# Patient Record
Sex: MALE | Race: WHITE | NOT HISPANIC OR LATINO | Employment: OTHER | URBAN - METROPOLITAN AREA
[De-identification: names, ages, dates, MRNs, and addresses within clinical notes are randomized per-mention and may not be internally consistent; named-entity substitution may affect disease eponyms.]

---

## 2017-07-01 ENCOUNTER — HOSPITAL ENCOUNTER (EMERGENCY)
Facility: MEDICAL CENTER | Age: 82
End: 2017-07-01
Attending: EMERGENCY MEDICINE
Payer: MEDICARE

## 2017-07-01 ENCOUNTER — APPOINTMENT (OUTPATIENT)
Dept: RADIOLOGY | Facility: MEDICAL CENTER | Age: 82
End: 2017-07-01
Attending: EMERGENCY MEDICINE
Payer: MEDICARE

## 2017-07-01 VITALS
BODY MASS INDEX: 25.62 KG/M2 | HEIGHT: 69 IN | RESPIRATION RATE: 18 BRPM | WEIGHT: 173 LBS | DIASTOLIC BLOOD PRESSURE: 65 MMHG | SYSTOLIC BLOOD PRESSURE: 123 MMHG | OXYGEN SATURATION: 95 % | TEMPERATURE: 97.6 F | HEART RATE: 73 BPM

## 2017-07-01 DIAGNOSIS — E86.0 DEHYDRATION: ICD-10-CM

## 2017-07-01 DIAGNOSIS — R55 NEAR SYNCOPE: ICD-10-CM

## 2017-07-01 LAB
ALBUMIN SERPL BCP-MCNC: 4 G/DL (ref 3.2–4.9)
ALBUMIN/GLOB SERPL: 1.1 G/DL
ALP SERPL-CCNC: 75 U/L (ref 30–99)
ALT SERPL-CCNC: 14 U/L (ref 2–50)
ANION GAP SERPL CALC-SCNC: 11 MMOL/L (ref 0–11.9)
AST SERPL-CCNC: 16 U/L (ref 12–45)
BASOPHILS # BLD AUTO: 0.4 % (ref 0–1.8)
BASOPHILS # BLD: 0.03 K/UL (ref 0–0.12)
BILIRUB SERPL-MCNC: 1 MG/DL (ref 0.1–1.5)
BNP SERPL-MCNC: 19 PG/ML (ref 0–100)
BUN SERPL-MCNC: 14 MG/DL (ref 8–22)
CALCIUM SERPL-MCNC: 9.4 MG/DL (ref 8.5–10.5)
CHLORIDE SERPL-SCNC: 105 MMOL/L (ref 96–112)
CO2 SERPL-SCNC: 21 MMOL/L (ref 20–33)
CREAT SERPL-MCNC: 1.12 MG/DL (ref 0.5–1.4)
EOSINOPHIL # BLD AUTO: 0.14 K/UL (ref 0–0.51)
EOSINOPHIL NFR BLD: 1.9 % (ref 0–6.9)
ERYTHROCYTE [DISTWIDTH] IN BLOOD BY AUTOMATED COUNT: 41 FL (ref 35.9–50)
GFR SERPL CREATININE-BSD FRML MDRD: >60 ML/MIN/1.73 M 2
GLOBULIN SER CALC-MCNC: 3.6 G/DL (ref 1.9–3.5)
GLUCOSE SERPL-MCNC: 116 MG/DL (ref 65–99)
HCT VFR BLD AUTO: 47.5 % (ref 42–52)
HGB BLD-MCNC: 16.1 G/DL (ref 14–18)
IMM GRANULOCYTES # BLD AUTO: 0.06 K/UL (ref 0–0.11)
IMM GRANULOCYTES NFR BLD AUTO: 0.8 % (ref 0–0.9)
LIPASE SERPL-CCNC: 18 U/L (ref 11–82)
LYMPHOCYTES # BLD AUTO: 1.59 K/UL (ref 1–4.8)
LYMPHOCYTES NFR BLD: 21.1 % (ref 22–41)
MCH RBC QN AUTO: 29.3 PG (ref 27–33)
MCHC RBC AUTO-ENTMCNC: 33.9 G/DL (ref 33.7–35.3)
MCV RBC AUTO: 86.4 FL (ref 81.4–97.8)
MONOCYTES # BLD AUTO: 0.51 K/UL (ref 0–0.85)
MONOCYTES NFR BLD AUTO: 6.8 % (ref 0–13.4)
NEUTROPHILS # BLD AUTO: 5.2 K/UL (ref 1.82–7.42)
NEUTROPHILS NFR BLD: 69 % (ref 44–72)
NRBC # BLD AUTO: 0 K/UL
NRBC BLD AUTO-RTO: 0 /100 WBC
PLATELET # BLD AUTO: 237 K/UL (ref 164–446)
PMV BLD AUTO: 10 FL (ref 9–12.9)
POTASSIUM SERPL-SCNC: 4.7 MMOL/L (ref 3.6–5.5)
PROT SERPL-MCNC: 7.6 G/DL (ref 6–8.2)
RBC # BLD AUTO: 5.5 M/UL (ref 4.7–6.1)
SODIUM SERPL-SCNC: 137 MMOL/L (ref 135–145)
TROPONIN I SERPL-MCNC: <0.01 NG/ML (ref 0–0.04)
WBC # BLD AUTO: 7.5 K/UL (ref 4.8–10.8)

## 2017-07-01 PROCEDURE — 83880 ASSAY OF NATRIURETIC PEPTIDE: CPT

## 2017-07-01 PROCEDURE — 700105 HCHG RX REV CODE 258: Performed by: EMERGENCY MEDICINE

## 2017-07-01 PROCEDURE — 96374 THER/PROPH/DIAG INJ IV PUSH: CPT

## 2017-07-01 PROCEDURE — 99284 EMERGENCY DEPT VISIT MOD MDM: CPT

## 2017-07-01 PROCEDURE — 71010 DX-CHEST-PORTABLE (1 VIEW): CPT

## 2017-07-01 PROCEDURE — 85025 COMPLETE CBC W/AUTO DIFF WBC: CPT

## 2017-07-01 PROCEDURE — 80053 COMPREHEN METABOLIC PANEL: CPT

## 2017-07-01 PROCEDURE — 84484 ASSAY OF TROPONIN QUANT: CPT

## 2017-07-01 PROCEDURE — 83690 ASSAY OF LIPASE: CPT

## 2017-07-01 PROCEDURE — 93005 ELECTROCARDIOGRAM TRACING: CPT | Performed by: EMERGENCY MEDICINE

## 2017-07-01 PROCEDURE — 700111 HCHG RX REV CODE 636 W/ 250 OVERRIDE (IP): Performed by: EMERGENCY MEDICINE

## 2017-07-01 PROCEDURE — 96361 HYDRATE IV INFUSION ADD-ON: CPT

## 2017-07-01 RX ORDER — ONDANSETRON 2 MG/ML
4 INJECTION INTRAMUSCULAR; INTRAVENOUS ONCE
Status: COMPLETED | OUTPATIENT
Start: 2017-07-01 | End: 2017-07-01

## 2017-07-01 RX ORDER — SODIUM CHLORIDE 9 MG/ML
1000 INJECTION, SOLUTION INTRAVENOUS ONCE
Status: COMPLETED | OUTPATIENT
Start: 2017-07-01 | End: 2017-07-01

## 2017-07-01 RX ADMIN — ONDANSETRON 4 MG: 2 INJECTION INTRAMUSCULAR; INTRAVENOUS at 13:02

## 2017-07-01 RX ADMIN — SODIUM CHLORIDE 1000 ML: 9 INJECTION, SOLUTION INTRAVENOUS at 13:02

## 2017-07-01 ASSESSMENT — PAIN SCALES - GENERAL: PAINLEVEL_OUTOF10: 0

## 2017-07-01 NOTE — ED NOTES
All discharge instructions given to pt . Pt verbalized understanding of all discharge instructions. All lines removed prior to discharge. All questions answered.

## 2017-07-01 NOTE — DISCHARGE INSTRUCTIONS
Dehydration, Adult  Dehydration means your body does not have as much fluid as it needs. Your kidneys, brain, and heart will not work properly without the right amount of fluids and salt.   HOME CARE  · Ask your doctor how to replace body fluid losses (rehydrate).  · Drink enough fluids to keep your pee (urine) clear or pale yellow.  · Drink small amounts of fluids often if you feel sick to your stomach (nauseous) or throw up (vomit).  · Eat like you normally do.  · Avoid:  · Foods or drinks high in sugar.  · Bubbly (carbonated) drinks.  · Juice.  · Very hot or cold fluids.  · Drinks with caffeine.  · Fatty, greasy foods.  · Alcohol.  · Tobacco.  · Eating too much.  · Gelatin desserts.  · Wash your hands to avoid spreading germs (bacteria, viruses).  · Only take medicine as told by your doctor.  · Keep all doctor visits as told.  GET HELP RIGHT AWAY IF:   · You cannot drink something without throwing up.  · You get worse even with treatment.  · Your vomit has blood in it or looks greenish.  · Your poop (stool) has blood in it or looks black and tarry.  · You have not peed in 6 to 8 hours.  · You pee a small amount of very dark pee.  · You have a fever.  · You pass out (faint).  · You have belly (abdominal) pain that gets worse or stays in one spot (localizes).  · You have a rash, stiff neck, or bad headache.  · You get easily annoyed, sleepy, or are hard to wake up.  · You feel weak, dizzy, or very thirsty.  MAKE SURE YOU:   · Understand these instructions.  · Will watch your condition.  · Will get help right away if you are not doing well or get worse.     This information is not intended to replace advice given to you by your health care provider. Make sure you discuss any questions you have with your health care provider.     Document Released: 10/14/2010 Document Revised: 03/11/2013 Document Reviewed: 08/06/2012  EventBug Interactive Patient Education ©2016 [employer]    Near-Syncope  Near-syncope (commonly  known as near fainting) is sudden weakness, dizziness, or feeling like you might pass out. During an episode of near-syncope, you may also develop pale skin, have tunnel vision, or feel sick to your stomach (nauseous). Near-syncope may occur when getting up after sitting or while standing for a long time. It is caused by a sudden decrease in blood flow to the brain. This decrease can result from various causes or triggers, most of which are not serious. However, because near-syncope can sometimes be a sign of something serious, a medical evaluation is required. The specific cause is often not determined.  HOME CARE INSTRUCTIONS   Monitor your condition for any changes. The following actions may help to alleviate any discomfort you are experiencing:  · Have someone stay with you until you feel stable.  · Lie down right away and prop your feet up if you start feeling like you might faint. Breathe deeply and steadily. Wait until all the symptoms have passed. Most of these episodes last only a few minutes. You may feel tired for several hours.    · Drink enough fluids to keep your urine clear or pale yellow.    · If you are taking blood pressure or heart medicine, get up slowly when seated or lying down. Take several minutes to sit and then stand. This can reduce dizziness.  · Follow up with your health care provider as directed.   SEEK IMMEDIATE MEDICAL CARE IF:   · You have a severe headache.    · You have unusual pain in the chest, abdomen, or back.    · You are bleeding from the mouth or rectum, or you have black or tarry stool.    · You have an irregular or very fast heartbeat.    · You have repeated fainting or have seizure-like jerking during an episode.    · You faint when sitting or lying down.    · You have confusion.    · You have difficulty walking.    · You have severe weakness.    · You have vision problems.    MAKE SURE YOU:   · Understand these instructions.  · Will watch your condition.  · Will get help  right away if you are not doing well or get worse.     This information is not intended to replace advice given to you by your health care provider. Make sure you discuss any questions you have with your health care provider.     Document Released: 12/18/2006 Document Revised: 12/23/2014 Document Reviewed: 05/23/2014  Elsevier Interactive Patient Education ©2016 Elsevier Inc.

## 2017-07-01 NOTE — ED AVS SNAPSHOT
7/1/2017    Alireza Hernandez  [address]  [place] [address]    Dear Alireza:    [facility] wants to ensure your discharge home is safe and you or your loved ones have had all of your questions answered regarding your care after you leave the hospital.    Below is a list of resources and contact information should you have any questions regarding your hospital stay, follow-up instructions, or active medical symptoms.    Questions or Concerns Regarding… Contact   Medical Questions Related to Your Discharge  (7 days a week, 8am-5pm) Contact a Nurse Care Coordinator   [phone]   Medical Questions Not Related to Your Discharge  (24 hours a day / 7 days a week)  Contact the Nurse Health Line   [phone]    Medications or Discharge Instructions Refer to your discharge packet   or contact your [facility] Primary Care Provider   [phone]   Follow-up Appointment(s) Schedule your appointment via I-Tech   or contact Scheduling [phone]   Billing Review your statement via I-Tech  or contact Billing [phone]   Medical Records Review your records via I-Tech   or contact Medical Records [phone]     You may receive a telephone call within two days of discharge. This call is to make certain you understand your discharge instructions and have the opportunity to have any questions answered. You can also easily access your medical information, test results and upcoming appointments via the I-Tech free online health management tool. You can learn more and sign up at Mydish/I-Tech. For assistance setting up your I-Tech account, please call [phone].    Once again, we want to ensure your discharge home is safe and that you have a clear understanding of any next steps in your care. If you have any questions or concerns, please do not hesitate to contact us, we are here for you. Thank you for choosing [facility] for your healthcare needs.    Sincerely,    Your [facility] Healthcare Team

## 2017-07-01 NOTE — ED AVS SNAPSHOT
EventVue Access Code: [email or web address]  Expires: 7/31/2017  2:32 PM    Your email address is not on file at [employer].  Email Addresses are required for you to sign up for EventVue, please contact [phone] to verify your personal information and to provide your email address prior to attempting to register for EventVue.    Alireza Hernandez  [address] [facility] [place] [address]    EventVue  A secure, online tool to manage your health information     [employer]’s EventVue® is a secure, online tool that connects you to your personalized health information from the privacy of your home -- day or night - making it very easy for you to manage your healthcare. Once the activation process is completed, you can even access your medical information using the EventVue lewis, which is available for free in the Apple Lewis store or Google Play store.     To learn more about EventVue, visit [email or web address]    There are two levels of access available (as shown below):   My Chart Features  [facility] Primary Care Doctor [facility]  Specialists [facility]  Urgent  Care Non-[facility] Primary Care Doctor   Email your healthcare team securely and privately 24/7 X X X    Manage appointments: schedule your next appointment; view details of past/upcoming appointments X      Request prescription refills. X      View recent personal medical records, including lab and immunizations X X X X   View health record, including health history, allergies, medications X X X X   Read reports about your outpatient visits, procedures, consult and ER notes X X X X   See your discharge summary, which is a recap of your hospital and/or ER visit that includes your diagnosis, lab results, and care plan X X  X     How to register for EventVue:  Once your e-mail address has been verified, follow the following steps to sign up for EventVue.     1. Go to  [email or web address]  2. Click on the Sign Up Now box, which takes you to the New Member Sign Up page. You  will need to provide the following information:  a. Enter your Healthcare IT Access Code exactly as it appears at the top of this page. (You will not need to use this code after you’ve completed the sign-up process. If you do not sign up before the expiration date, you must request a new code.)   b. Enter your date of birth.   c. Enter your home email address.   d. Click Submit, and follow the next screen’s instructions.  3. Create a [employer] ID. This will be your Healthcare IT login ID and cannot be changed, so think of one that is secure and easy to remember.  4. Create a Healthcare IT password. You can change your password at any time.  5. Enter your Password Reset Question and Answer. This can be used at a later time if you forget your password.   6. Enter your e-mail address. This allows you to receive e-mail notifications when new information is available in Healthcare IT.  7. Click Sign Up. You can now view your health information.    For assistance activating your Healthcare IT account, call [phone]

## 2017-07-01 NOTE — ED PROVIDER NOTES
ED Provider Note    Scribed for Shree Lowry M.D. by Annie Perdomo. 7/1/2017  12:45 PM    Primary care provider: No primary care provider   Means of arrival: EMS  History obtained from: patient   History limited by: none    CHIEF COMPLAINT  Chief Complaint   Patient presents with   • Weakness     pt reports that he was outside this morning. drank a small amount of H2O, was driving when he became, weak, nauseated, and diaphoretic. pulled over and called EMS.    • N/V     HPI  Alireza Hernandez is a 81 y.o. male with a history of atrial fibrillation who presents to the Emergency Department by EMS for weakness and nausea onset this morning prior to arrival. He was driving when he felt near-syncopal, diaphoretic, weak, and nauseated.  The patient notes some palpitations at that time as well. The patient pulled over and called 911.  The patient notes that he was outside this morning and did not drink enough water. He denies any chest pain, shortness of breath, abdominal pain, fever, chills, vomiting or diarrhea. The patient has felt normal and well today and recently, he denies any cough or cold symptoms.     REVIEW OF SYSTEMS  Pertinent positives include weakness, diaphoresis, nausea, palpitations. Pertinent negatives include no abdominal pain, chest pain, shortness of breath, fever, chills, vomiting, diarrhea, cough, cold.  All other systems reviewed and negative.C    PAST MEDICAL HISTORY    No chronic medical problems    SURGICAL HISTORY  patient denies any surgical history    SOCIAL HISTORY  Social History   Substance Use Topics   • Smoking status: No   • Smokeless tobacco: No   • Alcohol Use: No      History   Drug Use No     FAMILY HISTORY  No family history noted    CURRENT MEDICATIONS  No current facility-administered medications on file prior to encounter.     No current outpatient prescriptions on file prior to encounter.     ALLERGIES  No Known Allergies    PHYSICAL EXAM  VITAL SIGNS: /65  "mmHg  Pulse 60  Temp(Src) 36.4 °C (97.6 °F)  Resp 18  Ht 1.753 m (5' 9\")  Wt 78.472 kg (173 lb)  BMI 25.54 kg/m2  SpO2 94%    Vital signs reviewed.  Constitutional:  Alert and in no acute distress  Head: Normocephalic and atraumatic   Mouth/Throat: Oropharynx dry   Neck: Supple  Cardiovascular: Regular rate and rhythm   Pulmonary/Chest: Clear breath sounds bilaterally   Abdominal: Soft, non tender, normal bowel sounds  Musculoskeletal: No deformities or tenderness   Lymphadenopathy: No lymphadenopathy   Neurological: Patient is alert and oriented to person, place, and time. CNs II - XII intact. DTRs intact. Normal sensation and strength.  Skin: Warm, dry, slightly pale  Psychiatric: Normal affect    LABS  Results for orders placed or performed during the hospital encounter of 07/01/17   CBC with Differential   Result Value Ref Range    WBC 7.5 4.8 - 10.8 K/uL    RBC 5.50 4.70 - 6.10 M/uL    Hemoglobin 16.1 14.0 - 18.0 g/dL    Hematocrit 47.5 42.0 - 52.0 %    MCV 86.4 81.4 - 97.8 fL    MCH 29.3 27.0 - 33.0 pg    MCHC 33.9 33.7 - 35.3 g/dL    RDW 41.0 35.9 - 50.0 fL    Platelet Count 237 164 - 446 K/uL    MPV 10.0 9.0 - 12.9 fL    Neutrophils-Polys 69.00 44.00 - 72.00 %    Lymphocytes 21.10 (L) 22.00 - 41.00 %    Monocytes 6.80 0.00 - 13.40 %    Eosinophils 1.90 0.00 - 6.90 %    Basophils 0.40 0.00 - 1.80 %    Immature Granulocytes 0.80 0.00 - 0.90 %    Nucleated RBC 0.00 /100 WBC    Neutrophils (Absolute) 5.20 1.82 - 7.42 K/uL    Lymphs (Absolute) 1.59 1.00 - 4.80 K/uL    Monos (Absolute) 0.51 0.00 - 0.85 K/uL    Eos (Absolute) 0.14 0.00 - 0.51 K/uL    Baso (Absolute) 0.03 0.00 - 0.12 K/uL    Immature Granulocytes (abs) 0.06 0.00 - 0.11 K/uL    NRBC (Absolute) 0.00 K/uL   Complete Metabolic Panel (CMP)   Result Value Ref Range    Sodium 137 135 - 145 mmol/L    Potassium 4.7 3.6 - 5.5 mmol/L    Chloride 105 96 - 112 mmol/L    Co2 21 20 - 33 mmol/L    Anion Gap 11.0 0.0 - 11.9    Glucose 116 (H) 65 - 99 mg/dL    " Bun 14 8 - 22 mg/dL    Creatinine 1.12 0.50 - 1.40 mg/dL    Calcium 9.4 8.5 - 10.5 mg/dL    AST(SGOT) 16 12 - 45 U/L    ALT(SGPT) 14 2 - 50 U/L    Alkaline Phosphatase 75 30 - 99 U/L    Total Bilirubin 1.0 0.1 - 1.5 mg/dL    Albumin 4.0 3.2 - 4.9 g/dL    Total Protein 7.6 6.0 - 8.2 g/dL    Globulin 3.6 (H) 1.9 - 3.5 g/dL    A-G Ratio 1.1 g/dL   Btype Natriuretic Peptide (BNP)   Result Value Ref Range    B Natriuretic Peptide 19 0 - 100 pg/mL   Lipase   Result Value Ref Range    Lipase 18 11 - 82 U/L   Troponin STAT   Result Value Ref Range    Troponin I <0.01 0.00 - 0.04 ng/mL   ESTIMATED GFR   Result Value Ref Range    GFR If African American >60 >60 mL/min/1.73 m 2    GFR If Non African American >60 >60 mL/min/1.73 m 2   EKG (ER)   Result Value Ref Range    Report       [facility] Emergency Dept.    Test Date:  2017  Pt Name:    RODERICK MILLS                 Department: ER  MRN:        [id number]                      Room:       [facility]  Gender:     M                            Technician: [id number]  :        [id number]                   Requested By:SHONNA COLES  Order #:    [id number]                    Reading MD:    Measurements  Intervals                                Axis  Rate:       61                           P:          22  ID:         [id number]                          QRS:        4  QRSD:       100                          T:          47  QT:         420  QTc:        423    Interpretive Statements  SINUS RHYTHM  FIRST DEGREE AV BLOCK  No previous ECG available for comparison     All labs reviewed by me.    EKG  12 Lead EKG interpreted by me to show sinus rhythm at 60. Normal P waves. Normal QRS. First degree AV block. Yumiko R wave progression Normal ST segments. Normal T waves. Abnormal EKG    RADIOLOGY  No orders to display     The radiologist's interpretation of all radiological studies have been reviewed by me.    COURSE & MEDICAL DECISION MAKING  Pertinent Labs & Imaging studies  "reviewed. (See chart for details) The patient's [facility] Nursing and past medical  records were reviewed    Last visit 2004 for Cardiology     12:45 PM - Patient seen and examined at bedside. Patient will be treated with NS infusion 1000mL for ibuprofen, Zofran 4mg IV. Ordered chest xray, CBC with differential, CMP, BNP, lipase, troponin, estimated GFR, EKG to evaluate his symptoms. The differential diagnoses include but are not limited to: vasovagal episode, dehydration, electrolyte abnormality, heat exhaustion his blood work is really quite unremarkable. I suspected a near syncopal episode secondary to dehydration. Patient was given IV fluids and is feeling much improved. He was discharged home in stable condition.    2:31 PM - Re-examined; The patient is resting in bed comfortably. I discussed his above findings and plans for discharge. He was given a referral to follow up with his primary care provider and instructed to return to the ED if his symptoms worsen. Patient understands and agrees. His vitals prior to discharge are: /65 mmHg  Pulse 66  Temp(Src) 36.4 °C (97.6 °F)  Resp 18  Ht 1.753 m (5' 9\")  Wt 78.472 kg (173 lb)  BMI 25.54 kg/m2  SpO2 97%     The patient will return for new or worsening symptoms and is stable at the time of discharge.    The patient is referred to a primary physician for blood pressure management, diabetic screening, and for all other preventative health concerns.    DISPOSITION:  Patient will be discharged home in stable condition.    FOLLOW UP:  [facility], Emergency Dept  [address] [facility] [address] [phone]  In 1 day  As needed, If symptoms worsen    OUTPATIENT MEDICATIONS:  There are no discharge medications for this patient.    FINAL IMPRESSION  1. Dehydration    2. Near syncope          I, Annie Perdomo (Scriberrol), am scribing for, and in the presence of, Shree Lowry M.D..    Electronically signed by: Annie " DAMON Perdomo (Scribe), 7/1/2017    IShree M.D. personally performed the services described in this documentation, as scribed by Annie Perdomo in my presence, and it is both accurate and complete.    The note accurately reflects work and decisions made by me.  Shree Lowry  7/1/2017  2:44 PM

## 2017-07-01 NOTE — ED AVS SNAPSHOT
Home Care Instructions                                                                                                                Alireza Hernandez   MRN: [id number]    Department:  [facility], Emergency Dept   Date of Visit:  7/1/2017            [facility], Emergency Dept    [address] [place] [id number]    Phone:  [phone]      You were seen by     Shree Lowry M.D.      Your Diagnosis Was     Dehydration     E86.0       These are the medications you received during your hospitalization from 07/01/2017 1229 to 07/01/2017 1432     Date/Time Order Dose Route Action    07/01/2017 1302 NS infusion 1,000 mL 1,000 mL Intravenous New Bag    07/01/2017 1302 ondansetron (ZOFRAN) syringe/vial injection 4 mg 4 mg Intravenous Given      Follow-up Information     1. Follow up with [facility], Emergency Dept In 1 day.    Specialty:  Emergency Medicine    Why:  As needed, If symptoms worsen    Contact information    [address] [place] [id number] [phone]      Medication Information     Review all of your home medications and newly ordered medications with your primary doctor and/or pharmacist as soon as possible. Follow medication instructions as directed by your doctor and/or pharmacist.     Please keep your complete medication list with you and share with your physician. Update the information when medications are discontinued, doses are changed, or new medications (including over-the-counter products) are added; and carry medication information at all times in the event of emergency situations.               Medication List      Notice     You have not been prescribed any medications.            Procedures and tests performed during your visit     Procedure/Test Number of Times Performed    Btype Natriuretic Peptide (BNP) 1    CBC with Differential 1    Complete Metabolic Panel (CMP) 1    DX-CHEST-PORTABLE (1 VIEW) 1    EKG (ER) 2    ESTIMATED GFR 1    IV Saline Lock 1    Lipase 1    Troponin STAT 1        Discharge Instructions       Dehydration, Adult  Dehydration means your body does not have as much fluid as it needs. Your kidneys, brain, and heart will not work properly without the right amount of fluids and salt.   HOME CARE  · Ask your doctor how to replace body fluid losses (rehydrate).  · Drink enough fluids to keep your pee (urine) clear or pale yellow.  · Drink small amounts of fluids often if you feel sick to your stomach (nauseous) or throw up (vomit).  · Eat like you normally do.  · Avoid:  · Foods or drinks high in sugar.  · Bubbly (carbonated) drinks.  · Juice.  · Very hot or cold fluids.  · Drinks with caffeine.  · Fatty, greasy foods.  · Alcohol.  · Tobacco.  · Eating too much.  · Gelatin desserts.  · Wash your hands to avoid spreading germs (bacteria, viruses).  · Only take medicine as told by your doctor.  · Keep all doctor visits as told.  GET HELP RIGHT AWAY IF:   · You cannot drink something without throwing up.  · You get worse even with treatment.  · Your vomit has blood in it or looks greenish.  · Your poop (stool) has blood in it or looks black and tarry.  · You have not peed in 6 to 8 hours.  · You pee a small amount of very dark pee.  · You have a fever.  · You pass out (faint).  · You have belly (abdominal) pain that gets worse or stays in one spot (localizes).  · You have a rash, stiff neck, or bad headache.  · You get easily annoyed, sleepy, or are hard to wake up.  · You feel weak, dizzy, or very thirsty.  MAKE SURE YOU:   · Understand these instructions.  · Will watch your condition.  · Will get help right away if you are not doing well or get worse.     This information is not intended to replace advice given to you by your health care provider. Make sure you discuss any questions you have with your health care provider.     Document Released: 10/14/2010 Document Revised: 03/11/2013 Document Reviewed:  08/06/2012  [employer] Interactive Patient Education ©2016 [employer]    Near-Syncope  Near-syncope (commonly known as near fainting) is sudden weakness, dizziness, or feeling like you might pass out. During an episode of near-syncope, you may also develop pale skin, have tunnel vision, or feel sick to your stomach (nauseous). Near-syncope may occur when getting up after sitting or while standing for a long time. It is caused by a sudden decrease in blood flow to the brain. This decrease can result from various causes or triggers, most of which are not serious. However, because near-syncope can sometimes be a sign of something serious, a medical evaluation is required. The specific cause is often not determined.  HOME CARE INSTRUCTIONS   Monitor your condition for any changes. The following actions may help to alleviate any discomfort you are experiencing:  · Have someone stay with you until you feel stable.  · Lie down right away and prop your feet up if you start feeling like you might faint. Breathe deeply and steadily. Wait until all the symptoms have passed. Most of these episodes last only a few minutes. You may feel tired for several hours.    · Drink enough fluids to keep your urine clear or pale yellow.    · If you are taking blood pressure or heart medicine, get up slowly when seated or lying down. Take several minutes to sit and then stand. This can reduce dizziness.  · Follow up with your health care provider as directed.   SEEK IMMEDIATE MEDICAL CARE IF:   · You have a severe headache.    · You have unusual pain in the chest, abdomen, or back.    · You are bleeding from the mouth or rectum, or you have black or tarry stool.    · You have an irregular or very fast heartbeat.    · You have repeated fainting or have seizure-like jerking during an episode.    · You faint when sitting or lying down.    · You have confusion.    · You have difficulty walking.    · You have severe weakness.    · You have  vision problems.    MAKE SURE YOU:   · Understand these instructions.  · Will watch your condition.  · Will get help right away if you are not doing well or get worse.     This information is not intended to replace advice given to you by your health care provider. Make sure you discuss any questions you have with your health care provider.     Document Released: 12/18/2006 Document Revised: 12/23/2014 Document Reviewed: 05/23/2014  [employer] Interactive Patient Education ©2016 [employer]            Patient Information     Patient Information    Following emergency treatment: all patient requiring follow-up care must return either to a private physician or a clinic if your condition worsens before you are able to obtain further medical attention, please return to the emergency room.     Billing Information    At [facility], we work to make the billing process streamlined for our patients.  Our Representatives are here to answer any questions you may have regarding your hospital bill.  If you have insurance coverage and have supplied your insurance information to us, we will submit a claim to your insurer on your behalf.  Should you have any questions regarding your bill, we can be reached online or by phone as follows:  Online: You are able pay your bills online or live chat with our representatives about any billing questions you may have. We are here to help Monday - Friday from 8:00am to 7:30pm and 9:00am - 12:00pm on Saturdays.  Please visit [email or web address]  for more information.   Phone:  [phone] or [phone]    Please note that your emergency physician, surgeon, pathologist, radiologist, anesthesiologist, and other specialists are not employed by [facility] and will therefore bill separately for their services.  Please contact them directly for any questions concerning their bills at the numbers below:     Emergency Physician Services:  [phone]  [facility]:  [phone]  [facility]:  [phone]  [facility]:  [phone]    1. Your final bill may vary from the amount quoted upon discharge if all procedures are not complete at that time, or if your doctor has additional procedures of which we are not aware. You will receive an additional bill if you return to the Emergency Department at [facility] for suture removal regardless of the facility of which the sutures were placed.     2. Please arrange for settlement of this account at the emergency registration.    3. All self-pay accounts are due in full at the time of treatment.  If you are unable to meet this obligation then payment is expected within 4-5 days.     4. If you have had radiology studies (CT, X-ray, Ultrasound, MRI), you have received a preliminary result during your emergency department visit. Please contact the radiology department [phone] to receive a copy of your final result. Please discuss the Final result with your primary physician or with the follow up physician provided.     Crisis Hotline:  [place] Crisis Hotline:  8-625-MMZOHLV or 1-152.102.7675  [place] Crisis Hotline:    1-279.556.9534 or 232-890-2569         ED Discharge Follow Up Questions    1. In order to provide you with very good care, we would like to follow up with a phone call in the next few days.  May we have your permission to contact you?     YES /  NO    2. What is the best phone number to call you? (       )_____-__________    3. What is the best time to call you?      Morning  /  Afternoon  /  Evening                   Patient Signature:  ____________________________________________________________    Date:  ____________________________________________________________

## 2017-07-04 LAB — EKG IMPRESSION: NORMAL

## 2020-08-26 NOTE — ED NOTES
Chief Complaint   Patient presents with   • Weakness     pt reports that he was outside this morning. drank a small amount of H2O, was driving when he became, weak, nauseated, and diaphoretic. pulled over and called EMS.    • N/V     Pt received 4mg zofran PTA for nausea. Per EMS pt had near syncope in the car.   Pt placed on monitor, chart up for ERP.    8

## 2021-01-14 DIAGNOSIS — Z23 NEED FOR VACCINATION: ICD-10-CM

## 2021-12-16 ENCOUNTER — RX ONLY (OUTPATIENT)
Age: 86
Setting detail: RX ONLY
End: 2021-12-16

## 2021-12-16 RX ORDER — AMOXICILLIN 500 MG/1
CAPSULE ORAL
Qty: 4 | Refills: 0 | Status: ERX | COMMUNITY
Start: 2021-12-15

## 2021-12-29 ENCOUNTER — APPOINTMENT (RX ONLY)
Dept: URBAN - METROPOLITAN AREA CLINIC 36 | Facility: CLINIC | Age: 86
Setting detail: DERMATOLOGY
End: 2021-12-29

## 2021-12-29 PROBLEM — C44.321 SQUAMOUS CELL CARCINOMA OF SKIN OF NOSE: Status: ACTIVE | Noted: 2021-12-29

## 2021-12-29 PROCEDURE — ? MOHS SURGERY

## 2021-12-29 PROCEDURE — ? PRESCRIPTION

## 2021-12-29 PROCEDURE — 14060 TIS TRNFR E/N/E/L 10 SQ CM/<: CPT

## 2021-12-29 PROCEDURE — 17311 MOHS 1 STAGE H/N/HF/G: CPT

## 2021-12-29 PROCEDURE — 17312 MOHS ADDL STAGE: CPT

## 2021-12-29 RX ORDER — CEPHALEXIN 500 MG/1
1 TABLET ORAL TID
Qty: 30 | Refills: 0 | Status: ERX | COMMUNITY
Start: 2021-12-29

## 2021-12-29 RX ADMIN — CEPHALEXIN 1: 500 TABLET ORAL at 00:00

## 2021-12-29 NOTE — PROCEDURE: MIPS QUALITY
Quality 431: Preventive Care And Screening: Unhealthy Alcohol Use - Screening: Patient not identified as an unhealthy alcohol user when screened for unhealthy alcohol use using a systematic screening method
Detail Level: Detailed
Quality 226: Preventive Care And Screening: Tobacco Use: Screening And Cessation Intervention: Patient screened for tobacco use and is an ex/non-smoker
Quality 130: Documentation Of Current Medications In The Medical Record: Current Medications Documented
Quality 265: Biopsy Follow-Up: Biopsy results reviewed, communicated, tracked, and documented
Quality 111:Pneumonia Vaccination Status For Older Adults: Pneumococcal Vaccination Previously Received

## 2021-12-29 NOTE — HPI: PROCEDURE (MOHS)
Has The Growth Been Previously Biopsied?: has been previously biopsied
Body Location Override (Optional): nose

## 2021-12-29 NOTE — PROCEDURE: MOHS SURGERY
Stage 2: Additional Anesthesia Type: 1% lidocaine with epinephrine
Oculoplastic Surgeon Procedure Text (C): After obtaining clear surgical margins the patient was sent to oculoplastics for surgical repair.  The patient understands they will receive post-surgical care and follow-up from the referring physician's office.
S Plasty Text: Given the location and shape of the defect, and the orientation of relaxed skin tension lines, an S-plasty was deemed most appropriate for repair.  Using a sterile surgical marker, the appropriate outline of the S-plasty was drawn, incorporating the defect and placing the expected incisions within the relaxed skin tension lines where possible.  The area thus outlined was incised deep to adipose tissue with a #15 scalpel blade.  The skin margins were undermined to an appropriate distance in all directions utilizing iris scissors. The skin flaps were advanced over the defect.  The opposing margins were then approximated with interrupted buried subcutaneous sutures.
Quadrants Reporting?: 0
Undermining Type: Entire Wound
Double M-Plasty Intermediate Repair Preamble Text (Leave Blank If You Do Not Want): Undermining was performed with blunt dissection.
Biopsy Photograph Reviewed: No (no photograph available)
Plastic Surgeon Procedure Text (B): After obtaining clear surgical margins the patient was sent to plastics for surgical repair.  The patient understands they will receive post-surgical care and follow-up from the referring physician's office.
Graft Donor Site Epidermal Sutures (Optional): 5-0 Surgipro
Repair Performed By Another Provider Text (Leave Blank If You Do Not Want): After obtaining clear surgical margins the defect was repaired by another provider.
Closure 4 Information: This tab is for additional flaps and grafts above and beyond our usual structured repairs.  Please note if you enter information here it will not currently bill and you will need to add the billing information manually.
Suture Removal: 14 days
Complex Requirements: Extensive Undermining Performed?: No
Mercedes Flap Text: The defect edges were debeveled with a #15 scalpel blade.  Given the location of the defect, shape of the defect and the proximity to free margins a Mercedes flap was deemed most appropriate.  Using a sterile surgical marker, an appropriate advancement flap was drawn incorporating the defect and placing the expected incisions within the relaxed skin tension lines where possible. The area thus outlined was incised deep to adipose tissue with a #15 scalpel blade.  The skin margins were undermined to an appropriate distance in all directions utilizing iris scissors.
Referred To Asc For Closure Text (Leave Blank If You Do Not Want): After obtaining clear surgical margins the patient was sent to an ASC for surgical repair.  The patient understands they will receive post-surgical care and follow-up from the ASC physician.
Show Special Stain Variables In The Stage Tabs: Yes
O-Z Plasty Text: The defect edges were debeveled with a #15 scalpel blade.  Given the location of the defect, shape of the defect and the proximity to free margins an O-Z plasty (double transposition flap) was deemed most appropriate.  Using a sterile surgical marker, the appropriate transposition flaps were drawn incorporating the defect and placing the expected incisions within the relaxed skin tension lines where possible.    The area thus outlined was incised deep to adipose tissue with a #15 scalpel blade.  The skin margins were undermined to an appropriate distance in all directions utilizing iris scissors.  Hemostasis was achieved with electrocautery.  The flaps were then transposed into place, one clockwise and the other counterclockwise, and anchored with interrupted buried subcutaneous sutures.
Otolaryngologist Procedure Text (D): After obtaining clear surgical margins the patient was sent to otolaryngology for surgical repair.  The patient understands they will receive post-surgical care and follow-up from the referring physician's office.
Why Was The Change Made?: Please Select the Appropriate Response
Localized Dermabrasion With Wire Brush Text: The patient was draped in routine manner.  Localized dermabrasion using 3 x 17 mm wire brush was performed in routine manner to papillary dermis. This spot dermabrasion is being performed to complete skin cancer reconstruction. It also will eliminate the other sun damaged precancerous cells that are known to be part of the regional effect of a lifetime's worth of sun exposure. This localized dermabrasion is therapeutic and should not be considered cosmetic in any regard.
Partial Purse String (Intermediate) Text: Given the location of the defect and the characteristics of the surrounding skin an intermediate purse string closure was deemed most appropriate.  Undermining was performed circumfirentially around the surgical defect.  A purse string suture was then placed and tightened. Wound tension only allowed a partial closure of the circular defect.
Purse String (Intermediate) Text: Given the location of the defect and the characteristics of the surrounding skin a purse string intermediate closure was deemed most appropriate.  Undermining was performed circumfirentially around the surgical defect.  A purse string suture was then placed and tightened.
Area H Indication Text: Tumors in this location are included in Area H (eyelids, eyebrows, nose, lips, chin, ear, pre-auricular, post-auricular, temple, genitalia, hands, feet, ankles and areola).  Tissue conservation is critical in these anatomic locations.
Consent (Near Eyelid Margin)/Introductory Paragraph: The rationale for Mohs was explained to the patient and consent was obtained. The risks, benefits and alternatives to therapy were discussed in detail. Specifically, the risks of ectropion or eyelid deformity, infection, scarring, bleeding, prolonged wound healing, incomplete removal, allergy to anesthesia, nerve injury and recurrence were addressed. Prior to the procedure, the treatment site was clearly identified and confirmed by the patient. All components of Universal Protocol/PAUSE Rule completed.
Posterior Auricular Interpolation Flap Text: A decision was made to reconstruct the defect utilizing an interpolation axial flap and a staged reconstruction.  A telfa template was made of the defect.  This telfa template was then used to outline the posterior auricular interpolation flap.  The donor area for the pedicle flap was then injected with anesthesia.  The flap was excised through the skin and subcutaneous tissue down to the layer of the underlying musculature.  The pedicle flap was carefully excised within this deep plane to maintain its blood supply.  The edges of the donor site were undermined.   The donor site was closed in a primary fashion.  The pedicle was then rotated into position and sutured.  Once the tube was sutured into place, adequate blood supply was confirmed with blanching and refill.  The pedicle was then wrapped with xeroform gauze and dressed appropriately with a telfa and gauze bandage to ensure continued blood supply and protect the attached pedicle.
Bcc Infiltrative Histology Text: There were numerous aggregates of basaloid cells demonstrating an infiltrative pattern.
Paramedian Forehead Flap Text: A decision was made to reconstruct the defect utilizing an interpolation axial flap and a staged reconstruction.  A telfa template was made of the defect.  This telfa template was then used to outline the paramedian forehead pedicle flap.  The donor area for the pedicle flap was then injected with anesthesia.  The flap was excised through the skin and subcutaneous tissue down to the layer of the underlying musculature.  The pedicle flap was carefully excised within this deep plane to maintain its blood supply.  The edges of the donor site were undermined.   The donor site was closed in a primary fashion.  The pedicle was then rotated into position and sutured.  Once the tube was sutured into place, adequate blood supply was confirmed with blanching and refill.  The pedicle was then wrapped with xeroform gauze and dressed appropriately with a telfa and gauze bandage to ensure continued blood supply and protect the attached pedicle.
Interpolation Flap Text: A decision was made to reconstruct the defect utilizing an interpolation axial flap and a staged reconstruction.  A telfa template was made of the defect.  This telfa template was then used to outline the interpolation flap.  The donor area for the pedicle flap was then injected with anesthesia.  The flap was excised through the skin and subcutaneous tissue down to the layer of the underlying musculature.  The interpolation flap was carefully excised within this deep plane to maintain its blood supply.  The edges of the donor site were undermined.   The donor site was closed in a primary fashion.  The pedicle was then rotated into position and sutured.  Once the tube was sutured into place, adequate blood supply was confirmed with blanching and refill.  The pedicle was then wrapped with xeroform gauze and dressed appropriately with a telfa and gauze bandage to ensure continued blood supply and protect the attached pedicle.
Mohs Method Verbiage: An incision at a 45 degree angle following the standard Mohs approach was done and the specimen was harvested as a microscopic controlled layer.
Closure 2 Information: This tab is for additional flaps and grafts, including complex repair and grafts and complex repair and flaps. You can also specify a different location for the additional defect, if the location is the same you do not need to select a new one. We will insert the automated text for the repair you select below just as we do for solitary flaps and grafts. Please note that at this time if you select a location with a different insurance zone you will need to override the ICD10 and CPT if appropriate.
Consent (Spinal Accessory)/Introductory Paragraph: The rationale for Mohs was explained to the patient and consent was obtained. The risks, benefits and alternatives to therapy were discussed in detail. Specifically, the risks of damage to the spinal accessory nerve, infection, scarring, bleeding, prolonged wound healing, incomplete removal, allergy to anesthesia, and recurrence were addressed. Prior to the procedure, the treatment site was clearly identified and confirmed by the patient. All components of Universal Protocol/PAUSE Rule completed.
M-Plasty Complex Repair Preamble Text (Leave Blank If You Do Not Want): Extensive wide undermining was performed.
X Size Of Lesion In Cm (Optional): 1.1
Rhombic Flap Text: The defect edges were debeveled with a #15 scalpel blade.  Given the location of the defect and the proximity to free margins a rhombic flap was deemed most appropriate.  Using a sterile surgical marker, an appropriate rhombic flap was drawn incorporating the defect.    The area thus outlined was incised deep to adipose tissue with a #15 scalpel blade.  The skin margins were undermined to an appropriate distance in all directions utilizing iris scissors.
Stage 2: Number Of Blocks?: 1
Nostril Rim Text: The closure involved the nostril rim.
Subsequent Stages Histo Method Verbiage: Using a similar technique to that described above, a thin layer of tissue was removed from all areas where tumor was visible on the previous stage.  The tissue was again oriented, mapped, dyed, and processed as above.
Xenograft Text: The defect edges were debeveled with a #15 scalpel blade.  Given the location of the defect, shape of the defect and the proximity to free margins a xenograft was deemed most appropriate.  The graft was then trimmed to fit the size of the defect.  The graft was then placed in the primary defect and oriented appropriately.
Tarsorrhaphy Text: A tarsorrhaphy was performed using Frost sutures.
Island Pedicle Flap-Requiring Vessel Identification Text: The defect edges were debeveled with a #15 scalpel blade.  Given the location of the defect, shape of the defect and the proximity to free margins an island pedicle advancement flap was deemed most appropriate.  Using a sterile surgical marker, an appropriate advancement flap was drawn, based on the axial vessel mentioned above, incorporating the defect, outlining the appropriate donor tissue and placing the expected incisions within the relaxed skin tension lines where possible.    The area thus outlined was incised deep to adipose tissue with a #15 scalpel blade.  The skin margins were undermined to an appropriate distance in all directions around the primary defect and laterally outward around the island pedicle utilizing iris scissors.  There was minimal undermining beneath the pedicle flap.
Complex Repair And Flap Additional Text (Will Appearing After The Standard Complex Repair Text): The complex repair was not sufficient to completely close the primary defect. The remaining additional defect was repaired with the flap mentioned below.
Complex Repair And Graft Additional Text (Will Appearing After The Standard Complex Repair Text): The complex repair was not sufficient to completely close the primary defect. The remaining additional defect was repaired with the graft mentioned below.
Special Stains Stage 8 - Results: Base On Clearance Noted Above
Dermal Autograft Text: The defect edges were debeveled with a #15 scalpel blade.  Given the location of the defect, shape of the defect and the proximity to free margins a dermal autograft was deemed most appropriate.  Using a sterile surgical marker, the primary defect shape was transferred to the donor site. The area thus outlined was incised deep to adipose tissue with a #15 scalpel blade.  The harvested graft was then trimmed of adipose and epidermal tissue until only dermis was left.  The skin graft was then placed in the primary defect and oriented appropriately.
Ear Wedge Repair Text: A wedge excision was completed by carrying down an excision through the full thickness of the ear and cartilage with an inward facing Burow's triangle. The wound was then closed in a layered fashion.
Skin Substitute Text: The defect edges were debeveled with a #15 scalpel blade.  Given the location of the defect, shape of the defect and the proximity to free margins a skin substitute graft was deemed most appropriate.  The graft material was trimmed to fit the size of the defect. The graft was then placed in the primary defect and oriented appropriately.
Depth Of Tumor Invasion (For Histology): dermis
Graft Donor Site Dermal Sutures (Optional): 5-0 Polysorb
Mauc Instructions: By selecting yes to the question below the MAUC number will be added into the note.  This will be calculated automatically based on the diagnosis chosen, the size entered, the body zone selected (H,M,L) and the specific indications you chose. You will also have the option to override the Mohs AUC if you disagree with the automatically calculated number and this option is found in the Case Summary tab.
Cartilage Graft Text: The defect edges were debeveled with a #15 scalpel blade.  Given the location of the defect, shape of the defect, the fact the defect involved a full thickness cartilage defect a cartilage graft was deemed most appropriate.  An appropriate donor site was identified, cleansed, and anesthetized. The cartilage graft was then harvested and transferred to the recipient site, oriented appropriately and then sutured into place.  The secondary defect was then repaired using a primary closure.
Advancement Flap (Double) Text: The defect edges were debeveled with a #15 scalpel blade.  Given the location of the defect and the proximity to free margins a double advancement flap was deemed most appropriate.  Using a sterile surgical marker, the appropriate advancement flaps were drawn incorporating the defect and placing the expected incisions within the relaxed skin tension lines where possible.    The area thus outlined was incised deep to adipose tissue with a #15 scalpel blade.  The skin margins were undermined to an appropriate distance in all directions utilizing iris scissors.
Full Thickness Lip Wedge Repair (Flap) Text: Given the location of the defect and the proximity to free margins a full thickness wedge repair was deemed most appropriate.  Using a sterile surgical marker, the appropriate repair was drawn incorporating the defect and placing the expected incisions perpendicular to the vermilion border.  The vermilion border was also meticulously outlined to ensure appropriate reapproximation during the repair.  The area thus outlined was incised through and through with a #15 scalpel blade.  The muscularis and dermis were reaproximated with deep sutures following hemostasis. Care was taken to realign the vermilion border before proceeding with the superficial closure.  Once the vermilion was realigned the superfical and mucosal closure was finished.
Mid-Level Procedure Text (F): After obtaining clear surgical margins the patient was sent to a mid-level provider for surgical repair.  The patient understands they will receive post-surgical care and follow-up from the mid-level provider.
Nasal Turnover Hinge Flap Text: The defect edges were debeveled with a #15 scalpel blade.  Given the size, depth, location of the defect and the defect being full thickness a nasal turnover hinge flap was deemed most appropriate.  Using a sterile surgical marker, an appropriate hinge flap was drawn incorporating the defect. The area thus outlined was incised with a #15 scalpel blade. The flap was designed to recreate the nasal mucosal lining and the alar rim. The skin margins were undermined to an appropriate distance in all directions utilizing iris scissors.
Burow's Advancement Flap Text: The defect edges were debeveled with a #15 scalpel blade.  Given the location of the defect and the proximity to free margins a Burow's advancement flap was deemed most appropriate.  Using a sterile surgical marker, the appropriate advancement flap was drawn incorporating the defect and placing the expected incisions within the relaxed skin tension lines where possible.    The area thus outlined was incised deep to adipose tissue with a #15 scalpel blade.  The skin margins were undermined to an appropriate distance in all directions utilizing iris scissors.
Surgeon Performing Repair (Optional): Severino Patel MD
Peng Advancement Flap Text: The defect edges were debeveled with a #15 scalpel blade.  Given the location of the defect, shape of the defect and the proximity to free margins a Peng advancement flap was deemed most appropriate.  Using a sterile surgical marker, an appropriate advancement flap was drawn incorporating the defect and placing the expected incisions within the relaxed skin tension lines where possible. The area thus outlined was incised deep to adipose tissue with a #15 scalpel blade.  The skin margins were undermined to an appropriate distance in all directions utilizing iris scissors.
Alternatives Discussed Intro (Do Not Add Period): I discussed alternative treatments to Mohs surgery and specifically discussed the risks and benefits of
Island Pedicle Flap Text: The defect edges were debeveled with a #15 scalpel blade.  Given the location of the defect, shape of the defect and the proximity to free margins an island pedicle advancement flap was deemed most appropriate.  Using a sterile surgical marker, an appropriate advancement flap was drawn incorporating the defect, outlining the appropriate donor tissue and placing the expected incisions within the relaxed skin tension lines where possible.    The area thus outlined was incised deep to adipose tissue with a #15 scalpel blade.  The skin margins were undermined to an appropriate distance in all directions around the primary defect and laterally outward around the island pedicle utilizing iris scissors.  There was minimal undermining beneath the pedicle flap.
Trilobed Flap Text: The defect edges were debeveled with a #15 scalpel blade.  Given the location of the defect and the proximity to free margins a trilobed flap was deemed most appropriate.  Using a sterile surgical marker, an appropriate trilobed flap drawn around the defect.    The area thus outlined was incised deep to adipose tissue with a #15 scalpel blade.  The skin margins were undermined to an appropriate distance in all directions utilizing iris scissors.
Melolabial Interpolation Flap Text: A decision was made to reconstruct the defect utilizing an interpolation axial flap and a staged reconstruction.  A telfa template was made of the defect.  This telfa template was then used to outline the melolabial interpolation flap.  The donor area for the pedicle flap was then injected with anesthesia.  The flap was excised through the skin and subcutaneous tissue down to the layer of the underlying musculature.  The pedicle flap was carefully excised within this deep plane to maintain its blood supply.  The edges of the donor site were undermined.   The donor site was closed in a primary fashion.  The pedicle was then rotated into position and sutured.  Once the tube was sutured into place, adequate blood supply was confirmed with blanching and refill.  The pedicle was then wrapped with xeroform gauze and dressed appropriately with a telfa and gauze bandage to ensure continued blood supply and protect the attached pedicle.
Purse String (Simple) Text: Given the location of the defect and the characteristics of the surrounding skin a purse string closure was deemed most appropriate.  Undermining was performed circumfirentially around the surgical defect.  A purse string suture was then placed and tightened.
Helical Rim Text: The closure involved the helical rim.
Area M Indication Text: Tumors in this location are included in Area M (cheek, forehead, scalp, neck, jawline and pretibial skin).  Mohs surgery is indicated for tumors in these anatomic locations.
Detail Level: Detailed
Wound Care: Vaseline
Graft Cartilage Fenestration Text: The cartilage was fenestrated with a 2mm punch biopsy to help facilitate graft survival and healing.
Crescentic Advancement Flap Text: The defect edges were debeveled with a #15 scalpel blade.  Given the location of the defect and the proximity to free margins a crescentic advancement flap was deemed most appropriate.  Using a sterile surgical marker, the appropriate advancement flap was drawn incorporating the defect and placing the expected incisions within the relaxed skin tension lines where possible.    The area thus outlined was incised deep to adipose tissue with a #15 scalpel blade.  The skin margins were undermined to an appropriate distance in all directions utilizing iris scissors.
Ear Star Wedge Flap Text: The defect edges were debeveled with a #15 blade scalpel.  Given the location of the defect and the proximity to free margins (helical rim) an ear star wedge flap was deemed most appropriate.  Using a sterile surgical marker, the appropriate flap was drawn incorporating the defect and placing the expected incisions between the helical rim and antihelix where possible.  The area thus outlined was incised through and through with a #15 scalpel blade.
Body Location Override (Optional - Billing Will Still Be Based On Selected Body Map Location If Applicable): nose
Consent 1/Introductory Paragraph: The rationale for Mohs was explained to the patient and consent was obtained. The risks, benefits and alternatives to therapy were discussed in detail. Specifically, the risks of infection, scarring, bleeding, prolonged wound healing, incomplete removal, allergy to anesthesia, nerve injury and recurrence were addressed. Prior to the procedure, the treatment site was clearly identified and confirmed by the patient. All components of Universal Protocol/PAUSE Rule completed.
Dorsal Nasal Flap Text: The defect edges were debeveled with a #15 scalpel blade.  Given the location of the defect and the proximity to free margins a dorsal nasal flap was deemed most appropriate.  Using a sterile surgical marker, an appropriate dorsal nasal flap was drawn around the defect.    The area thus outlined was incised deep to adipose tissue with a #15 scalpel blade.  The skin margins were undermined to an appropriate distance in all directions utilizing iris scissors.
Cheek Interpolation Flap Text: A decision was made to reconstruct the defect utilizing an interpolation axial flap and a staged reconstruction.  A telfa template was made of the defect.  This telfa template was then used to outline the Cheek Interpolation flap.  The donor area for the pedicle flap was then injected with anesthesia.  The flap was excised through the skin and subcutaneous tissue down to the layer of the underlying musculature.  The interpolation flap was carefully excised within this deep plane to maintain its blood supply.  The edges of the donor site were undermined.   The donor site was closed in a primary fashion.  The pedicle was then rotated into position and sutured.  Once the tube was sutured into place, adequate blood supply was confirmed with blanching and refill.  The pedicle was then wrapped with xeroform gauze and dressed appropriately with a telfa and gauze bandage to ensure continued blood supply and protect the attached pedicle.
Melolabial Transposition Flap Text: The defect edges were debeveled with a #15 scalpel blade.  Given the location of the defect and the proximity to free margins a melolabial flap was deemed most appropriate.  Using a sterile surgical marker, an appropriate melolabial transposition flap was drawn incorporating the defect.    The area thus outlined was incised deep to adipose tissue with a #15 scalpel blade.  The skin margins were undermined to an appropriate distance in all directions utilizing iris scissors.
Helical Rim Advancement Flap Text: The defect edges were debeveled with a #15 blade scalpel.  Given the location of the defect and the proximity to free margins (helical rim) a double helical rim advancement flap was deemed most appropriate.  Using a sterile surgical marker, the appropriate advancement flaps were drawn incorporating the defect and placing the expected incisions between the helical rim and antihelix where possible.  The area thus outlined was incised through and through with a #15 scalpel blade.  With a skin hook and iris scissors, the flaps were gently and sharply undermined and freed up.
Consent (Nose)/Introductory Paragraph: The rationale for Mohs was explained to the patient and consent was obtained. The risks, benefits and alternatives to therapy were discussed in detail. Specifically, the risks of nasal deformity, changes in the flow of air through the nose, infection, scarring, bleeding, prolonged wound healing, incomplete removal, allergy to anesthesia, nerve injury and recurrence were addressed. Prior to the procedure, the treatment site was clearly identified and confirmed by the patient. All components of Universal Protocol/PAUSE Rule completed.
Burow's Graft Text: The defect edges were debeveled with a #15 scalpel blade.  Given the location of the defect, shape of the defect, the proximity to free margins and the presence of a standing cone deformity a Burow's skin graft was deemed most appropriate. The standing cone was removed and this tissue was then trimmed to the shape of the primary defect. The adipose tissue was also removed until only dermis and epidermis were left.  The skin margins of the secondary defect were undermined to an appropriate distance in all directions utilizing iris scissors.  The secondary defect was closed with interrupted buried subcutaneous sutures.  The skin edges were then re-apposed with running  sutures.  The skin graft was then placed in the primary defect and oriented appropriately.
Tumor Depth: Less than 6mm from granular layer and no invasion beyond the subcutaneous fat
Same Histology In Subsequent Stages Text: The pattern and morphology of the tumor is as described in the first stage.
Double O-Z Plasty Text: The defect edges were debeveled with a #15 scalpel blade.  Given the location of the defect, shape of the defect and the proximity to free margins a Double O-Z plasty (double transposition flap) was deemed most appropriate.  Using a sterile surgical marker, the appropriate transposition flaps were drawn incorporating the defect and placing the expected incisions within the relaxed skin tension lines where possible. The area thus outlined was incised deep to adipose tissue with a #15 scalpel blade.  The skin margins were undermined to an appropriate distance in all directions utilizing iris scissors.  Hemostasis was achieved with electrocautery.  The flaps were then transposed into place, one clockwise and the other counterclockwise, and anchored with interrupted buried subcutaneous sutures.
Advancement Flap (Single) Text: The defect edges were debeveled with a #15 scalpel blade.  Given the location of the defect and the proximity to free margins a single advancement flap was deemed most appropriate.  Using a sterile surgical marker, an appropriate advancement flap was drawn incorporating the defect and placing the expected incisions within the relaxed skin tension lines where possible.    The area thus outlined was incised deep to adipose tissue with a #15 scalpel blade.  The skin margins were undermined to an appropriate distance in all directions utilizing iris scissors.
Split-Thickness Skin Graft Text: The defect edges were debeveled with a #15 scalpel blade.  Given the location of the defect, shape of the defect and the proximity to free margins a split thickness skin graft was deemed most appropriate.  Using a sterile surgical marker, the primary defect shape was transferred to the donor site. The split thickness graft was then harvested.  The skin graft was then placed in the primary defect and oriented appropriately.
Bcc Histology Text: There were numerous aggregates of basaloid cells.
Brow Lift Text: A midfrontal incision was made medially to the defect to allow access to the tissues just superior to the left eyebrow. Following careful dissection inferiorly in a supraperiosteal plane to the level of the left eyebrow, several 3-0 monocryl sutures were used to resuspend the eyebrow orbicularis oculi muscular unit to the superior frontal bone periosteum. This resulted in an appropriate reapproximation of static eyebrow symmetry and correction of the left brow ptosis.
Epidermal Closure: running and interrupted
Consent (Ear)/Introductory Paragraph: The rationale for Mohs was explained to the patient and consent was obtained. The risks, benefits and alternatives to therapy were discussed in detail. Specifically, the risks of ear deformity, infection, scarring, bleeding, prolonged wound healing, incomplete removal, allergy to anesthesia, nerve injury and recurrence were addressed. Prior to the procedure, the treatment site was clearly identified and confirmed by the patient. All components of Universal Protocol/PAUSE Rule completed.
Dressing (No Sutures): pressure dressing with telfa
W Plasty Text: The lesion was extirpated to the level of the fat with a #15 scalpel blade.  Given the location of the defect, shape of the defect and the proximity to free margins a W-plasty was deemed most appropriate for repair.  Using a sterile surgical marker, the appropriate transposition arms of the W-plasty were drawn incorporating the defect and placing the expected incisions within the relaxed skin tension lines where possible.    The area thus outlined was incised deep to adipose tissue with a #15 scalpel blade.  The skin margins were undermined to an appropriate distance in all directions utilizing iris scissors.  The opposing transposition arms were then transposed into place in opposite direction and anchored with interrupted buried subcutaneous sutures.
Location Indication Override (Is Already Calculated Based On Selected Body Location): Area H
O-Z Flap Text: The defect edges were debeveled with a #15 scalpel blade.  Given the location of the defect, shape of the defect and the proximity to free margins an O-Z flap was deemed most appropriate.  Using a sterile surgical marker, an appropriate transposition flap was drawn incorporating the defect and placing the expected incisions within the relaxed skin tension lines where possible. The area thus outlined was incised deep to adipose tissue with a #15 scalpel blade.  The skin margins were undermined to an appropriate distance in all directions utilizing iris scissors.
Eye Protection Verbiage: Before proceeding with the stage, a plastic scleral shield was inserted. The globe was anesthetized with a few drops of 1% lidocaine with 1:100,000 epinephrine. Then, an appropriate sized scleral shield was chosen and coated with lacrilube ointment. The shield was gently inserted and left in place for the duration of each stage. After the stage was completed, the shield was gently removed.
Debridement Text: The wound edges were debrided prior to proceeding with the closure to facilitate wound healing.
Suturegard Body: The suture ends were repeatedly re-tightened and re-clamped to achieve the desired tissue expansion.
Suturegard Intro: Intraoperative tissue expansion was performed, utilizing the SUTUREGARD device, in order to reduce wound tension.
Where Do You Want The Question To Include Opioid Counseling Located?: Case Summary Tab
V-Y Plasty Text: The defect edges were debeveled with a #15 scalpel blade.  Given the location of the defect, shape of the defect and the proximity to free margins an V-Y advancement flap was deemed most appropriate.  Using a sterile surgical marker, an appropriate advancement flap was drawn incorporating the defect and placing the expected incisions within the relaxed skin tension lines where possible.    The area thus outlined was incised deep to adipose tissue with a #15 scalpel blade.  The skin margins were undermined to an appropriate distance in all directions utilizing iris scissors.
Estimated Blood Loss (Cc): minimal
Cheiloplasty (Less Than 50%) Text: A decision was made to reconstruct the defect with a  cheiloplasty.  The defect was undermined extensively.  Additional obicularis oris muscle was excised with a 15 blade scalpel.  The defect was converted into a full thickness wedge, of less than 50% of the vertical height of the lip, to facilite a better cosmetic result.  Small vessels were then tied off with 5-0 monocyrl. The obicularis oris, superficial fascia, adipose and dermis were then reapproximated.  After the deeper layers were approximated the epidermis was reapproximated with particular care given to realign the vermilion border.
Mohs Case Number: [id number]
Donor Site Anesthesia Type: same as repair anesthesia
Modified Advancement Flap Text: The defect edges were debeveled with a #15 scalpel blade.  Given the location of the defect, shape of the defect and the proximity to free margins a modified advancement flap was deemed most appropriate.  Using a sterile surgical marker, an appropriate advancement flap was drawn incorporating the defect and placing the expected incisions within the relaxed skin tension lines where possible.    The area thus outlined was incised deep to adipose tissue with a #15 scalpel blade.  The skin margins were undermined to an appropriate distance in all directions utilizing iris scissors.
Mart-1 - Negative Histology Text: MART-1 staining demonstrates a normal density and pattern of melanocytes along the dermal-epidermal junction. The surgical margins are negative for tumor cells.
Wound Care (No Sutures): Petrolatum
O-T Plasty Text: The defect edges were debeveled with a #15 scalpel blade.  Given the location of the defect, shape of the defect and the proximity to free margins an O-T plasty was deemed most appropriate.  Using a sterile surgical marker, an appropriate O-T plasty was drawn incorporating the defect and placing the expected incisions within the relaxed skin tension lines where possible.    The area thus outlined was incised deep to adipose tissue with a #15 scalpel blade.  The skin margins were undermined to an appropriate distance in all directions utilizing iris scissors.
Epidermal Closure Graft Donor Site (Optional): running
Consent 2/Introductory Paragraph: Mohs surgery was explained to the patient and consent was obtained. The risks, benefits and alternatives to therapy were discussed in detail. Specifically, the risks of infection, scarring, bleeding, prolonged wound healing, incomplete removal, allergy to anesthesia, nerve injury and recurrence were addressed. Prior to the procedure, the treatment site was clearly identified and confirmed by the patient. All components of Universal Protocol/PAUSE Rule completed.
Chonodrocutaneous Helical Advancement Flap Text: The defect edges were debeveled with a #15 scalpel blade.  Given the location of the defect and the proximity to free margins a chondrocutaneous helical advancement flap was deemed most appropriate.  Using a sterile surgical marker, the appropriate advancement flap was drawn incorporating the defect and placing the expected incisions within the relaxed skin tension lines where possible.    The area thus outlined was incised deep to adipose tissue with a #15 scalpel blade.  The skin margins were undermined to an appropriate distance in all directions utilizing iris scissors.
Zygomaticofacial Flap Text: Given the location of the defect, shape of the defect and the proximity to free margins a zygomaticofacial flap was deemed most appropriate for repair.  Using a sterile surgical marker, the appropriate flap was drawn incorporating the defect and placing the expected incisions within the relaxed skin tension lines where possible. The area thus outlined was incised deep to adipose tissue with a #15 scalpel blade with preservation of a vascular pedicle.  The skin margins were undermined to an appropriate distance in all directions utilizing iris scissors.  The flap was then placed into the defect and anchored with interrupted buried subcutaneous sutures.
Partial Purse String (Simple) Text: Given the location of the defect and the characteristics of the surrounding skin a simple purse string closure was deemed most appropriate.  Undermining was performed circumfirentially around the surgical defect.  A purse string suture was then placed and tightened. Wound tension only allowed a partial closure of the circular defect.
Pain Refusal Text: I offered to prescribe pain medication but the patient refused to take this medication.
Inflammation Suggestive Of Cancer Camouflage Histology Text: There was a dense lymphocytic infiltrate which prevented adequate histologic evaluation of adjacent structures.
H Plasty Text: Given the location of the defect, shape of the defect and the proximity to free margins a H-plasty was deemed most appropriate for repair.  Using a sterile surgical marker, the appropriate advancement arms of the H-plasty were drawn incorporating the defect and placing the expected incisions within the relaxed skin tension lines where possible. The area thus outlined was incised deep to adipose tissue with a #15 scalpel blade. The skin margins were undermined to an appropriate distance in all directions utilizing iris scissors.  The opposing advancement arms were then advanced into place in opposite direction and anchored with interrupted buried subcutaneous sutures.
Double O-Z Flap Text: The defect edges were debeveled with a #15 scalpel blade.  Given the location of the defect, shape of the defect and the proximity to free margins a Double O-Z flap was deemed most appropriate.  Using a sterile surgical marker, an appropriate transposition flap was drawn incorporating the defect and placing the expected incisions within the relaxed skin tension lines where possible. The area thus outlined was incised deep to adipose tissue with a #15 scalpel blade.  The skin margins were undermined to an appropriate distance in all directions utilizing iris scissors.
Hemigard Postcare Instructions: The HEMIGARD strips are to remain completely dry for at least 5-7 days.
Staging Info: By selecting yes to the question above you will include information on AJCC 8 tumor staging in your Mohs note. Information on tumor staging will be automatically added for SCCs on the head and neck. AJCC 8 includes tumor size, tumor depth, perineural involvement and bone invasion.
Referring Physician (Optional): Germaine Sanchez MD
Bi-Rhombic Flap Text: The defect edges were debeveled with a #15 scalpel blade.  Given the location of the defect and the proximity to free margins a bi-rhombic flap was deemed most appropriate.  Using a sterile surgical marker, an appropriate rhombic flap was drawn incorporating the defect. The area thus outlined was incised deep to adipose tissue with a #15 scalpel blade.  The skin margins were undermined to an appropriate distance in all directions utilizing iris scissors.
Bilateral Helical Rim Advancement Flap Text: The defect edges were debeveled with a #15 blade scalpel.  Given the location of the defect and the proximity to free margins (helical rim) a bilateral helical rim advancement flap was deemed most appropriate.  Using a sterile surgical marker, the appropriate advancement flaps were drawn incorporating the defect and placing the expected incisions between the helical rim and antihelix where possible.  The area thus outlined was incised through and through with a #15 scalpel blade.  With a skin hook and iris scissors, the flaps were gently and sharply undermined and freed up.
Secondary Defect Width In Cm (Required For Flaps): 1.4
Information: Selecting Yes will display possible errors in your note based on the variables you have selected. This validation is only offered as a suggestion for you. PLEASE NOTE THAT THE VALIDATION TEXT WILL BE REMOVED WHEN YOU FINALIZE YOUR NOTE. IF YOU WANT TO FAX A PRELIMINARY NOTE YOU WILL NEED TO TOGGLE THIS TO 'NO' IF YOU DO NOT WANT IT IN YOUR FAXED NOTE.
Vermilion Border Text: The closure involved the vermilion border.
Bilobed Transposition Flap Text: The defect edges were debeveled with a #15 scalpel blade.  Given the location of the defect and the proximity to free margins a bilobed transposition flap was deemed most appropriate.  Using a sterile surgical marker, an appropriate bilobe flap drawn around the defect.    The area thus outlined was incised deep to adipose tissue with a #15 scalpel blade.  The skin margins were undermined to an appropriate distance in all directions utilizing iris scissors.
Keystone Flap Text: The defect edges were debeveled with a #15 scalpel blade.  Given the location of the defect, shape of the defect a keystone flap was deemed most appropriate.  Using a sterile surgical marker, an appropriate keystone flap was drawn incorporating the defect, outlining the appropriate donor tissue and placing the expected incisions within the relaxed skin tension lines where possible. The area thus outlined was incised deep to adipose tissue with a #15 scalpel blade.  The skin margins were undermined to an appropriate distance in all directions around the primary defect and laterally outward around the flap utilizing iris scissors.
Undermining Location (Optional): in the superficial subcutaneous fat
Anesthesia Type: 1% lidocaine with epinephrine and 0.25% bupivacaine in a 2:3 ration buffered with 8.4% sodium bicarbonate
Consent (Scalp)/Introductory Paragraph: The rationale for Mohs was explained to the patient and consent was obtained. The risks, benefits and alternatives to therapy were discussed in detail. Specifically, the risks of changes in hair growth pattern secondary to repair, infection, scarring, bleeding, prolonged wound healing, incomplete removal, allergy to anesthesia, nerve injury and recurrence were addressed. Prior to the procedure, the treatment site was clearly identified and confirmed by the patient. All components of Universal Protocol/PAUSE Rule completed.
Muscle Hinge Flap Text: The defect edges were debeveled with a #15 scalpel blade.  Given the size, depth and location of the defect and the proximity to free margins a muscle hinge flap was deemed most appropriate.  Using a sterile surgical marker, an appropriate hinge flap was drawn incorporating the defect. The area thus outlined was incised with a #15 scalpel blade.  The skin margins were undermined to an appropriate distance in all directions utilizing iris scissors.
Z Plasty Text: The lesion was extirpated to the level of the fat with a #15 scalpel blade.  Given the location of the defect, shape of the defect and the proximity to free margins a Z-plasty was deemed most appropriate for repair.  Using a sterile surgical marker, the appropriate transposition arms of the Z-plasty were drawn incorporating the defect and placing the expected incisions within the relaxed skin tension lines where possible.    The area thus outlined was incised deep to adipose tissue with a #15 scalpel blade.  The skin margins were undermined to an appropriate distance in all directions utilizing iris scissors.  The opposing transposition arms were then transposed into place in opposite direction and anchored with interrupted buried subcutaneous sutures.
Repair Anesthesia Method: local infiltration
Consent (Temporal Branch)/Introductory Paragraph: The rationale for Mohs was explained to the patient and consent was obtained. The risks, benefits and alternatives to therapy were discussed in detail. Specifically, the risks of damage to the temporal branch of the facial nerve, infection, scarring, bleeding, prolonged wound healing, incomplete removal, allergy to anesthesia, and recurrence were addressed. Prior to the procedure, the treatment site was clearly identified and confirmed by the patient. All components of Universal Protocol/PAUSE Rule completed.
Consent Type: Consent 1 (Standard)
Date Of Previous Biopsy (Optional): 11/22/2021
Double Island Pedicle Flap Text: The defect edges were debeveled with a #15 scalpel blade.  Given the location of the defect, shape of the defect and the proximity to free margins a double island pedicle advancement flap was deemed most appropriate.  Using a sterile surgical marker, an appropriate advancement flap was drawn incorporating the defect, outlining the appropriate donor tissue and placing the expected incisions within the relaxed skin tension lines where possible.    The area thus outlined was incised deep to adipose tissue with a #15 scalpel blade.  The skin margins were undermined to an appropriate distance in all directions around the primary defect and laterally outward around the island pedicle utilizing iris scissors.  There was minimal undermining beneath the pedicle flap.
Spiral Flap Text: The defect edges were debeveled with a #15 scalpel blade.  Given the location of the defect, shape of the defect and the proximity to free margins a spiral flap was deemed most appropriate.  Using a sterile surgical marker, an appropriate rotation flap was drawn incorporating the defect and placing the expected incisions within the relaxed skin tension lines where possible. The area thus outlined was incised deep to adipose tissue with a #15 scalpel blade.  The skin margins were undermined to an appropriate distance in all directions utilizing iris scissors.
Orbicularis Oris Muscle Flap Text: The defect edges were debeveled with a #15 scalpel blade.  Given that the defect affected the competency of the oral sphincter an orbicularis oris muscle flap was deemed most appropriate to restore this competency and normal muscle function.  Using a sterile surgical marker, an appropriate flap was drawn incorporating the defect. The area thus outlined was incised with a #15 scalpel blade.
Hemostasis: Electrocautery
Post-Care Instructions: I reviewed with the patient in detail post-care instructions. Patient is not to engage in any heavy lifting, exercise, or swimming for the next 14 days. Should the patient develop any fevers, chills, bleeding, severe pain patient will contact the office immediately.
Mustarde Flap Text: The defect edges were debeveled with a #15 scalpel blade.  Given the size, depth and location of the defect and the proximity to free margins a Mustarde flap was deemed most appropriate.  Using a sterile surgical marker, an appropriate flap was drawn incorporating the defect. The area thus outlined was incised with a #15 scalpel blade.  The skin margins were undermined to an appropriate distance in all directions utilizing iris scissors.
Postop Diagnosis: same
No Repair - Repaired With Adjacent Surgical Defect Text (Leave Blank If You Do Not Want): After obtaining clear surgical margins the defect was repaired concurrently with another surgical defect which was in close approximation.
Surgeon/Pathologist Verbiage (Will Incorporate Name Of Surgeon From Intro If Not Blank): operated in two distinct and integrated capacities as the surgeon and pathologist.
Unna Boot Text: An Unna boot was placed to help immobilize the limb and facilitate more rapid healing.
Consent (Lip)/Introductory Paragraph: The rationale for Mohs was explained to the patient and consent was obtained. The risks, benefits and alternatives to therapy were discussed in detail. Specifically, the risks of lip deformity, changes in the oral aperture, infection, scarring, bleeding, prolonged wound healing, incomplete removal, allergy to anesthesia, nerve injury and recurrence were addressed. Prior to the procedure, the treatment site was clearly identified and confirmed by the patient. All components of Universal Protocol/PAUSE Rule completed.
Rhomboid Transposition Flap Text: The defect edges were debeveled with a #15 scalpel blade.  Given the location of the defect and the proximity to free margins a rhomboid transposition flap was deemed most appropriate.  Using a sterile surgical marker, an appropriate rhomboid flap was drawn incorporating the defect.    The area thus outlined was incised deep to adipose tissue with a #15 scalpel blade.  The skin margins were undermined to an appropriate distance in all directions utilizing iris scissors.
No Residual Tumor Seen Histology Text: There were no malignant cells seen in the sections examined.
Bilobed Flap Text: The defect edges were debeveled with a #15 scalpel blade.  Given the location of the defect and the proximity to free margins a bilobe flap was deemed most appropriate.  Using a sterile surgical marker, an appropriate bilobe flap drawn around the defect.    The area thus outlined was incised deep to adipose tissue with a #15 scalpel blade.  The skin margins were undermined to an appropriate distance in all directions utilizing iris scissors.
Rotation Flap Text: The defect edges were debeveled with a #15 scalpel blade.  Given the location of the defect, shape of the defect and the proximity to free margins a rotation flap was deemed most appropriate.  Using a sterile surgical marker, an appropriate rotation flap was drawn incorporating the defect and placing the expected incisions within the relaxed skin tension lines where possible.    The area thus outlined was incised deep to adipose tissue with a #15 scalpel blade.  The skin margins were undermined to an appropriate distance in all directions utilizing iris scissors.
Home Suture Removal Text: Patient was provided instructions on removing sutures and will remove their sutures at home.  If they have any questions or difficulties they will call the office.
Previous Accession (Optional): [id number]
V-Y Flap Text: The defect edges were debeveled with a #15 scalpel blade.  Given the location of the defect, shape of the defect and the proximity to free margins a V-Y flap was deemed most appropriate.  Using a sterile surgical marker, an appropriate advancement flap was drawn incorporating the defect and placing the expected incisions within the relaxed skin tension lines where possible.    The area thus outlined was incised deep to adipose tissue with a #15 scalpel blade.  The skin margins were undermined to an appropriate distance in all directions utilizing iris scissors.
Manual Repair Warning Statement: We plan on removing the manually selected variable below in favor of our much easier automatic structured text blocks found in the previous tab. We decided to do this to help make the flow better and give you the full power of structured data. Manual selection is never going to be ideal in our platform and I would encourage you to avoid using manual selection from this point on, especially since I will be sunsetting this feature. It is important that you do one of two things with the customized text below. First, you can save all of the text in a word file so you can have it for future reference. Second, transfer the text to the appropriate area in the Library tab. Lastly, if there is a flap or graft type which we do not have you need to let us know right away so I can add it in before the variable is hidden. No need to panic, we plan to give you roughly 6 months to make the change.
Flap Type: Advancement Flap (Single)
Consent (Marginal Mandibular)/Introductory Paragraph: The rationale for Mohs was explained to the patient and consent was obtained. The risks, benefits and alternatives to therapy were discussed in detail. Specifically, the risks of damage to the marginal mandibular branch of the facial nerve, infection, scarring, bleeding, prolonged wound healing, incomplete removal, allergy to anesthesia, and recurrence were addressed. Prior to the procedure, the treatment site was clearly identified and confirmed by the patient. All components of Universal Protocol/PAUSE Rule completed.
Retention Suture Text: Retention sutures were placed to support the closure and prevent dehiscence.
Medical Necessity Statement: Based on my medical judgement, Mohs surgery is the most appropriate treatment for this cancer compared to other treatments.
Cheiloplasty (Complex) Text: A decision was made to reconstruct the defect with a  cheiloplasty.  The defect was undermined extensively.  Additional obicularis oris muscle was excised with a 15 blade scalpel.  The defect was converted into a full thickness wedge to facilite a better cosmetic result.  Small vessels were then tied off with 5-0 monocyrl. The obicularis oris, superficial fascia, adipose and dermis were then reapproximated.  After the deeper layers were approximated the epidermis was reapproximated with particular care given to realign the vermilion border.
Nasalis-Muscle-Based Myocutaneous Island Pedicle Flap Text: Using a #15 blade, an incision was made around the donor flap to the level of the nasalis muscle. Wide lateral undermining was then performed in both the subcutaneous plane above the nasalis muscle, and in a submuscular plane just above periosteum. This allowed the formation of a free nasalis muscle axial pedicle (based on the angular artery) which was still attached to the actual cutaneous flap, increasing its mobility and vascular viability. Hemostasis was obtained with pinpoint electrocoagulation. The flap was mobilized into position and the pivotal anchor points positioned and stabilized with buried interrupted sutures. Subcutaneous and dermal tissues were closed in a multilayered fashion with sutures. Tissue redundancies were excised, and the epidermal edges were apposed without significant tension and sutured with sutures.
Suturegard Retention Suture: 0-0 Nylon
Staged Advancement Flap Text: The defect edges were debeveled with a #15 scalpel blade.  Given the location of the defect, shape of the defect and the proximity to free margins a staged advancement flap was deemed most appropriate.  Using a sterile surgical marker, an appropriate advancement flap was drawn incorporating the defect and placing the expected incisions within the relaxed skin tension lines where possible. The area thus outlined was incised deep to adipose tissue with a #15 scalpel blade.  The skin margins were undermined to an appropriate distance in all directions utilizing iris scissors.
Banner Transposition Flap Text: The defect edges were debeveled with a #15 scalpel blade.  Given the location of the defect and the proximity to free margins a Banner transposition flap was deemed most appropriate.  Using a sterile surgical marker, an appropriate flap drawn around the defect. The area thus outlined was incised deep to adipose tissue with a #15 scalpel blade.  The skin margins were undermined to an appropriate distance in all directions utilizing iris scissors.
Area L Indication Text: Tumors in this location are included in Area L (trunk and extremities).  Mohs surgery is indicated for larger tumors, or tumors with aggressive histologic features, in these anatomic locations.
Mucosal Advancement Flap Text: Given the location of the defect, shape of the defect and the proximity to free margins a mucosal advancement flap was deemed most appropriate. Incisions were made with a 15 blade scalpel in the appropriate fashion along the cutaneous vermilion border and the mucosal lip. The remaining actinically damaged mucosal tissue was excised.  The mucosal advancement flap was then elevated to the gingival sulcus with care taken to preserve the neurovascular structures and advanced into the primary defect. Care was taken to ensure that precise realignment of the vermilion border was achieved.
Secondary Intention Text (Leave Blank If You Do Not Want): The defect will heal with secondary intention.
Repair Hemostasis (Optional): Pinpoint electrocautery
Consent 3/Introductory Paragraph: I gave the patient a chance to ask questions they had about the procedure.  Following this I explained the Mohs procedure and consent was obtained. The risks, benefits and alternatives to therapy were discussed in detail. Specifically, the risks of infection, scarring, bleeding, prolonged wound healing, incomplete removal, allergy to anesthesia, nerve injury and recurrence were addressed. Prior to the procedure, the treatment site was clearly identified and confirmed by the patient. All components of Universal Protocol/PAUSE Rule completed.
Star Wedge Flap Text: The defect edges were debeveled with a #15 scalpel blade.  Given the location of the defect, shape of the defect and the proximity to free margins a star wedge flap was deemed most appropriate.  Using a sterile surgical marker, an appropriate rotation flap was drawn incorporating the defect and placing the expected incisions within the relaxed skin tension lines where possible. The area thus outlined was incised deep to adipose tissue with a #15 scalpel blade.  The skin margins were undermined to an appropriate distance in all directions utilizing iris scissors.
O-L Flap Text: The defect edges were debeveled with a #15 scalpel blade.  Given the location of the defect, shape of the defect and the proximity to free margins an O-L flap was deemed most appropriate.  Using a sterile surgical marker, an appropriate advancement flap was drawn incorporating the defect and placing the expected incisions within the relaxed skin tension lines where possible.    The area thus outlined was incised deep to adipose tissue with a #15 scalpel blade.  The skin margins were undermined to an appropriate distance in all directions utilizing iris scissors.
Tissue Cultured Epidermal Autograft Text: The defect edges were debeveled with a #15 scalpel blade.  Given the location of the defect, shape of the defect and the proximity to free margins a tissue cultured epidermal autograft was deemed most appropriate.  The graft was then trimmed to fit the size of the defect.  The graft was then placed in the primary defect and oriented appropriately.
Graft Donor Site Bandage (Optional-Leave Blank If You Don't Want In Note): Aquaplast was fitted to the graft site and sewn into place. A pressure bandage were applied to the donor site and over the aquaplast bolster.
Mohs Histo Method Verbiage: Each section was then chromacoded and processed in the Mohs lab using the Mohs protocol and submitted for frozen section.
Cheek-To-Nose Interpolation Flap Text: A decision was made to reconstruct the defect utilizing an interpolation axial flap and a staged reconstruction.  A telfa template was made of the defect.  This telfa template was then used to outline the Cheek-To-Nose Interpolation flap.  The donor area for the pedicle flap was then injected with anesthesia.  The flap was excised through the skin and subcutaneous tissue down to the layer of the underlying musculature.  The interpolation flap was carefully excised within this deep plane to maintain its blood supply.  The edges of the donor site were undermined.   The donor site was closed in a primary fashion.  The pedicle was then rotated into position and sutured.  Once the tube was sutured into place, adequate blood supply was confirmed with blanching and refill.  The pedicle was then wrapped with xeroform gauze and dressed appropriately with a telfa and gauze bandage to ensure continued blood supply and protect the attached pedicle.
O-T Advancement Flap Text: The defect edges were debeveled with a #15 scalpel blade.  Given the location of the defect, shape of the defect and the proximity to free margins an O-T advancement flap was deemed most appropriate.  Using a sterile surgical marker, an appropriate advancement flap was drawn incorporating the defect and placing the expected incisions within the relaxed skin tension lines where possible.    The area thus outlined was incised deep to adipose tissue with a #15 scalpel blade.  The skin margins were undermined to an appropriate distance in all directions utilizing iris scissors.
Advancement-Rotation Flap Text: The defect edges were debeveled with a #15 scalpel blade.  Given the location of the defect, shape of the defect and the proximity to free margins an advancement-rotation flap was deemed most appropriate.  Using a sterile surgical marker, an appropriate flap was drawn incorporating the defect and placing the expected incisions within the relaxed skin tension lines where possible. The area thus outlined was incised deep to adipose tissue with a #15 scalpel blade.  The skin margins were undermined to an appropriate distance in all directions utilizing iris scissors.
Mohs Rapid Report Verbiage: The area of clinically evident tumor was marked with skin marking ink and appropriately hatched.  The initial incision was made following the Mohs approach through the skin.  The specimen was taken to the lab, divided into the necessary number of pieces, chromacoded and processed according to the Mohs protocol.  This was repeated in successive stages until a tumor free defect was achieved.
Repair Type: Flap
Mart-1 - Positive Histology Text: MART-1 staining demonstrates areas of higher density and clustering of melanocytes with Pagetoid spread upwards within the epidermis. The surgical margins are positive for tumor cells.
Mastoid Interpolation Flap Text: A decision was made to reconstruct the defect utilizing an interpolation axial flap and a staged reconstruction.  A telfa template was made of the defect.  This telfa template was then used to outline the mastoid interpolation flap.  The donor area for the pedicle flap was then injected with anesthesia.  The flap was excised through the skin and subcutaneous tissue down to the layer of the underlying musculature.  The pedicle flap was carefully excised within this deep plane to maintain its blood supply.  The edges of the donor site were undermined.   The donor site was closed in a primary fashion.  The pedicle was then rotated into position and sutured.  Once the tube was sutured into place, adequate blood supply was confirmed with blanching and refill.  The pedicle was then wrapped with xeroform gauze and dressed appropriately with a telfa and gauze bandage to ensure continued blood supply and protect the attached pedicle.
Number Of Stages: 2
Non-Graft Cartilage Fenestration Text: The cartilage was fenestrated with a 2mm punch biopsy to help facilitate healing.
Hatchet Flap Text: The defect edges were debeveled with a #15 scalpel blade.  Given the location of the defect, shape of the defect and the proximity to free margins a hatchet flap was deemed most appropriate.  Using a sterile surgical marker, an appropriate hatchet flap was drawn incorporating the defect and placing the expected incisions within the relaxed skin tension lines where possible.    The area thus outlined was incised deep to adipose tissue with a #15 scalpel blade.  The skin margins were undermined to an appropriate distance in all directions utilizing iris scissors.
Secondary Defect Length In Cm (Required For Flaps): 3.5
Retention Suture Bite Size: 1 mm
Hemigard Intro: Due to skin fragility and wound tension, it was decided to use HEMIGARD adhesive retention suture devices to permit a linear closure. The skin was cleaned and dried for a 6cm distance away from the wound. Excessive hair, if present, was removed to allow for adhesion.
Ftsg Text: The defect edges were debeveled with a #15 scalpel blade.  Given the location of the defect, shape of the defect and the proximity to free margins a full thickness skin graft was deemed most appropriate.  Using a sterile surgical marker, the primary defect shape was transferred to the donor site. The area thus outlined was incised deep to adipose tissue with a #15 scalpel blade.  The harvested graft was then trimmed of adipose tissue until only dermis and epidermis was left.  The skin margins of the secondary defect were undermined to an appropriate distance in all directions utilizing iris scissors.  The secondary defect was closed with interrupted buried subcutaneous sutures.  The skin edges were then re-apposed with running  sutures.  The skin graft was then placed in the primary defect and oriented appropriately.
Epidermal Autograft Text: The defect edges were debeveled with a #15 scalpel blade.  Given the location of the defect, shape of the defect and the proximity to free margins an epidermal autograft was deemed most appropriate.  Using a sterile surgical marker, the primary defect shape was transferred to the donor site. The epidermal graft was then harvested.  The skin graft was then placed in the primary defect and oriented appropriately.
Alar Island Pedicle Flap Text: The defect edges were debeveled with a #15 scalpel blade.  Given the location of the defect, shape of the defect and the proximity to the alar rim an island pedicle advancement flap was deemed most appropriate.  Using a sterile surgical marker, an appropriate advancement flap was drawn incorporating the defect, outlining the appropriate donor tissue and placing the expected incisions within the nasal ala running parallel to the alar rim. The area thus outlined was incised with a #15 scalpel blade.  The skin margins were undermined minimally to an appropriate distance in all directions around the primary defect and laterally outward around the island pedicle utilizing iris scissors.  There was minimal undermining beneath the pedicle flap.
Island Pedicle Flap With Canthal Suspension Text: The defect edges were debeveled with a #15 scalpel blade.  Given the location of the defect, shape of the defect and the proximity to free margins an island pedicle advancement flap was deemed most appropriate.  Using a sterile surgical marker, an appropriate advancement flap was drawn incorporating the defect, outlining the appropriate donor tissue and placing the expected incisions within the relaxed skin tension lines where possible. The area thus outlined was incised deep to adipose tissue with a #15 scalpel blade.  The skin margins were undermined to an appropriate distance in all directions around the primary defect and laterally outward around the island pedicle utilizing iris scissors.  There was minimal undermining beneath the pedicle flap. A suspension suture was placed in the canthal tendon to prevent tension and prevent ectropion.
A-T Advancement Flap Text: The defect edges were debeveled with a #15 scalpel blade.  Given the location of the defect, shape of the defect and the proximity to free margins an A-T advancement flap was deemed most appropriate.  Using a sterile surgical marker, an appropriate advancement flap was drawn incorporating the defect and placing the expected incisions within the relaxed skin tension lines where possible.    The area thus outlined was incised deep to adipose tissue with a #15 scalpel blade.  The skin margins were undermined to an appropriate distance in all directions utilizing iris scissors.
Composite Graft Text: The defect edges were debeveled with a #15 scalpel blade.  Given the location of the defect, shape of the defect, the proximity to free margins and the fact the defect was full thickness a composite graft was deemed most appropriate.  The defect was outline and then transferred to the donor site.  A full thickness graft was then excised from the donor site. The graft was then placed in the primary defect, oriented appropriately and then sutured into place.  The secondary defect was then repaired using a primary closure.
Transposition Flap Text: The defect edges were debeveled with a #15 scalpel blade.  Given the location of the defect and the proximity to free margins a transposition flap was deemed most appropriate.  Using a sterile surgical marker, an appropriate transposition flap was drawn incorporating the defect.    The area thus outlined was incised deep to adipose tissue with a #15 scalpel blade.  The skin margins were undermined to an appropriate distance in all directions utilizing iris scissors.
Adjacent Tissue Transfer Text: The defect edges were debeveled with a #15 scalpel blade.  Given the location of the defect and the proximity to free margins an adjacent tissue transfer was deemed most appropriate.  Using a sterile surgical marker, an appropriate flap was drawn incorporating the defect and placing the expected incisions within the relaxed skin tension lines where possible.    The area thus outlined was incised deep to adipose tissue with a #15 scalpel blade.  The skin margins were undermined to an appropriate distance in all directions utilizing iris scissors.

## 2022-01-12 ENCOUNTER — APPOINTMENT (RX ONLY)
Dept: URBAN - METROPOLITAN AREA CLINIC 36 | Facility: CLINIC | Age: 87
Setting detail: DERMATOLOGY
End: 2022-01-12

## 2022-01-12 DIAGNOSIS — Z48.02 ENCOUNTER FOR REMOVAL OF SUTURES: ICD-10-CM

## 2022-01-12 PROCEDURE — ? SUTURE REMOVAL (GLOBAL PERIOD)

## 2022-01-12 ASSESSMENT — LOCATION ZONE DERM: LOCATION ZONE: NOSE

## 2022-01-12 ASSESSMENT — LOCATION SIMPLE DESCRIPTION DERM: LOCATION SIMPLE: NOSE

## 2022-01-12 ASSESSMENT — LOCATION DETAILED DESCRIPTION DERM: LOCATION DETAILED: NASAL SUPRATIP

## 2022-01-12 NOTE — PROCEDURE: SUTURE REMOVAL (GLOBAL PERIOD)
Detail Level: Detailed
Add 28360 Cpt? (Important Note: In 2017 The Use Of 41742 Is Being Tracked By Cms To Determine Future Global Period Reimbursement For Global Periods): no

## 2022-01-26 ENCOUNTER — APPOINTMENT (RX ONLY)
Dept: URBAN - METROPOLITAN AREA CLINIC 36 | Facility: CLINIC | Age: 87
Setting detail: DERMATOLOGY
End: 2022-01-26

## 2022-01-26 DIAGNOSIS — Z48.817 ENCOUNTER FOR SURGICAL AFTERCARE FOLLOWING SURGERY ON THE SKIN AND SUBCUTANEOUS TISSUE: ICD-10-CM

## 2022-01-26 PROCEDURE — ? POST-OP WOUND CHECK

## 2022-01-26 ASSESSMENT — LOCATION ZONE DERM: LOCATION ZONE: NOSE

## 2022-01-26 ASSESSMENT — LOCATION SIMPLE DESCRIPTION DERM: LOCATION SIMPLE: NOSE

## 2022-01-26 ASSESSMENT — LOCATION DETAILED DESCRIPTION DERM: LOCATION DETAILED: NASAL DORSUM

## 2022-01-26 NOTE — PROCEDURE: POST-OP WOUND CHECK
Detail Level: Detailed
Add 29596 Cpt? (Important Note: In 2017 The Use Of 96058 Is Being Tracked By Cms To Determine Future Global Period Reimbursement For Global Periods): no

## 2022-02-09 ENCOUNTER — APPOINTMENT (RX ONLY)
Dept: URBAN - METROPOLITAN AREA CLINIC 36 | Facility: CLINIC | Age: 87
Setting detail: DERMATOLOGY
End: 2022-02-09

## 2022-02-09 DIAGNOSIS — Z48.817 ENCOUNTER FOR SURGICAL AFTERCARE FOLLOWING SURGERY ON THE SKIN AND SUBCUTANEOUS TISSUE: ICD-10-CM

## 2022-02-09 PROCEDURE — ? POST-OP WOUND CHECK

## 2022-02-09 ASSESSMENT — LOCATION ZONE DERM: LOCATION ZONE: NOSE

## 2022-02-09 ASSESSMENT — LOCATION DETAILED DESCRIPTION DERM: LOCATION DETAILED: NASAL SUPRATIP

## 2022-02-09 ASSESSMENT — LOCATION SIMPLE DESCRIPTION DERM: LOCATION SIMPLE: NOSE

## 2022-02-09 NOTE — PROCEDURE: POST-OP WOUND CHECK
Detail Level: Detailed
Add 36866 Cpt? (Important Note: In 2017 The Use Of 86671 Is Being Tracked By Cms To Determine Future Global Period Reimbursement For Global Periods): no

## 2023-01-01 ENCOUNTER — APPOINTMENT (OUTPATIENT)
Dept: RADIOLOGY | Facility: MEDICAL CENTER | Age: 88
DRG: 065 | End: 2023-01-01
Attending: EMERGENCY MEDICINE
Payer: COMMERCIAL

## 2023-01-01 ENCOUNTER — HOME CARE VISIT (OUTPATIENT)
Dept: HOSPICE | Facility: HOSPICE | Age: 88
End: 2023-01-01
Payer: MEDICARE

## 2023-01-01 ENCOUNTER — APPOINTMENT (OUTPATIENT)
Dept: RADIOLOGY | Facility: MEDICAL CENTER | Age: 88
DRG: 064 | End: 2023-01-01
Attending: EMERGENCY MEDICINE
Payer: COMMERCIAL

## 2023-01-01 ENCOUNTER — HOSPICE ADMISSION (OUTPATIENT)
Dept: HOSPICE | Facility: HOSPICE | Age: 88
End: 2023-01-01
Payer: MEDICARE

## 2023-01-01 ENCOUNTER — APPOINTMENT (OUTPATIENT)
Dept: RADIOLOGY | Facility: MEDICAL CENTER | Age: 88
End: 2023-01-01
Attending: EMERGENCY MEDICINE
Payer: COMMERCIAL

## 2023-01-01 ENCOUNTER — APPOINTMENT (OUTPATIENT)
Dept: CARDIOLOGY | Facility: MEDICAL CENTER | Age: 88
DRG: 064 | End: 2023-01-01
Attending: STUDENT IN AN ORGANIZED HEALTH CARE EDUCATION/TRAINING PROGRAM
Payer: COMMERCIAL

## 2023-01-01 ENCOUNTER — APPOINTMENT (OUTPATIENT)
Dept: RADIOLOGY | Facility: MEDICAL CENTER | Age: 88
DRG: 065 | End: 2023-01-01
Payer: COMMERCIAL

## 2023-01-01 ENCOUNTER — PATIENT OUTREACH (OUTPATIENT)
Dept: SCHEDULING | Facility: IMAGING CENTER | Age: 88
End: 2023-01-01
Payer: COMMERCIAL

## 2023-01-01 ENCOUNTER — APPOINTMENT (OUTPATIENT)
Dept: RADIOLOGY | Facility: MEDICAL CENTER | Age: 88
End: 2023-01-01
Attending: INTERNAL MEDICINE
Payer: COMMERCIAL

## 2023-01-01 ENCOUNTER — PHARMACY VISIT (OUTPATIENT)
Dept: PHARMACY | Facility: MEDICAL CENTER | Age: 88
End: 2023-01-01
Payer: COMMERCIAL

## 2023-01-01 ENCOUNTER — PHARMACY VISIT (OUTPATIENT)
Dept: PHARMACY | Facility: MEDICAL CENTER | Age: 88
End: 2023-01-01

## 2023-01-01 ENCOUNTER — APPOINTMENT (OUTPATIENT)
Dept: RADIOLOGY | Facility: MEDICAL CENTER | Age: 88
DRG: 064 | End: 2023-01-01
Attending: NURSE PRACTITIONER
Payer: COMMERCIAL

## 2023-01-01 ENCOUNTER — APPOINTMENT (OUTPATIENT)
Dept: RADIOLOGY | Facility: MEDICAL CENTER | Age: 88
DRG: 064 | End: 2023-01-01
Attending: INTERNAL MEDICINE
Payer: COMMERCIAL

## 2023-01-01 ENCOUNTER — APPOINTMENT (OUTPATIENT)
Dept: CARDIOLOGY | Facility: MEDICAL CENTER | Age: 88
End: 2023-01-01
Attending: HOSPITALIST
Payer: COMMERCIAL

## 2023-01-01 ENCOUNTER — HOSPITAL ENCOUNTER (INPATIENT)
Facility: MEDICAL CENTER | Age: 88
LOS: 6 days | DRG: 065 | End: 2023-07-17
Attending: EMERGENCY MEDICINE | Admitting: FAMILY MEDICINE
Payer: COMMERCIAL

## 2023-01-01 ENCOUNTER — OFFICE VISIT (OUTPATIENT)
Dept: NEUROLOGY | Facility: MEDICAL CENTER | Age: 88
End: 2023-01-01
Attending: PSYCHIATRY & NEUROLOGY
Payer: COMMERCIAL

## 2023-01-01 ENCOUNTER — APPOINTMENT (OUTPATIENT)
Dept: RADIOLOGY | Facility: MEDICAL CENTER | Age: 88
DRG: 371 | End: 2023-01-01
Attending: HOSPITALIST
Payer: COMMERCIAL

## 2023-01-01 ENCOUNTER — HOSPITAL ENCOUNTER (OUTPATIENT)
Facility: MEDICAL CENTER | Age: 88
End: 2023-04-09
Payer: COMMERCIAL

## 2023-01-01 ENCOUNTER — HOSPITAL ENCOUNTER (INPATIENT)
Facility: MEDICAL CENTER | Age: 88
LOS: 13 days | DRG: 371 | End: 2023-09-04
Attending: EMERGENCY MEDICINE | Admitting: HOSPITALIST
Payer: COMMERCIAL

## 2023-01-01 ENCOUNTER — HOSPITAL ENCOUNTER (OUTPATIENT)
Facility: MEDICAL CENTER | Age: 88
End: 2023-05-09
Attending: INTERNAL MEDICINE
Payer: COMMERCIAL

## 2023-01-01 ENCOUNTER — APPOINTMENT (OUTPATIENT)
Dept: RADIOLOGY | Facility: MEDICAL CENTER | Age: 88
End: 2023-01-01
Attending: HOSPITALIST
Payer: COMMERCIAL

## 2023-01-01 ENCOUNTER — HOSPITAL ENCOUNTER (INPATIENT)
Facility: REHABILITATION | Age: 88
End: 2023-01-01
Attending: PHYSICAL MEDICINE & REHABILITATION | Admitting: PHYSICAL MEDICINE & REHABILITATION
Payer: MEDICARE

## 2023-01-01 ENCOUNTER — HOSPITAL ENCOUNTER (OUTPATIENT)
Facility: MEDICAL CENTER | Age: 88
End: 2023-08-14
Attending: EMERGENCY MEDICINE | Admitting: INTERNAL MEDICINE
Payer: COMMERCIAL

## 2023-01-01 ENCOUNTER — HOSPITAL ENCOUNTER (OUTPATIENT)
Facility: MEDICAL CENTER | Age: 88
End: 2023-04-24
Attending: INTERNAL MEDICINE
Payer: COMMERCIAL

## 2023-01-01 ENCOUNTER — HOSPITAL ENCOUNTER (INPATIENT)
Facility: MEDICAL CENTER | Age: 88
LOS: 6 days | DRG: 064 | End: 2023-03-21
Attending: EMERGENCY MEDICINE | Admitting: INTERNAL MEDICINE
Payer: COMMERCIAL

## 2023-01-01 ENCOUNTER — APPOINTMENT (OUTPATIENT)
Dept: RADIOLOGY | Facility: MEDICAL CENTER | Age: 88
End: 2023-01-01
Payer: COMMERCIAL

## 2023-01-01 VITALS
RESPIRATION RATE: 18 BRPM | SYSTOLIC BLOOD PRESSURE: 118 MMHG | OXYGEN SATURATION: 94 % | DIASTOLIC BLOOD PRESSURE: 58 MMHG | WEIGHT: 160 LBS | HEIGHT: 69 IN | BODY MASS INDEX: 23.7 KG/M2 | TEMPERATURE: 97.9 F | HEART RATE: 85 BPM

## 2023-01-01 VITALS — HEART RATE: 60 BPM | RESPIRATION RATE: 18 BRPM

## 2023-01-01 VITALS
HEART RATE: 76 BPM | BODY MASS INDEX: 22.2 KG/M2 | HEIGHT: 69 IN | TEMPERATURE: 97.4 F | DIASTOLIC BLOOD PRESSURE: 76 MMHG | SYSTOLIC BLOOD PRESSURE: 149 MMHG | WEIGHT: 149.91 LBS | OXYGEN SATURATION: 95 % | RESPIRATION RATE: 16 BRPM

## 2023-01-01 VITALS
HEART RATE: 94 BPM | DIASTOLIC BLOOD PRESSURE: 65 MMHG | BODY MASS INDEX: 23.44 KG/M2 | SYSTOLIC BLOOD PRESSURE: 101 MMHG | OXYGEN SATURATION: 96 % | RESPIRATION RATE: 20 BRPM | WEIGHT: 158.73 LBS | TEMPERATURE: 96.8 F

## 2023-01-01 VITALS — SYSTOLIC BLOOD PRESSURE: 90 MMHG | RESPIRATION RATE: 20 BRPM | HEART RATE: 44 BPM | DIASTOLIC BLOOD PRESSURE: 40 MMHG

## 2023-01-01 VITALS
BODY MASS INDEX: 22.07 KG/M2 | HEART RATE: 66 BPM | TEMPERATURE: 96.9 F | OXYGEN SATURATION: 94 % | HEIGHT: 69 IN | WEIGHT: 149.03 LBS | DIASTOLIC BLOOD PRESSURE: 65 MMHG | RESPIRATION RATE: 17 BRPM | SYSTOLIC BLOOD PRESSURE: 125 MMHG

## 2023-01-01 VITALS — RESPIRATION RATE: 20 BRPM | DIASTOLIC BLOOD PRESSURE: 82 MMHG | HEART RATE: 76 BPM | SYSTOLIC BLOOD PRESSURE: 118 MMHG

## 2023-01-01 VITALS — RESPIRATION RATE: 16 BRPM

## 2023-01-01 VITALS — HEART RATE: 68 BPM | RESPIRATION RATE: 18 BRPM

## 2023-01-01 VITALS — RESPIRATION RATE: 16 BRPM | HEART RATE: 84 BPM

## 2023-01-01 VITALS — HEART RATE: 64 BPM | RESPIRATION RATE: 16 BRPM

## 2023-01-01 VITALS — HEART RATE: 68 BPM | RESPIRATION RATE: 16 BRPM

## 2023-01-01 VITALS — RESPIRATION RATE: 16 BRPM | HEART RATE: 68 BPM

## 2023-01-01 VITALS — HEART RATE: 70 BPM | RESPIRATION RATE: 12 BRPM

## 2023-01-01 VITALS — RESPIRATION RATE: 20 BRPM

## 2023-01-01 VITALS — HEART RATE: 72 BPM | RESPIRATION RATE: 18 BRPM

## 2023-01-01 VITALS — HEART RATE: 108 BPM | RESPIRATION RATE: 18 BRPM

## 2023-01-01 VITALS — RESPIRATION RATE: 12 BRPM

## 2023-01-01 VITALS — DIASTOLIC BLOOD PRESSURE: 50 MMHG | SYSTOLIC BLOOD PRESSURE: 92 MMHG

## 2023-01-01 VITALS — RESPIRATION RATE: 16 BRPM | HEART RATE: 64 BPM

## 2023-01-01 VITALS — HEART RATE: 72 BPM | RESPIRATION RATE: 16 BRPM

## 2023-01-01 DIAGNOSIS — R41.0 NEW ONSET OF CONFUSION: ICD-10-CM

## 2023-01-01 DIAGNOSIS — Z86.19 HISTORY OF CLOSTRIDIOIDES DIFFICILE INFECTION: ICD-10-CM

## 2023-01-01 DIAGNOSIS — G81.01: ICD-10-CM

## 2023-01-01 DIAGNOSIS — R55 SYNCOPE, UNSPECIFIED SYNCOPE TYPE: ICD-10-CM

## 2023-01-01 DIAGNOSIS — R53.1 RIGHT SIDED WEAKNESS: ICD-10-CM

## 2023-01-01 DIAGNOSIS — I67.9: ICD-10-CM

## 2023-01-01 DIAGNOSIS — I63.9 ACUTE CVA (CEREBROVASCULAR ACCIDENT) (HCC): ICD-10-CM

## 2023-01-01 DIAGNOSIS — L89.42 PRESSURE INJURY OF CONTIGUOUS REGION INVOLVING BACK AND BUTTOCK, STAGE 2, UNSPECIFIED LATERALITY (HCC): ICD-10-CM

## 2023-01-01 DIAGNOSIS — I61.9 INTRAPARENCHYMAL HEMORRHAGE OF BRAIN (HCC): ICD-10-CM

## 2023-01-01 DIAGNOSIS — L98.429 SKIN ULCER OF SACRUM, UNSPECIFIED ULCER STAGE (HCC): ICD-10-CM

## 2023-01-01 DIAGNOSIS — R19.7 DIARRHEA OF PRESUMED INFECTIOUS ORIGIN: ICD-10-CM

## 2023-01-01 DIAGNOSIS — J96.11 CHRONIC RESPIRATORY FAILURE WITH HYPOXIA (HCC): ICD-10-CM

## 2023-01-01 DIAGNOSIS — N39.0 URINARY TRACT INFECTION WITHOUT HEMATURIA, SITE UNSPECIFIED: ICD-10-CM

## 2023-01-01 DIAGNOSIS — I63.9 CEREBROVASCULAR ACCIDENT (CVA), UNSPECIFIED MECHANISM (HCC): Primary | ICD-10-CM

## 2023-01-01 DIAGNOSIS — D72.829 LEUKOCYTOSIS, UNSPECIFIED TYPE: ICD-10-CM

## 2023-01-01 DIAGNOSIS — R53.83 LETHARGY: ICD-10-CM

## 2023-01-01 DIAGNOSIS — I95.9 HYPOTENSION, UNSPECIFIED HYPOTENSION TYPE: ICD-10-CM

## 2023-01-01 DIAGNOSIS — I48.0 PAROXYSMAL ATRIAL FIBRILLATION (HCC): ICD-10-CM

## 2023-01-01 LAB
ABO + RH BLD: NORMAL
ABO GROUP BLD: NORMAL
ABO GROUP BLD: NORMAL
ACANTHOCYTES BLD QL SMEAR: NORMAL
ALBUMIN SERPL BCP-MCNC: 2.5 G/DL (ref 3.2–4.9)
ALBUMIN SERPL BCP-MCNC: 2.6 G/DL (ref 3.2–4.9)
ALBUMIN SERPL BCP-MCNC: 2.6 G/DL (ref 3.2–4.9)
ALBUMIN SERPL BCP-MCNC: 2.8 G/DL (ref 3.2–4.9)
ALBUMIN SERPL BCP-MCNC: 2.9 G/DL (ref 3.2–4.9)
ALBUMIN SERPL BCP-MCNC: 3.1 G/DL (ref 3.2–4.9)
ALBUMIN SERPL BCP-MCNC: 3.6 G/DL (ref 3.2–4.9)
ALBUMIN SERPL BCP-MCNC: 3.6 G/DL (ref 3.2–4.9)
ALBUMIN SERPL BCP-MCNC: 3.8 G/DL (ref 3.2–4.9)
ALBUMIN SERPL BCP-MCNC: 4.1 G/DL (ref 3.2–4.9)
ALBUMIN/GLOB SERPL: 0.8 G/DL
ALBUMIN/GLOB SERPL: 0.9 G/DL
ALBUMIN/GLOB SERPL: 1 G/DL
ALBUMIN/GLOB SERPL: 1.1 G/DL
ALBUMIN/GLOB SERPL: 1.2 G/DL
ALBUMIN/GLOB SERPL: 1.3 G/DL
ALBUMIN/GLOB SERPL: 1.3 G/DL
ALP SERPL-CCNC: 44 U/L (ref 30–99)
ALP SERPL-CCNC: 46 U/L (ref 30–99)
ALP SERPL-CCNC: 51 U/L (ref 30–99)
ALP SERPL-CCNC: 51 U/L (ref 30–99)
ALP SERPL-CCNC: 52 U/L (ref 30–99)
ALP SERPL-CCNC: 54 U/L (ref 30–99)
ALP SERPL-CCNC: 56 U/L (ref 30–99)
ALP SERPL-CCNC: 56 U/L (ref 30–99)
ALP SERPL-CCNC: 58 U/L (ref 30–99)
ALP SERPL-CCNC: 59 U/L (ref 30–99)
ALP SERPL-CCNC: 61 U/L (ref 30–99)
ALP SERPL-CCNC: 62 U/L (ref 30–99)
ALP SERPL-CCNC: 62 U/L (ref 30–99)
ALP SERPL-CCNC: 66 U/L (ref 30–99)
ALP SERPL-CCNC: 68 U/L (ref 30–99)
ALP SERPL-CCNC: 80 U/L (ref 30–99)
ALP SERPL-CCNC: 83 U/L (ref 30–99)
ALP SERPL-CCNC: 88 U/L (ref 30–99)
ALT SERPL-CCNC: 10 U/L (ref 2–50)
ALT SERPL-CCNC: 11 U/L (ref 2–50)
ALT SERPL-CCNC: 11 U/L (ref 2–50)
ALT SERPL-CCNC: 12 U/L (ref 2–50)
ALT SERPL-CCNC: 12 U/L (ref 2–50)
ALT SERPL-CCNC: 13 U/L (ref 2–50)
ALT SERPL-CCNC: 13 U/L (ref 2–50)
ALT SERPL-CCNC: 14 U/L (ref 2–50)
ALT SERPL-CCNC: 15 U/L (ref 2–50)
ALT SERPL-CCNC: 16 U/L (ref 2–50)
ALT SERPL-CCNC: 18 U/L (ref 2–50)
ALT SERPL-CCNC: 19 U/L (ref 2–50)
ALT SERPL-CCNC: 7 U/L (ref 2–50)
ALT SERPL-CCNC: 7 U/L (ref 2–50)
ANION GAP SERPL CALC-SCNC: 10 MMOL/L (ref 7–16)
ANION GAP SERPL CALC-SCNC: 11 MMOL/L (ref 7–16)
ANION GAP SERPL CALC-SCNC: 12 MMOL/L (ref 7–16)
ANION GAP SERPL CALC-SCNC: 13 MMOL/L (ref 7–16)
ANION GAP SERPL CALC-SCNC: 13 MMOL/L (ref 7–16)
ANION GAP SERPL CALC-SCNC: 14 MMOL/L (ref 7–16)
ANION GAP SERPL CALC-SCNC: 15 MMOL/L (ref 7–16)
ANION GAP SERPL CALC-SCNC: 16 MMOL/L (ref 7–16)
ANION GAP SERPL CALC-SCNC: 9 MMOL/L (ref 7–16)
ANION GAP SERPL CALC-SCNC: 9 MMOL/L (ref 7–16)
APPEARANCE UR: ABNORMAL
APPEARANCE UR: ABNORMAL
APPEARANCE UR: CLEAR
APTT PPP: 25.4 SEC (ref 24.7–36)
APTT PPP: 25.6 SEC (ref 24.7–36)
AST SERPL-CCNC: 11 U/L (ref 12–45)
AST SERPL-CCNC: 12 U/L (ref 12–45)
AST SERPL-CCNC: 13 U/L (ref 12–45)
AST SERPL-CCNC: 13 U/L (ref 12–45)
AST SERPL-CCNC: 14 U/L (ref 12–45)
AST SERPL-CCNC: 14 U/L (ref 12–45)
AST SERPL-CCNC: 15 U/L (ref 12–45)
AST SERPL-CCNC: 16 U/L (ref 12–45)
AST SERPL-CCNC: 20 U/L (ref 12–45)
AST SERPL-CCNC: 9 U/L (ref 12–45)
BACTERIA #/AREA URNS HPF: ABNORMAL /HPF
BACTERIA #/AREA URNS HPF: ABNORMAL /HPF
BACTERIA BLD CULT: ABNORMAL
BACTERIA BLD CULT: ABNORMAL
BACTERIA BLD CULT: NORMAL
BACTERIA STL CULT: NORMAL
BACTERIA STL CULT: NORMAL
BACTERIA UR CULT: ABNORMAL
BASOPHILS # BLD AUTO: 0 % (ref 0–1.8)
BASOPHILS # BLD AUTO: 0.1 % (ref 0–1.8)
BASOPHILS # BLD AUTO: 0.2 % (ref 0–1.8)
BASOPHILS # BLD AUTO: 0.2 % (ref 0–1.8)
BASOPHILS # BLD AUTO: 0.3 % (ref 0–1.8)
BASOPHILS # BLD AUTO: 0.4 % (ref 0–1.8)
BASOPHILS # BLD AUTO: 0.5 % (ref 0–1.8)
BASOPHILS # BLD AUTO: 0.6 % (ref 0–1.8)
BASOPHILS # BLD AUTO: 0.6 % (ref 0–1.8)
BASOPHILS # BLD AUTO: 0.7 % (ref 0–1.8)
BASOPHILS # BLD: 0 K/UL (ref 0–0.12)
BASOPHILS # BLD: 0.01 K/UL (ref 0–0.12)
BASOPHILS # BLD: 0.02 K/UL (ref 0–0.12)
BASOPHILS # BLD: 0.03 K/UL (ref 0–0.12)
BASOPHILS # BLD: 0.04 K/UL (ref 0–0.12)
BILIRUB SERPL-MCNC: 0.3 MG/DL (ref 0.1–1.5)
BILIRUB SERPL-MCNC: 0.4 MG/DL (ref 0.1–1.5)
BILIRUB SERPL-MCNC: 0.5 MG/DL (ref 0.1–1.5)
BILIRUB SERPL-MCNC: 0.6 MG/DL (ref 0.1–1.5)
BILIRUB SERPL-MCNC: 0.7 MG/DL (ref 0.1–1.5)
BILIRUB SERPL-MCNC: 0.9 MG/DL (ref 0.1–1.5)
BILIRUB SERPL-MCNC: 1 MG/DL (ref 0.1–1.5)
BILIRUB UR QL STRIP.AUTO: NEGATIVE
BLD GP AB SCN SERPL QL: NORMAL
BLD GP AB SCN SERPL QL: NORMAL
BUN SERPL-MCNC: 10 MG/DL (ref 8–22)
BUN SERPL-MCNC: 11 MG/DL (ref 8–22)
BUN SERPL-MCNC: 12 MG/DL (ref 8–22)
BUN SERPL-MCNC: 13 MG/DL (ref 8–22)
BUN SERPL-MCNC: 14 MG/DL (ref 8–22)
BUN SERPL-MCNC: 15 MG/DL (ref 8–22)
BUN SERPL-MCNC: 16 MG/DL (ref 8–22)
BUN SERPL-MCNC: 16 MG/DL (ref 8–22)
BUN SERPL-MCNC: 17 MG/DL (ref 8–22)
BUN SERPL-MCNC: 19 MG/DL (ref 8–22)
BUN SERPL-MCNC: 7 MG/DL (ref 8–22)
BUN SERPL-MCNC: 8 MG/DL (ref 8–22)
BUN SERPL-MCNC: 8 MG/DL (ref 8–22)
BURR CELLS BLD QL SMEAR: NORMAL
C DIFF DNA SPEC QL NAA+PROBE: NEGATIVE
C DIFF DNA SPEC QL NAA+PROBE: NORMAL
C DIFF TOX A+B STL QL IA: NEGATIVE
C DIFF TOX A+B STL QL IA: NEGATIVE
C DIFF TOX A+B STL QL IA: POSITIVE
C DIFF TOX A+B STL QL IA: POSITIVE
C DIFF TOX GENS STL QL NAA+PROBE: NORMAL
CALCIUM ALBUM COR SERPL-MCNC: 8.3 MG/DL (ref 8.5–10.5)
CALCIUM ALBUM COR SERPL-MCNC: 8.3 MG/DL (ref 8.5–10.5)
CALCIUM ALBUM COR SERPL-MCNC: 8.4 MG/DL (ref 8.5–10.5)
CALCIUM ALBUM COR SERPL-MCNC: 8.5 MG/DL (ref 8.5–10.5)
CALCIUM ALBUM COR SERPL-MCNC: 8.8 MG/DL (ref 8.5–10.5)
CALCIUM ALBUM COR SERPL-MCNC: 8.8 MG/DL (ref 8.5–10.5)
CALCIUM ALBUM COR SERPL-MCNC: 8.9 MG/DL (ref 8.5–10.5)
CALCIUM ALBUM COR SERPL-MCNC: 8.9 MG/DL (ref 8.5–10.5)
CALCIUM ALBUM COR SERPL-MCNC: 9 MG/DL (ref 8.5–10.5)
CALCIUM ALBUM COR SERPL-MCNC: 9 MG/DL (ref 8.5–10.5)
CALCIUM ALBUM COR SERPL-MCNC: 9.1 MG/DL (ref 8.5–10.5)
CALCIUM ALBUM COR SERPL-MCNC: 9.2 MG/DL (ref 8.5–10.5)
CALCIUM ALBUM COR SERPL-MCNC: 9.2 MG/DL (ref 8.5–10.5)
CALCIUM ALBUM COR SERPL-MCNC: 9.3 MG/DL (ref 8.5–10.5)
CALCIUM ALBUM COR SERPL-MCNC: 9.3 MG/DL (ref 8.5–10.5)
CALCIUM ALBUM COR SERPL-MCNC: 9.5 MG/DL (ref 8.5–10.5)
CALCIUM ALBUM COR SERPL-MCNC: 9.6 MG/DL (ref 8.5–10.5)
CALCIUM ALBUM COR SERPL-MCNC: 9.6 MG/DL (ref 8.5–10.5)
CALCIUM SERPL-MCNC: 7.4 MG/DL (ref 8.5–10.5)
CALCIUM SERPL-MCNC: 7.4 MG/DL (ref 8.5–10.5)
CALCIUM SERPL-MCNC: 7.6 MG/DL (ref 8.5–10.5)
CALCIUM SERPL-MCNC: 7.6 MG/DL (ref 8.5–10.5)
CALCIUM SERPL-MCNC: 7.7 MG/DL (ref 8.5–10.5)
CALCIUM SERPL-MCNC: 7.9 MG/DL (ref 8.5–10.5)
CALCIUM SERPL-MCNC: 8 MG/DL (ref 8.5–10.5)
CALCIUM SERPL-MCNC: 8.1 MG/DL (ref 8.5–10.5)
CALCIUM SERPL-MCNC: 8.2 MG/DL (ref 8.5–10.5)
CALCIUM SERPL-MCNC: 8.2 MG/DL (ref 8.5–10.5)
CALCIUM SERPL-MCNC: 8.3 MG/DL (ref 8.5–10.5)
CALCIUM SERPL-MCNC: 8.4 MG/DL (ref 8.5–10.5)
CALCIUM SERPL-MCNC: 8.5 MG/DL (ref 8.5–10.5)
CALCIUM SERPL-MCNC: 8.6 MG/DL (ref 8.5–10.5)
CALCIUM SERPL-MCNC: 9.1 MG/DL (ref 8.5–10.5)
CALCIUM SERPL-MCNC: 9.1 MG/DL (ref 8.5–10.5)
CALCIUM SERPL-MCNC: 9.2 MG/DL (ref 8.5–10.5)
CALCIUM SERPL-MCNC: 9.4 MG/DL (ref 8.5–10.5)
CFT BLD TEG: 4.2 MIN (ref 4.6–9.1)
CFT P HPASE BLD TEG: 4.9 MIN (ref 4.3–8.3)
CHLORIDE SERPL-SCNC: 102 MMOL/L (ref 96–112)
CHLORIDE SERPL-SCNC: 103 MMOL/L (ref 96–112)
CHLORIDE SERPL-SCNC: 104 MMOL/L (ref 96–112)
CHLORIDE SERPL-SCNC: 105 MMOL/L (ref 96–112)
CHLORIDE SERPL-SCNC: 106 MMOL/L (ref 96–112)
CHLORIDE SERPL-SCNC: 107 MMOL/L (ref 96–112)
CHLORIDE SERPL-SCNC: 108 MMOL/L (ref 96–112)
CHLORIDE SERPL-SCNC: 108 MMOL/L (ref 96–112)
CHLORIDE SERPL-SCNC: 109 MMOL/L (ref 96–112)
CHLORIDE SERPL-SCNC: 110 MMOL/L (ref 96–112)
CHLORIDE SERPL-SCNC: 111 MMOL/L (ref 96–112)
CHLORIDE SERPL-SCNC: 112 MMOL/L (ref 96–112)
CHOLEST SERPL-MCNC: 116 MG/DL (ref 100–199)
CHOLEST SERPL-MCNC: 128 MG/DL (ref 100–199)
CLOT ANGLE BLD TEG: 75.9 DEGREES (ref 63–78)
CLOT LYSIS 30M P MA LENFR BLD TEG: 0 % (ref 0–2.6)
CO2 SERPL-SCNC: 16 MMOL/L (ref 20–33)
CO2 SERPL-SCNC: 18 MMOL/L (ref 20–33)
CO2 SERPL-SCNC: 19 MMOL/L (ref 20–33)
CO2 SERPL-SCNC: 20 MMOL/L (ref 20–33)
CO2 SERPL-SCNC: 21 MMOL/L (ref 20–33)
CO2 SERPL-SCNC: 22 MMOL/L (ref 20–33)
CO2 SERPL-SCNC: 23 MMOL/L (ref 20–33)
CO2 SERPL-SCNC: 23 MMOL/L (ref 20–33)
CO2 SERPL-SCNC: 24 MMOL/L (ref 20–33)
CO2 SERPL-SCNC: 24 MMOL/L (ref 20–33)
CO2 SERPL-SCNC: 25 MMOL/L (ref 20–33)
CO2 SERPL-SCNC: 25 MMOL/L (ref 20–33)
COLOR UR: YELLOW
CREAT SERPL-MCNC: 0.58 MG/DL (ref 0.5–1.4)
CREAT SERPL-MCNC: 0.6 MG/DL (ref 0.5–1.4)
CREAT SERPL-MCNC: 0.63 MG/DL (ref 0.5–1.4)
CREAT SERPL-MCNC: 0.65 MG/DL (ref 0.5–1.4)
CREAT SERPL-MCNC: 0.65 MG/DL (ref 0.5–1.4)
CREAT SERPL-MCNC: 0.67 MG/DL (ref 0.5–1.4)
CREAT SERPL-MCNC: 0.68 MG/DL (ref 0.5–1.4)
CREAT SERPL-MCNC: 0.71 MG/DL (ref 0.5–1.4)
CREAT SERPL-MCNC: 0.72 MG/DL (ref 0.5–1.4)
CREAT SERPL-MCNC: 0.73 MG/DL (ref 0.5–1.4)
CREAT SERPL-MCNC: 0.74 MG/DL (ref 0.5–1.4)
CREAT SERPL-MCNC: 0.75 MG/DL (ref 0.5–1.4)
CREAT SERPL-MCNC: 0.76 MG/DL (ref 0.5–1.4)
CREAT SERPL-MCNC: 0.76 MG/DL (ref 0.5–1.4)
CREAT SERPL-MCNC: 0.78 MG/DL (ref 0.5–1.4)
CREAT SERPL-MCNC: 0.79 MG/DL (ref 0.5–1.4)
CREAT SERPL-MCNC: 0.82 MG/DL (ref 0.5–1.4)
CREAT SERPL-MCNC: 0.9 MG/DL (ref 0.5–1.4)
CREAT SERPL-MCNC: 0.91 MG/DL (ref 0.5–1.4)
CREAT SERPL-MCNC: 1.02 MG/DL (ref 0.5–1.4)
CRP SERPL HS-MCNC: 5.7 MG/DL (ref 0–0.75)
CT.EXTRINSIC BLD ROTEM: 1 MIN (ref 0.8–2.1)
E COLI SXT1+2 STL IA: NORMAL
EKG IMPRESSION: NORMAL
EOSINOPHIL # BLD AUTO: 0 K/UL (ref 0–0.51)
EOSINOPHIL # BLD AUTO: 0 K/UL (ref 0–0.51)
EOSINOPHIL # BLD AUTO: 0.1 K/UL (ref 0–0.51)
EOSINOPHIL # BLD AUTO: 0.14 K/UL (ref 0–0.51)
EOSINOPHIL # BLD AUTO: 0.17 K/UL (ref 0–0.51)
EOSINOPHIL # BLD AUTO: 0.21 K/UL (ref 0–0.51)
EOSINOPHIL # BLD AUTO: 0.34 K/UL (ref 0–0.51)
EOSINOPHIL # BLD AUTO: 0.59 K/UL (ref 0–0.51)
EOSINOPHIL # BLD AUTO: 0.61 K/UL (ref 0–0.51)
EOSINOPHIL # BLD AUTO: 0.61 K/UL (ref 0–0.51)
EOSINOPHIL # BLD AUTO: 0.64 K/UL (ref 0–0.51)
EOSINOPHIL # BLD AUTO: 0.68 K/UL (ref 0–0.51)
EOSINOPHIL # BLD AUTO: 0.73 K/UL (ref 0–0.51)
EOSINOPHIL # BLD AUTO: 0.73 K/UL (ref 0–0.51)
EOSINOPHIL # BLD AUTO: 0.75 K/UL (ref 0–0.51)
EOSINOPHIL # BLD AUTO: 0.75 K/UL (ref 0–0.51)
EOSINOPHIL # BLD AUTO: 0.85 K/UL (ref 0–0.51)
EOSINOPHIL # BLD AUTO: 0.87 K/UL (ref 0–0.51)
EOSINOPHIL # BLD AUTO: 0.91 K/UL (ref 0–0.51)
EOSINOPHIL # BLD AUTO: 0.92 K/UL (ref 0–0.51)
EOSINOPHIL # BLD AUTO: 1.05 K/UL (ref 0–0.51)
EOSINOPHIL NFR BLD: 0 % (ref 0–6.9)
EOSINOPHIL NFR BLD: 0 % (ref 0–6.9)
EOSINOPHIL NFR BLD: 0.6 % (ref 0–6.9)
EOSINOPHIL NFR BLD: 1.6 % (ref 0–6.9)
EOSINOPHIL NFR BLD: 1.9 % (ref 0–6.9)
EOSINOPHIL NFR BLD: 10.2 % (ref 0–6.9)
EOSINOPHIL NFR BLD: 10.3 % (ref 0–6.9)
EOSINOPHIL NFR BLD: 10.7 % (ref 0–6.9)
EOSINOPHIL NFR BLD: 11.1 % (ref 0–6.9)
EOSINOPHIL NFR BLD: 11.6 % (ref 0–6.9)
EOSINOPHIL NFR BLD: 11.7 % (ref 0–6.9)
EOSINOPHIL NFR BLD: 12 % (ref 0–6.9)
EOSINOPHIL NFR BLD: 12.9 % (ref 0–6.9)
EOSINOPHIL NFR BLD: 13 % (ref 0–6.9)
EOSINOPHIL NFR BLD: 13.1 % (ref 0–6.9)
EOSINOPHIL NFR BLD: 13.7 % (ref 0–6.9)
EOSINOPHIL NFR BLD: 14.7 % (ref 0–6.9)
EOSINOPHIL NFR BLD: 2.7 % (ref 0–6.9)
EOSINOPHIL NFR BLD: 3.1 % (ref 0–6.9)
EOSINOPHIL NFR BLD: 4.2 % (ref 0–6.9)
EOSINOPHIL NFR BLD: 4.8 % (ref 0–6.9)
EPI CELLS #/AREA URNS HPF: NEGATIVE /HPF
EPI CELLS #/AREA URNS HPF: NEGATIVE /HPF
ERYTHROCYTE [DISTWIDTH] IN BLOOD BY AUTOMATED COUNT: 41.9 FL (ref 35.9–50)
ERYTHROCYTE [DISTWIDTH] IN BLOOD BY AUTOMATED COUNT: 42.1 FL (ref 35.9–50)
ERYTHROCYTE [DISTWIDTH] IN BLOOD BY AUTOMATED COUNT: 42.8 FL (ref 35.9–50)
ERYTHROCYTE [DISTWIDTH] IN BLOOD BY AUTOMATED COUNT: 43.5 FL (ref 35.9–50)
ERYTHROCYTE [DISTWIDTH] IN BLOOD BY AUTOMATED COUNT: 44 FL (ref 35.9–50)
ERYTHROCYTE [DISTWIDTH] IN BLOOD BY AUTOMATED COUNT: 44.3 FL (ref 35.9–50)
ERYTHROCYTE [DISTWIDTH] IN BLOOD BY AUTOMATED COUNT: 46.8 FL (ref 35.9–50)
ERYTHROCYTE [DISTWIDTH] IN BLOOD BY AUTOMATED COUNT: 47.3 FL (ref 35.9–50)
ERYTHROCYTE [DISTWIDTH] IN BLOOD BY AUTOMATED COUNT: 47.4 FL (ref 35.9–50)
ERYTHROCYTE [DISTWIDTH] IN BLOOD BY AUTOMATED COUNT: 47.4 FL (ref 35.9–50)
ERYTHROCYTE [DISTWIDTH] IN BLOOD BY AUTOMATED COUNT: 47.5 FL (ref 35.9–50)
ERYTHROCYTE [DISTWIDTH] IN BLOOD BY AUTOMATED COUNT: 47.8 FL (ref 35.9–50)
ERYTHROCYTE [DISTWIDTH] IN BLOOD BY AUTOMATED COUNT: 48.6 FL (ref 35.9–50)
ERYTHROCYTE [DISTWIDTH] IN BLOOD BY AUTOMATED COUNT: 48.8 FL (ref 35.9–50)
ERYTHROCYTE [DISTWIDTH] IN BLOOD BY AUTOMATED COUNT: 50.2 FL (ref 35.9–50)
ERYTHROCYTE [DISTWIDTH] IN BLOOD BY AUTOMATED COUNT: 51.7 FL (ref 35.9–50)
ERYTHROCYTE [DISTWIDTH] IN BLOOD BY AUTOMATED COUNT: 51.8 FL (ref 35.9–50)
ERYTHROCYTE [DISTWIDTH] IN BLOOD BY AUTOMATED COUNT: 51.9 FL (ref 35.9–50)
ERYTHROCYTE [DISTWIDTH] IN BLOOD BY AUTOMATED COUNT: 52.1 FL (ref 35.9–50)
ERYTHROCYTE [DISTWIDTH] IN BLOOD BY AUTOMATED COUNT: 52.8 FL (ref 35.9–50)
ERYTHROCYTE [DISTWIDTH] IN BLOOD BY AUTOMATED COUNT: 52.8 FL (ref 35.9–50)
ERYTHROCYTE [DISTWIDTH] IN BLOOD BY AUTOMATED COUNT: 53.1 FL (ref 35.9–50)
ERYTHROCYTE [DISTWIDTH] IN BLOOD BY AUTOMATED COUNT: 53.8 FL (ref 35.9–50)
ERYTHROCYTE [DISTWIDTH] IN BLOOD BY AUTOMATED COUNT: 54.4 FL (ref 35.9–50)
ERYTHROCYTE [SEDIMENTATION RATE] IN BLOOD BY WESTERGREN METHOD: 33 MM/HOUR (ref 0–20)
EST. AVERAGE GLUCOSE BLD GHB EST-MCNC: 123 MG/DL
EST. AVERAGE GLUCOSE BLD GHB EST-MCNC: 134 MG/DL
FERRITIN SERPL-MCNC: 153 NG/ML (ref 22–322)
FOLATE SERPL-MCNC: 10.4 NG/ML
GFR SERPLBLD CREATININE-BSD FMLA CKD-EPI: 71 ML/MIN/1.73 M 2
GFR SERPLBLD CREATININE-BSD FMLA CKD-EPI: 82 ML/MIN/1.73 M 2
GFR SERPLBLD CREATININE-BSD FMLA CKD-EPI: 83 ML/MIN/1.73 M 2
GFR SERPLBLD CREATININE-BSD FMLA CKD-EPI: 85 ML/MIN/1.73 M 2
GFR SERPLBLD CREATININE-BSD FMLA CKD-EPI: 86 ML/MIN/1.73 M 2
GFR SERPLBLD CREATININE-BSD FMLA CKD-EPI: 87 ML/MIN/1.73 M 2
GFR SERPLBLD CREATININE-BSD FMLA CKD-EPI: 87 ML/MIN/1.73 M 2
GFR SERPLBLD CREATININE-BSD FMLA CKD-EPI: 88 ML/MIN/1.73 M 2
GFR SERPLBLD CREATININE-BSD FMLA CKD-EPI: 89 ML/MIN/1.73 M 2
GFR SERPLBLD CREATININE-BSD FMLA CKD-EPI: 90 ML/MIN/1.73 M 2
GFR SERPLBLD CREATININE-BSD FMLA CKD-EPI: 90 ML/MIN/1.73 M 2
GFR SERPLBLD CREATININE-BSD FMLA CKD-EPI: 91 ML/MIN/1.73 M 2
GFR SERPLBLD CREATININE-BSD FMLA CKD-EPI: 91 ML/MIN/1.73 M 2
GFR SERPLBLD CREATININE-BSD FMLA CKD-EPI: 92 ML/MIN/1.73 M 2
GFR SERPLBLD CREATININE-BSD FMLA CKD-EPI: 94 ML/MIN/1.73 M 2
GFR SERPLBLD CREATININE-BSD FMLA CKD-EPI: 94 ML/MIN/1.73 M 2
GLOBULIN SER CALC-MCNC: 2.4 G/DL (ref 1.9–3.5)
GLOBULIN SER CALC-MCNC: 2.5 G/DL (ref 1.9–3.5)
GLOBULIN SER CALC-MCNC: 2.6 G/DL (ref 1.9–3.5)
GLOBULIN SER CALC-MCNC: 2.6 G/DL (ref 1.9–3.5)
GLOBULIN SER CALC-MCNC: 2.7 G/DL (ref 1.9–3.5)
GLOBULIN SER CALC-MCNC: 2.7 G/DL (ref 1.9–3.5)
GLOBULIN SER CALC-MCNC: 2.8 G/DL (ref 1.9–3.5)
GLOBULIN SER CALC-MCNC: 3 G/DL (ref 1.9–3.5)
GLOBULIN SER CALC-MCNC: 3.1 G/DL (ref 1.9–3.5)
GLOBULIN SER CALC-MCNC: 3.3 G/DL (ref 1.9–3.5)
GLOBULIN SER CALC-MCNC: 3.5 G/DL (ref 1.9–3.5)
GLOBULIN SER CALC-MCNC: 3.5 G/DL (ref 1.9–3.5)
GLOBULIN SER CALC-MCNC: 3.6 G/DL (ref 1.9–3.5)
GLUCOSE BLD STRIP.AUTO-MCNC: 110 MG/DL (ref 65–99)
GLUCOSE BLD STRIP.AUTO-MCNC: 121 MG/DL (ref 65–99)
GLUCOSE BLD STRIP.AUTO-MCNC: 127 MG/DL (ref 65–99)
GLUCOSE BLD STRIP.AUTO-MCNC: 140 MG/DL (ref 65–99)
GLUCOSE BLD STRIP.AUTO-MCNC: 77 MG/DL (ref 65–99)
GLUCOSE BLD STRIP.AUTO-MCNC: 82 MG/DL (ref 65–99)
GLUCOSE SERPL-MCNC: 102 MG/DL (ref 65–99)
GLUCOSE SERPL-MCNC: 105 MG/DL (ref 65–99)
GLUCOSE SERPL-MCNC: 107 MG/DL (ref 65–99)
GLUCOSE SERPL-MCNC: 108 MG/DL (ref 65–99)
GLUCOSE SERPL-MCNC: 108 MG/DL (ref 65–99)
GLUCOSE SERPL-MCNC: 109 MG/DL (ref 65–99)
GLUCOSE SERPL-MCNC: 110 MG/DL (ref 65–99)
GLUCOSE SERPL-MCNC: 117 MG/DL (ref 65–99)
GLUCOSE SERPL-MCNC: 122 MG/DL (ref 65–99)
GLUCOSE SERPL-MCNC: 135 MG/DL (ref 65–99)
GLUCOSE SERPL-MCNC: 147 MG/DL (ref 65–99)
GLUCOSE SERPL-MCNC: 68 MG/DL (ref 65–99)
GLUCOSE SERPL-MCNC: 78 MG/DL (ref 65–99)
GLUCOSE SERPL-MCNC: 81 MG/DL (ref 65–99)
GLUCOSE SERPL-MCNC: 82 MG/DL (ref 65–99)
GLUCOSE SERPL-MCNC: 84 MG/DL (ref 65–99)
GLUCOSE SERPL-MCNC: 85 MG/DL (ref 65–99)
GLUCOSE SERPL-MCNC: 87 MG/DL (ref 65–99)
GLUCOSE SERPL-MCNC: 88 MG/DL (ref 65–99)
GLUCOSE SERPL-MCNC: 90 MG/DL (ref 65–99)
GLUCOSE SERPL-MCNC: 97 MG/DL (ref 65–99)
GLUCOSE SERPL-MCNC: 99 MG/DL (ref 65–99)
GLUCOSE SERPL-MCNC: 99 MG/DL (ref 65–99)
GLUCOSE UR STRIP.AUTO-MCNC: NEGATIVE MG/DL
HBA1C MFR BLD: 5.9 % (ref 4–5.6)
HBA1C MFR BLD: 6.3 % (ref 4–5.6)
HCT VFR BLD AUTO: 32.1 % (ref 42–52)
HCT VFR BLD AUTO: 34.8 % (ref 42–52)
HCT VFR BLD AUTO: 34.8 % (ref 42–52)
HCT VFR BLD AUTO: 35.3 % (ref 42–52)
HCT VFR BLD AUTO: 35.8 % (ref 42–52)
HCT VFR BLD AUTO: 37 % (ref 42–52)
HCT VFR BLD AUTO: 37.5 % (ref 42–52)
HCT VFR BLD AUTO: 37.7 % (ref 42–52)
HCT VFR BLD AUTO: 38 % (ref 42–52)
HCT VFR BLD AUTO: 38.4 % (ref 42–52)
HCT VFR BLD AUTO: 38.6 % (ref 42–52)
HCT VFR BLD AUTO: 39.1 % (ref 42–52)
HCT VFR BLD AUTO: 39.1 % (ref 42–52)
HCT VFR BLD AUTO: 39.2 % (ref 42–52)
HCT VFR BLD AUTO: 39.6 % (ref 42–52)
HCT VFR BLD AUTO: 39.7 % (ref 42–52)
HCT VFR BLD AUTO: 39.9 % (ref 42–52)
HCT VFR BLD AUTO: 40.1 % (ref 42–52)
HCT VFR BLD AUTO: 40.2 % (ref 42–52)
HCT VFR BLD AUTO: 41.6 % (ref 42–52)
HCT VFR BLD AUTO: 41.6 % (ref 42–52)
HCT VFR BLD AUTO: 42.5 % (ref 42–52)
HCT VFR BLD AUTO: 42.6 % (ref 42–52)
HCT VFR BLD AUTO: 49.2 % (ref 42–52)
HDLC SERPL-MCNC: 45 MG/DL
HDLC SERPL-MCNC: 51 MG/DL
HGB BLD-MCNC: 10 G/DL (ref 14–18)
HGB BLD-MCNC: 10.6 G/DL (ref 14–18)
HGB BLD-MCNC: 10.8 G/DL (ref 14–18)
HGB BLD-MCNC: 10.9 G/DL (ref 14–18)
HGB BLD-MCNC: 11.2 G/DL (ref 14–18)
HGB BLD-MCNC: 11.7 G/DL (ref 14–18)
HGB BLD-MCNC: 11.8 G/DL (ref 14–18)
HGB BLD-MCNC: 11.9 G/DL (ref 14–18)
HGB BLD-MCNC: 11.9 G/DL (ref 14–18)
HGB BLD-MCNC: 12 G/DL (ref 14–18)
HGB BLD-MCNC: 12.1 G/DL (ref 14–18)
HGB BLD-MCNC: 12.2 G/DL (ref 14–18)
HGB BLD-MCNC: 12.3 G/DL (ref 14–18)
HGB BLD-MCNC: 12.3 G/DL (ref 14–18)
HGB BLD-MCNC: 12.4 G/DL (ref 14–18)
HGB BLD-MCNC: 12.6 G/DL (ref 14–18)
HGB BLD-MCNC: 12.9 G/DL (ref 14–18)
HGB BLD-MCNC: 13.3 G/DL (ref 14–18)
HGB BLD-MCNC: 14 G/DL (ref 14–18)
HGB BLD-MCNC: 14.1 G/DL (ref 14–18)
HGB BLD-MCNC: 14.3 G/DL (ref 14–18)
HGB BLD-MCNC: 16.3 G/DL (ref 14–18)
HGB RETIC QN AUTO: 32.2 PG/CELL (ref 29–35)
HYALINE CASTS #/AREA URNS LPF: ABNORMAL /LPF
HYALINE CASTS #/AREA URNS LPF: ABNORMAL /LPF
IMM GRANULOCYTES # BLD AUTO: 0.01 K/UL (ref 0–0.11)
IMM GRANULOCYTES # BLD AUTO: 0.01 K/UL (ref 0–0.11)
IMM GRANULOCYTES # BLD AUTO: 0.02 K/UL (ref 0–0.11)
IMM GRANULOCYTES # BLD AUTO: 0.02 K/UL (ref 0–0.11)
IMM GRANULOCYTES # BLD AUTO: 0.03 K/UL (ref 0–0.11)
IMM GRANULOCYTES # BLD AUTO: 0.04 K/UL (ref 0–0.11)
IMM GRANULOCYTES # BLD AUTO: 0.05 K/UL (ref 0–0.11)
IMM GRANULOCYTES # BLD AUTO: 0.06 K/UL (ref 0–0.11)
IMM GRANULOCYTES # BLD AUTO: 0.11 K/UL (ref 0–0.11)
IMM GRANULOCYTES # BLD AUTO: 0.12 K/UL (ref 0–0.11)
IMM GRANULOCYTES NFR BLD AUTO: 0.2 % (ref 0–0.9)
IMM GRANULOCYTES NFR BLD AUTO: 0.2 % (ref 0–0.9)
IMM GRANULOCYTES NFR BLD AUTO: 0.3 % (ref 0–0.9)
IMM GRANULOCYTES NFR BLD AUTO: 0.4 % (ref 0–0.9)
IMM GRANULOCYTES NFR BLD AUTO: 0.5 % (ref 0–0.9)
IMM GRANULOCYTES NFR BLD AUTO: 0.6 % (ref 0–0.9)
IMM GRANULOCYTES NFR BLD AUTO: 0.7 % (ref 0–0.9)
IMM GRANULOCYTES NFR BLD AUTO: 1.1 % (ref 0–0.9)
IMM RETICS NFR: 9.4 % (ref 9.3–17.4)
INR PPP: 1.22 (ref 0.87–1.13)
INR PPP: 1.27 (ref 0.87–1.13)
IRON SATN MFR SERPL: 24 % (ref 15–55)
IRON SERPL-MCNC: 39 UG/DL (ref 50–180)
KETONES UR STRIP.AUTO-MCNC: ABNORMAL MG/DL
KETONES UR STRIP.AUTO-MCNC: NEGATIVE MG/DL
KETONES UR STRIP.AUTO-MCNC: NEGATIVE MG/DL
LACTATE SERPL-SCNC: 1.2 MMOL/L (ref 0.5–2)
LACTATE SERPL-SCNC: 1.3 MMOL/L (ref 0.5–2)
LACTATE SERPL-SCNC: 1.4 MMOL/L (ref 0.5–2)
LACTATE SERPL-SCNC: 1.5 MMOL/L (ref 0.5–2)
LACTATE SERPL-SCNC: 1.8 MMOL/L (ref 0.5–2)
LACTATE SERPL-SCNC: 2.4 MMOL/L (ref 0.5–2)
LACTOFERRIN STL QL IA: POSITIVE
LDH SERPL L TO P-CCNC: 156 U/L (ref 107–266)
LDLC SERPL CALC-MCNC: 55 MG/DL
LDLC SERPL CALC-MCNC: 59 MG/DL
LEUKOCYTE ESTERASE UR QL STRIP.AUTO: ABNORMAL
LEUKOCYTE ESTERASE UR QL STRIP.AUTO: ABNORMAL
LEUKOCYTE ESTERASE UR QL STRIP.AUTO: NEGATIVE
LV EJECT FRACT  99904: 65
LV EJECT FRACT  99904: 65
LV EJECT FRACT MOD 2C 99903: 39.01
LV EJECT FRACT MOD 2C 99903: 54.14
LV EJECT FRACT MOD 4C 99902: 67.55
LV EJECT FRACT MOD 4C 99902: 71.94
LV EJECT FRACT MOD BP 99901: 54.67
LV EJECT FRACT MOD BP 99901: 61.42
LYMPHOCYTES # BLD AUTO: 0.84 K/UL (ref 1–4.8)
LYMPHOCYTES # BLD AUTO: 1.02 K/UL (ref 1–4.8)
LYMPHOCYTES # BLD AUTO: 1.33 K/UL (ref 1–4.8)
LYMPHOCYTES # BLD AUTO: 1.39 K/UL (ref 1–4.8)
LYMPHOCYTES # BLD AUTO: 1.42 K/UL (ref 1–4.8)
LYMPHOCYTES # BLD AUTO: 1.43 K/UL (ref 1–4.8)
LYMPHOCYTES # BLD AUTO: 1.49 K/UL (ref 1–4.8)
LYMPHOCYTES # BLD AUTO: 1.52 K/UL (ref 1–4.8)
LYMPHOCYTES # BLD AUTO: 1.64 K/UL (ref 1–4.8)
LYMPHOCYTES # BLD AUTO: 1.83 K/UL (ref 1–4.8)
LYMPHOCYTES # BLD AUTO: 1.9 K/UL (ref 1–4.8)
LYMPHOCYTES # BLD AUTO: 2.05 K/UL (ref 1–4.8)
LYMPHOCYTES # BLD AUTO: 2.08 K/UL (ref 1–4.8)
LYMPHOCYTES # BLD AUTO: 2.13 K/UL (ref 1–4.8)
LYMPHOCYTES # BLD AUTO: 2.15 K/UL (ref 1–4.8)
LYMPHOCYTES # BLD AUTO: 2.25 K/UL (ref 1–4.8)
LYMPHOCYTES # BLD AUTO: 2.48 K/UL (ref 1–4.8)
LYMPHOCYTES # BLD AUTO: 2.51 K/UL (ref 1–4.8)
LYMPHOCYTES # BLD AUTO: 2.51 K/UL (ref 1–4.8)
LYMPHOCYTES # BLD AUTO: 2.71 K/UL (ref 1–4.8)
LYMPHOCYTES # BLD AUTO: 3.42 K/UL (ref 1–4.8)
LYMPHOCYTES NFR BLD: 10.3 % (ref 22–41)
LYMPHOCYTES NFR BLD: 13.5 % (ref 22–41)
LYMPHOCYTES NFR BLD: 14 % (ref 22–41)
LYMPHOCYTES NFR BLD: 15.6 % (ref 22–41)
LYMPHOCYTES NFR BLD: 16.6 % (ref 22–41)
LYMPHOCYTES NFR BLD: 17.2 % (ref 22–41)
LYMPHOCYTES NFR BLD: 20.5 % (ref 22–41)
LYMPHOCYTES NFR BLD: 20.8 % (ref 22–41)
LYMPHOCYTES NFR BLD: 25.2 % (ref 22–41)
LYMPHOCYTES NFR BLD: 28 % (ref 22–41)
LYMPHOCYTES NFR BLD: 28.3 % (ref 22–41)
LYMPHOCYTES NFR BLD: 28.4 % (ref 22–41)
LYMPHOCYTES NFR BLD: 28.8 % (ref 22–41)
LYMPHOCYTES NFR BLD: 30.5 % (ref 22–41)
LYMPHOCYTES NFR BLD: 31.4 % (ref 22–41)
LYMPHOCYTES NFR BLD: 32.8 % (ref 22–41)
LYMPHOCYTES NFR BLD: 36.8 % (ref 22–41)
LYMPHOCYTES NFR BLD: 37.6 % (ref 22–41)
LYMPHOCYTES NFR BLD: 40.5 % (ref 22–41)
LYMPHOCYTES NFR BLD: 44.4 % (ref 22–41)
LYMPHOCYTES NFR BLD: 5.1 % (ref 22–41)
MAGNESIUM SERPL-MCNC: 1.6 MG/DL (ref 1.5–2.5)
MAGNESIUM SERPL-MCNC: 1.7 MG/DL (ref 1.5–2.5)
MAGNESIUM SERPL-MCNC: 1.8 MG/DL (ref 1.5–2.5)
MAGNESIUM SERPL-MCNC: 1.9 MG/DL (ref 1.5–2.5)
MAGNESIUM SERPL-MCNC: 2 MG/DL (ref 1.5–2.5)
MAGNESIUM SERPL-MCNC: 2.1 MG/DL (ref 1.5–2.5)
MAGNESIUM SERPL-MCNC: 2.2 MG/DL (ref 1.5–2.5)
MAGNESIUM SERPL-MCNC: 2.2 MG/DL (ref 1.5–2.5)
MANUAL DIFF BLD: NORMAL
MCF BLD TEG: 65.8 MM (ref 52–69)
MCF.PLATELET INHIB BLD ROTEM: 32.5 MM (ref 15–32)
MCH RBC QN AUTO: 26.1 PG (ref 27–33)
MCH RBC QN AUTO: 26.2 PG (ref 27–33)
MCH RBC QN AUTO: 26.3 PG (ref 27–33)
MCH RBC QN AUTO: 26.4 PG (ref 27–33)
MCH RBC QN AUTO: 26.4 PG (ref 27–33)
MCH RBC QN AUTO: 26.5 PG (ref 27–33)
MCH RBC QN AUTO: 26.5 PG (ref 27–33)
MCH RBC QN AUTO: 26.7 PG (ref 27–33)
MCH RBC QN AUTO: 26.8 PG (ref 27–33)
MCH RBC QN AUTO: 26.9 PG (ref 27–33)
MCH RBC QN AUTO: 27 PG (ref 27–33)
MCH RBC QN AUTO: 28.1 PG (ref 27–33)
MCH RBC QN AUTO: 28.5 PG (ref 27–33)
MCH RBC QN AUTO: 28.8 PG (ref 27–33)
MCH RBC QN AUTO: 28.8 PG (ref 27–33)
MCH RBC QN AUTO: 28.9 PG (ref 27–33)
MCH RBC QN AUTO: 28.9 PG (ref 27–33)
MCH RBC QN AUTO: 29.3 PG (ref 27–33)
MCHC RBC AUTO-ENTMCNC: 30.1 G/DL (ref 32.3–36.5)
MCHC RBC AUTO-ENTMCNC: 30.5 G/DL (ref 32.3–36.5)
MCHC RBC AUTO-ENTMCNC: 30.9 G/DL (ref 32.3–36.5)
MCHC RBC AUTO-ENTMCNC: 30.9 G/DL (ref 32.3–36.5)
MCHC RBC AUTO-ENTMCNC: 31 G/DL (ref 32.3–36.5)
MCHC RBC AUTO-ENTMCNC: 31.2 G/DL (ref 32.3–36.5)
MCHC RBC AUTO-ENTMCNC: 31.2 G/DL (ref 33.7–35.3)
MCHC RBC AUTO-ENTMCNC: 31.3 G/DL (ref 32.3–36.5)
MCHC RBC AUTO-ENTMCNC: 31.3 G/DL (ref 32.3–36.5)
MCHC RBC AUTO-ENTMCNC: 31.5 G/DL (ref 32.3–36.5)
MCHC RBC AUTO-ENTMCNC: 31.5 G/DL (ref 32.3–36.5)
MCHC RBC AUTO-ENTMCNC: 31.6 G/DL (ref 32.3–36.5)
MCHC RBC AUTO-ENTMCNC: 31.8 G/DL (ref 32.3–36.5)
MCHC RBC AUTO-ENTMCNC: 31.8 G/DL (ref 32.3–36.5)
MCHC RBC AUTO-ENTMCNC: 31.9 G/DL (ref 32.3–36.5)
MCHC RBC AUTO-ENTMCNC: 32.1 G/DL (ref 33.7–35.3)
MCHC RBC AUTO-ENTMCNC: 32.5 G/DL (ref 32.3–36.5)
MCHC RBC AUTO-ENTMCNC: 33 G/DL (ref 33.7–35.3)
MCHC RBC AUTO-ENTMCNC: 33.1 G/DL (ref 33.7–35.3)
MCHC RBC AUTO-ENTMCNC: 33.2 G/DL (ref 33.7–35.3)
MCHC RBC AUTO-ENTMCNC: 33.7 G/DL (ref 33.7–35.3)
MCHC RBC AUTO-ENTMCNC: 33.9 G/DL (ref 33.7–35.3)
MCV RBC AUTO: 82.6 FL (ref 81.4–97.8)
MCV RBC AUTO: 82.6 FL (ref 81.4–97.8)
MCV RBC AUTO: 83.4 FL (ref 81.4–97.8)
MCV RBC AUTO: 83.5 FL (ref 81.4–97.8)
MCV RBC AUTO: 83.6 FL (ref 81.4–97.8)
MCV RBC AUTO: 83.7 FL (ref 81.4–97.8)
MCV RBC AUTO: 83.7 FL (ref 81.4–97.8)
MCV RBC AUTO: 83.9 FL (ref 81.4–97.8)
MCV RBC AUTO: 84.3 FL (ref 81.4–97.8)
MCV RBC AUTO: 84.3 FL (ref 81.4–97.8)
MCV RBC AUTO: 84.8 FL (ref 81.4–97.8)
MCV RBC AUTO: 85 FL (ref 81.4–97.8)
MCV RBC AUTO: 85.2 FL (ref 81.4–97.8)
MCV RBC AUTO: 85.7 FL (ref 81.4–97.8)
MCV RBC AUTO: 85.7 FL (ref 81.4–97.8)
MCV RBC AUTO: 85.8 FL (ref 81.4–97.8)
MCV RBC AUTO: 86.3 FL (ref 81.4–97.8)
MCV RBC AUTO: 86.4 FL (ref 81.4–97.8)
MCV RBC AUTO: 86.9 FL (ref 81.4–97.8)
MCV RBC AUTO: 87.1 FL (ref 81.4–97.8)
MCV RBC AUTO: 87.2 FL (ref 81.4–97.8)
MCV RBC AUTO: 87.5 FL (ref 81.4–97.8)
MCV RBC AUTO: 88.9 FL (ref 81.4–97.8)
MCV RBC AUTO: 89.8 FL (ref 81.4–97.8)
MICRO URNS: ABNORMAL
MICRO URNS: ABNORMAL
MICRO URNS: NORMAL
MONOCYTES # BLD AUTO: 0.1 K/UL (ref 0–0.85)
MONOCYTES # BLD AUTO: 0.36 K/UL (ref 0–0.85)
MONOCYTES # BLD AUTO: 0.38 K/UL (ref 0–0.85)
MONOCYTES # BLD AUTO: 0.42 K/UL (ref 0–0.85)
MONOCYTES # BLD AUTO: 0.45 K/UL (ref 0–0.85)
MONOCYTES # BLD AUTO: 0.46 K/UL (ref 0–0.85)
MONOCYTES # BLD AUTO: 0.46 K/UL (ref 0–0.85)
MONOCYTES # BLD AUTO: 0.53 K/UL (ref 0–0.85)
MONOCYTES # BLD AUTO: 0.54 K/UL (ref 0–0.85)
MONOCYTES # BLD AUTO: 0.56 K/UL (ref 0–0.85)
MONOCYTES # BLD AUTO: 0.56 K/UL (ref 0–0.85)
MONOCYTES # BLD AUTO: 0.58 K/UL (ref 0–0.85)
MONOCYTES # BLD AUTO: 0.72 K/UL (ref 0–0.85)
MONOCYTES # BLD AUTO: 0.78 K/UL (ref 0–0.85)
MONOCYTES # BLD AUTO: 0.85 K/UL (ref 0–0.85)
MONOCYTES # BLD AUTO: 0.87 K/UL (ref 0–0.85)
MONOCYTES # BLD AUTO: 0.97 K/UL (ref 0–0.85)
MONOCYTES # BLD AUTO: 0.99 K/UL (ref 0–0.85)
MONOCYTES # BLD AUTO: 1.14 K/UL (ref 0–0.85)
MONOCYTES NFR BLD AUTO: 1.7 % (ref 0–13.4)
MONOCYTES NFR BLD AUTO: 10 % (ref 0–13.4)
MONOCYTES NFR BLD AUTO: 10.2 % (ref 0–13.4)
MONOCYTES NFR BLD AUTO: 4.7 % (ref 0–13.4)
MONOCYTES NFR BLD AUTO: 5.6 % (ref 0–13.4)
MONOCYTES NFR BLD AUTO: 5.8 % (ref 0–13.4)
MONOCYTES NFR BLD AUTO: 5.9 % (ref 0–13.4)
MONOCYTES NFR BLD AUTO: 6.3 % (ref 0–13.4)
MONOCYTES NFR BLD AUTO: 6.6 % (ref 0–13.4)
MONOCYTES NFR BLD AUTO: 6.9 % (ref 0–13.4)
MONOCYTES NFR BLD AUTO: 6.9 % (ref 0–13.4)
MONOCYTES NFR BLD AUTO: 7 % (ref 0–13.4)
MONOCYTES NFR BLD AUTO: 7.1 % (ref 0–13.4)
MONOCYTES NFR BLD AUTO: 7.4 % (ref 0–13.4)
MONOCYTES NFR BLD AUTO: 7.8 % (ref 0–13.4)
MONOCYTES NFR BLD AUTO: 7.8 % (ref 0–13.4)
MONOCYTES NFR BLD AUTO: 8 % (ref 0–13.4)
MONOCYTES NFR BLD AUTO: 8.5 % (ref 0–13.4)
MONOCYTES NFR BLD AUTO: 8.6 % (ref 0–13.4)
MONOCYTES NFR BLD AUTO: 9.2 % (ref 0–13.4)
MONOCYTES NFR BLD AUTO: 9.5 % (ref 0–13.4)
MORPHOLOGY BLD-IMP: NORMAL
NEUTROPHILS # BLD AUTO: 11.62 K/UL (ref 1.82–7.42)
NEUTROPHILS # BLD AUTO: 14.28 K/UL (ref 1.82–7.42)
NEUTROPHILS # BLD AUTO: 2.43 K/UL (ref 1.82–7.42)
NEUTROPHILS # BLD AUTO: 2.52 K/UL (ref 1.82–7.42)
NEUTROPHILS # BLD AUTO: 2.56 K/UL (ref 1.82–7.42)
NEUTROPHILS # BLD AUTO: 2.75 K/UL (ref 1.82–7.42)
NEUTROPHILS # BLD AUTO: 2.89 K/UL (ref 1.82–7.42)
NEUTROPHILS # BLD AUTO: 3.08 K/UL (ref 1.82–7.42)
NEUTROPHILS # BLD AUTO: 3.1 K/UL (ref 1.82–7.42)
NEUTROPHILS # BLD AUTO: 3.18 K/UL (ref 1.82–7.42)
NEUTROPHILS # BLD AUTO: 3.44 K/UL (ref 1.82–7.42)
NEUTROPHILS # BLD AUTO: 3.45 K/UL (ref 1.82–7.42)
NEUTROPHILS # BLD AUTO: 4.02 K/UL (ref 1.82–7.42)
NEUTROPHILS # BLD AUTO: 4.12 K/UL (ref 1.82–7.42)
NEUTROPHILS # BLD AUTO: 4.6 K/UL (ref 1.82–7.42)
NEUTROPHILS # BLD AUTO: 6.12 K/UL (ref 1.82–7.42)
NEUTROPHILS # BLD AUTO: 6.6 K/UL (ref 1.82–7.42)
NEUTROPHILS # BLD AUTO: 7.42 K/UL (ref 1.82–7.42)
NEUTROPHILS # BLD AUTO: 7.61 K/UL (ref 1.82–7.42)
NEUTROPHILS # BLD AUTO: 8.26 K/UL (ref 1.82–7.42)
NEUTROPHILS # BLD AUTO: 8.82 K/UL (ref 1.82–7.42)
NEUTROPHILS NFR BLD: 39.3 % (ref 44–72)
NEUTROPHILS NFR BLD: 41.2 % (ref 44–72)
NEUTROPHILS NFR BLD: 41.9 % (ref 44–72)
NEUTROPHILS NFR BLD: 43.5 % (ref 44–72)
NEUTROPHILS NFR BLD: 46.1 % (ref 44–72)
NEUTROPHILS NFR BLD: 48.4 % (ref 44–72)
NEUTROPHILS NFR BLD: 49.9 % (ref 44–72)
NEUTROPHILS NFR BLD: 50.3 % (ref 44–72)
NEUTROPHILS NFR BLD: 51.3 % (ref 44–72)
NEUTROPHILS NFR BLD: 52 % (ref 44–72)
NEUTROPHILS NFR BLD: 53.8 % (ref 44–72)
NEUTROPHILS NFR BLD: 57.8 % (ref 44–72)
NEUTROPHILS NFR BLD: 60.7 % (ref 44–72)
NEUTROPHILS NFR BLD: 67.3 % (ref 44–72)
NEUTROPHILS NFR BLD: 71.4 % (ref 44–72)
NEUTROPHILS NFR BLD: 71.5 % (ref 44–72)
NEUTROPHILS NFR BLD: 72.1 % (ref 44–72)
NEUTROPHILS NFR BLD: 75.5 % (ref 44–72)
NEUTROPHILS NFR BLD: 76.6 % (ref 44–72)
NEUTROPHILS NFR BLD: 83.6 % (ref 44–72)
NEUTROPHILS NFR BLD: 86.6 % (ref 44–72)
NITRITE UR QL STRIP.AUTO: NEGATIVE
NITRITE UR QL STRIP.AUTO: NEGATIVE
NITRITE UR QL STRIP.AUTO: POSITIVE
NRBC # BLD AUTO: 0 K/UL
NRBC BLD-RTO: 0 /100 WBC
NRBC BLD-RTO: 0 /100 WBC (ref 0–0.2)
NT-PROBNP SERPL IA-MCNC: 1829 PG/ML (ref 0–125)
OVALOCYTES BLD QL SMEAR: NORMAL
PA AA BLD-ACNC: 3.3 % (ref 0–11)
PA ADP BLD-ACNC: 1.1 % (ref 0–17)
PH UR STRIP.AUTO: 5.5 [PH] (ref 5–8)
PH UR STRIP.AUTO: 6.5 [PH] (ref 5–8)
PH UR STRIP.AUTO: 7.5 [PH] (ref 5–8)
PHOSPHATE SERPL-MCNC: 1.9 MG/DL (ref 2.5–4.5)
PHOSPHATE SERPL-MCNC: 2.6 MG/DL (ref 2.5–4.5)
PHOSPHATE SERPL-MCNC: 2.6 MG/DL (ref 2.5–4.5)
PHOSPHATE SERPL-MCNC: 2.9 MG/DL (ref 2.5–4.5)
PHOSPHATE SERPL-MCNC: 2.9 MG/DL (ref 2.5–4.5)
PHOSPHATE SERPL-MCNC: 3 MG/DL (ref 2.5–4.5)
PHOSPHATE SERPL-MCNC: 3 MG/DL (ref 2.5–4.5)
PHOSPHATE SERPL-MCNC: 3.2 MG/DL (ref 2.5–4.5)
PHOSPHATE SERPL-MCNC: 3.2 MG/DL (ref 2.5–4.5)
PLATELET # BLD AUTO: 164 K/UL (ref 164–446)
PLATELET # BLD AUTO: 173 K/UL (ref 164–446)
PLATELET # BLD AUTO: 191 K/UL (ref 164–446)
PLATELET # BLD AUTO: 192 K/UL (ref 164–446)
PLATELET # BLD AUTO: 201 K/UL (ref 164–446)
PLATELET # BLD AUTO: 212 K/UL (ref 164–446)
PLATELET # BLD AUTO: 215 K/UL (ref 164–446)
PLATELET # BLD AUTO: 222 K/UL (ref 164–446)
PLATELET # BLD AUTO: 223 K/UL (ref 164–446)
PLATELET # BLD AUTO: 227 K/UL (ref 164–446)
PLATELET # BLD AUTO: 229 K/UL (ref 164–446)
PLATELET # BLD AUTO: 236 K/UL (ref 164–446)
PLATELET # BLD AUTO: 244 K/UL (ref 164–446)
PLATELET # BLD AUTO: 246 K/UL (ref 164–446)
PLATELET # BLD AUTO: 250 K/UL (ref 164–446)
PLATELET # BLD AUTO: 257 K/UL (ref 164–446)
PLATELET # BLD AUTO: 265 K/UL (ref 164–446)
PLATELET # BLD AUTO: 270 K/UL (ref 164–446)
PLATELET # BLD AUTO: 289 K/UL (ref 164–446)
PLATELET # BLD AUTO: 305 K/UL (ref 164–446)
PLATELET # BLD AUTO: 316 K/UL (ref 164–446)
PLATELET # BLD AUTO: 343 K/UL (ref 164–446)
PLATELET # BLD AUTO: 379 K/UL (ref 164–446)
PLATELET # BLD AUTO: 400 K/UL (ref 164–446)
PLATELET BLD QL SMEAR: NORMAL
PMV BLD AUTO: 10 FL (ref 9–12.9)
PMV BLD AUTO: 10.1 FL (ref 9–12.9)
PMV BLD AUTO: 10.2 FL (ref 9–12.9)
PMV BLD AUTO: 10.3 FL (ref 9–12.9)
PMV BLD AUTO: 10.4 FL (ref 9–12.9)
PMV BLD AUTO: 10.4 FL (ref 9–12.9)
PMV BLD AUTO: 10.5 FL (ref 9–12.9)
PMV BLD AUTO: 10.5 FL (ref 9–12.9)
PMV BLD AUTO: 10.6 FL (ref 9–12.9)
PMV BLD AUTO: 10.7 FL (ref 9–12.9)
PMV BLD AUTO: 10.7 FL (ref 9–12.9)
PMV BLD AUTO: 10.8 FL (ref 9–12.9)
PMV BLD AUTO: 11 FL (ref 9–12.9)
PMV BLD AUTO: 11.1 FL (ref 9–12.9)
PMV BLD AUTO: 11.1 FL (ref 9–12.9)
PMV BLD AUTO: 11.4 FL (ref 9–12.9)
PMV BLD AUTO: 11.6 FL (ref 9–12.9)
PMV BLD AUTO: 9.4 FL (ref 9–12.9)
PMV BLD AUTO: 9.6 FL (ref 9–12.9)
PMV BLD AUTO: 9.8 FL (ref 9–12.9)
PMV BLD AUTO: 9.8 FL (ref 9–12.9)
POIKILOCYTOSIS BLD QL SMEAR: NORMAL
POTASSIUM SERPL-SCNC: 3.3 MMOL/L (ref 3.6–5.5)
POTASSIUM SERPL-SCNC: 3.4 MMOL/L (ref 3.6–5.5)
POTASSIUM SERPL-SCNC: 3.4 MMOL/L (ref 3.6–5.5)
POTASSIUM SERPL-SCNC: 3.5 MMOL/L (ref 3.6–5.5)
POTASSIUM SERPL-SCNC: 3.6 MMOL/L (ref 3.6–5.5)
POTASSIUM SERPL-SCNC: 3.7 MMOL/L (ref 3.6–5.5)
POTASSIUM SERPL-SCNC: 3.8 MMOL/L (ref 3.6–5.5)
POTASSIUM SERPL-SCNC: 3.9 MMOL/L (ref 3.6–5.5)
POTASSIUM SERPL-SCNC: 4 MMOL/L (ref 3.6–5.5)
POTASSIUM SERPL-SCNC: 4.2 MMOL/L (ref 3.6–5.5)
POTASSIUM SERPL-SCNC: 4.3 MMOL/L (ref 3.6–5.5)
POTASSIUM SERPL-SCNC: 4.4 MMOL/L (ref 3.6–5.5)
PROCALCITONIN SERPL-MCNC: 0.07 NG/ML
PROCALCITONIN SERPL-MCNC: 0.12 NG/ML
PROCALCITONIN SERPL-MCNC: 0.68 NG/ML
PROCALCITONIN SERPL-MCNC: 0.76 NG/ML
PROCALCITONIN SERPL-MCNC: 0.77 NG/ML
PROCALCITONIN SERPL-MCNC: 0.82 NG/ML
PROT SERPL-MCNC: 5.1 G/DL (ref 6–8.2)
PROT SERPL-MCNC: 5.2 G/DL (ref 6–8.2)
PROT SERPL-MCNC: 5.4 G/DL (ref 6–8.2)
PROT SERPL-MCNC: 5.5 G/DL (ref 6–8.2)
PROT SERPL-MCNC: 5.6 G/DL (ref 6–8.2)
PROT SERPL-MCNC: 5.6 G/DL (ref 6–8.2)
PROT SERPL-MCNC: 5.7 G/DL (ref 6–8.2)
PROT SERPL-MCNC: 5.9 G/DL (ref 6–8.2)
PROT SERPL-MCNC: 5.9 G/DL (ref 6–8.2)
PROT SERPL-MCNC: 6.2 G/DL (ref 6–8.2)
PROT SERPL-MCNC: 6.3 G/DL (ref 6–8.2)
PROT SERPL-MCNC: 6.3 G/DL (ref 6–8.2)
PROT SERPL-MCNC: 6.4 G/DL (ref 6–8.2)
PROT SERPL-MCNC: 6.7 G/DL (ref 6–8.2)
PROT SERPL-MCNC: 7.4 G/DL (ref 6–8.2)
PROT SERPL-MCNC: 7.4 G/DL (ref 6–8.2)
PROT UR QL STRIP: 30 MG/DL
PROT UR QL STRIP: NEGATIVE MG/DL
PROT UR QL STRIP: NEGATIVE MG/DL
PROTHROMBIN TIME: 15.3 SEC (ref 12–14.6)
PROTHROMBIN TIME: 15.7 SEC (ref 12–14.6)
RBC # BLD AUTO: 3.81 M/UL (ref 4.7–6.1)
RBC # BLD AUTO: 4.03 M/UL (ref 4.7–6.1)
RBC # BLD AUTO: 4.12 M/UL (ref 4.7–6.1)
RBC # BLD AUTO: 4.13 M/UL (ref 4.7–6.1)
RBC # BLD AUTO: 4.23 M/UL (ref 4.7–6.1)
RBC # BLD AUTO: 4.28 M/UL (ref 4.7–6.1)
RBC # BLD AUTO: 4.42 M/UL (ref 4.7–6.1)
RBC # BLD AUTO: 4.42 M/UL (ref 4.7–6.1)
RBC # BLD AUTO: 4.49 M/UL (ref 4.7–6.1)
RBC # BLD AUTO: 4.52 M/UL (ref 4.7–6.1)
RBC # BLD AUTO: 4.52 M/UL (ref 4.7–6.1)
RBC # BLD AUTO: 4.54 M/UL (ref 4.7–6.1)
RBC # BLD AUTO: 4.59 M/UL (ref 4.7–6.1)
RBC # BLD AUTO: 4.59 M/UL (ref 4.7–6.1)
RBC # BLD AUTO: 4.6 M/UL (ref 4.7–6.1)
RBC # BLD AUTO: 4.61 M/UL (ref 4.7–6.1)
RBC # BLD AUTO: 4.65 M/UL (ref 4.7–6.1)
RBC # BLD AUTO: 4.66 M/UL (ref 4.7–6.1)
RBC # BLD AUTO: 4.66 M/UL (ref 4.7–6.1)
RBC # BLD AUTO: 4.85 M/UL (ref 4.7–6.1)
RBC # BLD AUTO: 4.97 M/UL (ref 4.7–6.1)
RBC # BLD AUTO: 5.65 M/UL (ref 4.7–6.1)
RBC # URNS HPF: ABNORMAL /HPF
RBC # URNS HPF: ABNORMAL /HPF
RBC BLD AUTO: PRESENT
RBC UR QL AUTO: ABNORMAL
RBC UR QL AUTO: ABNORMAL
RBC UR QL AUTO: NEGATIVE
RETICS # AUTO: 0.04 M/UL (ref 0.04–0.06)
RETICS/RBC NFR: 0.9 % (ref 0.8–2.1)
RH BLD: NORMAL
RH BLD: NORMAL
SALMONELLA AB SER QL: POSITIVE
SARS-COV+SARS-COV-2 AG RESP QL IA.RAPID: NOTDETECTED
SCHISTOCYTES BLD QL SMEAR: NORMAL
SIGNIFICANT IND 70042: ABNORMAL
SIGNIFICANT IND 70042: NORMAL
SITE SITE: ABNORMAL
SITE SITE: NORMAL
SODIUM SERPL-SCNC: 137 MMOL/L (ref 135–145)
SODIUM SERPL-SCNC: 138 MMOL/L (ref 135–145)
SODIUM SERPL-SCNC: 138 MMOL/L (ref 135–145)
SODIUM SERPL-SCNC: 139 MMOL/L (ref 135–145)
SODIUM SERPL-SCNC: 140 MMOL/L (ref 135–145)
SODIUM SERPL-SCNC: 141 MMOL/L (ref 135–145)
SODIUM SERPL-SCNC: 142 MMOL/L (ref 135–145)
SODIUM SERPL-SCNC: 143 MMOL/L (ref 135–145)
SOURCE SOURCE: ABNORMAL
SOURCE SOURCE: NORMAL
SP GR UR STRIP.AUTO: 1.01
SP GR UR STRIP.AUTO: 1.02
SP GR UR STRIP.AUTO: 1.02
SPECIMEN SOURCE: NORMAL
TEG ALGORITHM TGALG: ABNORMAL
TIBC SERPL-MCNC: 160 UG/DL (ref 250–450)
TRIGL SERPL-MCNC: 108 MG/DL (ref 0–149)
TRIGL SERPL-MCNC: 59 MG/DL (ref 0–149)
TROPONIN T SERPL-MCNC: 20 NG/L (ref 6–19)
TROPONIN T SERPL-MCNC: 23 NG/L (ref 6–19)
TROPONIN T SERPL-MCNC: 25 NG/L (ref 6–19)
TROPONIN T SERPL-MCNC: 39 NG/L (ref 6–19)
TROPONIN T SERPL-MCNC: 97 NG/L (ref 6–19)
TSH SERPL DL<=0.005 MIU/L-ACNC: 1.98 UIU/ML (ref 0.38–5.33)
TSH SERPL DL<=0.005 MIU/L-ACNC: 2.44 UIU/ML (ref 0.38–5.33)
TSH SERPL DL<=0.005 MIU/L-ACNC: 3.82 UIU/ML (ref 0.38–5.33)
UFH PPP CHRO-ACNC: 0.14 IU/ML
UFH PPP CHRO-ACNC: 0.27 IU/ML
UFH PPP CHRO-ACNC: 0.47 IU/ML
UFH PPP CHRO-ACNC: 0.58 IU/ML
UFH PPP CHRO-ACNC: 0.72 IU/ML
UFH PPP CHRO-ACNC: 0.77 IU/ML
UFH PPP CHRO-ACNC: <0.1 IU/ML
UIBC SERPL-MCNC: 121 UG/DL (ref 110–370)
UROBILINOGEN UR STRIP.AUTO-MCNC: 0.2 MG/DL
VIT B12 SERPL-MCNC: 1514 PG/ML (ref 211–911)
VIT B12 SERPL-MCNC: 552 PG/ML (ref 211–911)
WBC # BLD AUTO: 12.2 K/UL (ref 4.8–10.8)
WBC # BLD AUTO: 12.4 K/UL (ref 4.8–10.8)
WBC # BLD AUTO: 15.4 K/UL (ref 4.8–10.8)
WBC # BLD AUTO: 16.5 K/UL (ref 4.8–10.8)
WBC # BLD AUTO: 5.8 K/UL (ref 4.8–10.8)
WBC # BLD AUTO: 5.8 K/UL (ref 4.8–10.8)
WBC # BLD AUTO: 5.9 K/UL (ref 4.8–10.8)
WBC # BLD AUTO: 6 K/UL (ref 4.8–10.8)
WBC # BLD AUTO: 6.1 K/UL (ref 4.8–10.8)
WBC # BLD AUTO: 6.2 K/UL (ref 4.8–10.8)
WBC # BLD AUTO: 6.5 K/UL (ref 4.8–10.8)
WBC # BLD AUTO: 6.7 K/UL (ref 4.8–10.8)
WBC # BLD AUTO: 6.7 K/UL (ref 4.8–10.8)
WBC # BLD AUTO: 6.8 K/UL (ref 4.8–10.8)
WBC # BLD AUTO: 6.8 K/UL (ref 4.8–10.8)
WBC # BLD AUTO: 7 K/UL (ref 4.8–10.8)
WBC # BLD AUTO: 7 K/UL (ref 4.8–10.8)
WBC # BLD AUTO: 7.1 K/UL (ref 4.8–10.8)
WBC # BLD AUTO: 7.6 K/UL (ref 4.8–10.8)
WBC # BLD AUTO: 7.9 K/UL (ref 4.8–10.8)
WBC # BLD AUTO: 8.6 K/UL (ref 4.8–10.8)
WBC # BLD AUTO: 9.2 K/UL (ref 4.8–10.8)
WBC # BLD AUTO: 9.9 K/UL (ref 4.8–10.8)
WBC # BLD AUTO: 9.9 K/UL (ref 4.8–10.8)
WBC #/AREA URNS HPF: ABNORMAL /HPF
WBC #/AREA URNS HPF: ABNORMAL /HPF

## 2023-01-01 PROCEDURE — 36415 COLL VENOUS BLD VENIPUNCTURE: CPT

## 2023-01-01 PROCEDURE — S9126 HOSPICE CARE, IN THE HOME, P: HCPCS

## 2023-01-01 PROCEDURE — 700102 HCHG RX REV CODE 250 W/ 637 OVERRIDE(OP)

## 2023-01-01 PROCEDURE — 80048 BASIC METABOLIC PNL TOTAL CA: CPT

## 2023-01-01 PROCEDURE — 71045 X-RAY EXAM CHEST 1 VIEW: CPT

## 2023-01-01 PROCEDURE — 700102 HCHG RX REV CODE 250 W/ 637 OVERRIDE(OP): Performed by: PHYSICAL MEDICINE & REHABILITATION

## 2023-01-01 PROCEDURE — 80053 COMPREHEN METABOLIC PANEL: CPT | Mod: 91

## 2023-01-01 PROCEDURE — A9270 NON-COVERED ITEM OR SERVICE: HCPCS

## 2023-01-01 PROCEDURE — 85027 COMPLETE CBC AUTOMATED: CPT

## 2023-01-01 PROCEDURE — 83735 ASSAY OF MAGNESIUM: CPT

## 2023-01-01 PROCEDURE — 96105 ASSESSMENT OF APHASIA: CPT

## 2023-01-01 PROCEDURE — A9270 NON-COVERED ITEM OR SERVICE: HCPCS | Performed by: STUDENT IN AN ORGANIZED HEALTH CARE EDUCATION/TRAINING PROGRAM

## 2023-01-01 PROCEDURE — 700102 HCHG RX REV CODE 250 W/ 637 OVERRIDE(OP): Performed by: HOSPITALIST

## 2023-01-01 PROCEDURE — 93005 ELECTROCARDIOGRAM TRACING: CPT | Performed by: EMERGENCY MEDICINE

## 2023-01-01 PROCEDURE — 700111 HCHG RX REV CODE 636 W/ 250 OVERRIDE (IP): Performed by: NURSE PRACTITIONER

## 2023-01-01 PROCEDURE — RXMED WILLOW AMBULATORY MEDICATION CHARGE: Performed by: REHABILITATION PRACTITIONER

## 2023-01-01 PROCEDURE — G0378 HOSPITAL OBSERVATION PER HR: HCPCS

## 2023-01-01 PROCEDURE — A9270 NON-COVERED ITEM OR SERVICE: HCPCS | Performed by: PHYSICAL MEDICINE & REHABILITATION

## 2023-01-01 PROCEDURE — A9270 NON-COVERED ITEM OR SERVICE: HCPCS | Performed by: NURSE PRACTITIONER

## 2023-01-01 PROCEDURE — 85520 HEPARIN ASSAY: CPT | Mod: 91

## 2023-01-01 PROCEDURE — G0299 HHS/HOSPICE OF RN EA 15 MIN: HCPCS

## 2023-01-01 PROCEDURE — A9270 NON-COVERED ITEM OR SERVICE: HCPCS | Performed by: HOSPITALIST

## 2023-01-01 PROCEDURE — 94760 N-INVAS EAR/PLS OXIMETRY 1: CPT

## 2023-01-01 PROCEDURE — 70496 CT ANGIOGRAPHY HEAD: CPT

## 2023-01-01 PROCEDURE — G0155 HHCP-SVS OF CSW,EA 15 MIN: HCPCS

## 2023-01-01 PROCEDURE — 80053 COMPREHEN METABOLIC PANEL: CPT

## 2023-01-01 PROCEDURE — 84484 ASSAY OF TROPONIN QUANT: CPT

## 2023-01-01 PROCEDURE — 700111 HCHG RX REV CODE 636 W/ 250 OVERRIDE (IP)

## 2023-01-01 PROCEDURE — 770022 HCHG ROOM/CARE - ICU (200)

## 2023-01-01 PROCEDURE — 97530 THERAPEUTIC ACTIVITIES: CPT | Mod: CO

## 2023-01-01 PROCEDURE — 85025 COMPLETE CBC W/AUTO DIFF WBC: CPT

## 2023-01-01 PROCEDURE — 0042T CT-CEREBRAL PERFUSION ANALYSIS: CPT

## 2023-01-01 PROCEDURE — 83630 LACTOFERRIN FECAL (QUAL): CPT

## 2023-01-01 PROCEDURE — 93306 TTE W/DOPPLER COMPLETE: CPT

## 2023-01-01 PROCEDURE — 82962 GLUCOSE BLOOD TEST: CPT

## 2023-01-01 PROCEDURE — 700105 HCHG RX REV CODE 258: Mod: JZ | Performed by: EMERGENCY MEDICINE

## 2023-01-01 PROCEDURE — 97166 OT EVAL MOD COMPLEX 45 MIN: CPT

## 2023-01-01 PROCEDURE — 99232 SBSQ HOSP IP/OBS MODERATE 35: CPT | Performed by: NURSE PRACTITIONER

## 2023-01-01 PROCEDURE — 99232 SBSQ HOSP IP/OBS MODERATE 35: CPT | Mod: GC | Performed by: INTERNAL MEDICINE

## 2023-01-01 PROCEDURE — 81003 URINALYSIS AUTO W/O SCOPE: CPT

## 2023-01-01 PROCEDURE — 700105 HCHG RX REV CODE 258

## 2023-01-01 PROCEDURE — 86850 RBC ANTIBODY SCREEN: CPT

## 2023-01-01 PROCEDURE — 770001 HCHG ROOM/CARE - MED/SURG/GYN PRIV*

## 2023-01-01 PROCEDURE — 93010 ELECTROCARDIOGRAM REPORT: CPT | Performed by: INTERNAL MEDICINE

## 2023-01-01 PROCEDURE — 80061 LIPID PANEL: CPT

## 2023-01-01 PROCEDURE — 83605 ASSAY OF LACTIC ACID: CPT

## 2023-01-01 PROCEDURE — 97161 PT EVAL LOW COMPLEX 20 MIN: CPT

## 2023-01-01 PROCEDURE — G0156 HHCP-SVS OF AIDE,EA 15 MIN: HCPCS

## 2023-01-01 PROCEDURE — 700101 HCHG RX REV CODE 250

## 2023-01-01 PROCEDURE — 99222 1ST HOSP IP/OBS MODERATE 55: CPT | Performed by: PHYSICAL MEDICINE & REHABILITATION

## 2023-01-01 PROCEDURE — 87493 C DIFF AMPLIFIED PROBE: CPT

## 2023-01-01 PROCEDURE — 99231 SBSQ HOSP IP/OBS SF/LOW 25: CPT | Performed by: PSYCHIATRY & NEUROLOGY

## 2023-01-01 PROCEDURE — 99291 CRITICAL CARE FIRST HOUR: CPT | Mod: GC | Performed by: INTERNAL MEDICINE

## 2023-01-01 PROCEDURE — 92610 EVALUATE SWALLOWING FUNCTION: CPT

## 2023-01-01 PROCEDURE — 99233 SBSQ HOSP IP/OBS HIGH 50: CPT | Performed by: NURSE PRACTITIONER

## 2023-01-01 PROCEDURE — 92507 TX SP LANG VOICE COMM INDIV: CPT

## 2023-01-01 PROCEDURE — 99232 SBSQ HOSP IP/OBS MODERATE 35: CPT | Mod: GC | Performed by: FAMILY MEDICINE

## 2023-01-01 PROCEDURE — 700102 HCHG RX REV CODE 250 W/ 637 OVERRIDE(OP): Performed by: NURSE PRACTITIONER

## 2023-01-01 PROCEDURE — 99205 OFFICE O/P NEW HI 60 MIN: CPT | Performed by: PSYCHIATRY & NEUROLOGY

## 2023-01-01 PROCEDURE — 85610 PROTHROMBIN TIME: CPT

## 2023-01-01 PROCEDURE — 96366 THER/PROPH/DIAG IV INF ADDON: CPT

## 2023-01-01 PROCEDURE — 99291 CRITICAL CARE FIRST HOUR: CPT | Performed by: NURSE PRACTITIONER

## 2023-01-01 PROCEDURE — 99232 SBSQ HOSP IP/OBS MODERATE 35: CPT | Performed by: PHYSICAL MEDICINE & REHABILITATION

## 2023-01-01 PROCEDURE — 87040 BLOOD CULTURE FOR BACTERIA: CPT

## 2023-01-01 PROCEDURE — 99239 HOSP IP/OBS DSCHRG MGMT >30: CPT | Performed by: INTERNAL MEDICINE

## 2023-01-01 PROCEDURE — 99232 SBSQ HOSP IP/OBS MODERATE 35: CPT | Mod: GC | Performed by: STUDENT IN AN ORGANIZED HEALTH CARE EDUCATION/TRAINING PROGRAM

## 2023-01-01 PROCEDURE — 700102 HCHG RX REV CODE 250 W/ 637 OVERRIDE(OP): Performed by: STUDENT IN AN ORGANIZED HEALTH CARE EDUCATION/TRAINING PROGRAM

## 2023-01-01 PROCEDURE — 85576 BLOOD PLATELET AGGREGATION: CPT

## 2023-01-01 PROCEDURE — 81001 URINALYSIS AUTO W/SCOPE: CPT

## 2023-01-01 PROCEDURE — 770020 HCHG ROOM/CARE - TELE (206)

## 2023-01-01 PROCEDURE — 84100 ASSAY OF PHOSPHORUS: CPT

## 2023-01-01 PROCEDURE — 86140 C-REACTIVE PROTEIN: CPT

## 2023-01-01 PROCEDURE — 99497 ADVNCD CARE PLAN 30 MIN: CPT | Performed by: INTERNAL MEDICINE

## 2023-01-01 PROCEDURE — A9270 NON-COVERED ITEM OR SERVICE: HCPCS | Mod: JZ | Performed by: INTERNAL MEDICINE

## 2023-01-01 PROCEDURE — 700105 HCHG RX REV CODE 258: Performed by: INTERNAL MEDICINE

## 2023-01-01 PROCEDURE — 700102 HCHG RX REV CODE 250 W/ 637 OVERRIDE(OP): Mod: JZ | Performed by: INTERNAL MEDICINE

## 2023-01-01 PROCEDURE — 86768 SALMONELLA ANTIBODY: CPT | Mod: 91

## 2023-01-01 PROCEDURE — 87426 SARSCOV CORONAVIRUS AG IA: CPT

## 2023-01-01 PROCEDURE — 665036 HSPC NOTICE OF ELECTION NOE

## 2023-01-01 PROCEDURE — 95816 EEG AWAKE AND DROWSY: CPT | Performed by: PSYCHIATRY & NEUROLOGY

## 2023-01-01 PROCEDURE — 83550 IRON BINDING TEST: CPT

## 2023-01-01 PROCEDURE — 97530 THERAPEUTIC ACTIVITIES: CPT

## 2023-01-01 PROCEDURE — 700111 HCHG RX REV CODE 636 W/ 250 OVERRIDE (IP): Performed by: EMERGENCY MEDICINE

## 2023-01-01 PROCEDURE — 99285 EMERGENCY DEPT VISIT HI MDM: CPT

## 2023-01-01 PROCEDURE — 87324 CLOSTRIDIUM AG IA: CPT

## 2023-01-01 PROCEDURE — 85384 FIBRINOGEN ACTIVITY: CPT

## 2023-01-01 PROCEDURE — A9270 NON-COVERED ITEM OR SERVICE: HCPCS | Mod: JZ

## 2023-01-01 PROCEDURE — 70450 CT HEAD/BRAIN W/O DYE: CPT

## 2023-01-01 PROCEDURE — 97535 SELF CARE MNGMENT TRAINING: CPT

## 2023-01-01 PROCEDURE — 3074F SYST BP LT 130 MM HG: CPT | Performed by: PSYCHIATRY & NEUROLOGY

## 2023-01-01 PROCEDURE — 99223 1ST HOSP IP/OBS HIGH 75: CPT | Performed by: HOSPITALIST

## 2023-01-01 PROCEDURE — 99291 CRITICAL CARE FIRST HOUR: CPT

## 2023-01-01 PROCEDURE — 700105 HCHG RX REV CODE 258: Performed by: NURSE PRACTITIONER

## 2023-01-01 PROCEDURE — C9113 INJ PANTOPRAZOLE SODIUM, VIA: HCPCS | Performed by: INTERNAL MEDICINE

## 2023-01-01 PROCEDURE — 87077 CULTURE AEROBIC IDENTIFY: CPT

## 2023-01-01 PROCEDURE — 99233 SBSQ HOSP IP/OBS HIGH 50: CPT | Mod: GC | Performed by: INTERNAL MEDICINE

## 2023-01-01 PROCEDURE — 99232 SBSQ HOSP IP/OBS MODERATE 35: CPT | Performed by: STUDENT IN AN ORGANIZED HEALTH CARE EDUCATION/TRAINING PROGRAM

## 2023-01-01 PROCEDURE — 84145 PROCALCITONIN (PCT): CPT

## 2023-01-01 PROCEDURE — 97162 PT EVAL MOD COMPLEX 30 MIN: CPT

## 2023-01-01 PROCEDURE — 99231 SBSQ HOSP IP/OBS SF/LOW 25: CPT | Mod: GC | Performed by: STUDENT IN AN ORGANIZED HEALTH CARE EDUCATION/TRAINING PROGRAM

## 2023-01-01 PROCEDURE — 83540 ASSAY OF IRON: CPT

## 2023-01-01 PROCEDURE — 86901 BLOOD TYPING SEROLOGIC RH(D): CPT

## 2023-01-01 PROCEDURE — 99231 SBSQ HOSP IP/OBS SF/LOW 25: CPT | Mod: GC | Performed by: INTERNAL MEDICINE

## 2023-01-01 PROCEDURE — 30283B1 TRANSFUSION OF NONAUTOLOGOUS 4-FACTOR PROTHROMBIN COMPLEX CONCENTRATE INTO VEIN, PERCUTANEOUS APPROACH: ICD-10-PCS | Performed by: EMERGENCY MEDICINE

## 2023-01-01 PROCEDURE — 87899 AGENT NOS ASSAY W/OPTIC: CPT

## 2023-01-01 PROCEDURE — 92523 SPEECH SOUND LANG COMPREHEN: CPT

## 2023-01-01 PROCEDURE — 83615 LACTATE (LD) (LDH) ENZYME: CPT

## 2023-01-01 PROCEDURE — 87046 STOOL CULTR AEROBIC BACT EA: CPT

## 2023-01-01 PROCEDURE — 83880 ASSAY OF NATRIURETIC PEPTIDE: CPT

## 2023-01-01 PROCEDURE — 82728 ASSAY OF FERRITIN: CPT

## 2023-01-01 PROCEDURE — 86900 BLOOD TYPING SEROLOGIC ABO: CPT

## 2023-01-01 PROCEDURE — 87186 SC STD MICRODIL/AGAR DIL: CPT

## 2023-01-01 PROCEDURE — 99238 HOSP IP/OBS DSCHRG MGMT 30/<: CPT | Mod: GC | Performed by: FAMILY MEDICINE

## 2023-01-01 PROCEDURE — 70551 MRI BRAIN STEM W/O DYE: CPT

## 2023-01-01 PROCEDURE — 700111 HCHG RX REV CODE 636 W/ 250 OVERRIDE (IP): Mod: JZ | Performed by: HOSPITALIST

## 2023-01-01 PROCEDURE — 82746 ASSAY OF FOLIC ACID SERUM: CPT

## 2023-01-01 PROCEDURE — 700117 HCHG RX CONTRAST REV CODE 255: Performed by: EMERGENCY MEDICINE

## 2023-01-01 PROCEDURE — 87077 CULTURE AEROBIC IDENTIFY: CPT | Mod: 91

## 2023-01-01 PROCEDURE — 92526 ORAL FUNCTION THERAPY: CPT

## 2023-01-01 PROCEDURE — 97112 NEUROMUSCULAR REEDUCATION: CPT

## 2023-01-01 PROCEDURE — 92611 MOTION FLUOROSCOPY/SWALLOW: CPT | Performed by: SPEECH-LANGUAGE PATHOLOGIST

## 2023-01-01 PROCEDURE — 74018 RADEX ABDOMEN 1 VIEW: CPT

## 2023-01-01 PROCEDURE — 99239 HOSP IP/OBS DSCHRG MGMT >30: CPT | Mod: GC | Performed by: INTERNAL MEDICINE

## 2023-01-01 PROCEDURE — 80069 RENAL FUNCTION PANEL: CPT

## 2023-01-01 PROCEDURE — 70498 CT ANGIOGRAPHY NECK: CPT

## 2023-01-01 PROCEDURE — 93005 ELECTROCARDIOGRAM TRACING: CPT

## 2023-01-01 PROCEDURE — 85652 RBC SED RATE AUTOMATED: CPT

## 2023-01-01 PROCEDURE — 96375 TX/PRO/DX INJ NEW DRUG ADDON: CPT

## 2023-01-01 PROCEDURE — 87086 URINE CULTURE/COLONY COUNT: CPT

## 2023-01-01 PROCEDURE — 700111 HCHG RX REV CODE 636 W/ 250 OVERRIDE (IP): Performed by: INTERNAL MEDICINE

## 2023-01-01 PROCEDURE — 85018 HEMOGLOBIN: CPT

## 2023-01-01 PROCEDURE — 99223 1ST HOSP IP/OBS HIGH 75: CPT | Mod: AI | Performed by: HOSPITALIST

## 2023-01-01 PROCEDURE — 84443 ASSAY THYROID STIM HORMONE: CPT

## 2023-01-01 PROCEDURE — 3078F DIAST BP <80 MM HG: CPT | Performed by: PSYCHIATRY & NEUROLOGY

## 2023-01-01 PROCEDURE — 700102 HCHG RX REV CODE 250 W/ 637 OVERRIDE(OP): Mod: JZ

## 2023-01-01 PROCEDURE — 96374 THER/PROPH/DIAG INJ IV PUSH: CPT

## 2023-01-01 PROCEDURE — 82607 VITAMIN B-12: CPT

## 2023-01-01 PROCEDURE — 99222 1ST HOSP IP/OBS MODERATE 55: CPT | Mod: FS | Performed by: NURSE PRACTITIONER

## 2023-01-01 PROCEDURE — 87045 FECES CULTURE AEROBIC BACT: CPT

## 2023-01-01 PROCEDURE — 99233 SBSQ HOSP IP/OBS HIGH 50: CPT | Performed by: PSYCHIATRY & NEUROLOGY

## 2023-01-01 PROCEDURE — 95816 EEG AWAKE AND DROWSY: CPT | Mod: 26 | Performed by: PSYCHIATRY & NEUROLOGY

## 2023-01-01 PROCEDURE — 85014 HEMATOCRIT: CPT

## 2023-01-01 PROCEDURE — 99233 SBSQ HOSP IP/OBS HIGH 50: CPT | Mod: 25,GC | Performed by: INTERNAL MEDICINE

## 2023-01-01 PROCEDURE — 700105 HCHG RX REV CODE 258: Performed by: EMERGENCY MEDICINE

## 2023-01-01 PROCEDURE — 74230 X-RAY XM SWLNG FUNCJ C+: CPT

## 2023-01-01 PROCEDURE — 97167 OT EVAL HIGH COMPLEX 60 MIN: CPT

## 2023-01-01 PROCEDURE — 85730 THROMBOPLASTIN TIME PARTIAL: CPT

## 2023-01-01 PROCEDURE — 84295 ASSAY OF SERUM SODIUM: CPT

## 2023-01-01 PROCEDURE — 700101 HCHG RX REV CODE 250: Performed by: INTERNAL MEDICINE

## 2023-01-01 PROCEDURE — 99223 1ST HOSP IP/OBS HIGH 75: CPT | Mod: GC | Performed by: FAMILY MEDICINE

## 2023-01-01 PROCEDURE — 87150 DNA/RNA AMPLIFIED PROBE: CPT

## 2023-01-01 PROCEDURE — 99233 SBSQ HOSP IP/OBS HIGH 50: CPT | Mod: 25 | Performed by: INTERNAL MEDICINE

## 2023-01-01 PROCEDURE — 84145 PROCALCITONIN (PCT): CPT | Mod: 91

## 2023-01-01 PROCEDURE — 83036 HEMOGLOBIN GLYCOSYLATED A1C: CPT

## 2023-01-01 PROCEDURE — 93306 TTE W/DOPPLER COMPLETE: CPT | Mod: 26 | Performed by: INTERNAL MEDICINE

## 2023-01-01 PROCEDURE — 97602 WOUND(S) CARE NON-SELECTIVE: CPT

## 2023-01-01 PROCEDURE — 96365 THER/PROPH/DIAG IV INF INIT: CPT

## 2023-01-01 PROCEDURE — 99223 1ST HOSP IP/OBS HIGH 75: CPT | Performed by: PHYSICAL MEDICINE & REHABILITATION

## 2023-01-01 PROCEDURE — 700101 HCHG RX REV CODE 250: Performed by: NURSE PRACTITIONER

## 2023-01-01 PROCEDURE — 85520 HEPARIN ASSAY: CPT

## 2023-01-01 PROCEDURE — 85007 BL SMEAR W/DIFF WBC COUNT: CPT

## 2023-01-01 PROCEDURE — 83605 ASSAY OF LACTIC ACID: CPT | Mod: 91

## 2023-01-01 PROCEDURE — 97163 PT EVAL HIGH COMPLEX 45 MIN: CPT

## 2023-01-01 PROCEDURE — 92612 ENDOSCOPY SWALLOW (FEES) VID: CPT

## 2023-01-01 PROCEDURE — 85025 COMPLETE CBC W/AUTO DIFF WBC: CPT | Mod: 91

## 2023-01-01 PROCEDURE — RXMED WILLOW AMBULATORY MEDICATION CHARGE

## 2023-01-01 PROCEDURE — 85046 RETICYTE/HGB CONCENTRATE: CPT

## 2023-01-01 PROCEDURE — 99212 OFFICE O/P EST SF 10 MIN: CPT | Performed by: PSYCHIATRY & NEUROLOGY

## 2023-01-01 PROCEDURE — 97110 THERAPEUTIC EXERCISES: CPT

## 2023-01-01 PROCEDURE — 85347 COAGULATION TIME ACTIVATED: CPT

## 2023-01-01 PROCEDURE — 700111 HCHG RX REV CODE 636 W/ 250 OVERRIDE (IP): Mod: JZ | Performed by: EMERGENCY MEDICINE

## 2023-01-01 PROCEDURE — 99222 1ST HOSP IP/OBS MODERATE 55: CPT | Mod: GC | Performed by: INTERNAL MEDICINE

## 2023-01-01 PROCEDURE — 700111 HCHG RX REV CODE 636 W/ 250 OVERRIDE (IP): Mod: JZ | Performed by: INTERNAL MEDICINE

## 2023-01-01 RX ORDER — MORPHINE SULFATE 100 MG/5ML
10 SOLUTION ORAL
Qty: 240 ML | Refills: 0 | Status: SHIPPED | OUTPATIENT
Start: 2023-01-01 | End: 2024-09-03

## 2023-01-01 RX ORDER — SIMVASTATIN 20 MG
20 TABLET ORAL EVERY EVENING
Status: DISCONTINUED | OUTPATIENT
Start: 2023-01-01 | End: 2023-01-01 | Stop reason: HOSPADM

## 2023-01-01 RX ORDER — FAMOTIDINE 20 MG/1
20 TABLET, FILM COATED ORAL DAILY
Status: DISCONTINUED | OUTPATIENT
Start: 2023-01-01 | End: 2023-01-01

## 2023-01-01 RX ORDER — FUROSEMIDE 20 MG/1
20 TABLET ORAL
Status: DISCONTINUED | OUTPATIENT
Start: 2023-01-01 | End: 2023-01-01 | Stop reason: HOSPADM

## 2023-01-01 RX ORDER — FAMOTIDINE 20 MG/1
20 TABLET, FILM COATED ORAL
Status: ON HOLD | COMMUNITY
End: 2023-01-01

## 2023-01-01 RX ORDER — SODIUM CHLORIDE 9 MG/ML
500 INJECTION, SOLUTION INTRAVENOUS
Status: DISCONTINUED | OUTPATIENT
Start: 2023-01-01 | End: 2023-01-01 | Stop reason: HOSPADM

## 2023-01-01 RX ORDER — METOPROLOL SUCCINATE 25 MG/1
25 TABLET, EXTENDED RELEASE ORAL DAILY
Status: DISCONTINUED | OUTPATIENT
Start: 2023-01-01 | End: 2023-01-01 | Stop reason: HOSPADM

## 2023-01-01 RX ORDER — MAGNESIUM SULFATE HEPTAHYDRATE 40 MG/ML
2 INJECTION, SOLUTION INTRAVENOUS ONCE
Status: COMPLETED | OUTPATIENT
Start: 2023-01-01 | End: 2023-01-01

## 2023-01-01 RX ORDER — ACETAMINOPHEN 325 MG/1
650 TABLET ORAL EVERY 6 HOURS PRN
Status: DISCONTINUED | OUTPATIENT
Start: 2023-01-01 | End: 2023-01-01 | Stop reason: HOSPADM

## 2023-01-01 RX ORDER — AMOXICILLIN 250 MG
2 CAPSULE ORAL 2 TIMES DAILY
Status: DISCONTINUED | OUTPATIENT
Start: 2023-01-01 | End: 2023-01-01 | Stop reason: HOSPADM

## 2023-01-01 RX ORDER — ONDANSETRON 2 MG/ML
4 INJECTION INTRAMUSCULAR; INTRAVENOUS ONCE
Status: COMPLETED | OUTPATIENT
Start: 2023-01-01 | End: 2023-01-01

## 2023-01-01 RX ORDER — METOPROLOL SUCCINATE 100 MG/1
50 TABLET, EXTENDED RELEASE ORAL DAILY
Status: ON HOLD | COMMUNITY
End: 2023-01-01

## 2023-01-01 RX ORDER — VANCOMYCIN HYDROCHLORIDE 125 MG/1
125 CAPSULE ORAL EVERY 6 HOURS
Qty: 44 CAPSULE | Refills: 0 | Status: SHIPPED | OUTPATIENT
Start: 2023-01-01 | End: 2023-01-01

## 2023-01-01 RX ORDER — SIMVASTATIN 20 MG
20 TABLET ORAL NIGHTLY
COMMUNITY
End: 2023-01-01 | Stop reason: SDUPTHER

## 2023-01-01 RX ORDER — ONDANSETRON 2 MG/ML
4 INJECTION INTRAMUSCULAR; INTRAVENOUS EVERY 4 HOURS PRN
Status: DISCONTINUED | OUTPATIENT
Start: 2023-01-01 | End: 2023-01-01 | Stop reason: HOSPADM

## 2023-01-01 RX ORDER — AMOXICILLIN 250 MG
2 CAPSULE ORAL 2 TIMES DAILY
Status: DISCONTINUED | OUTPATIENT
Start: 2023-01-01 | End: 2023-01-01

## 2023-01-01 RX ORDER — BACLOFEN 10 MG/1
5 TABLET ORAL 3 TIMES DAILY
Status: DISCONTINUED | OUTPATIENT
Start: 2023-01-01 | End: 2023-01-01 | Stop reason: HOSPADM

## 2023-01-01 RX ORDER — CALCIUM CARBONATE 500 MG/1
1000 TABLET, CHEWABLE ORAL 2 TIMES DAILY
COMMUNITY
End: 2023-01-01 | Stop reason: SDUPTHER

## 2023-01-01 RX ORDER — OMEPRAZOLE 20 MG/1
20 CAPSULE, DELAYED RELEASE ORAL DAILY
Status: DISCONTINUED | OUTPATIENT
Start: 2023-01-01 | End: 2023-01-01

## 2023-01-01 RX ORDER — BISACODYL 10 MG
10 SUPPOSITORY, RECTAL RECTAL PRN
Qty: 5 SUPPOSITORY | Refills: 10 | Status: SHIPPED | OUTPATIENT
Start: 2023-01-01 | End: 2024-09-03

## 2023-01-01 RX ORDER — ACETAMINOPHEN 325 MG/1
650 TABLET ORAL EVERY 4 HOURS PRN
Status: DISCONTINUED | OUTPATIENT
Start: 2023-01-01 | End: 2023-01-01 | Stop reason: HOSPADM

## 2023-01-01 RX ORDER — POLYETHYLENE GLYCOL 3350 17 G/17G
1 POWDER, FOR SOLUTION ORAL
Status: DISCONTINUED | OUTPATIENT
Start: 2023-01-01 | End: 2023-01-01

## 2023-01-01 RX ORDER — SODIUM CHLORIDE, SODIUM LACTATE, POTASSIUM CHLORIDE, AND CALCIUM CHLORIDE .6; .31; .03; .02 G/100ML; G/100ML; G/100ML; G/100ML
30 INJECTION, SOLUTION INTRAVENOUS ONCE
Status: COMPLETED | OUTPATIENT
Start: 2023-01-01 | End: 2023-01-01

## 2023-01-01 RX ORDER — ASPIRIN 81 MG/1
81 TABLET, CHEWABLE ORAL DAILY
Status: DISCONTINUED | OUTPATIENT
Start: 2023-01-01 | End: 2023-01-01

## 2023-01-01 RX ORDER — LOSARTAN POTASSIUM 50 MG/1
75 TABLET ORAL DAILY
Status: DISCONTINUED | OUTPATIENT
Start: 2023-01-01 | End: 2023-01-01 | Stop reason: HOSPADM

## 2023-01-01 RX ORDER — SIMVASTATIN 20 MG
20 TABLET ORAL NIGHTLY
Status: DISCONTINUED | OUTPATIENT
Start: 2023-01-01 | End: 2023-01-01 | Stop reason: HOSPADM

## 2023-01-01 RX ORDER — METOPROLOL SUCCINATE 25 MG/1
75 TABLET, EXTENDED RELEASE ORAL DAILY
Status: DISCONTINUED | OUTPATIENT
Start: 2023-01-01 | End: 2023-01-01 | Stop reason: HOSPADM

## 2023-01-01 RX ORDER — LOSARTAN POTASSIUM 25 MG/1
12.5 TABLET ORAL DAILY
Qty: 30 TABLET | Refills: 0 | Status: ON HOLD | OUTPATIENT
Start: 2023-01-01 | End: 2023-01-01

## 2023-01-01 RX ORDER — LORAZEPAM 2 MG/ML
1 CONCENTRATE ORAL
Qty: 360 ML | Refills: 0 | Status: SHIPPED | OUTPATIENT
Start: 2023-01-01 | End: 2024-09-03

## 2023-01-01 RX ORDER — ACETAMINOPHEN 650 MG/1
650 SUPPOSITORY RECTAL EVERY 4 HOURS PRN
Status: DISCONTINUED | OUTPATIENT
Start: 2023-01-01 | End: 2023-01-01 | Stop reason: HOSPADM

## 2023-01-01 RX ORDER — FAMOTIDINE 20 MG/1
20 TABLET, FILM COATED ORAL 2 TIMES DAILY
Qty: 60 TABLET | Refills: 0 | Status: SHIPPED | OUTPATIENT
Start: 2023-01-01 | End: 2023-01-01 | Stop reason: SDUPTHER

## 2023-01-01 RX ORDER — VANCOMYCIN HYDROCHLORIDE 125 MG/1
125 CAPSULE ORAL
Qty: 3 CAPSULE | Refills: 0 | Status: SHIPPED | OUTPATIENT
Start: 2023-01-01 | End: 2023-01-01

## 2023-01-01 RX ORDER — POTASSIUM CHLORIDE 7.45 MG/ML
10 INJECTION INTRAVENOUS
Status: COMPLETED | OUTPATIENT
Start: 2023-01-01 | End: 2023-01-01

## 2023-01-01 RX ORDER — ONDANSETRON 4 MG/1
4 TABLET, ORALLY DISINTEGRATING ORAL EVERY 4 HOURS PRN
Status: DISCONTINUED | OUTPATIENT
Start: 2023-01-01 | End: 2023-01-01 | Stop reason: HOSPADM

## 2023-01-01 RX ORDER — METOPROLOL SUCCINATE 50 MG/1
50 TABLET, EXTENDED RELEASE ORAL DAILY
Status: DISCONTINUED | OUTPATIENT
Start: 2023-01-01 | End: 2023-01-01

## 2023-01-01 RX ORDER — ONDANSETRON 4 MG/1
4 TABLET, ORALLY DISINTEGRATING ORAL EVERY 6 HOURS PRN
Qty: 15 TABLET | Refills: 10 | Status: SHIPPED | OUTPATIENT
Start: 2023-01-01 | End: 2024-09-03

## 2023-01-01 RX ORDER — METOPROLOL SUCCINATE 25 MG/1
25 TABLET, EXTENDED RELEASE ORAL DAILY
Status: DISCONTINUED | OUTPATIENT
Start: 2023-01-01 | End: 2023-01-01

## 2023-01-01 RX ORDER — METOPROLOL TARTRATE 50 MG/1
50 TABLET, FILM COATED ORAL 2 TIMES DAILY
Status: DISCONTINUED | OUTPATIENT
Start: 2023-01-01 | End: 2023-01-01 | Stop reason: HOSPADM

## 2023-01-01 RX ORDER — SODIUM CHLORIDE, SODIUM LACTATE, POTASSIUM CHLORIDE, AND CALCIUM CHLORIDE .6; .31; .03; .02 G/100ML; G/100ML; G/100ML; G/100ML
1000 INJECTION, SOLUTION INTRAVENOUS ONCE
Status: COMPLETED | OUTPATIENT
Start: 2023-01-01 | End: 2023-01-01

## 2023-01-01 RX ORDER — SODIUM CHLORIDE 9 MG/ML
500 INJECTION, SOLUTION INTRAVENOUS ONCE
Status: COMPLETED | OUTPATIENT
Start: 2023-01-01 | End: 2023-01-01

## 2023-01-01 RX ORDER — FINASTERIDE 5 MG/1
5 TABLET, FILM COATED ORAL DAILY
COMMUNITY
End: 2023-01-01 | Stop reason: SDUPTHER

## 2023-01-01 RX ORDER — HYDRALAZINE HYDROCHLORIDE 20 MG/ML
10 INJECTION INTRAMUSCULAR; INTRAVENOUS EVERY 4 HOURS PRN
Status: DISCONTINUED | OUTPATIENT
Start: 2023-01-01 | End: 2023-01-01

## 2023-01-01 RX ORDER — POTASSIUM CHLORIDE 20 MEQ/1
40 TABLET, EXTENDED RELEASE ORAL ONCE
Status: COMPLETED | OUTPATIENT
Start: 2023-01-01 | End: 2023-01-01

## 2023-01-01 RX ORDER — FINASTERIDE 5 MG/1
5 TABLET, FILM COATED ORAL EVERY EVENING
Status: DISCONTINUED | OUTPATIENT
Start: 2023-01-01 | End: 2023-01-01 | Stop reason: HOSPADM

## 2023-01-01 RX ORDER — SENNA AND DOCUSATE SODIUM 50; 8.6 MG/1; MG/1
2 TABLET, FILM COATED ORAL 2 TIMES DAILY PRN
Qty: 28 TABLET | Refills: 10 | Status: SHIPPED | OUTPATIENT
Start: 2023-01-01 | End: 2024-09-03

## 2023-01-01 RX ORDER — PANTOPRAZOLE SODIUM 40 MG/10ML
40 INJECTION, POWDER, LYOPHILIZED, FOR SOLUTION INTRAVENOUS 2 TIMES DAILY
Status: DISCONTINUED | OUTPATIENT
Start: 2023-01-01 | End: 2023-01-01

## 2023-01-01 RX ORDER — METOPROLOL SUCCINATE 25 MG/1
75 TABLET, EXTENDED RELEASE ORAL DAILY
Qty: 30 TABLET | Refills: 0 | Status: SHIPPED | OUTPATIENT
Start: 2023-01-01 | End: 2023-01-01 | Stop reason: SDUPTHER

## 2023-01-01 RX ORDER — CALCIUM GLUCONATE 20 MG/ML
2 INJECTION, SOLUTION INTRAVENOUS ONCE
Status: DISCONTINUED | OUTPATIENT
Start: 2023-01-01 | End: 2023-01-01

## 2023-01-01 RX ORDER — SULFAMETHOXAZOLE AND TRIMETHOPRIM 800; 160 MG/1; MG/1
1 TABLET ORAL EVERY 12 HOURS
Status: COMPLETED | OUTPATIENT
Start: 2023-01-01 | End: 2023-01-01

## 2023-01-01 RX ORDER — FAMOTIDINE 20 MG/1
20 TABLET, FILM COATED ORAL 2 TIMES DAILY
Status: DISCONTINUED | OUTPATIENT
Start: 2023-01-01 | End: 2023-01-01 | Stop reason: HOSPADM

## 2023-01-01 RX ORDER — VANCOMYCIN HYDROCHLORIDE 125 MG/1
125 CAPSULE ORAL
Qty: 4 CAPSULE | Refills: 0 | Status: SHIPPED | OUTPATIENT
Start: 2023-01-01 | End: 2023-01-01

## 2023-01-01 RX ORDER — FINASTERIDE 5 MG/1
5 TABLET, FILM COATED ORAL DAILY
Qty: 14 TABLET | Refills: 10 | Status: SHIPPED | OUTPATIENT
Start: 2023-01-01 | End: 2024-09-03

## 2023-01-01 RX ORDER — METOPROLOL SUCCINATE 25 MG/1
50 TABLET, EXTENDED RELEASE ORAL
Status: DISCONTINUED | OUTPATIENT
Start: 2023-01-01 | End: 2023-01-01

## 2023-01-01 RX ORDER — LORATADINE 10 MG/1
10 TABLET ORAL DAILY
Qty: 14 TABLET | Refills: 10 | Status: SHIPPED | OUTPATIENT
Start: 2023-01-01 | End: 2024-09-03

## 2023-01-01 RX ORDER — LABETALOL HYDROCHLORIDE 5 MG/ML
10-20 INJECTION, SOLUTION INTRAVENOUS EVERY 4 HOURS PRN
Status: DISCONTINUED | OUTPATIENT
Start: 2023-01-01 | End: 2023-01-01 | Stop reason: HOSPADM

## 2023-01-01 RX ORDER — FAMOTIDINE 20 MG/1
20 TABLET, FILM COATED ORAL DAILY
Qty: 30 TABLET | Refills: 0 | Status: ON HOLD | OUTPATIENT
Start: 2023-01-01 | End: 2023-01-01

## 2023-01-01 RX ORDER — LOSARTAN POTASSIUM 50 MG/1
12.5 TABLET ORAL DAILY
Status: DISCONTINUED | OUTPATIENT
Start: 2023-01-01 | End: 2023-01-01 | Stop reason: HOSPADM

## 2023-01-01 RX ORDER — POTASSIUM CHLORIDE 20 MEQ/1
20 TABLET, EXTENDED RELEASE ORAL 2 TIMES DAILY
Status: COMPLETED | OUTPATIENT
Start: 2023-01-01 | End: 2023-01-01

## 2023-01-01 RX ORDER — HEPARIN SODIUM 5000 [USP'U]/100ML
0-30 INJECTION, SOLUTION INTRAVENOUS CONTINUOUS
Status: DISCONTINUED | OUTPATIENT
Start: 2023-01-01 | End: 2023-01-01

## 2023-01-01 RX ORDER — LOSARTAN POTASSIUM 50 MG/1
75 TABLET ORAL DAILY
Status: ON HOLD | COMMUNITY
End: 2023-01-01 | Stop reason: SDUPTHER

## 2023-01-01 RX ORDER — HYDRALAZINE HYDROCHLORIDE 20 MG/ML
10-20 INJECTION INTRAMUSCULAR; INTRAVENOUS EVERY 4 HOURS PRN
Status: DISCONTINUED | OUTPATIENT
Start: 2023-01-01 | End: 2023-01-01

## 2023-01-01 RX ORDER — ENOXAPARIN SODIUM 100 MG/ML
40 INJECTION SUBCUTANEOUS DAILY
Status: DISCONTINUED | OUTPATIENT
Start: 2023-01-01 | End: 2023-01-01 | Stop reason: HOSPADM

## 2023-01-01 RX ORDER — CALCIUM CARBONATE 500 MG/1
500 TABLET, CHEWABLE ORAL 3 TIMES DAILY
Status: COMPLETED | OUTPATIENT
Start: 2023-01-01 | End: 2023-01-01

## 2023-01-01 RX ORDER — SIMVASTATIN 40 MG
20 TABLET ORAL NIGHTLY
Status: DISCONTINUED | OUTPATIENT
Start: 2023-01-01 | End: 2023-01-01 | Stop reason: HOSPADM

## 2023-01-01 RX ORDER — TAMSULOSIN HYDROCHLORIDE 0.4 MG/1
0.4 CAPSULE ORAL
Qty: 30 CAPSULE | Status: SHIPPED
Start: 2023-01-01 | End: 2023-01-01 | Stop reason: SDUPTHER

## 2023-01-01 RX ORDER — VANCOMYCIN HYDROCHLORIDE 125 MG/1
125 CAPSULE ORAL EVERY 24 HOURS
Qty: 6 CAPSULE | Refills: 0 | Status: SHIPPED | OUTPATIENT
Start: 2023-01-01 | End: 2023-01-01

## 2023-01-01 RX ORDER — OMEPRAZOLE 20 MG/1
20 CAPSULE, DELAYED RELEASE ORAL DAILY
Status: DISCONTINUED | OUTPATIENT
Start: 2023-01-01 | End: 2023-01-01 | Stop reason: HOSPADM

## 2023-01-01 RX ORDER — ASPIRIN 81 MG/1
81 TABLET ORAL DAILY
Status: DISCONTINUED | OUTPATIENT
Start: 2023-01-01 | End: 2023-01-01

## 2023-01-01 RX ORDER — LOPERAMIDE HYDROCHLORIDE 2 MG/1
2 CAPSULE ORAL 4 TIMES DAILY PRN
Status: ON HOLD | COMMUNITY
End: 2023-01-01

## 2023-01-01 RX ORDER — LOPERAMIDE HYDROCHLORIDE 2 MG/1
2 CAPSULE ORAL 4 TIMES DAILY PRN
COMMUNITY
End: 2023-01-01 | Stop reason: SDUPTHER

## 2023-01-01 RX ORDER — OMEPRAZOLE 20 MG/1
20 CAPSULE, DELAYED RELEASE ORAL 2 TIMES DAILY
Status: DISCONTINUED | OUTPATIENT
Start: 2023-01-01 | End: 2023-01-01

## 2023-01-01 RX ORDER — CALCIUM CARBONATE 1000 MG/1
1000 TABLET, CHEWABLE ORAL 2 TIMES DAILY
Qty: 30 TABLET | Refills: 10 | Status: SHIPPED | OUTPATIENT
Start: 2023-01-01 | End: 2024-09-03

## 2023-01-01 RX ORDER — METOPROLOL SUCCINATE 25 MG/1
25 TABLET, EXTENDED RELEASE ORAL DAILY
Qty: 30 TABLET | Status: SHIPPED
Start: 2023-01-01 | End: 2023-01-01

## 2023-01-01 RX ORDER — VANCOMYCIN HYDROCHLORIDE 125 MG/1
125 CAPSULE ORAL EVERY 12 HOURS
Qty: 14 CAPSULE | Refills: 0 | Status: SHIPPED | OUTPATIENT
Start: 2023-01-01 | End: 2023-01-01

## 2023-01-01 RX ORDER — LABETALOL HYDROCHLORIDE 5 MG/ML
10 INJECTION, SOLUTION INTRAVENOUS
Status: DISCONTINUED | OUTPATIENT
Start: 2023-01-01 | End: 2023-01-01

## 2023-01-01 RX ORDER — BISACODYL 10 MG
10 SUPPOSITORY, RECTAL RECTAL
Status: DISCONTINUED | OUTPATIENT
Start: 2023-01-01 | End: 2023-01-01 | Stop reason: HOSPADM

## 2023-01-01 RX ORDER — OMEPRAZOLE 20 MG/1
20 CAPSULE, DELAYED RELEASE ORAL 2 TIMES DAILY
Status: DISCONTINUED | OUTPATIENT
Start: 2023-01-01 | End: 2023-01-01 | Stop reason: HOSPADM

## 2023-01-01 RX ORDER — CALCIUM CARBONATE 1000 MG/1
1000 TABLET, CHEWABLE ORAL 2 TIMES DAILY
COMMUNITY
End: 2023-01-01 | Stop reason: SDUPTHER

## 2023-01-01 RX ORDER — FINASTERIDE 5 MG/1
5 TABLET, FILM COATED ORAL DAILY
Status: DISCONTINUED | OUTPATIENT
Start: 2023-01-01 | End: 2023-01-01 | Stop reason: HOSPADM

## 2023-01-01 RX ORDER — POTASSIUM CHLORIDE 20 MEQ/1
40 TABLET, EXTENDED RELEASE ORAL EVERY 6 HOURS
Status: COMPLETED | OUTPATIENT
Start: 2023-01-01 | End: 2023-01-01

## 2023-01-01 RX ORDER — BACLOFEN 5 MG/1
7.5 TABLET ORAL 3 TIMES DAILY
Status: DISCONTINUED | OUTPATIENT
Start: 2023-01-01 | End: 2023-01-01 | Stop reason: HOSPADM

## 2023-01-01 RX ORDER — TAMSULOSIN HYDROCHLORIDE 0.4 MG/1
0.4 CAPSULE ORAL
Status: DISCONTINUED | OUTPATIENT
Start: 2023-01-01 | End: 2023-01-01 | Stop reason: HOSPADM

## 2023-01-01 RX ORDER — HYDRALAZINE HYDROCHLORIDE 20 MG/ML
10 INJECTION INTRAMUSCULAR; INTRAVENOUS
Status: DISCONTINUED | OUTPATIENT
Start: 2023-01-01 | End: 2023-01-01

## 2023-01-01 RX ORDER — ASPIRIN 300 MG/1
300 SUPPOSITORY RECTAL DAILY
Status: DISCONTINUED | OUTPATIENT
Start: 2023-01-01 | End: 2023-01-01

## 2023-01-01 RX ORDER — METOPROLOL SUCCINATE 25 MG/1
75 TABLET, EXTENDED RELEASE ORAL DAILY
Qty: 42 TABLET | Refills: 6 | OUTPATIENT
Start: 2023-01-01

## 2023-01-01 RX ORDER — HYDRALAZINE HYDROCHLORIDE 20 MG/ML
10 INJECTION INTRAMUSCULAR; INTRAVENOUS
Status: DISCONTINUED | OUTPATIENT
Start: 2023-01-01 | End: 2023-01-01 | Stop reason: HOSPADM

## 2023-01-01 RX ORDER — LABETALOL HYDROCHLORIDE 5 MG/ML
10 INJECTION, SOLUTION INTRAVENOUS EVERY 4 HOURS PRN
Status: DISCONTINUED | OUTPATIENT
Start: 2023-01-01 | End: 2023-01-01

## 2023-01-01 RX ORDER — BISACODYL 10 MG
10 SUPPOSITORY, RECTAL RECTAL
Status: DISCONTINUED | OUTPATIENT
Start: 2023-01-01 | End: 2023-01-01

## 2023-01-01 RX ORDER — ACETAMINOPHEN 325 MG/1
650 TABLET ORAL EVERY 4 HOURS PRN
Status: ON HOLD | COMMUNITY
End: 2023-01-01

## 2023-01-01 RX ORDER — CEFTRIAXONE 2 G/1
2000 INJECTION, POWDER, FOR SOLUTION INTRAMUSCULAR; INTRAVENOUS ONCE
Status: COMPLETED | OUTPATIENT
Start: 2023-01-01 | End: 2023-01-01

## 2023-01-01 RX ORDER — LORATADINE 10 MG/1
10 TABLET ORAL DAILY
Status: DISCONTINUED | OUTPATIENT
Start: 2023-01-01 | End: 2023-01-01 | Stop reason: HOSPADM

## 2023-01-01 RX ORDER — ENALAPRILAT 1.25 MG/ML
1.25 INJECTION INTRAVENOUS EVERY 6 HOURS PRN
Status: DISCONTINUED | OUTPATIENT
Start: 2023-01-01 | End: 2023-01-01 | Stop reason: HOSPADM

## 2023-01-01 RX ORDER — SODIUM CHLORIDE 9 MG/ML
INJECTION, SOLUTION INTRAVENOUS CONTINUOUS
Status: DISCONTINUED | OUTPATIENT
Start: 2023-01-01 | End: 2023-01-01

## 2023-01-01 RX ORDER — METOPROLOL SUCCINATE 25 MG/1
25 TABLET, EXTENDED RELEASE ORAL ONCE
Status: COMPLETED | OUTPATIENT
Start: 2023-01-01 | End: 2023-01-01

## 2023-01-01 RX ORDER — ACETAMINOPHEN 500 MG
1000 TABLET ORAL EVERY 6 HOURS PRN
Qty: 30 TABLET | Refills: 10 | Status: SHIPPED | OUTPATIENT
Start: 2023-01-01 | End: 2024-09-03

## 2023-01-01 RX ORDER — NITROFURANTOIN 25; 75 MG/1; MG/1
100 CAPSULE ORAL 2 TIMES DAILY
Status: ON HOLD | COMMUNITY
End: 2023-01-01

## 2023-01-01 RX ORDER — TAMSULOSIN HYDROCHLORIDE 0.4 MG/1
0.4 CAPSULE ORAL
Qty: 14 CAPSULE | Refills: 11 | Status: SHIPPED | OUTPATIENT
Start: 2023-01-01 | End: 2024-09-03

## 2023-01-01 RX ORDER — ACETAMINOPHEN 650 MG/1
650 SUPPOSITORY RECTAL EVERY 6 HOURS PRN
Qty: 5 SUPPOSITORY | Refills: 10 | Status: SHIPPED | OUTPATIENT
Start: 2023-01-01 | End: 2024-09-03

## 2023-01-01 RX ORDER — LORATADINE 10 MG/1
10 TABLET ORAL DAILY
Qty: 30 TABLET | Refills: 0 | Status: SHIPPED | OUTPATIENT
Start: 2023-01-01 | End: 2023-01-01 | Stop reason: SDUPTHER

## 2023-01-01 RX ORDER — LORAZEPAM 2 MG/ML
2 INJECTION INTRAMUSCULAR
Status: DISCONTINUED | OUTPATIENT
Start: 2023-01-01 | End: 2023-01-01 | Stop reason: HOSPADM

## 2023-01-01 RX ORDER — SODIUM CHLORIDE 9 MG/ML
1000 INJECTION, SOLUTION INTRAVENOUS ONCE
Status: COMPLETED | OUTPATIENT
Start: 2023-01-01 | End: 2023-01-01

## 2023-01-01 RX ORDER — FUROSEMIDE 10 MG/ML
20 INJECTION INTRAMUSCULAR; INTRAVENOUS ONCE
Status: COMPLETED | OUTPATIENT
Start: 2023-01-01 | End: 2023-01-01

## 2023-01-01 RX ORDER — ENALAPRILAT 1.25 MG/ML
1.25 INJECTION INTRAVENOUS EVERY 6 HOURS PRN
Status: DISCONTINUED | OUTPATIENT
Start: 2023-01-01 | End: 2023-01-01

## 2023-01-01 RX ORDER — METOPROLOL SUCCINATE 25 MG/1
75 TABLET, EXTENDED RELEASE ORAL
Status: DISCONTINUED | OUTPATIENT
Start: 2023-01-01 | End: 2023-01-01 | Stop reason: HOSPADM

## 2023-01-01 RX ORDER — METOPROLOL SUCCINATE 25 MG/1
75 TABLET, EXTENDED RELEASE ORAL DAILY
Qty: 30 TABLET | Refills: 1 | Status: SHIPPED | OUTPATIENT
Start: 2023-01-01 | End: 2023-01-01 | Stop reason: SDUPTHER

## 2023-01-01 RX ORDER — LABETALOL HYDROCHLORIDE 5 MG/ML
10 INJECTION, SOLUTION INTRAVENOUS
Status: DISCONTINUED | OUTPATIENT
Start: 2023-01-01 | End: 2023-01-01 | Stop reason: HOSPADM

## 2023-01-01 RX ORDER — POLYETHYLENE GLYCOL 3350 17 G/17G
1 POWDER, FOR SOLUTION ORAL
Status: DISCONTINUED | OUTPATIENT
Start: 2023-01-01 | End: 2023-01-01 | Stop reason: HOSPADM

## 2023-01-01 RX ORDER — METOPROLOL TARTRATE 1 MG/ML
5 INJECTION, SOLUTION INTRAVENOUS ONCE
Status: ACTIVE | OUTPATIENT
Start: 2023-01-01 | End: 2023-01-01

## 2023-01-01 RX ORDER — FAMOTIDINE 20 MG/1
20 TABLET, FILM COATED ORAL DAILY
Status: DISCONTINUED | OUTPATIENT
Start: 2023-01-01 | End: 2023-01-01 | Stop reason: HOSPADM

## 2023-01-01 RX ORDER — OMEPRAZOLE 20 MG/1
20 CAPSULE, DELAYED RELEASE ORAL DAILY
Qty: 30 CAPSULE | Status: SHIPPED
Start: 2023-01-01 | End: 2023-01-01 | Stop reason: SDUPTHER

## 2023-01-01 RX ADMIN — BACLOFEN 7.5 MG: 5 TABLET ORAL at 06:19

## 2023-01-01 RX ADMIN — OMEPRAZOLE 20 MG: 20 CAPSULE, DELAYED RELEASE ORAL at 06:19

## 2023-01-01 RX ADMIN — POLYETHYLENE GLYCOL 3350 1 PACKET: 17 POWDER, FOR SOLUTION ORAL at 04:18

## 2023-01-01 RX ADMIN — TAMSULOSIN HYDROCHLORIDE 0.4 MG: 0.4 CAPSULE ORAL at 08:07

## 2023-01-01 RX ADMIN — CALCIUM CARBONATE 500 MG: 500 TABLET, CHEWABLE ORAL at 05:07

## 2023-01-01 RX ADMIN — TAMSULOSIN HYDROCHLORIDE 0.4 MG: 0.4 CAPSULE ORAL at 07:43

## 2023-01-01 RX ADMIN — APIXABAN 5 MG: 5 TABLET, FILM COATED ORAL at 04:32

## 2023-01-01 RX ADMIN — METOPROLOL SUCCINATE 75 MG: 25 TABLET, EXTENDED RELEASE ORAL at 05:59

## 2023-01-01 RX ADMIN — OMEPRAZOLE 20 MG: 20 CAPSULE, DELAYED RELEASE ORAL at 05:45

## 2023-01-01 RX ADMIN — SIMVASTATIN 20 MG: 20 TABLET, FILM COATED ORAL at 18:05

## 2023-01-01 RX ADMIN — APIXABAN 5 MG: 5 TABLET, FILM COATED ORAL at 04:46

## 2023-01-01 RX ADMIN — APIXABAN 5 MG: 5 TABLET, FILM COATED ORAL at 06:06

## 2023-01-01 RX ADMIN — FAMOTIDINE 20 MG: 20 TABLET, FILM COATED ORAL at 05:03

## 2023-01-01 RX ADMIN — METOPROLOL SUCCINATE 25 MG: 25 TABLET, EXTENDED RELEASE ORAL at 10:08

## 2023-01-01 RX ADMIN — VANCOMYCIN HYDROCHLORIDE 125 MG: 5 INJECTION, POWDER, LYOPHILIZED, FOR SOLUTION INTRAVENOUS at 18:37

## 2023-01-01 RX ADMIN — LORATADINE 10 MG: 10 TABLET ORAL at 04:54

## 2023-01-01 RX ADMIN — TAMSULOSIN HYDROCHLORIDE 0.4 MG: 0.4 CAPSULE ORAL at 10:33

## 2023-01-01 RX ADMIN — METOPROLOL TARTRATE 50 MG: 50 TABLET, FILM COATED ORAL at 18:32

## 2023-01-01 RX ADMIN — SULFAMETHOXAZOLE AND TRIMETHOPRIM 1 TABLET: 800; 160 TABLET ORAL at 07:54

## 2023-01-01 RX ADMIN — FAMOTIDINE 20 MG: 20 TABLET, FILM COATED ORAL at 17:08

## 2023-01-01 RX ADMIN — METOPROLOL SUCCINATE 75 MG: 25 TABLET, EXTENDED RELEASE ORAL at 05:15

## 2023-01-01 RX ADMIN — BACLOFEN 7.5 MG: 5 TABLET ORAL at 18:32

## 2023-01-01 RX ADMIN — BACLOFEN 5 MG: 10 TABLET ORAL at 05:15

## 2023-01-01 RX ADMIN — APIXABAN 5 MG: 5 TABLET, FILM COATED ORAL at 04:54

## 2023-01-01 RX ADMIN — APIXABAN 5 MG: 5 TABLET, FILM COATED ORAL at 17:20

## 2023-01-01 RX ADMIN — LOSARTAN POTASSIUM 12.5 MG: 50 TABLET, FILM COATED ORAL at 05:15

## 2023-01-01 RX ADMIN — POTASSIUM CHLORIDE 10 MEQ: 7.46 INJECTION, SOLUTION INTRAVENOUS at 20:48

## 2023-01-01 RX ADMIN — TAMSULOSIN HYDROCHLORIDE 0.4 MG: 0.4 CAPSULE ORAL at 08:41

## 2023-01-01 RX ADMIN — FAMOTIDINE 20 MG: 20 TABLET, FILM COATED ORAL at 05:40

## 2023-01-01 RX ADMIN — FINASTERIDE 5 MG: 5 TABLET, FILM COATED ORAL at 14:57

## 2023-01-01 RX ADMIN — METOPROLOL TARTRATE 50 MG: 50 TABLET, FILM COATED ORAL at 06:19

## 2023-01-01 RX ADMIN — APIXABAN 5 MG: 5 TABLET, FILM COATED ORAL at 16:53

## 2023-01-01 RX ADMIN — APIXABAN 5 MG: 5 TABLET, FILM COATED ORAL at 18:32

## 2023-01-01 RX ADMIN — BACLOFEN 5 MG: 10 TABLET ORAL at 11:50

## 2023-01-01 RX ADMIN — METOPROLOL SUCCINATE 50 MG: 25 TABLET, EXTENDED RELEASE ORAL at 05:10

## 2023-01-01 RX ADMIN — FAMOTIDINE 20 MG: 20 TABLET, FILM COATED ORAL at 06:29

## 2023-01-01 RX ADMIN — LOSARTAN POTASSIUM 75 MG: 50 TABLET, FILM COATED ORAL at 05:45

## 2023-01-01 RX ADMIN — TAMSULOSIN HYDROCHLORIDE 0.4 MG: 0.4 CAPSULE ORAL at 08:45

## 2023-01-01 RX ADMIN — FAMOTIDINE 20 MG: 20 TABLET, FILM COATED ORAL at 04:32

## 2023-01-01 RX ADMIN — BACLOFEN 5 MG: 10 TABLET ORAL at 12:16

## 2023-01-01 RX ADMIN — APIXABAN 5 MG: 5 TABLET, FILM COATED ORAL at 17:19

## 2023-01-01 RX ADMIN — OMEPRAZOLE 20 MG: 20 CAPSULE, DELAYED RELEASE ORAL at 17:05

## 2023-01-01 RX ADMIN — METOPROLOL SUCCINATE 75 MG: 25 TABLET, EXTENDED RELEASE ORAL at 06:28

## 2023-01-01 RX ADMIN — BACLOFEN 5 MG: 10 TABLET ORAL at 05:35

## 2023-01-01 RX ADMIN — BACLOFEN 5 MG: 10 TABLET ORAL at 17:38

## 2023-01-01 RX ADMIN — IOHEXOL 90 ML: 350 INJECTION, SOLUTION INTRAVENOUS at 15:02

## 2023-01-01 RX ADMIN — SIMVASTATIN 20 MG: 20 TABLET, FILM COATED ORAL at 17:18

## 2023-01-01 RX ADMIN — BACLOFEN 5 MG: 10 TABLET ORAL at 17:01

## 2023-01-01 RX ADMIN — SODIUM CHLORIDE: 9 INJECTION, SOLUTION INTRAVENOUS at 19:56

## 2023-01-01 RX ADMIN — SIMVASTATIN 20 MG: 20 TABLET, FILM COATED ORAL at 17:25

## 2023-01-01 RX ADMIN — LABETALOL HYDROCHLORIDE 10 MG: 5 INJECTION INTRAVENOUS at 20:19

## 2023-01-01 RX ADMIN — BACLOFEN 5 MG: 10 TABLET ORAL at 04:24

## 2023-01-01 RX ADMIN — APIXABAN 5 MG: 5 TABLET, FILM COATED ORAL at 04:57

## 2023-01-01 RX ADMIN — OMEPRAZOLE 20 MG: 20 CAPSULE, DELAYED RELEASE ORAL at 05:35

## 2023-01-01 RX ADMIN — SENNOSIDES AND DOCUSATE SODIUM 2 TABLET: 50; 8.6 TABLET ORAL at 05:38

## 2023-01-01 RX ADMIN — SIMVASTATIN 20 MG: 40 TABLET, FILM COATED ORAL at 20:50

## 2023-01-01 RX ADMIN — METOPROLOL SUCCINATE 75 MG: 25 TABLET, EXTENDED RELEASE ORAL at 06:05

## 2023-01-01 RX ADMIN — OMEPRAZOLE 20 MG: 20 CAPSULE, DELAYED RELEASE ORAL at 05:48

## 2023-01-01 RX ADMIN — LOSARTAN POTASSIUM 75 MG: 50 TABLET, FILM COATED ORAL at 05:07

## 2023-01-01 RX ADMIN — FAMOTIDINE 20 MG: 20 TABLET, FILM COATED ORAL at 16:11

## 2023-01-01 RX ADMIN — TAMSULOSIN HYDROCHLORIDE 0.4 MG: 0.4 CAPSULE ORAL at 08:33

## 2023-01-01 RX ADMIN — HEPARIN SODIUM 14 UNITS/KG/HR: 5000 INJECTION, SOLUTION INTRAVENOUS at 09:24

## 2023-01-01 RX ADMIN — LORAZEPAM 1 MG: 2 CONCENTRATE ORAL at 14:15

## 2023-01-01 RX ADMIN — BACLOFEN 7.5 MG: 5 TABLET ORAL at 18:05

## 2023-01-01 RX ADMIN — SODIUM PHOSPHATE, MONOBASIC, MONOHYDRATE AND SODIUM PHOSPHATE, DIBASIC, ANHYDROUS 30 MMOL: 276; 142 INJECTION, SOLUTION INTRAVENOUS at 09:11

## 2023-01-01 RX ADMIN — SIMVASTATIN 20 MG: 20 TABLET, FILM COATED ORAL at 20:52

## 2023-01-01 RX ADMIN — APIXABAN 5 MG: 5 TABLET, FILM COATED ORAL at 17:27

## 2023-01-01 RX ADMIN — SODIUM CHLORIDE: 9 INJECTION, SOLUTION INTRAVENOUS at 22:29

## 2023-01-01 RX ADMIN — METOPROLOL SUCCINATE 50 MG: 25 TABLET, EXTENDED RELEASE ORAL at 10:02

## 2023-01-01 RX ADMIN — SIMVASTATIN 20 MG: 20 TABLET, FILM COATED ORAL at 17:08

## 2023-01-01 RX ADMIN — BACLOFEN 5 MG: 10 TABLET ORAL at 13:12

## 2023-01-01 RX ADMIN — PANTOPRAZOLE SODIUM 40 MG: 40 INJECTION, POWDER, FOR SOLUTION INTRAVENOUS at 05:41

## 2023-01-01 RX ADMIN — MICONAZOLE NITRATE: 20 CREAM TOPICAL at 17:32

## 2023-01-01 RX ADMIN — MAGNESIUM SULFATE HEPTAHYDRATE 2 G: 2 INJECTION, SOLUTION INTRAVENOUS at 23:53

## 2023-01-01 RX ADMIN — ENOXAPARIN SODIUM 40 MG: 100 INJECTION SUBCUTANEOUS at 17:40

## 2023-01-01 RX ADMIN — FAMOTIDINE 20 MG: 20 TABLET, FILM COATED ORAL at 16:53

## 2023-01-01 RX ADMIN — FINASTERIDE 5 MG: 5 TABLET, FILM COATED ORAL at 18:35

## 2023-01-01 RX ADMIN — BACLOFEN 5 MG: 10 TABLET ORAL at 04:57

## 2023-01-01 RX ADMIN — BACLOFEN 5 MG: 10 TABLET ORAL at 12:49

## 2023-01-01 RX ADMIN — IOHEXOL 100 ML: 350 INJECTION, SOLUTION INTRAVENOUS at 11:35

## 2023-01-01 RX ADMIN — BACLOFEN 5 MG: 10 TABLET ORAL at 13:01

## 2023-01-01 RX ADMIN — APIXABAN 5 MG: 5 TABLET, FILM COATED ORAL at 17:25

## 2023-01-01 RX ADMIN — BACLOFEN 5 MG: 10 TABLET ORAL at 05:09

## 2023-01-01 RX ADMIN — APIXABAN 2.5 MG: 5 TABLET, FILM COATED ORAL at 06:29

## 2023-01-01 RX ADMIN — FAMOTIDINE 20 MG: 20 TABLET, FILM COATED ORAL at 17:24

## 2023-01-01 RX ADMIN — POTASSIUM CHLORIDE 40 MEQ: 1500 TABLET, EXTENDED RELEASE ORAL at 20:51

## 2023-01-01 RX ADMIN — METOPROLOL TARTRATE 50 MG: 50 TABLET, FILM COATED ORAL at 21:05

## 2023-01-01 RX ADMIN — OMEPRAZOLE 20 MG: 20 CAPSULE, DELAYED RELEASE ORAL at 04:46

## 2023-01-01 RX ADMIN — BACLOFEN 5 MG: 10 TABLET ORAL at 17:07

## 2023-01-01 RX ADMIN — METOPROLOL TARTRATE 50 MG: 50 TABLET, FILM COATED ORAL at 09:24

## 2023-01-01 RX ADMIN — VANCOMYCIN HYDROCHLORIDE 125 MG: 5 INJECTION, POWDER, LYOPHILIZED, FOR SOLUTION INTRAVENOUS at 18:32

## 2023-01-01 RX ADMIN — MICONAZOLE NITRATE: 20 CREAM TOPICAL at 04:24

## 2023-01-01 RX ADMIN — ONDANSETRON 4 MG: 2 INJECTION INTRAMUSCULAR; INTRAVENOUS at 14:41

## 2023-01-01 RX ADMIN — HEPARIN SODIUM 12 UNITS/KG/HR: 5000 INJECTION, SOLUTION INTRAVENOUS at 13:01

## 2023-01-01 RX ADMIN — PANTOPRAZOLE SODIUM 40 MG: 40 INJECTION, POWDER, FOR SOLUTION INTRAVENOUS at 18:00

## 2023-01-01 RX ADMIN — FAMOTIDINE 20 MG: 20 TABLET, FILM COATED ORAL at 17:25

## 2023-01-01 RX ADMIN — BACLOFEN 7.5 MG: 5 TABLET ORAL at 17:49

## 2023-01-01 RX ADMIN — OMEPRAZOLE 20 MG: 20 CAPSULE, DELAYED RELEASE ORAL at 05:39

## 2023-01-01 RX ADMIN — FAMOTIDINE 20 MG: 20 TABLET, FILM COATED ORAL at 05:35

## 2023-01-01 RX ADMIN — MAGNESIUM SULFATE HEPTAHYDRATE 2 G: 40 INJECTION, SOLUTION INTRAVENOUS at 11:19

## 2023-01-01 RX ADMIN — MICONAZOLE NITRATE: 20 CREAM TOPICAL at 05:15

## 2023-01-01 RX ADMIN — SENNOSIDES AND DOCUSATE SODIUM 2 TABLET: 50; 8.6 TABLET ORAL at 18:08

## 2023-01-01 RX ADMIN — VANCOMYCIN HYDROCHLORIDE 125 MG: 5 INJECTION, POWDER, LYOPHILIZED, FOR SOLUTION INTRAVENOUS at 23:29

## 2023-01-01 RX ADMIN — CEFTRIAXONE SODIUM 1000 MG: 10 INJECTION, POWDER, FOR SOLUTION INTRAVENOUS at 10:33

## 2023-01-01 RX ADMIN — POTASSIUM CHLORIDE 40 MEQ: 1500 TABLET, EXTENDED RELEASE ORAL at 10:38

## 2023-01-01 RX ADMIN — SODIUM CHLORIDE: 9 INJECTION, SOLUTION INTRAVENOUS at 09:28

## 2023-01-01 RX ADMIN — SIMVASTATIN 20 MG: 20 TABLET, FILM COATED ORAL at 21:03

## 2023-01-01 RX ADMIN — TAMSULOSIN HYDROCHLORIDE 0.4 MG: 0.4 CAPSULE ORAL at 09:34

## 2023-01-01 RX ADMIN — FAMOTIDINE 20 MG: 20 TABLET, FILM COATED ORAL at 04:24

## 2023-01-01 RX ADMIN — LOSARTAN POTASSIUM 12.5 MG: 50 TABLET, FILM COATED ORAL at 05:14

## 2023-01-01 RX ADMIN — MICONAZOLE NITRATE: 20 CREAM TOPICAL at 17:07

## 2023-01-01 RX ADMIN — ENOXAPARIN SODIUM 40 MG: 100 INJECTION SUBCUTANEOUS at 16:51

## 2023-01-01 RX ADMIN — APIXABAN 5 MG: 5 TABLET, FILM COATED ORAL at 16:02

## 2023-01-01 RX ADMIN — APIXABAN 5 MG: 5 TABLET, FILM COATED ORAL at 05:30

## 2023-01-01 RX ADMIN — TAMSULOSIN HYDROCHLORIDE 0.4 MG: 0.4 CAPSULE ORAL at 08:28

## 2023-01-01 RX ADMIN — METOPROLOL TARTRATE 50 MG: 50 TABLET, FILM COATED ORAL at 19:13

## 2023-01-01 RX ADMIN — OMEPRAZOLE 20 MG: 20 CAPSULE, DELAYED RELEASE ORAL at 05:07

## 2023-01-01 RX ADMIN — LORATADINE 10 MG: 10 TABLET ORAL at 05:09

## 2023-01-01 RX ADMIN — FAMOTIDINE 20 MG: 20 TABLET, FILM COATED ORAL at 05:08

## 2023-01-01 RX ADMIN — LORATADINE 10 MG: 10 TABLET ORAL at 06:17

## 2023-01-01 RX ADMIN — SIMVASTATIN 20 MG: 40 TABLET, FILM COATED ORAL at 20:45

## 2023-01-01 RX ADMIN — POTASSIUM CHLORIDE 40 MEQ: 1500 TABLET, EXTENDED RELEASE ORAL at 12:50

## 2023-01-01 RX ADMIN — LOSARTAN POTASSIUM 75 MG: 50 TABLET, FILM COATED ORAL at 06:18

## 2023-01-01 RX ADMIN — MICONAZOLE NITRATE: 20 CREAM TOPICAL at 18:08

## 2023-01-01 RX ADMIN — APIXABAN 5 MG: 5 TABLET, FILM COATED ORAL at 05:14

## 2023-01-01 RX ADMIN — APIXABAN 5 MG: 5 TABLET, FILM COATED ORAL at 18:37

## 2023-01-01 RX ADMIN — APIXABAN 5 MG: 5 TABLET, FILM COATED ORAL at 17:18

## 2023-01-01 RX ADMIN — TAMSULOSIN HYDROCHLORIDE 0.4 MG: 0.4 CAPSULE ORAL at 07:47

## 2023-01-01 RX ADMIN — FINASTERIDE 5 MG: 5 TABLET, FILM COATED ORAL at 05:46

## 2023-01-01 RX ADMIN — BACLOFEN 5 MG: 10 TABLET ORAL at 16:02

## 2023-01-01 RX ADMIN — METOPROLOL SUCCINATE 50 MG: 25 TABLET, EXTENDED RELEASE ORAL at 04:54

## 2023-01-01 RX ADMIN — METOPROLOL SUCCINATE 50 MG: 50 TABLET, EXTENDED RELEASE ORAL at 05:42

## 2023-01-01 RX ADMIN — APIXABAN 5 MG: 5 TABLET, FILM COATED ORAL at 05:09

## 2023-01-01 RX ADMIN — SIMVASTATIN 20 MG: 20 TABLET, FILM COATED ORAL at 17:27

## 2023-01-01 RX ADMIN — FAMOTIDINE 20 MG: 20 TABLET, FILM COATED ORAL at 05:09

## 2023-01-01 RX ADMIN — BACLOFEN 5 MG: 10 TABLET ORAL at 17:19

## 2023-01-01 RX ADMIN — SIMVASTATIN 20 MG: 20 TABLET, FILM COATED ORAL at 21:48

## 2023-01-01 RX ADMIN — APIXABAN 5 MG: 5 TABLET, FILM COATED ORAL at 04:13

## 2023-01-01 RX ADMIN — OMEPRAZOLE 20 MG: 20 CAPSULE, DELAYED RELEASE ORAL at 04:34

## 2023-01-01 RX ADMIN — MAGNESIUM SULFATE HEPTAHYDRATE 2 G: 2 INJECTION, SOLUTION INTRAVENOUS at 08:31

## 2023-01-01 RX ADMIN — APIXABAN 5 MG: 5 TABLET, FILM COATED ORAL at 05:40

## 2023-01-01 RX ADMIN — VANCOMYCIN HYDROCHLORIDE 125 MG: 5 INJECTION, POWDER, LYOPHILIZED, FOR SOLUTION INTRAVENOUS at 05:09

## 2023-01-01 RX ADMIN — OMEPRAZOLE 20 MG: 20 CAPSULE, DELAYED RELEASE ORAL at 17:39

## 2023-01-01 RX ADMIN — BACLOFEN 7.5 MG: 5 TABLET ORAL at 18:34

## 2023-01-01 RX ADMIN — VANCOMYCIN HYDROCHLORIDE 125 MG: 5 INJECTION, POWDER, LYOPHILIZED, FOR SOLUTION INTRAVENOUS at 09:05

## 2023-01-01 RX ADMIN — METOPROLOL SUCCINATE 25 MG: 25 TABLET, EXTENDED RELEASE ORAL at 20:51

## 2023-01-01 RX ADMIN — MICONAZOLE NITRATE: 20 CREAM TOPICAL at 05:40

## 2023-01-01 RX ADMIN — BACLOFEN 7.5 MG: 5 TABLET ORAL at 14:15

## 2023-01-01 RX ADMIN — VANCOMYCIN HYDROCHLORIDE 125 MG: 5 INJECTION, POWDER, LYOPHILIZED, FOR SOLUTION INTRAVENOUS at 12:02

## 2023-01-01 RX ADMIN — LORATADINE 10 MG: 10 TABLET ORAL at 04:13

## 2023-01-01 RX ADMIN — TAMSULOSIN HYDROCHLORIDE 0.4 MG: 0.4 CAPSULE ORAL at 14:57

## 2023-01-01 RX ADMIN — MICONAZOLE NITRATE: 20 CREAM TOPICAL at 06:29

## 2023-01-01 RX ADMIN — CEFTRIAXONE SODIUM 1000 MG: 10 INJECTION, POWDER, FOR SOLUTION INTRAVENOUS at 11:24

## 2023-01-01 RX ADMIN — BACLOFEN 7.5 MG: 5 TABLET ORAL at 05:35

## 2023-01-01 RX ADMIN — SODIUM CHLORIDE: 9 INJECTION, SOLUTION INTRAVENOUS at 17:49

## 2023-01-01 RX ADMIN — APIXABAN 5 MG: 5 TABLET, FILM COATED ORAL at 23:28

## 2023-01-01 RX ADMIN — FINASTERIDE 5 MG: 5 TABLET, FILM COATED ORAL at 18:05

## 2023-01-01 RX ADMIN — HEPARIN SODIUM 14 UNITS/KG/HR: 5000 INJECTION, SOLUTION INTRAVENOUS at 10:47

## 2023-01-01 RX ADMIN — FAMOTIDINE 20 MG: 20 TABLET, FILM COATED ORAL at 04:54

## 2023-01-01 RX ADMIN — FAMOTIDINE 20 MG: 20 TABLET, FILM COATED ORAL at 04:14

## 2023-01-01 RX ADMIN — METOPROLOL SUCCINATE 75 MG: 25 TABLET, EXTENDED RELEASE ORAL at 05:09

## 2023-01-01 RX ADMIN — APIXABAN 2.5 MG: 5 TABLET, FILM COATED ORAL at 04:24

## 2023-01-01 RX ADMIN — VANCOMYCIN HYDROCHLORIDE 125 MG: 5 INJECTION, POWDER, LYOPHILIZED, FOR SOLUTION INTRAVENOUS at 17:42

## 2023-01-01 RX ADMIN — METOPROLOL TARTRATE 50 MG: 50 TABLET, FILM COATED ORAL at 18:06

## 2023-01-01 RX ADMIN — BACLOFEN 5 MG: 10 TABLET ORAL at 04:32

## 2023-01-01 RX ADMIN — OMEPRAZOLE 20 MG: 20 CAPSULE, DELAYED RELEASE ORAL at 18:13

## 2023-01-01 RX ADMIN — APIXABAN 5 MG: 5 TABLET, FILM COATED ORAL at 04:34

## 2023-01-01 RX ADMIN — FINASTERIDE 5 MG: 5 TABLET, FILM COATED ORAL at 18:32

## 2023-01-01 RX ADMIN — FAMOTIDINE 20 MG: 20 TABLET, FILM COATED ORAL at 06:00

## 2023-01-01 RX ADMIN — VANCOMYCIN HYDROCHLORIDE 125 MG: 5 INJECTION, POWDER, LYOPHILIZED, FOR SOLUTION INTRAVENOUS at 11:23

## 2023-01-01 RX ADMIN — METOPROLOL SUCCINATE 75 MG: 25 TABLET, EXTENDED RELEASE ORAL at 05:05

## 2023-01-01 RX ADMIN — SODIUM CHLORIDE 1000 ML: 9 INJECTION, SOLUTION INTRAVENOUS at 03:07

## 2023-01-01 RX ADMIN — BACLOFEN 5 MG: 10 TABLET ORAL at 17:42

## 2023-01-01 RX ADMIN — SENNOSIDES AND DOCUSATE SODIUM 2 TABLET: 50; 8.6 TABLET ORAL at 04:50

## 2023-01-01 RX ADMIN — APIXABAN 5 MG: 5 TABLET, FILM COATED ORAL at 05:38

## 2023-01-01 RX ADMIN — SIMVASTATIN 20 MG: 20 TABLET, FILM COATED ORAL at 16:53

## 2023-01-01 RX ADMIN — MICONAZOLE NITRATE: 20 CREAM TOPICAL at 17:01

## 2023-01-01 RX ADMIN — APIXABAN 5 MG: 5 TABLET, FILM COATED ORAL at 17:08

## 2023-01-01 RX ADMIN — FAMOTIDINE 20 MG: 20 TABLET, FILM COATED ORAL at 05:15

## 2023-01-01 RX ADMIN — LOSARTAN POTASSIUM 75 MG: 50 TABLET, FILM COATED ORAL at 05:23

## 2023-01-01 RX ADMIN — SODIUM CHLORIDE: 9 INJECTION, SOLUTION INTRAVENOUS at 11:48

## 2023-01-01 RX ADMIN — POTASSIUM CHLORIDE 20 MEQ: 1500 TABLET, EXTENDED RELEASE ORAL at 11:22

## 2023-01-01 RX ADMIN — SIMVASTATIN 20 MG: 20 TABLET, FILM COATED ORAL at 18:21

## 2023-01-01 RX ADMIN — BACLOFEN 5 MG: 10 TABLET ORAL at 06:29

## 2023-01-01 RX ADMIN — APIXABAN 2.5 MG: 5 TABLET, FILM COATED ORAL at 05:35

## 2023-01-01 RX ADMIN — MAGNESIUM SULFATE HEPTAHYDRATE 2 G: 40 INJECTION, SOLUTION INTRAVENOUS at 09:12

## 2023-01-01 RX ADMIN — VANCOMYCIN HYDROCHLORIDE 125 MG: 5 INJECTION, POWDER, LYOPHILIZED, FOR SOLUTION INTRAVENOUS at 04:33

## 2023-01-01 RX ADMIN — TAMSULOSIN HYDROCHLORIDE 0.4 MG: 0.4 CAPSULE ORAL at 08:15

## 2023-01-01 RX ADMIN — MICONAZOLE NITRATE: 20 CREAM TOPICAL at 05:05

## 2023-01-01 RX ADMIN — BACLOFEN 7.5 MG: 5 TABLET ORAL at 19:03

## 2023-01-01 RX ADMIN — VANCOMYCIN HYDROCHLORIDE 125 MG: 5 INJECTION, POWDER, LYOPHILIZED, FOR SOLUTION INTRAVENOUS at 06:01

## 2023-01-01 RX ADMIN — POTASSIUM CHLORIDE 40 MEQ: 1500 TABLET, EXTENDED RELEASE ORAL at 00:16

## 2023-01-01 RX ADMIN — TAMSULOSIN HYDROCHLORIDE 0.4 MG: 0.4 CAPSULE ORAL at 10:35

## 2023-01-01 RX ADMIN — BACLOFEN 5 MG: 10 TABLET ORAL at 05:59

## 2023-01-01 RX ADMIN — MICONAZOLE NITRATE: 20 CREAM TOPICAL at 17:19

## 2023-01-01 RX ADMIN — APIXABAN 5 MG: 5 TABLET, FILM COATED ORAL at 17:48

## 2023-01-01 RX ADMIN — LOSARTAN POTASSIUM 75 MG: 50 TABLET, FILM COATED ORAL at 05:37

## 2023-01-01 RX ADMIN — FUROSEMIDE 20 MG: 10 INJECTION, SOLUTION INTRAMUSCULAR; INTRAVENOUS at 17:48

## 2023-01-01 RX ADMIN — HYDRALAZINE HYDROCHLORIDE 10 MG: 20 INJECTION INTRAMUSCULAR; INTRAVENOUS at 18:14

## 2023-01-01 RX ADMIN — BACLOFEN 5 MG: 10 TABLET ORAL at 18:32

## 2023-01-01 RX ADMIN — METOPROLOL SUCCINATE 75 MG: 25 TABLET, EXTENDED RELEASE ORAL at 06:16

## 2023-01-01 RX ADMIN — METOPROLOL TARTRATE 50 MG: 50 TABLET, FILM COATED ORAL at 18:34

## 2023-01-01 RX ADMIN — IOHEXOL 40 ML: 350 INJECTION, SOLUTION INTRAVENOUS at 11:34

## 2023-01-01 RX ADMIN — APIXABAN 5 MG: 5 TABLET, FILM COATED ORAL at 05:02

## 2023-01-01 RX ADMIN — SIMVASTATIN 20 MG: 20 TABLET, FILM COATED ORAL at 17:24

## 2023-01-01 RX ADMIN — APIXABAN 5 MG: 5 TABLET, FILM COATED ORAL at 06:19

## 2023-01-01 RX ADMIN — SIMVASTATIN 20 MG: 40 TABLET, FILM COATED ORAL at 21:00

## 2023-01-01 RX ADMIN — SODIUM CHLORIDE, POTASSIUM CHLORIDE, SODIUM LACTATE AND CALCIUM CHLORIDE 1000 ML: 600; 310; 30; 20 INJECTION, SOLUTION INTRAVENOUS at 16:09

## 2023-01-01 RX ADMIN — CALCIUM CARBONATE 500 MG: 500 TABLET, CHEWABLE ORAL at 13:54

## 2023-01-01 RX ADMIN — LOSARTAN POTASSIUM 75 MG: 50 TABLET, FILM COATED ORAL at 04:33

## 2023-01-01 RX ADMIN — METOPROLOL SUCCINATE 75 MG: 25 TABLET, EXTENDED RELEASE ORAL at 05:02

## 2023-01-01 RX ADMIN — APIXABAN 5 MG: 5 TABLET, FILM COATED ORAL at 16:11

## 2023-01-01 RX ADMIN — APIXABAN 5 MG: 5 TABLET, FILM COATED ORAL at 04:53

## 2023-01-01 RX ADMIN — METOPROLOL SUCCINATE 50 MG: 25 TABLET, EXTENDED RELEASE ORAL at 05:29

## 2023-01-01 RX ADMIN — SODIUM CHLORIDE 1000 ML: 9 INJECTION, SOLUTION INTRAVENOUS at 17:28

## 2023-01-01 RX ADMIN — LOSARTAN POTASSIUM 75 MG: 50 TABLET, FILM COATED ORAL at 04:46

## 2023-01-01 RX ADMIN — TAMSULOSIN HYDROCHLORIDE 0.4 MG: 0.4 CAPSULE ORAL at 08:30

## 2023-01-01 RX ADMIN — POTASSIUM CHLORIDE 20 MEQ: 1500 TABLET, EXTENDED RELEASE ORAL at 17:39

## 2023-01-01 RX ADMIN — APIXABAN 5 MG: 5 TABLET, FILM COATED ORAL at 17:01

## 2023-01-01 RX ADMIN — TAMSULOSIN HYDROCHLORIDE 0.4 MG: 0.4 CAPSULE ORAL at 08:39

## 2023-01-01 RX ADMIN — VANCOMYCIN HYDROCHLORIDE 125 MG: 5 INJECTION, POWDER, LYOPHILIZED, FOR SOLUTION INTRAVENOUS at 01:06

## 2023-01-01 RX ADMIN — FINASTERIDE 5 MG: 5 TABLET, FILM COATED ORAL at 05:22

## 2023-01-01 RX ADMIN — FINASTERIDE 5 MG: 5 TABLET, FILM COATED ORAL at 17:49

## 2023-01-01 RX ADMIN — LOSARTAN POTASSIUM 12.5 MG: 50 TABLET, FILM COATED ORAL at 06:29

## 2023-01-01 RX ADMIN — TAMSULOSIN HYDROCHLORIDE 0.4 MG: 0.4 CAPSULE ORAL at 09:32

## 2023-01-01 RX ADMIN — BACLOFEN 5 MG: 10 TABLET ORAL at 12:51

## 2023-01-01 RX ADMIN — POTASSIUM CHLORIDE 10 MEQ: 7.46 INJECTION, SOLUTION INTRAVENOUS at 17:11

## 2023-01-01 RX ADMIN — BACLOFEN 5 MG: 10 TABLET ORAL at 06:05

## 2023-01-01 RX ADMIN — SENNOSIDES AND DOCUSATE SODIUM 2 TABLET: 50; 8.6 TABLET ORAL at 06:19

## 2023-01-01 RX ADMIN — APIXABAN 5 MG: 5 TABLET, FILM COATED ORAL at 06:16

## 2023-01-01 RX ADMIN — LOSARTAN POTASSIUM 75 MG: 50 TABLET, FILM COATED ORAL at 05:42

## 2023-01-01 RX ADMIN — VANCOMYCIN HYDROCHLORIDE 125 MG: 5 INJECTION, POWDER, LYOPHILIZED, FOR SOLUTION INTRAVENOUS at 00:43

## 2023-01-01 RX ADMIN — FAMOTIDINE 20 MG: 20 TABLET, FILM COATED ORAL at 17:18

## 2023-01-01 RX ADMIN — MAGNESIUM SULFATE HEPTAHYDRATE 2 G: 2 INJECTION, SOLUTION INTRAVENOUS at 10:39

## 2023-01-01 RX ADMIN — FAMOTIDINE 20 MG: 20 TABLET, FILM COATED ORAL at 04:58

## 2023-01-01 RX ADMIN — OMEPRAZOLE 20 MG: 20 CAPSULE, DELAYED RELEASE ORAL at 16:51

## 2023-01-01 RX ADMIN — BACLOFEN 5 MG: 10 TABLET ORAL at 18:08

## 2023-01-01 RX ADMIN — TAMSULOSIN HYDROCHLORIDE 0.4 MG: 0.4 CAPSULE ORAL at 08:42

## 2023-01-01 RX ADMIN — APIXABAN 2.5 MG: 5 TABLET, FILM COATED ORAL at 18:11

## 2023-01-01 RX ADMIN — BACLOFEN 7.5 MG: 5 TABLET ORAL at 05:38

## 2023-01-01 RX ADMIN — FAMOTIDINE 20 MG: 20 TABLET, FILM COATED ORAL at 05:30

## 2023-01-01 RX ADMIN — TAMSULOSIN HYDROCHLORIDE 0.4 MG: 0.4 CAPSULE ORAL at 09:49

## 2023-01-01 RX ADMIN — METOPROLOL SUCCINATE 50 MG: 25 TABLET, EXTENDED RELEASE ORAL at 06:06

## 2023-01-01 RX ADMIN — FINASTERIDE 5 MG: 5 TABLET, FILM COATED ORAL at 04:46

## 2023-01-01 RX ADMIN — CALCIUM CARBONATE 500 MG: 500 TABLET, CHEWABLE ORAL at 18:13

## 2023-01-01 RX ADMIN — APIXABAN 5 MG: 5 TABLET, FILM COATED ORAL at 05:15

## 2023-01-01 RX ADMIN — TAMSULOSIN HYDROCHLORIDE 0.4 MG: 0.4 CAPSULE ORAL at 08:20

## 2023-01-01 RX ADMIN — APIXABAN 5 MG: 5 TABLET, FILM COATED ORAL at 18:38

## 2023-01-01 RX ADMIN — BACLOFEN 7.5 MG: 5 TABLET ORAL at 11:23

## 2023-01-01 RX ADMIN — BACLOFEN 7.5 MG: 5 TABLET ORAL at 11:56

## 2023-01-01 RX ADMIN — METOPROLOL SUCCINATE 25 MG: 25 TABLET, EXTENDED RELEASE ORAL at 05:23

## 2023-01-01 RX ADMIN — METOPROLOL SUCCINATE 75 MG: 25 TABLET, EXTENDED RELEASE ORAL at 04:25

## 2023-01-01 RX ADMIN — VANCOMYCIN HYDROCHLORIDE 125 MG: 5 INJECTION, POWDER, LYOPHILIZED, FOR SOLUTION INTRAVENOUS at 00:16

## 2023-01-01 RX ADMIN — SODIUM CHLORIDE 500 ML: 9 INJECTION, SOLUTION INTRAVENOUS at 18:00

## 2023-01-01 RX ADMIN — POTASSIUM CHLORIDE 10 MEQ: 7.46 INJECTION, SOLUTION INTRAVENOUS at 18:31

## 2023-01-01 RX ADMIN — SIMVASTATIN 20 MG: 20 TABLET, FILM COATED ORAL at 17:20

## 2023-01-01 RX ADMIN — SODIUM CHLORIDE, POTASSIUM CHLORIDE, SODIUM LACTATE AND CALCIUM CHLORIDE 1000 ML: 600; 310; 30; 20 INJECTION, SOLUTION INTRAVENOUS at 11:44

## 2023-01-01 RX ADMIN — BACLOFEN 5 MG: 10 TABLET ORAL at 12:02

## 2023-01-01 RX ADMIN — APIXABAN 5 MG: 5 TABLET, FILM COATED ORAL at 18:21

## 2023-01-01 RX ADMIN — SODIUM CHLORIDE: 9 INJECTION, SOLUTION INTRAVENOUS at 00:40

## 2023-01-01 RX ADMIN — SIMVASTATIN 20 MG: 20 TABLET, FILM COATED ORAL at 21:35

## 2023-01-01 RX ADMIN — BACLOFEN 5 MG: 10 TABLET ORAL at 11:46

## 2023-01-01 RX ADMIN — VANCOMYCIN HYDROCHLORIDE 125 MG: 5 INJECTION, POWDER, LYOPHILIZED, FOR SOLUTION INTRAVENOUS at 13:12

## 2023-01-01 RX ADMIN — APIXABAN 5 MG: 5 TABLET, FILM COATED ORAL at 06:00

## 2023-01-01 RX ADMIN — TAMSULOSIN HYDROCHLORIDE 0.4 MG: 0.4 CAPSULE ORAL at 09:05

## 2023-01-01 RX ADMIN — VANCOMYCIN HYDROCHLORIDE 125 MG: 5 INJECTION, POWDER, LYOPHILIZED, FOR SOLUTION INTRAVENOUS at 05:39

## 2023-01-01 RX ADMIN — APIXABAN 5 MG: 5 TABLET, FILM COATED ORAL at 18:08

## 2023-01-01 RX ADMIN — LORATADINE 10 MG: 10 TABLET ORAL at 06:06

## 2023-01-01 RX ADMIN — FAMOTIDINE 20 MG: 20 TABLET, FILM COATED ORAL at 06:06

## 2023-01-01 RX ADMIN — OMEPRAZOLE 20 MG: 20 CAPSULE, DELAYED RELEASE ORAL at 04:50

## 2023-01-01 RX ADMIN — APIXABAN 5 MG: 5 TABLET, FILM COATED ORAL at 17:42

## 2023-01-01 RX ADMIN — FAMOTIDINE 20 MG: 20 TABLET, FILM COATED ORAL at 06:16

## 2023-01-01 RX ADMIN — BACLOFEN 5 MG: 10 TABLET ORAL at 17:32

## 2023-01-01 RX ADMIN — METOPROLOL TARTRATE 50 MG: 50 TABLET, FILM COATED ORAL at 18:35

## 2023-01-01 RX ADMIN — MICONAZOLE NITRATE: 20 CREAM TOPICAL at 05:35

## 2023-01-01 RX ADMIN — SENNOSIDES AND DOCUSATE SODIUM 2 TABLET: 50; 8.6 TABLET ORAL at 17:47

## 2023-01-01 RX ADMIN — ENOXAPARIN SODIUM 40 MG: 100 INJECTION SUBCUTANEOUS at 17:05

## 2023-01-01 RX ADMIN — FINASTERIDE 5 MG: 5 TABLET, FILM COATED ORAL at 18:43

## 2023-01-01 RX ADMIN — APIXABAN 5 MG: 5 TABLET, FILM COATED ORAL at 04:37

## 2023-01-01 RX ADMIN — OMEPRAZOLE 20 MG: 20 CAPSULE, DELAYED RELEASE ORAL at 05:23

## 2023-01-01 RX ADMIN — LORATADINE 10 MG: 10 TABLET ORAL at 10:10

## 2023-01-01 RX ADMIN — BACLOFEN 5 MG: 10 TABLET ORAL at 12:05

## 2023-01-01 RX ADMIN — METOPROLOL SUCCINATE 25 MG: 25 TABLET, EXTENDED RELEASE ORAL at 14:55

## 2023-01-01 RX ADMIN — METOPROLOL SUCCINATE 75 MG: 25 TABLET, EXTENDED RELEASE ORAL at 04:32

## 2023-01-01 RX ADMIN — LOSARTAN POTASSIUM 12.5 MG: 50 TABLET, FILM COATED ORAL at 04:57

## 2023-01-01 RX ADMIN — APIXABAN 5 MG: 5 TABLET, FILM COATED ORAL at 17:38

## 2023-01-01 RX ADMIN — SENNOSIDES AND DOCUSATE SODIUM 2 TABLET: 50; 8.6 TABLET ORAL at 06:00

## 2023-01-01 RX ADMIN — APIXABAN 5 MG: 5 TABLET, FILM COATED ORAL at 17:24

## 2023-01-01 RX ADMIN — VANCOMYCIN HYDROCHLORIDE 125 MG: 5 INJECTION, POWDER, LYOPHILIZED, FOR SOLUTION INTRAVENOUS at 06:18

## 2023-01-01 RX ADMIN — SODIUM CHLORIDE 500 ML: 9 INJECTION, SOLUTION INTRAVENOUS at 01:56

## 2023-01-01 RX ADMIN — BACLOFEN 5 MG: 10 TABLET ORAL at 18:37

## 2023-01-01 RX ADMIN — POTASSIUM CHLORIDE 10 MEQ: 7.46 INJECTION, SOLUTION INTRAVENOUS at 09:14

## 2023-01-01 RX ADMIN — CEFTRIAXONE SODIUM 2000 MG: 2 INJECTION, POWDER, FOR SOLUTION INTRAMUSCULAR; INTRAVENOUS at 20:20

## 2023-01-01 RX ADMIN — APIXABAN 5 MG: 5 TABLET, FILM COATED ORAL at 18:06

## 2023-01-01 RX ADMIN — VANCOMYCIN HYDROCHLORIDE 125 MG: 5 INJECTION, POWDER, LYOPHILIZED, FOR SOLUTION INTRAVENOUS at 12:49

## 2023-01-01 RX ADMIN — METOPROLOL SUCCINATE 75 MG: 25 TABLET, EXTENDED RELEASE ORAL at 05:40

## 2023-01-01 RX ADMIN — VANCOMYCIN HYDROCHLORIDE 125 MG: 5 INJECTION, POWDER, LYOPHILIZED, FOR SOLUTION INTRAVENOUS at 17:38

## 2023-01-01 RX ADMIN — METOPROLOL SUCCINATE 75 MG: 25 TABLET, EXTENDED RELEASE ORAL at 05:14

## 2023-01-01 RX ADMIN — BACLOFEN 5 MG: 10 TABLET ORAL at 11:56

## 2023-01-01 RX ADMIN — SENNOSIDES AND DOCUSATE SODIUM 2 TABLET: 50; 8.6 TABLET ORAL at 05:35

## 2023-01-01 RX ADMIN — VANCOMYCIN HYDROCHLORIDE 125 MG: 5 INJECTION, POWDER, LYOPHILIZED, FOR SOLUTION INTRAVENOUS at 05:03

## 2023-01-01 RX ADMIN — PROTHROMBIN, COAGULATION FACTOR VII HUMAN, COAGULATION FACTOR IX HUMAN, COAGULATION FACTOR X HUMAN, PROTEIN C, PROTEIN S HUMAN, AND WATER 4000 UNITS: KIT at 16:07

## 2023-01-01 RX ADMIN — METOPROLOL TARTRATE 50 MG: 50 TABLET, FILM COATED ORAL at 05:37

## 2023-01-01 RX ADMIN — SODIUM CHLORIDE: 9 INJECTION, SOLUTION INTRAVENOUS at 17:40

## 2023-01-01 RX ADMIN — APIXABAN 2.5 MG: 5 TABLET, FILM COATED ORAL at 17:06

## 2023-01-01 RX ADMIN — VANCOMYCIN HYDROCHLORIDE 125 MG: 5 INJECTION, POWDER, LYOPHILIZED, FOR SOLUTION INTRAVENOUS at 12:05

## 2023-01-01 RX ADMIN — SODIUM CHLORIDE, POTASSIUM CHLORIDE, SODIUM LACTATE AND CALCIUM CHLORIDE 2178 ML: 600; 310; 30; 20 INJECTION, SOLUTION INTRAVENOUS at 12:20

## 2023-01-01 RX ADMIN — LOSARTAN POTASSIUM 12.5 MG: 50 TABLET, FILM COATED ORAL at 04:24

## 2023-01-01 RX ADMIN — SIMVASTATIN 20 MG: 40 TABLET, FILM COATED ORAL at 20:49

## 2023-01-01 RX ADMIN — FUROSEMIDE 20 MG: 20 TABLET ORAL at 05:45

## 2023-01-01 RX ADMIN — BACLOFEN 7.5 MG: 5 TABLET ORAL at 04:49

## 2023-01-01 RX ADMIN — LOSARTAN POTASSIUM 12.5 MG: 50 TABLET, FILM COATED ORAL at 05:40

## 2023-01-01 RX ADMIN — VANCOMYCIN HYDROCHLORIDE 125 MG: 5 INJECTION, POWDER, LYOPHILIZED, FOR SOLUTION INTRAVENOUS at 06:09

## 2023-01-01 RX ADMIN — BACLOFEN 5 MG: 10 TABLET ORAL at 18:11

## 2023-01-01 RX ADMIN — APIXABAN 5 MG: 5 TABLET, FILM COATED ORAL at 05:08

## 2023-01-01 RX ADMIN — SIMVASTATIN 20 MG: 40 TABLET, FILM COATED ORAL at 21:05

## 2023-01-01 RX ADMIN — SODIUM CHLORIDE: 9 INJECTION, SOLUTION INTRAVENOUS at 03:24

## 2023-01-01 RX ADMIN — BACLOFEN 5 MG: 10 TABLET ORAL at 12:38

## 2023-01-01 RX ADMIN — MICONAZOLE NITRATE: 20 CREAM TOPICAL at 15:21

## 2023-01-01 RX ADMIN — POTASSIUM CHLORIDE 10 MEQ: 7.46 INJECTION, SOLUTION INTRAVENOUS at 19:42

## 2023-01-01 RX ADMIN — SIMVASTATIN 20 MG: 20 TABLET, FILM COATED ORAL at 16:11

## 2023-01-01 RX ADMIN — APIXABAN 5 MG: 5 TABLET, FILM COATED ORAL at 05:23

## 2023-01-01 RX ADMIN — SENNOSIDES AND DOCUSATE SODIUM 2 TABLET: 50; 8.6 TABLET ORAL at 18:43

## 2023-01-01 RX ADMIN — TAMSULOSIN HYDROCHLORIDE 0.4 MG: 0.4 CAPSULE ORAL at 07:53

## 2023-01-01 RX ADMIN — BACLOFEN 5 MG: 10 TABLET ORAL at 05:02

## 2023-01-01 RX ADMIN — OMEPRAZOLE 20 MG: 20 CAPSULE, DELAYED RELEASE ORAL at 05:22

## 2023-01-01 RX ADMIN — PANTOPRAZOLE SODIUM 40 MG: 40 INJECTION, POWDER, FOR SOLUTION INTRAVENOUS at 20:19

## 2023-01-01 RX ADMIN — APIXABAN 5 MG: 5 TABLET, FILM COATED ORAL at 04:49

## 2023-01-01 RX ADMIN — MICONAZOLE NITRATE: 20 CREAM TOPICAL at 18:39

## 2023-01-01 RX ADMIN — SULFAMETHOXAZOLE AND TRIMETHOPRIM 1 TABLET: 800; 160 TABLET ORAL at 18:08

## 2023-01-01 RX ADMIN — SIMVASTATIN 20 MG: 20 TABLET, FILM COATED ORAL at 20:22

## 2023-01-01 RX ADMIN — APIXABAN 5 MG: 5 TABLET, FILM COATED ORAL at 18:04

## 2023-01-01 RX ADMIN — APIXABAN 2.5 MG: 5 TABLET, FILM COATED ORAL at 17:32

## 2023-01-01 RX ADMIN — VANCOMYCIN HYDROCHLORIDE 125 MG: 5 INJECTION, POWDER, LYOPHILIZED, FOR SOLUTION INTRAVENOUS at 11:50

## 2023-01-01 RX ADMIN — BACLOFEN 7.5 MG: 5 TABLET ORAL at 12:50

## 2023-01-01 RX ADMIN — SENNOSIDES AND DOCUSATE SODIUM 2 TABLET: 50; 8.6 TABLET ORAL at 18:32

## 2023-01-01 RX ADMIN — ONDANSETRON 4 MG: 2 INJECTION INTRAMUSCULAR; INTRAVENOUS at 21:15

## 2023-01-01 RX ADMIN — SODIUM CHLORIDE: 9 INJECTION, SOLUTION INTRAVENOUS at 20:01

## 2023-01-01 RX ADMIN — APIXABAN 5 MG: 5 TABLET, FILM COATED ORAL at 08:29

## 2023-01-01 RX ADMIN — POTASSIUM CHLORIDE 10 MEQ: 7.46 INJECTION, SOLUTION INTRAVENOUS at 10:17

## 2023-01-01 RX ADMIN — SODIUM CHLORIDE: 9 INJECTION, SOLUTION INTRAVENOUS at 21:42

## 2023-01-01 RX ADMIN — APIXABAN 5 MG: 5 TABLET, FILM COATED ORAL at 15:39

## 2023-01-01 RX ADMIN — MICONAZOLE NITRATE: 20 CREAM TOPICAL at 18:11

## 2023-01-01 RX ADMIN — TAMSULOSIN HYDROCHLORIDE 0.4 MG: 0.4 CAPSULE ORAL at 08:55

## 2023-01-01 RX ADMIN — BACLOFEN 7.5 MG: 5 TABLET ORAL at 04:33

## 2023-01-01 RX ADMIN — LORATADINE 10 MG: 10 TABLET ORAL at 05:29

## 2023-01-01 SDOH — ECONOMIC STABILITY: GENERAL

## 2023-01-01 ASSESSMENT — PAIN SCALES - PAIN ASSESSMENT IN ADVANCED DEMENTIA (PAINAD)
NEGVOCALIZATION: 0 - NONE.
FACIALEXPRESSION: 0 - SMILING OR INEXPRESSIVE.
CONSOLABILITY: 0 - NO NEED TO CONSOLE.
NEGVOCALIZATION: 0 - NONE.
TOTALSCORE: 6
TOTALSCORE: 0
FACIALEXPRESSION: 0 - SMILING OR INEXPRESSIVE.
BODYLANGUAGE: 0 - RELAXED.
CONSOLABILITY: 0 - NO NEED TO CONSOLE.
BODYLANGUAGE: 2 - RIGID. FISTS CLENCHED. KNEES PULLED UP. PULLING OR PUSHING AWAY. STRIKING OUT.
TOTALSCORE: 0
TOTALSCORE: 0
BODYLANGUAGE: 0 - RELAXED.
NEGVOCALIZATION: 0 - NONE.
TOTALSCORE: 0
FACIALEXPRESSION: 0 - SMILING OR INEXPRESSIVE.
FACIALEXPRESSION: 0 - SMILING OR INEXPRESSIVE.
NEGVOCALIZATION: 1 - OCCASIONAL MOAN OR GROAN. LOW-LEVEL SPEECH WITH A NEGATIVE OR DISAPPROVING QUALITY.
CONSOLABILITY: 1 - DISTRACTED OR REASSURED BY VOICE OR TOUCH.
CONSOLABILITY: 0 - NO NEED TO CONSOLE.
CONSOLABILITY: 0 - NO NEED TO CONSOLE.
FACIALEXPRESSION: 1 - SAD. FRIGHTENED. FROWN.
NEGVOCALIZATION: 0 - NONE.
BODYLANGUAGE: 0 - RELAXED.
BODYLANGUAGE: 0 - RELAXED.

## 2023-01-01 ASSESSMENT — ENCOUNTER SYMPTOMS
VOMITING: 0
FOCAL WEAKNESS: 1
LAST BOWEL MOVEMENT: 66738
MYALGIAS: 0
EYE PAIN: 0
MYALGIAS: 0
SORE THROAT: 0
SPEECH CHANGE: 1
PALPITATIONS: 0
CLAUDICATION: 0
DIARRHEA: 1
FATIGUE: 1
ORTHOPNEA: 0
SINUS PAIN: 0
DEPRESSION: 0
STOOL FREQUENCY: DAILY
CARDIOVASCULAR NEGATIVE: 1
ABDOMINAL PAIN: 0
DEPRESSION: 0
HEADACHES: 0
MUSCLE WEAKNESS: 1
CHILLS: 0
ABDOMINAL PAIN: 0
ABDOMINAL PAIN: 0
DOUBLE VISION: 0
SLEEP QUALITY: ADEQUATE
MYALGIAS: 0
COUGH: 0
DENIES PAIN: 1
NECK PAIN: 0
ORTHOPNEA: 0
PERSON REPORTING PAIN: PATIENT
SEIZURES: 0
DOUBLE VISION: 0
DENIES PAIN: 1
NAUSEA: 0
PALPITATIONS: 0
EYE PAIN: 0
MYALGIAS: 0
VOMITING: 0
ORTHOPNEA: 0
DENIES PAIN: 1
TINGLING: 0
FOCAL WEAKNESS: 1
TINGLING: 1
PAIN: 1
COUGH: 0
NAUSEA: 0
COUGH: 0
NERVOUS/ANXIOUS: 0
HEARTBURN: 0
SHORTNESS OF BREATH: 0
NECK PAIN: 0
SHORTNESS OF BREATH: 0
DIZZINESS: 0
DIZZINESS: 0
DESCRIPTION OF MEMORY LOSS: LONG TERM
VOMITING: 0
DOUBLE VISION: 0
NAUSEA: 0
SHORTNESS OF BREATH: 0
ORTHOPNEA: 0
DIZZINESS: 0
FOCAL WEAKNESS: 1
DIARRHEA: 0
VOMITING: 0
DIZZINESS: 0
HEARTBURN: 0
ABDOMINAL PAIN: 0
WEAKNESS: 0
VOMITING: 0
PALPITATIONS: 0
DEPRESSION: 0
DIARRHEA: 0
DENIES PAIN: 1
VOMITING: 0
DOUBLE VISION: 0
WHEEZING: 0
COUGH: 0
BLOOD IN STOOL: 0
FATIGUES EASILY: 1
PERSON REPORTING PAIN: PATIENT
SINUS PAIN: 0
FOCAL WEAKNESS: 1
COUGH: 0
NERVOUS/ANXIOUS: 0
COUGH: 0
NERVOUS/ANXIOUS: 0
LOSS OF CONSCIOUSNESS: 0
CHILLS: 0
DIZZINESS: 0
DEPRESSION: 0
FEVER: 0
FATIGUES EASILY: 1
HEADACHES: 0
SENSORY CHANGE: 1
CHILLS: 0
PND: 0
VOMITING: 0
DEPRESSION: 0
SHORTNESS OF BREATH: 0
HEADACHES: 0
DOUBLE VISION: 0
DOUBLE VISION: 0
MYALGIAS: 0
VOMITING: 0
DENIES PAIN: 1
SHORTNESS OF BREATH: 0
DIARRHEA: 1
CHILLS: 0
ABDOMINAL PAIN: 0
CONSTIPATION: 0
DIARRHEA: 0
BLURRED VISION: 0
SHORTNESS OF BREATH: 0
VOMITING: 0
HEADACHES: 0
BLURRED VISION: 0
ORTHOPNEA: 0
SINUS PAIN: 0
COUGH: 0
ABDOMINAL PAIN: 0
MUSCLE WEAKNESS: 1
FEVER: 0
HEADACHES: 0
CONSTIPATION: 0
NAUSEA: 0
STOOL FREQUENCY: DAILY
COUGH: 0
CHILLS: 0
NERVOUS/ANXIOUS: 0
COUGH: 0
NAUSEA: 0
SUBJECTIVE PAIN PROGRESSION: UNCHANGED
WEAKNESS: 1
SLEEP QUALITY: ADEQUATE
MYALGIAS: 0
NAUSEA: 0
SHORTNESS OF BREATH: 0
NERVOUS/ANXIOUS: 0
FEVER: 0
PHOTOPHOBIA: 0
COUGH: 0
STOOL FREQUENCY: TWICE DAILY
SHORTNESS OF BREATH: 0
COUGH: 0
SPEECH CHANGE: 1
DOUBLE VISION: 0
COUGH: 0
CHILLS: 0
DOUBLE VISION: 0
DEPRESSION: 0
DIARRHEA: 1
PERSON REPORTING PAIN: PATIENT
MUSCLE WEAKNESS: 1
MYALGIAS: 0
LAST BOWEL MOVEMENT: 66760
SHORTNESS OF BREATH: 0
TREMORS: 0
FEVER: 0
BLURRED VISION: 0
DOUBLE VISION: 0
HEMOPTYSIS: 0
ORTHOPNEA: 0
NECK PAIN: 0
CONSTIPATION: 0
FATIGUE: 1
NERVOUS/ANXIOUS: 0
FLANK PAIN: 0
CHILLS: 0
CHILLS: 0
HEADACHES: 0
TREMORS: 0
NAUSEA: 0
FEVER: 0
LIMITED RANGE OF MOTION: 1
BRUISES/BLEEDS EASILY: 0
NERVOUS/ANXIOUS: 0
LIMITED RANGE OF MOTION: 1
BRUISES/BLEEDS EASILY: 0
ABDOMINAL PAIN: 0
LIMITED RANGE OF MOTION: 1
DIAPHORESIS: 0
PERSON REPORTING PAIN: PATIENT
ABDOMINAL PAIN: 0
CHILLS: 0
EYE PAIN: 0
BACK PAIN: 0
FEVER: 0
DENIES PAIN: 1
FEVER: 0
LOSS OF CONSCIOUSNESS: 0
SINUS PAIN: 0
BACK PAIN: 0
STOOL FREQUENCY: TWICE DAILY
SORE THROAT: 0
NAUSEA: 0
SLEEP QUALITY: ADEQUATE
DIZZINESS: 0
PAIN SEVERITY GOAL: 0/10
VOMITING: 0
NAUSEA: 0
FATIGUE: 1
PALPITATIONS: 0
LAST BOWEL MOVEMENT: 66763
DOUBLE VISION: 0
CLAUDICATION: 0
HEADACHES: 0
SORE THROAT: 0
DOUBLE VISION: 0
PERSON REPORTING PAIN: PATIENT
STOOL FREQUENCY: MORE THAN TWICE DAILY
FATIGUES EASILY: 1
LAST BOWEL MOVEMENT: 66741
LOSS OF CONSCIOUSNESS: 0
PERSON REPORTING PAIN: PATIENT
MYALGIAS: 0
BLOOD IN STOOL: 0
FOCAL WEAKNESS: 1
LIMITED RANGE OF MOTION: 1
INSOMNIA: 1
ABDOMINAL PAIN: 0
CHILLS: 0
NERVOUS/ANXIOUS: 0
ABDOMINAL PAIN: 0
SORE THROAT: 0
HEADACHES: 0
ABDOMINAL PAIN: 0
HEADACHES: 0
HEARTBURN: 0
DIZZINESS: 0
NECK PAIN: 0
DIARRHEA: 0
FEVER: 0
DIARRHEA: 0
BLURRED VISION: 0
BACK PAIN: 0
FEVER: 0
FATIGUES EASILY: 1
DOUBLE VISION: 0
WHEEZING: 0
BLURRED VISION: 0
MYALGIAS: 0
HEMOPTYSIS: 0
COUGH: 0
FEVER: 0
PERSON REPORTING PAIN: PATIENT
NAUSEA: 0
BLOOD IN STOOL: 0
BLURRED VISION: 0
ORTHOPNEA: 0
MYALGIAS: 0
DIZZINESS: 0
FEVER: 0
NERVOUS/ANXIOUS: 0
LAST BOWEL MOVEMENT: 66728
FOCAL WEAKNESS: 1
ORTHOPNEA: 0
FOCAL WEAKNESS: 1
EYES NEGATIVE: 1
FEVER: 0
DRY SKIN: 1
FOCAL WEAKNESS: 1
VOMITING: 0
DENIES PAIN: 1
FLANK PAIN: 0
MUSCLE WEAKNESS: 1
MUSCLE WEAKNESS: 1
DIZZINESS: 0
SHORTNESS OF BREATH: 0
FALLS: 0
FOCAL WEAKNESS: 1
DIARRHEA: 0
PALPITATIONS: 0
DIARRHEA: 1
SHORTNESS OF BREATH: 0
NERVOUS/ANXIOUS: 0
FOCAL WEAKNESS: 1
PALPITATIONS: 0
ABDOMINAL PAIN: 0
HEARTBURN: 0
PALPITATIONS: 0
SORE THROAT: 0
SLEEP QUALITY: FAIR
WHEEZING: 0
BLURRED VISION: 0
HEADACHES: 0
COUGH: 0
DENIES PAIN: 1
HEADACHES: 0
HEADACHES: 0
NAUSEA: 0
VOMITING: 0
SLEEP QUALITY: ADEQUATE
FOCAL WEAKNESS: 1
DENIES PAIN: 1
PERSON REPORTING PAIN: PATIENT
NAUSEA: 0
DENIES PAIN: 1
HEADACHES: 0
PALPITATIONS: 0
CHILLS: 0
DIZZINESS: 0
LIMITED RANGE OF MOTION: 1
CHILLS: 0
FATIGUES EASILY: 1
ABDOMINAL PAIN: 0
HALLUCINATIONS: 0
CHILLS: 0
NECK PAIN: 0
SHORTNESS OF BREATH: 0
FEVER: 0
WEAKNESS: 1
STRIDOR: 0
MUSCLE WEAKNESS: 1
DEPRESSION: 1
BLURRED VISION: 0
FATIGUE: 1
NAUSEA: 0
PALPITATIONS: 0
HEMOPTYSIS: 0
HEARTBURN: 0
DIAPHORESIS: 0
HEMOPTYSIS: 0
HEADACHES: 0
DOUBLE VISION: 0
DENIES PAIN: 1
DIARRHEA: 1
ABDOMINAL PAIN: 0
ABDOMINAL PAIN: 0
NAUSEA: 0
DIARRHEA: 0
BLOOD IN STOOL: 0
CHILLS: 0
PERSON REPORTING PAIN: PATIENT
CHILLS: 0
PALPITATIONS: 0
BLURRED VISION: 0
CHILLS: 0
DEPRESSION: 1
MYALGIAS: 0
BACK PAIN: 0
VOMITING: 0
HEADACHES: 0
MYALGIAS: 0
CHILLS: 0
BOWEL PATTERN NORMAL: 1
DIAPHORESIS: 0
CHILLS: 0
DENIES PAIN: 1
DIZZINESS: 0
VOMITING: 0
MYALGIAS: 0
GASTROINTESTINAL NEGATIVE: 1
EYE PAIN: 0
BOWEL PATTERN NORMAL: 1
LAST BOWEL MOVEMENT: 66771
PALPITATIONS: 0
DESCRIPTION OF MEMORY LOSS: SHORT TERM
HEADACHES: 0
LAST BOWEL MOVEMENT: 66778
NAUSEA: 0
SHORTNESS OF BREATH: 0
RESPIRATORY NEGATIVE: 1
FATIGUES EASILY: 1
PERSON REPORTING PAIN: PATIENT
DEPRESSION: 0
STOOL FREQUENCY: MORE THAN TWICE DAILY
TREMORS: 0
PERSON REPORTING PAIN: PATIENT
VOMITING: 0
SORE THROAT: 0
MUSCULOSKELETAL NEGATIVE: 1
LAST BOWEL MOVEMENT: 66734
FLANK PAIN: 0
PALPITATIONS: 0
NAUSEA: 0
DOUBLE VISION: 0
VOMITING: 0
FATIGUES EASILY: 1
NECK PAIN: 0
COUGH: 0
STOOL FREQUENCY: DAILY
SEIZURES: 0
ORTHOPNEA: 0
MYALGIAS: 0
SHORTNESS OF BREATH: 0
HEMOPTYSIS: 0
ORTHOPNEA: 0
TREMORS: 0
COUGH: 0
PERSON REPORTING PAIN: PATIENT
EYE PAIN: 0
PALPITATIONS: 0
CHILLS: 0
HEADACHES: 0
LAST BOWEL MOVEMENT: 66753
BLURRED VISION: 0
SORE THROAT: 0
CHILLS: 0
WEIGHT LOSS: 1
HEADACHES: 0
BLOOD IN STOOL: 0
DEPRESSION: 0
MYALGIAS: 0
ORTHOPNEA: 0
NAUSEA: 0
FATIGUES EASILY: 1
DOUBLE VISION: 0
BACK PAIN: 0
DIARRHEA: 0
NAUSEA: 0
DENIES PAIN: 1
DEPRESSION: 1
FATIGUE: 1
PERSON REPORTING PAIN: PATIENT
DIARRHEA: 0
FEVER: 0
MYALGIAS: 0
CLAUDICATION: 0
CONSTIPATION: 0
SLEEP QUALITY: ADEQUATE
DENIES PAIN: 1
BACK PAIN: 0
VOMITING: 0
COUGH: 0
FOCAL WEAKNESS: 1
DIARRHEA: 0
NAUSEA: 0
MYALGIAS: 0
FATIGUES EASILY: 1
BLURRED VISION: 0
FLANK PAIN: 0
VOMITING: 0
SPEECH CHANGE: 1
SHORTNESS OF BREATH: 0
PALPITATIONS: 0
INSOMNIA: 1
BLURRED VISION: 0
NAUSEA: 0
PALPITATIONS: 0
MYALGIAS: 0
DIARRHEA: 0
DIAPHORESIS: 0
HEADACHES: 0
PALPITATIONS: 0
WHEEZING: 0
DIARRHEA: 0
MYALGIAS: 0
BLOOD IN STOOL: 0
BACK PAIN: 0
HEADACHES: 0
HIGHEST PAIN SEVERITY IN PAST 24 HOURS: 0/10
VOMITING: 0
PALPITATIONS: 0
LAST BOWEL MOVEMENT: 66721
FEVER: 0
PERSON REPORTING PAIN: PATIENT
FOCAL WEAKNESS: 1
FEVER: 0
VOMITING: 0
BLOOD IN STOOL: 0
PAIN LOCATION - PAIN SEVERITY: 1/10
DIARRHEA: 0
POLYDIPSIA: 0
DIARRHEA: 1
MYALGIAS: 0
FEVER: 0
FOCAL WEAKNESS: 1
DEPRESSION: 0
FEVER: 0
WEAKNESS: 1
BACK PAIN: 0
LIMITED RANGE OF MOTION: 1
CHILLS: 0
DEPRESSION: 0
DIZZINESS: 0
DIZZINESS: 0
COUGH: 0
SLEEP QUALITY: ADEQUATE
DEPRESSION: 1
NERVOUS/ANXIOUS: 1
FEVER: 0
DEPRESSION: 0
PERSON REPORTING PAIN: PATIENT
WEAKNESS: 0
NAUSEA: 0
FEVER: 0
SORE THROAT: 0
NAUSEA: 0
SORE THROAT: 0
SHORTNESS OF BREATH: 0
VOMITING: 0
SLEEP QUALITY: ADEQUATE
ABDOMINAL PAIN: 0
DIZZINESS: 0
SINUS PAIN: 0
COUGH: 0
ABDOMINAL PAIN: 0
FATIGUE: 1
PALPITATIONS: 0
FLANK PAIN: 0
DIZZINESS: 0
FATIGUES EASILY: 1
PALPITATIONS: 0
BLURRED VISION: 0
NAUSEA: 0
LOSS OF CONSCIOUSNESS: 1
VOMITING: 0
HEADACHES: 0
CHILLS: 0
FEVER: 0
FATIGUES EASILY: 1
SLEEP QUALITY: FAIR
CHILLS: 0
MYALGIAS: 0
DIARRHEA: 0
DESCRIPTION OF MEMORY LOSS: SHORT TERM
DIARRHEA: 0
MYALGIAS: 0
ORTHOPNEA: 0
SPEECH CHANGE: 1
MYALGIAS: 0
SORE THROAT: 0
HEADACHES: 0
HEADACHES: 0
SHORTNESS OF BREATH: 0
DENIES PAIN: 1
DIARRHEA: 0
FEVER: 0
PERSON REPORTING PAIN: PATIENT
HEADACHES: 0
ORTHOPNEA: 0
ABDOMINAL PAIN: 0
CHILLS: 0
DENIES PAIN: 1
SLEEP QUALITY: ADEQUATE
NAUSEA: 0
SORE THROAT: 0
DIZZINESS: 0
ABDOMINAL PAIN: 0
CHILLS: 0
HEADACHES: 0
STOOL FREQUENCY: DAILY
DIZZINESS: 0
FEVER: 0
SENSORY CHANGE: 1
SEIZURES: 0
FOCAL WEAKNESS: 1
VOMITING: 0
DIARRHEA: 0
NAUSEA: 0
NERVOUS/ANXIOUS: 0
VOMITING: 0
DIARRHEA: 1
HEADACHES: 0
BLURRED VISION: 0
COUGH: 0
DOUBLE VISION: 0
DIZZINESS: 0
FEVER: 0
PERSON REPORTING PAIN: PATIENT
DOUBLE VISION: 0
COUGH: 0
NAUSEA: 0
SENSORY CHANGE: 1
DIZZINESS: 0
PERSON REPORTING PAIN: PATIENT
LIMITED RANGE OF MOTION: 1
BRUISES/BLEEDS EASILY: 0
DRY SKIN: 1
VOMITING: 0
FOCAL WEAKNESS: 1
PERSON REPORTING PAIN: PATIENT
BLURRED VISION: 0
PALPITATIONS: 0
PALPITATIONS: 0
NERVOUS/ANXIOUS: 0
BACK PAIN: 0
NERVOUS/ANXIOUS: 0
LOWEST PAIN SEVERITY IN PAST 24 HOURS: 0/10
DIZZINESS: 0
DEPRESSION: 1
FLANK PAIN: 0
FATIGUES EASILY: 1
FEVER: 0
NERVOUS/ANXIOUS: 0
FATIGUE: 1
SINUS PAIN: 0
ABDOMINAL PAIN: 0
HEADACHES: 0
ABDOMINAL PAIN: 0
DIZZINESS: 0
LIMITED RANGE OF MOTION: 1
SLEEP QUALITY: ADEQUATE
PALPITATIONS: 0
BLURRED VISION: 0
DIZZINESS: 0
DIARRHEA: 0
DENIES PAIN: 1
NERVOUS/ANXIOUS: 0
SLEEP QUALITY: FAIR
FOCAL WEAKNESS: 1
FEVER: 0
BLURRED VISION: 0
BOWEL PATTERN NORMAL: 1
CHILLS: 0
LAST BOWEL MOVEMENT: 66778
MUSCLE WEAKNESS: 1
SLEEP QUALITY: ADEQUATE
COUGH: 0
COUGH: 0
FATIGUE: 1
SPUTUM PRODUCTION: 0
EYE PAIN: 0
DENIES PAIN: 1
VOMITING: 0
BACK PAIN: 0

## 2023-01-01 ASSESSMENT — ACTIVITIES OF DAILY LIVING (ADL)
AMBULATION ASSISTANCE: ONE PERSON
AMBULATION ASSISTANCE: TWO PERSON
DRESSING_REQUIRES_ASSISTANCE: 1
GROOMING_REQUIRES_ASSISTANCE: 1
CURRENT_FUNCTION: ONE PERSON
GROOMING_REQUIRES_ASSISTANCE: 1
AMBULATION ASSISTANCE: NON-AMBULATORY
CURRENT_FUNCTION: ONE PERSON
PHYSICAL_TRANSFER_REQUIRES_ASSISTANCE: 1
BATHING_REQUIRES_ASSISTANCE: 1
PHYSICAL_TRANSFER_REQUIRES_ASSISTANCE: 1
DRESSING_REQUIRES_ASSISTANCE: 1
TOILETING_REQUIRES_ASSISTANCE: 1
AMBULATION ASSISTANCE: NON-AMBULATORY
AMBULATION_REQUIRES_ASSISTANCE: 1
CURRENT_FUNCTION: ONE PERSON
DRESSING_REQUIRES_ASSISTANCE: 1
AMBULATION_REQUIRES_ASSISTANCE: 1
LAUNDRY_REQUIRES_ASSISTANCE: 1
AMBULATION ASSISTANCE: NON-AMBULATORY
LAUNDRY_REQUIRES_ASSISTANCE: 1
SHOPPING_REQUIRES_ASSISTANCE: 1
CURRENT_FUNCTION: ONE PERSON
DRESSING_REQUIRES_ASSISTANCE: 1
PHYSICAL_TRANSFER_REQUIRES_ASSISTANCE: 1
BATHING_REQUIRES_ASSISTANCE: 1
BATHING_REQUIRES_ASSISTANCE: 1
TOILETING: DEPENDENT
BATHING_REQUIRES_ASSISTANCE: 1
AMBULATION_REQUIRES_ASSISTANCE: 1
AMBULATION ASSISTANCE: NON-AMBULATORY
AMBULATION_REQUIRES_ASSISTANCE: 1
SHOPPING_REQUIRES_ASSISTANCE: 1
LAUNDRY_REQUIRES_ASSISTANCE: 1
PHYSICAL_TRANSFER_REQUIRES_ASSISTANCE: 1
GROOMING_REQUIRES_ASSISTANCE: 1
SHOPPING_REQUIRES_ASSISTANCE: 1
CURRENT_FUNCTION: ONE PERSON
SHOPPING_REQUIRES_ASSISTANCE: 1
DRESSING_REQUIRES_ASSISTANCE: 1
LAUNDRY_REQUIRES_ASSISTANCE: 1
PHYSICAL_TRANSFER_REQUIRES_ASSISTANCE: 1
CURRENT_FUNCTION: ONE PERSON
SHOPPING_REQUIRES_ASSISTANCE: 1
TOILETING_REQUIRES_ASSISTANCE: 1
TOILETING: DEPENDENT
DRESSING_REQUIRES_ASSISTANCE: 1
PHYSICAL_TRANSFER_REQUIRES_ASSISTANCE: 1
AMBULATION ASSISTANCE: NON-AMBULATORY
CURRENT_FUNCTION: ONE PERSON
GROOMING_REQUIRES_ASSISTANCE: 1
DRESSING_REQUIRES_ASSISTANCE: 1
CURRENT_FUNCTION: ONE PERSON
AMBULATION_REQUIRES_ASSISTANCE: 1
TOILETING_REQUIRES_ASSISTANCE: 1
BATHING_REQUIRES_ASSISTANCE: 1
BATHING_REQUIRES_ASSISTANCE: 1
DRESSING_REQUIRES_ASSISTANCE: 1
BATHING_REQUIRES_ASSISTANCE: 1
LAUNDRY_REQUIRES_ASSISTANCE: 1
AMBULATION_REQUIRES_ASSISTANCE: 1
GROOMING_REQUIRES_ASSISTANCE: 1
SHOPPING_REQUIRES_ASSISTANCE: 1
AMBULATION ASSISTANCE: ONE PERSON
PHYSICAL_TRANSFER_REQUIRES_ASSISTANCE: 1
BATHING_REQUIRES_ASSISTANCE: 1
AMBULATION_REQUIRES_ASSISTANCE: 1
CURRENT_FUNCTION: ONE PERSON
AMBULATION ASSISTANCE: NON-AMBULATORY
CURRENT_FUNCTION: ONE PERSON
TOILETING_REQUIRES_ASSISTANCE: 1
MONEY MANAGEMENT (EXPENSES/BILLS): TOTALLY DEPENDENT
GROOMING_REQUIRES_ASSISTANCE: 1
TOILETING_REQUIRES_ASSISTANCE: 1
AMBULATION ASSISTANCE: NON-AMBULATORY
LAUNDRY_REQUIRES_ASSISTANCE: 1
TOILETING: INDEPENDENT
CONTINENCE_REQUIRES_ASSISTANCE: 1
LAUNDRY_REQUIRES_ASSISTANCE: 1
SHOPPING_REQUIRES_ASSISTANCE: 1
AMBULATION ASSISTANCE: NON-AMBULATORY
TOILETING_REQUIRES_ASSISTANCE: 1
AMBULATION ASSISTANCE: NON-AMBULATORY
AMBULATION ASSISTANCE: NON-AMBULATORY
GROOMING_REQUIRES_ASSISTANCE: 1
AMBULATION_REQUIRES_ASSISTANCE: 1
PHYSICAL_TRANSFER_REQUIRES_ASSISTANCE: 1
TOILETING: REQUIRES ASSIST
TOILETING_REQUIRES_ASSISTANCE: 1

## 2023-01-01 ASSESSMENT — COGNITIVE AND FUNCTIONAL STATUS - GENERAL
MOBILITY SCORE: 8
DAILY ACTIVITIY SCORE: 6
SUGGESTED CMS G CODE MODIFIER DAILY ACTIVITY: CM
MOVING FROM LYING ON BACK TO SITTING ON SIDE OF FLAT BED: UNABLE
DRESSING REGULAR UPPER BODY CLOTHING: A LOT
PERSONAL GROOMING: A LOT
MOBILITY SCORE: 7
CLIMB 3 TO 5 STEPS WITH RAILING: TOTAL
MOBILITY SCORE: 6
WALKING IN HOSPITAL ROOM: TOTAL
MOBILITY SCORE: 6
DRESSING REGULAR UPPER BODY CLOTHING: TOTAL
SUGGESTED CMS G CODE MODIFIER DAILY ACTIVITY: CL
HELP NEEDED FOR BATHING: TOTAL
MOVING FROM LYING ON BACK TO SITTING ON SIDE OF FLAT BED: UNABLE
WALKING IN HOSPITAL ROOM: TOTAL
SUGGESTED CMS G CODE MODIFIER DAILY ACTIVITY: CL
STANDING UP FROM CHAIR USING ARMS: TOTAL
MOVING FROM LYING ON BACK TO SITTING ON SIDE OF FLAT BED: UNABLE
MOVING TO AND FROM BED TO CHAIR: UNABLE
HELP NEEDED FOR BATHING: A LOT
MOVING FROM LYING ON BACK TO SITTING ON SIDE OF FLAT BED: UNABLE
MOVING FROM LYING ON BACK TO SITTING ON SIDE OF FLAT BED: UNABLE
HELP NEEDED FOR BATHING: A LOT
DAILY ACTIVITIY SCORE: 11
DRESSING REGULAR LOWER BODY CLOTHING: TOTAL
TURNING FROM BACK TO SIDE WHILE IN FLAT BAD: UNABLE
DRESSING REGULAR LOWER BODY CLOTHING: TOTAL
CLIMB 3 TO 5 STEPS WITH RAILING: TOTAL
TURNING FROM BACK TO SIDE WHILE IN FLAT BAD: UNABLE
MOVING TO AND FROM BED TO CHAIR: UNABLE
SUGGESTED CMS G CODE MODIFIER MOBILITY: CM
CLIMB 3 TO 5 STEPS WITH RAILING: TOTAL
TURNING FROM BACK TO SIDE WHILE IN FLAT BAD: UNABLE
STANDING UP FROM CHAIR USING ARMS: TOTAL
SUGGESTED CMS G CODE MODIFIER MOBILITY: CM
SUGGESTED CMS G CODE MODIFIER DAILY ACTIVITY: CL
DRESSING REGULAR LOWER BODY CLOTHING: A LOT
SUGGESTED CMS G CODE MODIFIER MOBILITY: CN
DAILY ACTIVITIY SCORE: 14
SUGGESTED CMS G CODE MODIFIER DAILY ACTIVITY: CN
STANDING UP FROM CHAIR USING ARMS: A LOT
MOVING FROM LYING ON BACK TO SITTING ON SIDE OF FLAT BED: UNABLE
SUGGESTED CMS G CODE MODIFIER DAILY ACTIVITY: CL
TURNING FROM BACK TO SIDE WHILE IN FLAT BAD: UNABLE
SUGGESTED CMS G CODE MODIFIER DAILY ACTIVITY: CM
TURNING FROM BACK TO SIDE WHILE IN FLAT BAD: A LOT
PERSONAL GROOMING: A LOT
CLIMB 3 TO 5 STEPS WITH RAILING: TOTAL
HELP NEEDED FOR BATHING: A LOT
MOVING TO AND FROM BED TO CHAIR: UNABLE
EATING MEALS: A LOT
MOVING FROM LYING ON BACK TO SITTING ON SIDE OF FLAT BED: A LOT
TOILETING: A LOT
SUGGESTED CMS G CODE MODIFIER MOBILITY: CN
WALKING IN HOSPITAL ROOM: TOTAL
TOILETING: TOTAL
DRESSING REGULAR UPPER BODY CLOTHING: A LOT
DAILY ACTIVITIY SCORE: 7
TOILETING: TOTAL
SUGGESTED CMS G CODE MODIFIER MOBILITY: CN
STANDING UP FROM CHAIR USING ARMS: TOTAL
MOVING FROM LYING ON BACK TO SITTING ON SIDE OF FLAT BED: UNABLE
EATING MEALS: A LOT
TOILETING: TOTAL
DRESSING REGULAR LOWER BODY CLOTHING: TOTAL
WALKING IN HOSPITAL ROOM: TOTAL
MOVING TO AND FROM BED TO CHAIR: A LOT
CLIMB 3 TO 5 STEPS WITH RAILING: TOTAL
HELP NEEDED FOR BATHING: TOTAL
DRESSING REGULAR LOWER BODY CLOTHING: TOTAL
PERSONAL GROOMING: TOTAL
SUGGESTED CMS G CODE MODIFIER MOBILITY: CN
SUGGESTED CMS G CODE MODIFIER DAILY ACTIVITY: CL
MOVING TO AND FROM BED TO CHAIR: UNABLE
DRESSING REGULAR UPPER BODY CLOTHING: A LOT
PERSONAL GROOMING: A LOT
STANDING UP FROM CHAIR USING ARMS: A LOT
MOVING TO AND FROM BED TO CHAIR: UNABLE
MOVING TO AND FROM BED TO CHAIR: UNABLE
STANDING UP FROM CHAIR USING ARMS: TOTAL
WALKING IN HOSPITAL ROOM: TOTAL
DAILY ACTIVITIY SCORE: 13
DRESSING REGULAR LOWER BODY CLOTHING: TOTAL
TOILETING: TOTAL
STANDING UP FROM CHAIR USING ARMS: TOTAL
CLIMB 3 TO 5 STEPS WITH RAILING: TOTAL
MOVING FROM LYING ON BACK TO SITTING ON SIDE OF FLAT BED: UNABLE
HELP NEEDED FOR BATHING: TOTAL
WALKING IN HOSPITAL ROOM: TOTAL
TURNING FROM BACK TO SIDE WHILE IN FLAT BAD: UNABLE
MOVING FROM LYING ON BACK TO SITTING ON SIDE OF FLAT BED: UNABLE
HELP NEEDED FOR BATHING: A LOT
TOILETING: TOTAL
PERSONAL GROOMING: TOTAL
HELP NEEDED FOR BATHING: TOTAL
MOBILITY SCORE: 7
SUGGESTED CMS G CODE MODIFIER MOBILITY: CM
DRESSING REGULAR LOWER BODY CLOTHING: TOTAL
MOVING FROM LYING ON BACK TO SITTING ON SIDE OF FLAT BED: UNABLE
TURNING FROM BACK TO SIDE WHILE IN FLAT BAD: UNABLE
MOVING TO AND FROM BED TO CHAIR: UNABLE
MOVING FROM LYING ON BACK TO SITTING ON SIDE OF FLAT BED: UNABLE
STANDING UP FROM CHAIR USING ARMS: TOTAL
DRESSING REGULAR UPPER BODY CLOTHING: A LOT
MOVING TO AND FROM BED TO CHAIR: UNABLE
WALKING IN HOSPITAL ROOM: TOTAL
EATING MEALS: A LOT
EATING MEALS: A LOT
HELP NEEDED FOR BATHING: A LOT
CLIMB 3 TO 5 STEPS WITH RAILING: TOTAL
PERSONAL GROOMING: TOTAL
TOILETING: A LOT
DRESSING REGULAR LOWER BODY CLOTHING: TOTAL
EATING MEALS: A LOT
MOBILITY SCORE: 6
PERSONAL GROOMING: A LOT
EATING MEALS: A LOT
PERSONAL GROOMING: A LOT
TOILETING: TOTAL
SUGGESTED CMS G CODE MODIFIER MOBILITY: CN
TOILETING: TOTAL
TOILETING: TOTAL
EATING MEALS: A LOT
PERSONAL GROOMING: A LITTLE
TURNING FROM BACK TO SIDE WHILE IN FLAT BAD: A LOT
MOBILITY SCORE: 6
EATING MEALS: A LOT
DRESSING REGULAR UPPER BODY CLOTHING: TOTAL
CLIMB 3 TO 5 STEPS WITH RAILING: TOTAL
SUGGESTED CMS G CODE MODIFIER MOBILITY: CN
DRESSING REGULAR LOWER BODY CLOTHING: TOTAL
TOILETING: A LOT
CLIMB 3 TO 5 STEPS WITH RAILING: TOTAL
WALKING IN HOSPITAL ROOM: TOTAL
MOBILITY SCORE: 9
DAILY ACTIVITIY SCORE: 12
TURNING FROM BACK TO SIDE WHILE IN FLAT BAD: A LOT
TURNING FROM BACK TO SIDE WHILE IN FLAT BAD: A LOT
SUGGESTED CMS G CODE MODIFIER MOBILITY: CN
SUGGESTED CMS G CODE MODIFIER DAILY ACTIVITY: CL
STANDING UP FROM CHAIR USING ARMS: TOTAL
SUGGESTED CMS G CODE MODIFIER DAILY ACTIVITY: CM
PERSONAL GROOMING: A LOT
MOVING TO AND FROM BED TO CHAIR: UNABLE
HELP NEEDED FOR BATHING: TOTAL
TURNING FROM BACK TO SIDE WHILE IN FLAT BAD: UNABLE
CLIMB 3 TO 5 STEPS WITH RAILING: TOTAL
CLIMB 3 TO 5 STEPS WITH RAILING: TOTAL
DRESSING REGULAR LOWER BODY CLOTHING: A LOT
MOBILITY SCORE: 6
TURNING FROM BACK TO SIDE WHILE IN FLAT BAD: UNABLE
DAILY ACTIVITIY SCORE: 12
EATING MEALS: TOTAL
DRESSING REGULAR LOWER BODY CLOTHING: A LOT
STANDING UP FROM CHAIR USING ARMS: A LOT
DRESSING REGULAR UPPER BODY CLOTHING: A LOT
MOVING TO AND FROM BED TO CHAIR: UNABLE
DAILY ACTIVITIY SCORE: 9
DRESSING REGULAR UPPER BODY CLOTHING: A LOT
SUGGESTED CMS G CODE MODIFIER DAILY ACTIVITY: CL
PERSONAL GROOMING: A LOT
HELP NEEDED FOR BATHING: A LOT
WALKING IN HOSPITAL ROOM: TOTAL
STANDING UP FROM CHAIR USING ARMS: TOTAL
SUGGESTED CMS G CODE MODIFIER MOBILITY: CM
SUGGESTED CMS G CODE MODIFIER MOBILITY: CM
DAILY ACTIVITIY SCORE: 9
MOBILITY SCORE: 6
WALKING IN HOSPITAL ROOM: TOTAL
DAILY ACTIVITIY SCORE: 9
DRESSING REGULAR UPPER BODY CLOTHING: A LOT
STANDING UP FROM CHAIR USING ARMS: A LOT
TURNING FROM BACK TO SIDE WHILE IN FLAT BAD: A LOT
CLIMB 3 TO 5 STEPS WITH RAILING: TOTAL
EATING MEALS: A LITTLE
DRESSING REGULAR LOWER BODY CLOTHING: A LOT
DAILY ACTIVITIY SCORE: 8
DRESSING REGULAR UPPER BODY CLOTHING: TOTAL
MOBILITY SCORE: 6
EATING MEALS: A LITTLE
WALKING IN HOSPITAL ROOM: TOTAL
PERSONAL GROOMING: A LOT
SUGGESTED CMS G CODE MODIFIER MOBILITY: CM
CLIMB 3 TO 5 STEPS WITH RAILING: TOTAL
MOBILITY SCORE: 8
STANDING UP FROM CHAIR USING ARMS: TOTAL
DAILY ACTIVITIY SCORE: 7
MOVING TO AND FROM BED TO CHAIR: UNABLE
HELP NEEDED FOR BATHING: TOTAL
DRESSING REGULAR UPPER BODY CLOTHING: A LOT
TOILETING: TOTAL
DRESSING REGULAR LOWER BODY CLOTHING: A LOT
DRESSING REGULAR UPPER BODY CLOTHING: A LOT
SUGGESTED CMS G CODE MODIFIER DAILY ACTIVITY: CL
PERSONAL GROOMING: A LITTLE
DAILY ACTIVITIY SCORE: 12
SUGGESTED CMS G CODE MODIFIER DAILY ACTIVITY: CK
EATING MEALS: A LITTLE
DRESSING REGULAR UPPER BODY CLOTHING: TOTAL
MOBILITY SCORE: 8
EATING MEALS: A LITTLE
HELP NEEDED FOR BATHING: TOTAL
MOVING FROM LYING ON BACK TO SITTING ON SIDE OF FLAT BED: UNABLE
MOVING TO AND FROM BED TO CHAIR: UNABLE
TOILETING: TOTAL

## 2023-01-01 ASSESSMENT — SOCIAL DETERMINANTS OF HEALTH (SDOH)
ACTIVE STRESSOR - LOSS OF CONTROL: 1
ACTIVE STRESSOR - LOSS OF CONTROL: 1
ACTIVE STRESSOR - EXPRESSED EMOTIONAL NEED: 1
ACTIVE STRESSOR - NO STRESS FACTORS: 1
ACTIVE STRESSOR - HEALTH CHANGES: 1
ACTIVE STRESSOR - NO STRESS FACTORS: 1
ACTIVE STRESSOR - EXPRESSED EMOTIONAL NEED: 1
ACTIVE STRESSOR - EXPRESSED EMOTIONAL NEED: 1
ACTIVE STRESSOR - NO STRESS FACTORS: 1
ACTIVE STRESSOR - LOSS OF CONTROL: 1
ACTIVE STRESSOR - NO STRESS FACTORS: 1
ACTIVE STRESSOR - HEALTH CHANGES: 1
ACTIVE STRESSOR - NO STRESS FACTORS: 1
ACTIVE STRESSOR - NO STRESS FACTORS: 1
ACTIVE STRESSOR - LOSS OF CONTROL: 1
ACTIVE STRESSOR - NO STRESS FACTORS: 1
ACTIVE STRESSOR - EXPRESSED EMOTIONAL NEED: 1
ACTIVE STRESSOR - NO STRESS FACTORS: 1

## 2023-01-01 ASSESSMENT — PATIENT HEALTH QUESTIONNAIRE - PHQ9
SUM OF ALL RESPONSES TO PHQ9 QUESTIONS 1 AND 2: 0
CLINICAL INTERPRETATION OF PHQ2 SCORE: 0
9. THOUGHTS THAT YOU WOULD BE BETTER OFF DEAD, OR OF HURTING YOURSELF: NOT AT ALL
2. FEELING DOWN, DEPRESSED, IRRITABLE, OR HOPELESS: NOT AT ALL
4. FEELING TIRED OR HAVING LITTLE ENERGY: SEVERAL DAYS
2. FEELING DOWN, DEPRESSED, IRRITABLE, OR HOPELESS: NOT AT ALL
SUM OF ALL RESPONSES TO PHQ9 QUESTIONS 1 AND 2: 0
1. LITTLE INTEREST OR PLEASURE IN DOING THINGS: NOT AT ALL
2. FEELING DOWN, DEPRESSED, IRRITABLE, OR HOPELESS: NOT AT ALL
1. LITTLE INTEREST OR PLEASURE IN DOING THINGS: NOT AT ALL
2. FEELING DOWN, DEPRESSED, IRRITABLE, OR HOPELESS: NOT AT ALL
SUM OF ALL RESPONSES TO PHQ9 QUESTIONS 1 AND 2: 0
8. MOVING OR SPEAKING SO SLOWLY THAT OTHER PEOPLE COULD HAVE NOTICED. OR THE OPPOSITE, BEING SO FIGETY OR RESTLESS THAT YOU HAVE BEEN MOVING AROUND A LOT MORE THAN USUAL: NOT AT ALL
5. POOR APPETITE OR OVEREATING: SEVERAL DAYS
1. LITTLE INTEREST OR PLEASURE IN DOING THINGS: NEARLY EVERY DAY
CLINICAL INTERPRETATION OF PHQ2 SCORE: 0
1. LITTLE INTEREST OR PLEASURE IN DOING THINGS: NOT AT ALL
1. LITTLE INTEREST OR PLEASURE IN DOING THINGS: NOT AT ALL
2. FEELING DOWN, DEPRESSED, IRRITABLE, OR HOPELESS: SEVERAL DAYS
1. LITTLE INTEREST OR PLEASURE IN DOING THINGS: NOT AT ALL
1. LITTLE INTEREST OR PLEASURE IN DOING THINGS: NOT AT ALL
6. FEELING BAD ABOUT YOURSELF - OR THAT YOU ARE A FAILURE OR HAVE LET YOURSELF OR YOUR FAMILY DOWN: NOT AL ALL
5. POOR APPETITE OR OVEREATING: SEVERAL DAYS
1. LITTLE INTEREST OR PLEASURE IN DOING THINGS: NOT AT ALL
SUM OF ALL RESPONSES TO PHQ9 QUESTIONS 1 AND 2: 0
9. THOUGHTS THAT YOU WOULD BE BETTER OFF DEAD, OR OF HURTING YOURSELF: NOT AT ALL
8. MOVING OR SPEAKING SO SLOWLY THAT OTHER PEOPLE COULD HAVE NOTICED. OR THE OPPOSITE, BEING SO FIGETY OR RESTLESS THAT YOU HAVE BEEN MOVING AROUND A LOT MORE THAN USUAL: NOT AT ALL
SUM OF ALL RESPONSES TO PHQ9 QUESTIONS 1 AND 2: 1
1. LITTLE INTEREST OR PLEASURE IN DOING THINGS: NOT AT ALL
2. FEELING DOWN, DEPRESSED, IRRITABLE, OR HOPELESS: NOT AT ALL
2. FEELING DOWN, DEPRESSED, IRRITABLE, OR HOPELESS: NOT AT ALL
SUM OF ALL RESPONSES TO PHQ9 QUESTIONS 1 AND 2: 0
1. LITTLE INTEREST OR PLEASURE IN DOING THINGS: SEVERAL DAYS
1. LITTLE INTEREST OR PLEASURE IN DOING THINGS: NOT AT ALL
2. FEELING DOWN, DEPRESSED, IRRITABLE, OR HOPELESS: NOT AT ALL
SUM OF ALL RESPONSES TO PHQ9 QUESTIONS 1 AND 2: 0
5. POOR APPETITE OR OVEREATING: SEVERAL DAYS
1. LITTLE INTEREST OR PLEASURE IN DOING THINGS: NOT AT ALL
1. LITTLE INTEREST OR PLEASURE IN DOING THINGS: NOT AT ALL
SUM OF ALL RESPONSES TO PHQ9 QUESTIONS 1 AND 2: 1
1. LITTLE INTEREST OR PLEASURE IN DOING THINGS: NOT AT ALL
SUM OF ALL RESPONSES TO PHQ QUESTIONS 1-9: 4
7. TROUBLE CONCENTRATING ON THINGS, SUCH AS READING THE NEWSPAPER OR WATCHING TELEVISION: NOT AT ALL
2. FEELING DOWN, DEPRESSED, IRRITABLE, OR HOPELESS: NOT AT ALL
2. FEELING DOWN, DEPRESSED, IRRITABLE, OR HOPELESS: NOT AT ALL
1. LITTLE INTEREST OR PLEASURE IN DOING THINGS: NOT AT ALL
SUM OF ALL RESPONSES TO PHQ9 QUESTIONS 1 AND 2: 0
1. LITTLE INTEREST OR PLEASURE IN DOING THINGS: NOT AT ALL
1. LITTLE INTEREST OR PLEASURE IN DOING THINGS: NOT AT ALL
2. FEELING DOWN, DEPRESSED, IRRITABLE, OR HOPELESS: NOT AT ALL
SUM OF ALL RESPONSES TO PHQ9 QUESTIONS 1 AND 2: 0
2. FEELING DOWN, DEPRESSED, IRRITABLE, OR HOPELESS: NOT AT ALL
2. FEELING DOWN, DEPRESSED, IRRITABLE, OR HOPELESS: NOT AT ALL
5. POOR APPETITE OR OVEREATING: 0 - NOT AT ALL
SUM OF ALL RESPONSES TO PHQ QUESTIONS 1-9: 3
1. LITTLE INTEREST OR PLEASURE IN DOING THINGS: NOT AT ALL
SUM OF ALL RESPONSES TO PHQ9 QUESTIONS 1 AND 2: 0
4. FEELING TIRED OR HAVING LITTLE ENERGY: SEVERAL DAYS
3. TROUBLE FALLING OR STAYING ASLEEP OR SLEEPING TOO MUCH: NOT AT ALL
7. TROUBLE CONCENTRATING ON THINGS, SUCH AS READING THE NEWSPAPER OR WATCHING TELEVISION: NOT AT ALL
CLINICAL INTERPRETATION OF PHQ2 SCORE: 0
2. FEELING DOWN, DEPRESSED, IRRITABLE, OR HOPELESS: NOT AT ALL
SUM OF ALL RESPONSES TO PHQ9 QUESTIONS 1 AND 2: 0
SUM OF ALL RESPONSES TO PHQ9 QUESTIONS 1 AND 2: 0
2. FEELING DOWN, DEPRESSED, IRRITABLE, OR HOPELESS: NOT AT ALL
2. FEELING DOWN, DEPRESSED, IRRITABLE, OR HOPELESS: NOT AT ALL
SUM OF ALL RESPONSES TO PHQ9 QUESTIONS 1 AND 2: 1
SUM OF ALL RESPONSES TO PHQ9 QUESTIONS 1 AND 2: 0
2. FEELING DOWN, DEPRESSED, IRRITABLE, OR HOPELESS: NOT AT ALL
5. POOR APPETITE OR OVEREATING: SEVERAL DAYS
SUM OF ALL RESPONSES TO PHQ9 QUESTIONS 1 AND 2: 0
7. TROUBLE CONCENTRATING ON THINGS, SUCH AS READING THE NEWSPAPER OR WATCHING TELEVISION: NOT AT ALL
9. THOUGHTS THAT YOU WOULD BE BETTER OFF DEAD, OR OF HURTING YOURSELF: NOT AT ALL
2. FEELING DOWN, DEPRESSED, IRRITABLE, OR HOPELESS: NOT AT ALL
2. FEELING DOWN, DEPRESSED, IRRITABLE, OR HOPELESS: NOT AT ALL
2. FEELING DOWN, DEPRESSED, IRRITABLE, OR HOPELESS: SEVERAL DAYS
CLINICAL INTERPRETATION OF PHQ2 SCORE: 0
1. LITTLE INTEREST OR PLEASURE IN DOING THINGS: NOT AT ALL
1. LITTLE INTEREST OR PLEASURE IN DOING THINGS: NOT AT ALL
SUM OF ALL RESPONSES TO PHQ9 QUESTIONS 1 AND 2: 0
2. FEELING DOWN, DEPRESSED, IRRITABLE, OR HOPELESS: NOT AT ALL
SUM OF ALL RESPONSES TO PHQ9 QUESTIONS 1 AND 2: 0
7. TROUBLE CONCENTRATING ON THINGS, SUCH AS READING THE NEWSPAPER OR WATCHING TELEVISION: NOT AT ALL
SUM OF ALL RESPONSES TO PHQ9 QUESTIONS 1 AND 2: 0
SUM OF ALL RESPONSES TO PHQ9 QUESTIONS 1 AND 2: 0
1. LITTLE INTEREST OR PLEASURE IN DOING THINGS: SEVERAL DAYS
SUM OF ALL RESPONSES TO PHQ9 QUESTIONS 1 AND 2: 0
1. LITTLE INTEREST OR PLEASURE IN DOING THINGS: NOT AT ALL
2. FEELING DOWN, DEPRESSED, IRRITABLE, OR HOPELESS: NOT AT ALL
SUM OF ALL RESPONSES TO PHQ9 QUESTIONS 1 AND 2: 0
2. FEELING DOWN, DEPRESSED, IRRITABLE, OR HOPELESS: NOT AT ALL
8. MOVING OR SPEAKING SO SLOWLY THAT OTHER PEOPLE COULD HAVE NOTICED. OR THE OPPOSITE, BEING SO FIGETY OR RESTLESS THAT YOU HAVE BEEN MOVING AROUND A LOT MORE THAN USUAL: SEVERAL DAYS
6. FEELING BAD ABOUT YOURSELF - OR THAT YOU ARE A FAILURE OR HAVE LET YOURSELF OR YOUR FAMILY DOWN: NOT AL ALL
1. LITTLE INTEREST OR PLEASURE IN DOING THINGS: NOT AT ALL
6. FEELING BAD ABOUT YOURSELF - OR THAT YOU ARE A FAILURE OR HAVE LET YOURSELF OR YOUR FAMILY DOWN: NOT AL ALL
SUM OF ALL RESPONSES TO PHQ9 QUESTIONS 1 AND 2: 0
CLINICAL INTERPRETATION OF PHQ2 SCORE: 0
2. FEELING DOWN, DEPRESSED, IRRITABLE, OR HOPELESS: NOT AT ALL
1. LITTLE INTEREST OR PLEASURE IN DOING THINGS: NOT AT ALL
2. FEELING DOWN, DEPRESSED, IRRITABLE, OR HOPELESS: NOT AT ALL
2. FEELING DOWN, DEPRESSED, IRRITABLE, OR HOPELESS: NOT AT ALL
1. LITTLE INTEREST OR PLEASURE IN DOING THINGS: NOT AT ALL
SUM OF ALL RESPONSES TO PHQ9 QUESTIONS 1 AND 2: 1
SUM OF ALL RESPONSES TO PHQ9 QUESTIONS 1 AND 2: 6
4. FEELING TIRED OR HAVING LITTLE ENERGY: SEVERAL DAYS
3. TROUBLE FALLING OR STAYING ASLEEP OR SLEEPING TOO MUCH: NOT AT ALL
SUM OF ALL RESPONSES TO PHQ9 QUESTIONS 1 AND 2: 0
SUM OF ALL RESPONSES TO PHQ9 QUESTIONS 1 AND 2: 0
3. TROUBLE FALLING OR STAYING ASLEEP OR SLEEPING TOO MUCH: NOT AT ALL
8. MOVING OR SPEAKING SO SLOWLY THAT OTHER PEOPLE COULD HAVE NOTICED. OR THE OPPOSITE, BEING SO FIGETY OR RESTLESS THAT YOU HAVE BEEN MOVING AROUND A LOT MORE THAN USUAL: SEVERAL DAYS
2. FEELING DOWN, DEPRESSED, IRRITABLE, OR HOPELESS: NEARLY EVERY DAY
SUM OF ALL RESPONSES TO PHQ9 QUESTIONS 1 AND 2: 0
2. FEELING DOWN, DEPRESSED, IRRITABLE, OR HOPELESS: NOT AT ALL
9. THOUGHTS THAT YOU WOULD BE BETTER OFF DEAD, OR OF HURTING YOURSELF: NOT AT ALL
2. FEELING DOWN, DEPRESSED, IRRITABLE, OR HOPELESS: NOT AT ALL
CLINICAL INTERPRETATION OF PHQ2 SCORE: 2
3. TROUBLE FALLING OR STAYING ASLEEP OR SLEEPING TOO MUCH: NOT AT ALL
1. LITTLE INTEREST OR PLEASURE IN DOING THINGS: NOT AT ALL
SUM OF ALL RESPONSES TO PHQ QUESTIONS 1-9: 4
SUM OF ALL RESPONSES TO PHQ9 QUESTIONS 1 AND 2: 0
4. FEELING TIRED OR HAVING LITTLE ENERGY: SEVERAL DAYS
1. LITTLE INTEREST OR PLEASURE IN DOING THINGS: NOT AT ALL
2. FEELING DOWN, DEPRESSED, IRRITABLE, OR HOPELESS: NOT AT ALL
2. FEELING DOWN, DEPRESSED, IRRITABLE, OR HOPELESS: NOT AT ALL
1. LITTLE INTEREST OR PLEASURE IN DOING THINGS: NOT AT ALL
1. LITTLE INTEREST OR PLEASURE IN DOING THINGS: NOT AT ALL
SUM OF ALL RESPONSES TO PHQ QUESTIONS 1-9: 3
6. FEELING BAD ABOUT YOURSELF - OR THAT YOU ARE A FAILURE OR HAVE LET YOURSELF OR YOUR FAMILY DOWN: NOT AL ALL

## 2023-01-01 ASSESSMENT — FIBROSIS 4 INDEX
FIB4 SCORE: 1.88
FIB4 SCORE: 1.12
FIB4 SCORE: 1.67
FIB4 SCORE: 1.88
FIB4 SCORE: 1.67
FIB4 SCORE: 1.88
FIB4 SCORE: 1.88
FIB4 SCORE: 0.76
FIB4 SCORE: 1.88
FIB4 SCORE: 1.26
FIB4 SCORE: 1.26
FIB4 SCORE: 1.88
FIB4 SCORE: 0.89
FIB4 SCORE: 1.88
FIB4 SCORE: 1.12
FIB4 SCORE: 1.05
FIB4 SCORE: 1.9
FIB4 SCORE: 1.29
FIB4 SCORE: 1.05
FIB4 SCORE: 1.88
FIB4 SCORE: 1.1
FIB4 SCORE: 1.12
FIB4 SCORE: 1.2
FIB4 SCORE: 1.12
FIB4 SCORE: 1.29
FIB4 SCORE: 1.88

## 2023-01-01 ASSESSMENT — PAIN DESCRIPTION - PAIN TYPE
TYPE: ACUTE PAIN
TYPE: ACUTE PAIN;CHRONIC PAIN
TYPE: ACUTE PAIN
TYPE: ACUTE PAIN
TYPE: ACUTE PAIN;CHRONIC PAIN
TYPE: ACUTE PAIN
TYPE: ACUTE PAIN;CHRONIC PAIN
TYPE: ACUTE PAIN;CHRONIC PAIN
TYPE: ACUTE PAIN
TYPE: ACUTE PAIN;CHRONIC PAIN

## 2023-01-01 ASSESSMENT — CHA2DS2 SCORE
AGE 75 OR GREATER: YES
AGE 65 TO 74: NO
AGE 75 OR GREATER: YES
HYPERTENSION: YES
SEX: MALE
CHA2DS2 VASC SCORE: 5
CHA2DS2 VASC SCORE: 5
VASCULAR DISEASE: NO
DIABETES: NO
PRIOR STROKE OR TIA OR THROMBOEMBOLISM: YES
AGE 65 TO 74: NO
AGE 65 TO 74: NO
VASCULAR DISEASE: NO
CHA2DS2 VASC SCORE: 6
DIABETES: NO
HYPERTENSION: YES
AGE 65 TO 74: NO
SEX: MALE
CHF OR LEFT VENTRICULAR DYSFUNCTION: NO
SEX: MALE
HYPERTENSION: YES
PRIOR STROKE OR TIA OR THROMBOEMBOLISM: YES
VASCULAR DISEASE: NO
CHF OR LEFT VENTRICULAR DYSFUNCTION: NO
DIABETES: NO
CHF OR LEFT VENTRICULAR DYSFUNCTION: NO
VASCULAR DISEASE: NO
SEX: MALE
CHA2DS2 VASC SCORE: 5
PRIOR STROKE OR TIA OR THROMBOEMBOLISM: YES
PRIOR STROKE OR TIA OR THROMBOEMBOLISM: YES
VASCULAR DISEASE: YES
DIABETES: NO
CHF OR LEFT VENTRICULAR DYSFUNCTION: NO
AGE 75 OR GREATER: YES
HYPERTENSION: YES
AGE 75 OR GREATER: YES
CHF OR LEFT VENTRICULAR DYSFUNCTION: NO
PRIOR STROKE OR TIA OR THROMBOEMBOLISM: YES
DIABETES: NO
CHA2DS2 VASC SCORE: 5
AGE 65 TO 74: NO
AGE 75 OR GREATER: YES
SEX: MALE
HYPERTENSION: YES

## 2023-01-01 ASSESSMENT — GAIT ASSESSMENTS
GAIT LEVEL OF ASSIST: UNABLE TO PARTICIPATE

## 2023-01-01 ASSESSMENT — LIFESTYLE VARIABLES
TOTAL SCORE: 0
ALCOHOL_USE: NO
HOW MANY TIMES IN THE PAST YEAR HAVE YOU HAD 5 OR MORE DRINKS IN A DAY: 0
TOTAL SCORE: 0
EVER HAD A DRINK FIRST THING IN THE MORNING TO STEADY YOUR NERVES TO GET RID OF A HANGOVER: NO
ALCOHOL_USE: NO
EVER FELT BAD OR GUILTY ABOUT YOUR DRINKING: NO
TOTAL SCORE: 0
SUBSTANCE_ABUSE: 0
EVER_SMOKED: NEVER
CONSUMPTION TOTAL: NEGATIVE
AVERAGE NUMBER OF DAYS PER WEEK YOU HAVE A DRINK CONTAINING ALCOHOL: 0
HOW MANY TIMES IN THE PAST YEAR HAVE YOU HAD 5 OR MORE DRINKS IN A DAY: 0
ON A TYPICAL DAY WHEN YOU DRINK ALCOHOL HOW MANY DRINKS DO YOU HAVE: 0
AVERAGE NUMBER OF DAYS PER WEEK YOU HAVE A DRINK CONTAINING ALCOHOL: 0
HAVE YOU EVER FELT YOU SHOULD CUT DOWN ON YOUR DRINKING: NO
EVER FELT BAD OR GUILTY ABOUT YOUR DRINKING: NO
DOES PATIENT WANT TO STOP DRINKING: NO
HAVE PEOPLE ANNOYED YOU BY CRITICIZING YOUR DRINKING: NO
EVER HAD A DRINK FIRST THING IN THE MORNING TO STEADY YOUR NERVES TO GET RID OF A HANGOVER: NO
CONSUMPTION TOTAL: NEGATIVE
SUBSTANCE_ABUSE: 0
EVER HAD A DRINK FIRST THING IN THE MORNING TO STEADY YOUR NERVES TO GET RID OF A HANGOVER: NO
TOTAL SCORE: 0
AVERAGE NUMBER OF DAYS PER WEEK YOU HAVE A DRINK CONTAINING ALCOHOL: 0
HAVE YOU EVER FELT YOU SHOULD CUT DOWN ON YOUR DRINKING: NO
HOW MANY TIMES IN THE PAST YEAR HAVE YOU HAD 5 OR MORE DRINKS IN A DAY: 0
HAVE YOU EVER FELT YOU SHOULD CUT DOWN ON YOUR DRINKING: NO
TOTAL SCORE: 0
DOES PATIENT WANT TO STOP DRINKING: NO
ALCOHOL_USE: NO
EVER FELT BAD OR GUILTY ABOUT YOUR DRINKING: NO
TOTAL SCORE: 0
ON A TYPICAL DAY WHEN YOU DRINK ALCOHOL HOW MANY DRINKS DO YOU HAVE: 0
CONSUMPTION TOTAL: NEGATIVE
TOTAL SCORE: 0
TOTAL SCORE: 0
HAVE PEOPLE ANNOYED YOU BY CRITICIZING YOUR DRINKING: NO
ON A TYPICAL DAY WHEN YOU DRINK ALCOHOL HOW MANY DRINKS DO YOU HAVE: 0
HAVE PEOPLE ANNOYED YOU BY CRITICIZING YOUR DRINKING: NO
TOTAL SCORE: 0

## 2023-01-01 ASSESSMENT — COPD QUESTIONNAIRES
HAVE YOU SMOKED AT LEAST 100 CIGARETTES IN YOUR ENTIRE LIFE: NO/DON'T KNOW
DO YOU EVER COUGH UP ANY MUCUS OR PHLEGM?: NO/ONLY WITH OCCASIONAL COLDS OR INFECTIONS
COPD SCREENING SCORE: 2
DURING THE PAST 4 WEEKS HOW MUCH DID YOU FEEL SHORT OF BREATH: NONE/LITTLE OF THE TIME

## 2023-03-01 ENCOUNTER — APPOINTMENT (RX ONLY)
Dept: URBAN - METROPOLITAN AREA CLINIC 36 | Facility: CLINIC | Age: 88
Setting detail: DERMATOLOGY
End: 2023-03-01

## 2023-03-01 PROBLEM — C44.321 SQUAMOUS CELL CARCINOMA OF SKIN OF NOSE: Status: ACTIVE | Noted: 2023-03-01

## 2023-03-01 PROCEDURE — 17311 MOHS 1 STAGE H/N/HF/G: CPT

## 2023-03-01 PROCEDURE — ? REFERRAL CORRESPONDENCE

## 2023-03-01 PROCEDURE — 17312 MOHS ADDL STAGE: CPT

## 2023-03-01 PROCEDURE — 14060 TIS TRNFR E/N/E/L 10 SQ CM/<: CPT

## 2023-03-01 PROCEDURE — ? MOHS SURGERY

## 2023-03-01 NOTE — PROCEDURE: MOHS SURGERY
Mohs Case Number: 
Biopsy Photograph Reviewed: Yes
Referring Physician (Optional): Germaine Sanchez MD
Consent Type: Consent 1 (Standard)
Eye Shield Used: No
Surgeon Performing Repair (Optional): Anderson Potter MD
Initial Size Of Lesion: 0.6
X Size Of Lesion In Cm (Optional): 0.4
Number Of Stages: 3
Primary Defect Length In Cm (Final Defect Size - Required For Flaps/Grafts): 2.3
Primary Defect Width In Cm (Final Defect Size - Required For Flaps/Grafts): 1.1
Repair Type: Flap
Oculoplastic Surgeon (A): Panfilo
Oculoplastic Surgeon Procedure Text (A): After obtaining clear surgical margins the patient was sent to oculoplastics for surgical repair.  The patient understands they will receive post-surgical care and follow-up from the referring physician's office.
Otolaryngologist Procedure Text (A): After obtaining clear surgical margins the patient was sent to otolaryngology for surgical repair.  The patient understands they will receive post-surgical care and follow-up from the referring physician's office.
Plastic Surgeon Procedure Text (A): After obtaining clear surgical margins the patient was sent to plastics for surgical repair.  The patient understands they will receive post-surgical care and follow-up from the referring physician's office.
Mid-Level (A): did
Mid-Level Procedure Text (A): After obtaining clear surgical margins the patient was sent to a mid-level provider for surgical repair.  The patient understands they will receive post-surgical care and follow-up from the mid-level provider.
Provider Procedure Text (A): After obtaining clear surgical margins the defect was repaired by another provider.
Asc Procedure Text (A): After obtaining clear surgical margins the patient was sent to an ASC for surgical repair.  The patient understands they will receive post-surgical care and follow-up from the ASC physician.
Simple / Intermediate / Complex Repair - Final Wound Length In Cm: 0
Suturegard Retention Suture: 2-0 Nylon
Retention Suture Bite Size: 3 mm
Length To Time In Minutes Device Was In Place: 10
Number Of Hemigard Strips Per Side: 1
Undermining Type: Entire Wound
Debridement Text: The wound edges were debrided prior to proceeding with the closure to facilitate wound healing.
Helical Rim Text: The closure involved the helical rim.
Vermilion Border Text: The closure involved the vermilion border.
Nostril Rim Text: The closure involved the nostril rim.
Retention Suture Text: Retention sutures were placed to support the closure and prevent dehiscence.
Flap Type: Burow's Advancement Flap
Secondary Defect Length In Cm (Required For Flaps): 2.1
Secondary Defect Width In Cm (Required For Flaps): 1.5
Area H Indication Text: Tumors in this location are included in Area H (eyelids, eyebrows, nose, lips, chin, ear, pre-auricular, post-auricular, temple, genitalia, hands, feet, ankles and areola).  Tissue conservation is critical in these anatomic locations.
Area M Indication Text: Tumors in this location are included in Area M (cheek, forehead, scalp, neck, jawline and pretibial skin).  Mohs surgery is indicated for tumors in these anatomic locations.
Area L Indication Text: Tumors in this location are included in Area L (trunk and extremities).  Mohs surgery is indicated for larger tumors, or tumors with aggressive histologic features, in these anatomic locations.
Depth Of Tumor Invasion (For Histology): dermis
Perineural Invasion (For Histology - Be Specific If Possible): absent
Presence Of Scar Tissue (For Histology): present
Surgical Defect Length In Cm (Optional): 0.7
Special Stains Stage 1 - Results: Base On Clearance Noted Above
Stage 2: Additional Anesthesia Type: 1% lidocaine with 1:100,000 epinephrine and 408mcg clindamycin/ml and a 1:10 solution of 8.4% sodium bicarbonate
Surgical Defect Length In Cm (Optional): 1.6
Surgical Defect Width In Cm (Optional): 0.8
Stage 4: Additional Anesthesia Type: 1% lidocaine with epinephrine
Staging Info: By selecting yes to the question above you will include information on AJCC 8 tumor staging in your Mohs note. Information on tumor staging will be automatically added for SCCs on the head and neck. AJCC 8 includes tumor size, tumor depth, perineural involvement and bone invasion.
Tumor Depth: Less than 6mm from granular layer and no invasion beyond the subcutaneous fat
Was The Patient On Physician Recommended Anticoagulation Therapy?: Please Select the Appropriate Response
Medical Necessity Statement: Based on my medical judgement, Mohs surgery is the most appropriate treatment for this cancer compared to other treatments.
Alternatives Discussed Intro (Do Not Add Period): I discussed alternative treatments to Mohs surgery and specifically discussed the risks and benefits of
Consent 1/Introductory Paragraph: The rationale for Mohs was explained to the patient and consent was obtained. The risks, benefits and alternatives to therapy were discussed in detail. Specifically, the risks of infection, scarring, bleeding, prolonged wound healing, incomplete removal, allergy to anesthesia, nerve injury and recurrence were addressed. Prior to the procedure, the treatment site was clearly identified and confirmed by the patient. All components of Universal Protocol/PAUSE Rule completed.
Consent 2/Introductory Paragraph: Mohs surgery was explained to the patient and consent was obtained. The risks, benefits and alternatives to therapy were discussed in detail. Specifically, the risks of infection, scarring, bleeding, prolonged wound healing, incomplete removal, allergy to anesthesia, nerve injury and recurrence were addressed. Prior to the procedure, the treatment site was clearly identified and confirmed by the patient. All components of Universal Protocol/PAUSE Rule completed.
Consent 3/Introductory Paragraph: I gave the patient a chance to ask questions they had about the procedure.  Following this I explained the Mohs procedure and consent was obtained. The risks, benefits and alternatives to therapy were discussed in detail. Specifically, the risks of infection, scarring, bleeding, prolonged wound healing, incomplete removal, allergy to anesthesia, nerve injury and recurrence were addressed. Prior to the procedure, the treatment site was clearly identified and confirmed by the patient. All components of Universal Protocol/PAUSE Rule completed.
Consent (Temporal Branch)/Introductory Paragraph: The rationale for Mohs was explained to the patient and consent was obtained. The risks, benefits and alternatives to therapy were discussed in detail. Specifically, the risks of damage to the temporal branch of the facial nerve, infection, scarring, bleeding, prolonged wound healing, incomplete removal, allergy to anesthesia, and recurrence were addressed. Prior to the procedure, the treatment site was clearly identified and confirmed by the patient. All components of Universal Protocol/PAUSE Rule completed.
Consent (Marginal Mandibular)/Introductory Paragraph: The rationale for Mohs was explained to the patient and consent was obtained. The risks, benefits and alternatives to therapy were discussed in detail. Specifically, the risks of damage to the marginal mandibular branch of the facial nerve, infection, scarring, bleeding, prolonged wound healing, incomplete removal, allergy to anesthesia, and recurrence were addressed. Prior to the procedure, the treatment site was clearly identified and confirmed by the patient. All components of Universal Protocol/PAUSE Rule completed.
Consent (Spinal Accessory)/Introductory Paragraph: The rationale for Mohs was explained to the patient and consent was obtained. The risks, benefits and alternatives to therapy were discussed in detail. Specifically, the risks of damage to the spinal accessory nerve, infection, scarring, bleeding, prolonged wound healing, incomplete removal, allergy to anesthesia, and recurrence were addressed. Prior to the procedure, the treatment site was clearly identified and confirmed by the patient. All components of Universal Protocol/PAUSE Rule completed.
Consent (Near Eyelid Margin)/Introductory Paragraph: The rationale for Mohs was explained to the patient and consent was obtained. The risks, benefits and alternatives to therapy were discussed in detail. Specifically, the risks of ectropion or eyelid deformity, infection, scarring, bleeding, prolonged wound healing, incomplete removal, allergy to anesthesia, nerve injury and recurrence were addressed. Prior to the procedure, the treatment site was clearly identified and confirmed by the patient. All components of Universal Protocol/PAUSE Rule completed.
Consent (Ear)/Introductory Paragraph: The rationale for Mohs was explained to the patient and consent was obtained. The risks, benefits and alternatives to therapy were discussed in detail. Specifically, the risks of ear deformity, infection, scarring, bleeding, prolonged wound healing, incomplete removal, allergy to anesthesia, nerve injury and recurrence were addressed. Prior to the procedure, the treatment site was clearly identified and confirmed by the patient. All components of Universal Protocol/PAUSE Rule completed.
Consent (Nose)/Introductory Paragraph: The rationale for Mohs was explained to the patient and consent was obtained. The risks, benefits and alternatives to therapy were discussed in detail. Specifically, the risks of nasal deformity, changes in the flow of air through the nose, infection, scarring, bleeding, prolonged wound healing, incomplete removal, allergy to anesthesia, nerve injury and recurrence were addressed. Prior to the procedure, the treatment site was clearly identified and confirmed by the patient. All components of Universal Protocol/PAUSE Rule completed.
Consent (Lip)/Introductory Paragraph: The rationale for Mohs was explained to the patient and consent was obtained. The risks, benefits and alternatives to therapy were discussed in detail. Specifically, the risks of lip deformity, changes in the oral aperture, infection, scarring, bleeding, prolonged wound healing, incomplete removal, allergy to anesthesia, nerve injury and recurrence were addressed. Prior to the procedure, the treatment site was clearly identified and confirmed by the patient. All components of Universal Protocol/PAUSE Rule completed.
Consent (Scalp)/Introductory Paragraph: The rationale for Mohs was explained to the patient and consent was obtained. The risks, benefits and alternatives to therapy were discussed in detail. Specifically, the risks of changes in hair growth pattern secondary to repair, infection, scarring, bleeding, prolonged wound healing, incomplete removal, allergy to anesthesia, nerve injury and recurrence were addressed. Prior to the procedure, the treatment site was clearly identified and confirmed by the patient. All components of Universal Protocol/PAUSE Rule completed.
Detail Level: Detailed
Postop Diagnosis: same
Surgeon: Tariq
Anesthesia Type: 0.5% bupivacaine with 1:200,000 epinephrine
Anesthesia Volume In Cc: 6
Additional Anesthesia Volume In Cc: 2
Hemostasis: Electrocautery
Estimated Blood Loss (Cc): less than 5 cc
Repair Anesthesia Method: local infiltration
Brow Lift Text: A midfrontal incision was made medially to the defect to allow access to the tissues just superior to the left eyebrow. Following careful dissection inferiorly in a supraperiosteal plane to the level of the left eyebrow, several 3-0 monocryl sutures were used to resuspend the eyebrow orbicularis oculi muscular unit to the superior frontal bone periosteum. This resulted in an appropriate reapproximation of static eyebrow symmetry and correction of the left brow ptosis.
Deep Sutures: 5-0 Polysorb
Epidermal Sutures: 5-0 Prolene
Epidermal Closure: running cuticular
Suturegard Intro: Intraoperative tissue expansion was performed, utilizing the SUTUREGARD device, in order to reduce wound tension.
Suturegard Body: The suture ends were repeatedly re-tightened and re-clamped to achieve the desired tissue expansion.
Hemigard Intro: Due to skin fragility and wound tension, it was decided to use HEMIGARD adhesive retention suture devices to permit a linear closure. The skin was cleaned and dried for a 6cm distance away from the wound. Excessive hair, if present, was removed to allow for adhesion.
Hemigard Postcare Instructions: The HEMIGARD strips are to remain completely dry for at least 5-7 days.
Donor Site Anesthesia Type: same as repair anesthesia
Graft Basting Suture (Optional): 5-0 Fast Absorbing Gut
Graft Donor Site Epidermal Sutures (Optional): 5-0 Ethibond
Epidermal Closure Graft Donor Site (Optional): simple interrupted
Graft Donor Site Bandage (Optional-Leave Blank If You Don't Want In Note): Aquaphor and telefa placed on wound. Pressure dressing applied to donor site
Closure 2 Information: This tab is for additional flaps and grafts, including complex repair and grafts and complex repair and flaps. You can also specify a different location for the additional defect, if the location is the same you do not need to select a new one. We will insert the automated text for the repair you select below just as we do for solitary flaps and grafts. Please note that at this time if you select a location with a different insurance zone you will need to override the ICD10 and CPT if appropriate.
Closure 3 Information: This tab is for additional flaps and grafts above and beyond our usual structured repairs.  Please note if you enter information here it will not currently bill and you will need to add the billing information manually.
Wound Care: Aquaphor
Dressing: dry sterile dressing
Wound Care (No Sutures): Petrolatum
Suture Removal: 7 days
Unna Boot Text: An Unna boot was placed to help immobilize the limb and facilitate more rapid healing.
Home Suture Removal Text: Patient was provided instructions on removing sutures and will remove their sutures at home.  If they have any questions or difficulties they will call the office.
Post-Care Instructions: I reviewed with the patient in detail post-care instructions. Patient is not to engage in any heavy lifting, exercise, or swimming for the next 14 days. Should the patient develop any fevers, chills, bleeding, severe pain patient will contact the office immediately.
Pain Refusal Text: I offered to prescribe pain medication but the patient refused to take this medication.
Mauc Instructions: By selecting yes to the question below the MAUC number will be added into the note.  This will be calculated automatically based on the diagnosis chosen, the size entered, the body zone selected (H,M,L) and the specific indications you chose. You will also have the option to override the Mohs AUC if you disagree with the automatically calculated number and this option is found in the Case Summary tab.
Where Do You Want The Question To Include Opioid Counseling Located?: Case Summary Tab
Eye Protection Verbiage: Before proceeding with the stage, a plastic scleral shield was inserted. The globe was anesthetized with a few drops of 1% lidocaine with 1:100,000 epinephrine. Then, an appropriate sized scleral shield was chosen and coated with lacrilube ointment. The shield was gently inserted and left in place for the duration of each stage. After the stage was completed, the shield was gently removed.
Mohs Method Verbiage: An incision at a 45 degree angle following the standard Mohs approach was done and the specimen was harvested as a microscopic controlled layer.
Surgeon/Pathologist Verbiage (Will Incorporate Name Of Surgeon From Intro If Not Blank): operated in two distinct and integrated capacities as the surgeon and pathologist.
Mohs Histo Method Verbiage: Each section was then chromacoded and processed in the Mohs lab using the Mohs protocol and submitted for frozen section.
Subsequent Stages Histo Method Verbiage: Using a similar technique to that described above, a thin layer of tissue was removed from all areas where tumor was visible on the previous stage.  The tissue was again oriented, mapped, dyed, and processed as above.
Mohs Rapid Report Verbiage: The area of clinically evident tumor was marked with skin marking ink and appropriately hatched.  The initial incision was made following the Mohs approach through the skin.  The specimen was taken to the lab, divided into the necessary number of pieces, chromacoded and processed according to the Mohs protocol.  This was repeated in successive stages until a tumor free defect was achieved.
Complex Repair Preamble Text (Leave Blank If You Do Not Want): Extensive wide undermining was performed at least 2 cm in all directions.
Intermediate Repair Preamble Text (Leave Blank If You Do Not Want): Undermining was performed with blunt dissection.
M-Plasty Complex Repair Preamble Text (Leave Blank If You Do Not Want): Extensive wide undermining was performed.
Non-Graft Cartilage Fenestration Text: The cartilage was fenestrated with a 2mm punch biopsy to help facilitate healing.
Graft Cartilage Fenestration Text: The cartilage was fenestrated with a 2mm punch biopsy to help facilitate graft survival and healing.
Secondary Intention Text (Leave Blank If You Do Not Want): The defect will heal with secondary intention.
No Repair - Repaired With Adjacent Surgical Defect Text (Leave Blank If You Do Not Want): After obtaining clear surgical margins the defect was repaired concurrently with another surgical defect which was in close approximation.
Adjacent Tissue Transfer Text: The defect edges were debeveled with a #15 scalpel blade.  Given the location of the defect and the proximity to free margins an adjacent tissue transfer was deemed most appropriate.  Using a sterile surgical marker, an appropriate flap was drawn incorporating the defect and placing the expected incisions within the relaxed skin tension lines where possible.    The area thus outlined was incised deep to adipose tissue with a #15 scalpel blade.  The skin margins were undermined to an appropriate distance in all directions utilizing iris scissors.
Advancement Flap (Single) Text: The defect edges were debeveled with a #15 scalpel blade.  Given the location of the defect and the proximity to free margins a single advancement flap was deemed most appropriate.  Using a sterile surgical marker, an appropriate advancement flap was drawn incorporating the defect and placing the expected incisions within the relaxed skin tension lines where possible.    The area thus outlined was incised deep to adipose tissue with a #15 scalpel blade.  The skin margins were undermined to an appropriate distance in all directions utilizing iris scissors.
Advancement Flap (Double) Text: The defect edges were debeveled with a #15 scalpel blade.  Given the location of the defect and the proximity to free margins a double advancement flap was deemed most appropriate.  Using a sterile surgical marker, the appropriate advancement flaps were drawn incorporating the defect and placing the expected incisions within the relaxed skin tension lines where possible.    The area thus outlined was incised deep to adipose tissue with a #15 scalpel blade.  The skin margins were undermined to an appropriate distance in all directions utilizing iris scissors.
Burow's Advancement Flap Text: The defect edges were debeveled with a #15 scalpel blade.  Given the location of the defect and the proximity to free margins a Burow's advancement flap was deemed most appropriate.  Using a sterile surgical marker, the appropriate advancement flap was drawn incorporating the defect and placing the expected incisions within the relaxed skin tension lines where possible.    The area thus outlined was incised deep to adipose tissue with a #15 scalpel blade.  The skin margins were undermined to an appropriate distance in all directions utilizing iris scissors.
Chonodrocutaneous Helical Advancement Flap Text: The defect edges were debeveled with a #15 scalpel blade.  Given the location of the defect and the proximity to free margins a chondrocutaneous helical advancement flap was deemed most appropriate.  Using a sterile surgical marker, the appropriate advancement flap was drawn incorporating the defect and placing the expected incisions within the relaxed skin tension lines where possible.    The area thus outlined was incised deep to adipose tissue with a #15 scalpel blade.  The skin margins were undermined to an appropriate distance in all directions utilizing iris scissors.
Crescentic Advancement Flap Text: The defect edges were debeveled with a #15 scalpel blade.  Given the location of the defect and the proximity to free margins a crescentic advancement flap was deemed most appropriate.  Using a sterile surgical marker, the appropriate advancement flap was drawn incorporating the defect and placing the expected incisions within the relaxed skin tension lines where possible.    The area thus outlined was incised deep to adipose tissue with a #15 scalpel blade.  The skin margins were undermined to an appropriate distance in all directions utilizing iris scissors.
A-T Advancement Flap Text: The defect edges were debeveled with a #15 scalpel blade.  Given the location of the defect, shape of the defect and the proximity to free margins an A-T advancement flap was deemed most appropriate.  Using a sterile surgical marker, an appropriate advancement flap was drawn incorporating the defect and placing the expected incisions within the relaxed skin tension lines where possible.    The area thus outlined was incised deep to adipose tissue with a #15 scalpel blade.  The skin margins were undermined to an appropriate distance in all directions utilizing iris scissors.
O-T Advancement Flap Text: The defect edges were debeveled with a #15 scalpel blade.  Given the location of the defect, shape of the defect and the proximity to free margins an O-T advancement flap was deemed most appropriate.  Using a sterile surgical marker, an appropriate advancement flap was drawn incorporating the defect and placing the expected incisions within the relaxed skin tension lines where possible.    The area thus outlined was incised deep to adipose tissue with a #15 scalpel blade.  The skin margins were undermined to an appropriate distance in all directions utilizing iris scissors.
O-L Flap Text: The defect edges were debeveled with a #15 scalpel blade.  Given the location of the defect, shape of the defect and the proximity to free margins an O-L flap was deemed most appropriate.  Using a sterile surgical marker, an appropriate advancement flap was drawn incorporating the defect and placing the expected incisions within the relaxed skin tension lines where possible.    The area thus outlined was incised deep to adipose tissue with a #15 scalpel blade.  The skin margins were undermined to an appropriate distance in all directions utilizing iris scissors.
O-Z Flap Text: The defect edges were debeveled with a #15 scalpel blade.  Given the location of the defect, shape of the defect and the proximity to free margins an O-Z flap was deemed most appropriate.  Using a sterile surgical marker, an appropriate transposition flap was drawn incorporating the defect and placing the expected incisions within the relaxed skin tension lines where possible. The area thus outlined was incised deep to adipose tissue with a #15 scalpel blade.  The skin margins were undermined to an appropriate distance in all directions utilizing iris scissors.
Double O-Z Flap Text: The defect edges were debeveled with a #15 scalpel blade.  Given the location of the defect, shape of the defect and the proximity to free margins a Double O-Z flap was deemed most appropriate.  Using a sterile surgical marker, an appropriate transposition flap was drawn incorporating the defect and placing the expected incisions within the relaxed skin tension lines where possible. The area thus outlined was incised deep to adipose tissue with a #15 scalpel blade.  The skin margins were undermined to an appropriate distance in all directions utilizing iris scissors.
V-Y Flap Text: The defect edges were debeveled with a #15 scalpel blade.  Given the location of the defect, shape of the defect and the proximity to free margins a V-Y flap was deemed most appropriate.  Using a sterile surgical marker, an appropriate advancement flap was drawn incorporating the defect and placing the expected incisions within the relaxed skin tension lines where possible.    The area thus outlined was incised deep to adipose tissue with a #15 scalpel blade.  The skin margins were undermined to an appropriate distance in all directions utilizing iris scissors.
Advancement-Rotation Flap Text: The defect edges were debeveled with a #15 scalpel blade.  Given the location of the defect, shape of the defect and the proximity to free margins an advancement-rotation flap was deemed most appropriate.  Using a sterile surgical marker, an appropriate flap was drawn incorporating the defect and placing the expected incisions within the relaxed skin tension lines where possible. The area thus outlined was incised deep to adipose tissue with a #15 scalpel blade.  The skin margins were undermined to an appropriate distance in all directions utilizing iris scissors.
Mercedes Flap Text: The defect edges were debeveled with a #15 scalpel blade.  Given the location of the defect, shape of the defect and the proximity to free margins a Mercedes flap was deemed most appropriate.  Using a sterile surgical marker, an appropriate advancement flap was drawn incorporating the defect and placing the expected incisions within the relaxed skin tension lines where possible. The area thus outlined was incised deep to adipose tissue with a #15 scalpel blade.  The skin margins were undermined to an appropriate distance in all directions utilizing iris scissors.
Modified Advancement Flap Text: The defect edges were debeveled with a #15 scalpel blade.  Given the location of the defect, shape of the defect and the proximity to free margins a modified advancement flap was deemed most appropriate.  Using a sterile surgical marker, an appropriate advancement flap was drawn incorporating the defect and placing the expected incisions within the relaxed skin tension lines where possible.    The area thus outlined was incised deep to adipose tissue with a #15 scalpel blade.  The skin margins were undermined to an appropriate distance in all directions utilizing iris scissors.
Mucosal Advancement Flap Text: Given the location of the defect, shape of the defect and the proximity to free margins a mucosal advancement flap was deemed most appropriate. Incisions were made with a 15 blade scalpel in the appropriate fashion along the cutaneous vermilion border and the mucosal lip. The remaining actinically damaged mucosal tissue was excised.  The mucosal advancement flap was then elevated to the gingival sulcus with care taken to preserve the neurovascular structures and advanced into the primary defect. Care was taken to ensure that precise realignment of the vermilion border was achieved.
Peng Advancement Flap Text: The defect edges were debeveled with a #15 scalpel blade.  Given the location of the defect, shape of the defect and the proximity to free margins a Peng advancement flap was deemed most appropriate.  Using a sterile surgical marker, an appropriate advancement flap was drawn incorporating the defect and placing the expected incisions within the relaxed skin tension lines where possible. The area thus outlined was incised deep to adipose tissue with a #15 scalpel blade.  The skin margins were undermined to an appropriate distance in all directions utilizing iris scissors.
Hatchet Flap Text: The defect edges were debeveled with a #15 scalpel blade.  Given the location of the defect, shape of the defect and the proximity to free margins a hatchet flap based from the glabella was deemed most appropriate.  Using a sterile surgical marker, an appropriate glabellar hatchet flap was drawn incorporating the defect and placing the expected incisions within the relaxed skin tension lines where possible.    The area thus outlined was incised deep to adipose tissue with a #15 scalpel blade.  The skin margins were undermined to an appropriate distance in all directions utilizing iris scissors.
Rotation Flap Text: The defect edges were debeveled with a #15 scalpel blade.  Given the location of the defect, shape of the defect and the proximity to free margins a rotation flap was deemed most appropriate.  Using a sterile surgical marker, an appropriate rotation flap was drawn incorporating the defect and placing the expected incisions within the relaxed skin tension lines where possible.    The area thus outlined was incised deep to adipose tissue with a #15 scalpel blade.  The skin margins were undermined to an appropriate distance in all directions utilizing iris scissors.
Spiral Flap Text: The defect edges were debeveled with a #15 scalpel blade.  Given the location of the defect, shape of the defect and the proximity to free margins a spiral flap was deemed most appropriate.  Using a sterile surgical marker, an appropriate rotation flap was drawn incorporating the defect and placing the expected incisions within the relaxed skin tension lines where possible. The area thus outlined was incised deep to adipose tissue with a #15 scalpel blade.  The skin margins were undermined to an appropriate distance in all directions utilizing iris scissors.
Staged Advancement Flap Text: The defect edges were debeveled with a #15 scalpel blade.  Given the location of the defect, shape of the defect and the proximity to free margins a staged advancement flap was deemed most appropriate.  Using a sterile surgical marker, an appropriate advancement flap was drawn incorporating the defect and placing the expected incisions within the relaxed skin tension lines where possible. The area thus outlined was incised deep to adipose tissue with a #15 scalpel blade.  The skin margins were undermined to an appropriate distance in all directions utilizing iris scissors.
Star Wedge Flap Text: The defect edges were debeveled with a #15 scalpel blade.  Given the location of the defect, shape of the defect and the proximity to free margins a star wedge flap was deemed most appropriate.  Using a sterile surgical marker, an appropriate rotation flap was drawn incorporating the defect and placing the expected incisions within the relaxed skin tension lines where possible. The area thus outlined was incised deep to adipose tissue with a #15 scalpel blade.  The skin margins were undermined to an appropriate distance in all directions utilizing iris scissors.
Transposition Flap Text: The defect edges were debeveled with a #15 scalpel blade.  Given the location of the defect and the proximity to free margins a transposition flap was deemed most appropriate.  Using a sterile surgical marker, an appropriate transposition flap was drawn incorporating the defect.    The area thus outlined was incised deep to adipose tissue with a #15 scalpel blade.  The skin margins were undermined to an appropriate distance in all directions utilizing iris scissors.
Muscle Hinge Flap Text: The defect edges were debeveled with a #15 scalpel blade.  Given the size, depth and location of the defect and the proximity to free margins a muscle hinge flap was deemed most appropriate.  Using a sterile surgical marker, an appropriate hinge flap was drawn incorporating the defect. The area thus outlined was incised with a #15 scalpel blade.  The skin margins were undermined to an appropriate distance in all directions utilizing iris scissors.
Mustarde Flap Text: The defect edges were debeveled with a #15 scalpel blade.  Given the size, depth and location of the defect and the proximity to free margins a Mustarde flap was deemed most appropriate.  Using a sterile surgical marker, an appropriate flap was drawn incorporating the defect. The area thus outlined was incised with a #15 scalpel blade.  The skin margins were undermined to an appropriate distance in all directions utilizing iris scissors.
Nasal Turnover Hinge Flap Text: The defect edges were debeveled with a #15 scalpel blade.  Given the size, depth, location of the defect and the defect being full thickness a nasal turnover hinge flap was deemed most appropriate.  Using a sterile surgical marker, an appropriate hinge flap was drawn incorporating the defect. The area thus outlined was incised with a #15 scalpel blade. The flap was designed to recreate the nasal mucosal lining and the alar rim. The skin margins were undermined to an appropriate distance in all directions utilizing iris scissors.
Nasalis-Muscle-Based Myocutaneous Island Pedicle Flap Text: Using a #15 blade, an incision was made around the donor flap to the level of the nasalis muscle. Wide lateral undermining was then performed in both the subcutaneous plane above the nasalis muscle, and in a submuscular plane just above periosteum. This allowed the formation of a free nasalis muscle axial pedicle (based on the angular artery) which was still attached to the actual cutaneous flap, increasing its mobility and vascular viability. Hemostasis was obtained with pinpoint electrocoagulation. The flap was mobilized into position and the pivotal anchor points positioned and stabilized with buried interrupted sutures. Subcutaneous and dermal tissues were closed in a multilayered fashion with sutures. Tissue redundancies were excised, and the epidermal edges were apposed without significant tension and sutured with sutures.
Orbicularis Oris Muscle Flap Text: The defect edges were debeveled with a #15 scalpel blade.  Given that the defect affected the competency of the oral sphincter an orbicularis oris muscle flap was deemed most appropriate to restore this competency and normal muscle function.  Using a sterile surgical marker, an appropriate flap was drawn incorporating the defect. The area thus outlined was incised with a #15 scalpel blade.
Melolabial Transposition Flap Text: The defect edges were debeveled with a #15 scalpel blade.  Given the location of the defect and the proximity to free margins a melolabial flap was deemed most appropriate.  Using a sterile surgical marker, an appropriate melolabial transposition flap was drawn incorporating the defect.    The area thus outlined was incised deep to adipose tissue with a #15 scalpel blade.  The skin margins were undermined to an appropriate distance in all directions utilizing iris scissors.
Rhombic Flap Text: The defect edges were debeveled with a #15 scalpel blade.  Given the location of the defect and the proximity to free margins a rhombic flap was deemed most appropriate.  Using a sterile surgical marker, an appropriate rhombic flap was drawn incorporating the defect.    The area thus outlined was incised deep to adipose tissue with a #15 scalpel blade.  The skin margins were undermined to an appropriate distance in all directions utilizing iris scissors.
Rhomboid Transposition Flap Text: The defect edges were debeveled with a #15 scalpel blade.  Given the location of the defect and the proximity to free margins a rhomboid transposition flap was deemed most appropriate.  Using a sterile surgical marker, an appropriate rhomboid flap was drawn incorporating the defect.    The area thus outlined was incised deep to adipose tissue with a #15 scalpel blade.  The skin margins were undermined to an appropriate distance in all directions utilizing iris scissors.
Bi-Rhombic Flap Text: The defect edges were debeveled with a #15 scalpel blade.  Given the location of the defect and the proximity to free margins a bi-rhombic flap was deemed most appropriate.  Using a sterile surgical marker, an appropriate rhombic flap was drawn incorporating the defect. The area thus outlined was incised deep to adipose tissue with a #15 scalpel blade.  The skin margins were undermined to an appropriate distance in all directions utilizing iris scissors.
Helical Rim Advancement Flap Text: The defect edges were debeveled with a #15 blade scalpel.  Given the location of the defect and the proximity to free margins (helical rim) a double helical rim advancement flap was deemed most appropriate.  Using a sterile surgical marker, the appropriate advancement flaps were drawn incorporating the defect and placing the expected incisions between the helical rim and antihelix where possible.  The area thus outlined was incised through and through with a #15 scalpel blade.  With a skin hook and iris scissors, the flaps were gently and sharply undermined and freed up.
Bilateral Helical Rim Advancement Flap Text: The defect edges were debeveled with a #15 blade scalpel.  Given the location of the defect and the proximity to free margins (helical rim) a bilateral helical rim advancement flap was deemed most appropriate.  Using a sterile surgical marker, the appropriate advancement flaps were drawn incorporating the defect and placing the expected incisions between the helical rim and antihelix where possible.  The area thus outlined was incised through and through with a #15 scalpel blade.  With a skin hook and iris scissors, the flaps were gently and sharply undermined and freed up.
Ear Star Wedge Flap Text: The defect edges were debeveled with a #15 blade scalpel.  Given the location of the defect and the proximity to free margins (helical rim) an ear star wedge flap was deemed most appropriate.  Using a sterile surgical marker, the appropriate flap was drawn incorporating the defect and placing the expected incisions between the helical rim and antihelix where possible.  The area thus outlined was incised through and through with a #15 scalpel blade.
Banner Transposition Flap Text: The defect edges were debeveled with a #15 scalpel blade.  Given the location of the defect and the proximity to free margins a Banner transposition flap was deemed most appropriate.  Using a sterile surgical marker, an appropriate flap drawn around the defect. The area thus outlined was incised deep to adipose tissue with a #15 scalpel blade.  The skin margins were undermined to an appropriate distance in all directions utilizing iris scissors.
Bilobed Flap Text: The defect edges were debeveled with a #15 scalpel blade.  Given the location of the defect and the proximity to free margins a bilobe flap was deemed most appropriate.  Using a sterile surgical marker, an appropriate bilobe flap drawn around the defect.    The area thus outlined was incised deep to adipose tissue with a #15 scalpel blade.  The skin margins were undermined to an appropriate distance in all directions utilizing iris scissors.
Bilobed Transposition Flap Text: The defect edges were debeveled with a #15 scalpel blade.  Given the location of the defect and the proximity to free margins a bilobed transposition flap was deemed most appropriate.  Using a sterile surgical marker, an appropriate bilobe flap drawn around the defect.    The area thus outlined was incised deep to adipose tissue with a #15 scalpel blade.  The skin margins were undermined to an appropriate distance in all directions utilizing iris scissors.
Trilobed Flap Text: The defect edges were debeveled with a #15 scalpel blade.  Given the location of the defect and the proximity to free margins a trilobed flap was deemed most appropriate.  Using a sterile surgical marker, an appropriate trilobed flap drawn around the defect.    The area thus outlined was incised deep to adipose tissue with a #15 scalpel blade.  The skin margins were undermined to an appropriate distance in all directions utilizing iris scissors.
Dorsal Nasal Flap Text: The defect edges were debeveled with a #15 scalpel blade.  Given the location of the defect and the proximity to free margins a dorsal nasal flap,based upon the glabellar folds, was deemed most appropriate.  Using a sterile surgical marker, an appropriate dorsal nasal flap was drawn around the defect.    The area thus outlined was incised deep to adipose tissue with a #15 scalpel blade.  The skin margins were undermined to an appropriate distance in all directions utilizing iris scissors.
Island Pedicle Flap Text: The defect edges were debeveled with a #15 scalpel blade.  Given the location of the defect, shape of the defect and the proximity to free margins an island pedicle advancement flap was deemed most appropriate.  Using a sterile surgical marker, an appropriate advancement flap was drawn incorporating the defect, outlining the appropriate donor tissue and placing the expected incisions within the relaxed skin tension lines where possible.    The area thus outlined was incised deep to adipose tissue with a #15 scalpel blade.  The skin margins were undermined to an appropriate distance in all directions around the primary defect and laterally outward around the island pedicle utilizing iris scissors.  There was minimal undermining beneath the pedicle flap.
Island Pedicle Flap With Canthal Suspension Text: The defect edges were debeveled with a #15 scalpel blade.  Given the location of the defect, shape of the defect and the proximity to free margins an island pedicle advancement flap was deemed most appropriate.  Using a sterile surgical marker, an appropriate advancement flap was drawn incorporating the defect, outlining the appropriate donor tissue and placing the expected incisions within the relaxed skin tension lines where possible. The area thus outlined was incised deep to adipose tissue with a #15 scalpel blade.  The skin margins were undermined to an appropriate distance in all directions around the primary defect and laterally outward around the island pedicle utilizing iris scissors.  There was minimal undermining beneath the pedicle flap. A suspension suture was placed in the canthal tendon to prevent tension and prevent ectropion.
Alar Island Pedicle Flap Text: The defect edges were debeveled with a #15 scalpel blade.  Given the location of the defect, shape of the defect and the proximity to the alar rim an island pedicle advancement flap was deemed most appropriate.  Using a sterile surgical marker, an appropriate advancement flap was drawn incorporating the defect, outlining the appropriate donor tissue and placing the expected incisions within the nasal ala running parallel to the alar rim. The area thus outlined was incised with a #15 scalpel blade.  The skin margins were undermined minimally to an appropriate distance in all directions around the primary defect and laterally outward around the island pedicle utilizing iris scissors.  There was minimal undermining beneath the pedicle flap.
Double Island Pedicle Flap Text: The defect edges were debeveled with a #15 scalpel blade.  Given the location of the defect, shape of the defect and the proximity to free margins a double island pedicle advancement flap was deemed most appropriate.  Using a sterile surgical marker, an appropriate advancement flap was drawn incorporating the defect, outlining the appropriate donor tissue and placing the expected incisions within the relaxed skin tension lines where possible.    The area thus outlined was incised deep to adipose tissue with a #15 scalpel blade.  The skin margins were undermined to an appropriate distance in all directions around the primary defect and laterally outward around the island pedicle utilizing iris scissors.  There was minimal undermining beneath the pedicle flap.
Island Pedicle Flap-Requiring Vessel Identification Text: The defect edges were debeveled with a #15 scalpel blade.  Given the location of the defect, shape of the defect and the proximity to free margins an island pedicle advancement flap was deemed most appropriate.  Using a sterile surgical marker, an appropriate advancement flap was drawn, based on the axial vessel mentioned above, incorporating the defect, outlining the appropriate donor tissue and placing the expected incisions within the relaxed skin tension lines where possible.    The area thus outlined was incised deep to adipose tissue with a #15 scalpel blade.  The skin margins were undermined to an appropriate distance in all directions around the primary defect and laterally outward around the island pedicle utilizing iris scissors.  There was minimal undermining beneath the pedicle flap.
Keystone Flap Text: The defect edges were debeveled with a #15 scalpel blade.  Given the location of the defect, shape of the defect a keystone flap was deemed most appropriate.  Using a sterile surgical marker, an appropriate keystone flap was drawn incorporating the defect, outlining the appropriate donor tissue and placing the expected incisions within the relaxed skin tension lines where possible. The area thus outlined was incised deep to adipose tissue with a #15 scalpel blade.  The skin margins were undermined to an appropriate distance in all directions around the primary defect and laterally outward around the flap utilizing iris scissors.
O-T Plasty Text: The defect edges were debeveled with a #15 scalpel blade.  Given the location of the defect, shape of the defect and the proximity to free margins an O-T plasty was deemed most appropriate.  Using a sterile surgical marker, an appropriate O-T plasty was drawn incorporating the defect and placing the expected incisions within the relaxed skin tension lines where possible.    The area thus outlined was incised deep to adipose tissue with a #15 scalpel blade.  The skin margins were undermined to an appropriate distance in all directions utilizing iris scissors.
O-Z Plasty Text: The defect edges were debeveled with a #15 scalpel blade.  Given the location of the defect, shape of the defect and the proximity to free margins an O-Z plasty (double transposition flap) was deemed most appropriate.  Using a sterile surgical marker, the appropriate transposition flaps were drawn incorporating the defect and placing the expected incisions within the relaxed skin tension lines where possible.    The area thus outlined was incised deep to adipose tissue with a #15 scalpel blade.  The skin margins were undermined to an appropriate distance in all directions utilizing iris scissors.  Hemostasis was achieved with electrocautery.  The flaps were then transposed into place, one clockwise and the other counterclockwise, and anchored with interrupted buried subcutaneous sutures.
Double O-Z Plasty Text: The defect edges were debeveled with a #15 scalpel blade.  Given the location of the defect, shape of the defect and the proximity to free margins a Double O-Z plasty (double transposition flap) was deemed most appropriate.  Using a sterile surgical marker, the appropriate transposition flaps were drawn incorporating the defect and placing the expected incisions within the relaxed skin tension lines where possible. The area thus outlined was incised deep to adipose tissue with a #15 scalpel blade.  The skin margins were undermined to an appropriate distance in all directions utilizing iris scissors.  Hemostasis was achieved with electrocautery.  The flaps were then transposed into place, one clockwise and the other counterclockwise, and anchored with interrupted buried subcutaneous sutures.
V-Y Plasty Text: The defect edges were debeveled with a #15 scalpel blade.  Given the location of the defect, shape of the defect and the proximity to free margins an V-Y advancement flap was deemed most appropriate.  Using a sterile surgical marker, an appropriate advancement flap was drawn incorporating the defect and placing the expected incisions within the relaxed skin tension lines where possible.    The area thus outlined was incised deep to adipose tissue with a #15 scalpel blade.  The skin margins were undermined to an appropriate distance in all directions utilizing iris scissors.
H Plasty Text: Given the location of the defect, shape of the defect and the proximity to free margins a H-plasty was deemed most appropriate for repair.  Using a sterile surgical marker, the appropriate advancement arms of the H-plasty were drawn incorporating the defect and placing the expected incisions within the relaxed skin tension lines where possible. The area thus outlined was incised deep to adipose tissue with a #15 scalpel blade. The skin margins were undermined to an appropriate distance in all directions utilizing iris scissors.  The opposing advancement arms were then advanced into place in opposite direction and anchored with interrupted buried subcutaneous sutures.
W Plasty Text: The lesion was extirpated to the level of the fat with a #15 scalpel blade.  Given the location of the defect, shape of the defect and the proximity to free margins a W-plasty was deemed most appropriate for repair.  Using a sterile surgical marker, the appropriate transposition arms of the W-plasty were drawn incorporating the defect and placing the expected incisions within the relaxed skin tension lines where possible.    The area thus outlined was incised deep to adipose tissue with a #15 scalpel blade.  The skin margins were undermined to an appropriate distance in all directions utilizing iris scissors.  The opposing transposition arms were then transposed into place in opposite direction and anchored with interrupted buried subcutaneous sutures.
Z Plasty Text: The lesion was extirpated to the level of the fat with a #15 scalpel blade.  Given the location of the defect, shape of the defect and the proximity to free margins a Z-plasty was deemed most appropriate for repair.  Using a sterile surgical marker, the appropriate transposition arms of the Z-plasty were drawn incorporating the defect and placing the expected incisions within the relaxed skin tension lines where possible.    The area thus outlined was incised deep to adipose tissue with a #15 scalpel blade.  The skin margins were undermined to an appropriate distance in all directions utilizing iris scissors.  The opposing transposition arms were then transposed into place in opposite direction and anchored with interrupted buried subcutaneous sutures.
Zygomaticofacial Flap Text: Given the location of the defect, shape of the defect and the proximity to free margins a zygomaticofacial flap was deemed most appropriate for repair.  Using a sterile surgical marker, the appropriate flap was drawn incorporating the defect and placing the expected incisions within the relaxed skin tension lines where possible. The area thus outlined was incised deep to adipose tissue with a #15 scalpel blade with preservation of a vascular pedicle.  The skin margins were undermined to an appropriate distance in all directions utilizing iris scissors.  The flap was then placed into the defect and anchored with interrupted buried subcutaneous sutures.
Cheek Interpolation Flap Text: A decision was made to reconstruct the defect utilizing an interpolation axial flap and a staged reconstruction.  A telfa template was made of the defect.  This telfa template was then used to outline the Cheek Interpolation flap.  The donor area for the pedicle flap was then injected with anesthesia.  The flap was excised through the skin and subcutaneous tissue down to the layer of the underlying musculature.  The interpolation flap was carefully excised within this deep plane to maintain its blood supply.  The edges of the donor site were undermined.   The donor site was closed in a primary fashion.  The pedicle was then rotated into position and sutured.  Once the tube was sutured into place, adequate blood supply was confirmed with blanching and refill.  The pedicle was then wrapped with xeroform gauze and dressed appropriately with a telfa and gauze bandage to ensure continued blood supply and protect the attached pedicle.
Cheek-To-Nose Interpolation Flap Text: A decision was made to reconstruct the defect utilizing an interpolation axial flap and a staged reconstruction.  A telfa template was made of the defect.  This telfa template was then used to outline the Cheek-To-Nose Interpolation flap.  The donor area for the pedicle flap was then injected with anesthesia.  The flap was excised through the skin and subcutaneous tissue down to the layer of the underlying musculature.  The interpolation flap was carefully excised within this deep plane to maintain its blood supply.  The edges of the donor site were undermined.   The donor site was closed in a primary fashion.  The pedicle was then rotated into position and sutured.  Once the tube was sutured into place, adequate blood supply was confirmed with blanching and refill.  The pedicle was then wrapped with xeroform gauze and dressed appropriately with a telfa and gauze bandage to ensure continued blood supply and protect the attached pedicle.
Interpolation Flap Text: A decision was made to reconstruct the defect utilizing an interpolation axial flap and a staged reconstruction.  A telfa template was made of the defect.  This telfa template was then used to outline the interpolation flap.  The donor area for the pedicle flap was then injected with anesthesia.  The flap was excised through the skin and subcutaneous tissue down to the layer of the underlying musculature.  The interpolation flap was carefully excised within this deep plane to maintain its blood supply.  The edges of the donor site were undermined.   The donor site was closed in a primary fashion.  The pedicle was then rotated into position and sutured.  Once the tube was sutured into place, adequate blood supply was confirmed with blanching and refill.  The pedicle was then wrapped with xeroform gauze and dressed appropriately with a telfa and gauze bandage to ensure continued blood supply and protect the attached pedicle.
Melolabial Interpolation Flap Text: A decision was made to reconstruct the defect utilizing an interpolation axial flap and a staged reconstruction.  A telfa template was made of the defect.  This telfa template was then used to outline the melolabial interpolation flap.  The donor area for the pedicle flap was then injected with anesthesia.  The flap was excised through the skin and subcutaneous tissue down to the layer of the underlying musculature.  The pedicle flap was carefully excised within this deep plane to maintain its blood supply.  The edges of the donor site were undermined.   The donor site was closed in a primary fashion.  The pedicle was then rotated into position and sutured.  Once the tube was sutured into place, adequate blood supply was confirmed with blanching and refill.  The pedicle was then wrapped with xeroform gauze and dressed appropriately with a telfa and gauze bandage to ensure continued blood supply and protect the attached pedicle.
Mastoid Interpolation Flap Text: A decision was made to reconstruct the defect utilizing an interpolation axial flap and a staged reconstruction.  A telfa template was made of the defect.  This telfa template was then used to outline the mastoid interpolation flap.  The donor area for the pedicle flap was then injected with anesthesia.  The flap was excised through the skin and subcutaneous tissue down to the layer of the underlying musculature.  The pedicle flap was carefully excised within this deep plane to maintain its blood supply.  The edges of the donor site were undermined.   The donor site was closed in a primary fashion.  The pedicle was then rotated into position and sutured.  Once the tube was sutured into place, adequate blood supply was confirmed with blanching and refill.  The pedicle was then wrapped with xeroform gauze and dressed appropriately with a telfa and gauze bandage to ensure continued blood supply and protect the attached pedicle.
Posterior Auricular Interpolation Flap Text: A decision was made to reconstruct the defect utilizing an interpolation axial flap and a staged reconstruction.  A telfa template was made of the defect.  This telfa template was then used to outline the posterior auricular interpolation flap.  The donor area for the pedicle flap was then injected with anesthesia.  The flap was excised through the skin and subcutaneous tissue down to the layer of the underlying musculature.  The pedicle flap was carefully excised within this deep plane to maintain its blood supply.  The edges of the donor site were undermined.   The donor site was closed in a primary fashion.  The pedicle was then rotated into position and sutured.  Once the tube was sutured into place, adequate blood supply was confirmed with blanching and refill.  The pedicle was then wrapped with xeroform gauze and dressed appropriately with a telfa and gauze bandage to ensure continued blood supply and protect the attached pedicle.
Paramedian Forehead Flap Text: A decision was made to reconstruct the defect utilizing an interpolation axial flap and a staged reconstruction.  A telfa template was made of the defect.  This telfa template was then used to outline the paramedian forehead pedicle flap.  The donor area for the pedicle flap was then injected with anesthesia.  The flap was excised through the skin and subcutaneous tissue down to the layer of the underlying musculature.  The pedicle flap was carefully excised within this deep plane to maintain its blood supply.  The edges of the donor site were undermined.   The donor site was closed in a primary fashion.  The pedicle was then rotated into position and sutured.  Once the tube was sutured into place, adequate blood supply was confirmed with blanching and refill.  The pedicle was then wrapped with xeroform gauze and dressed appropriately with a telfa and gauze bandage to ensure continued blood supply and protect the attached pedicle.
Abbe Flap (Upper To Lower Lip) Text: The defect of the lower lip was assessed and measured.  Given the location and size of the defect, an Abbe flap was deemed most appropriate.  Using a sterile surgical marker, an appropriate Abbe flap was measured and drawn on the upper lip. Local anesthesia was then infiltrated.  A scalpel was then used to incise the upper lip through and through the skin, vermilion, muscle and mucosa, leaving the flap pedicled on the opposite side.  The flap was then rotated and transferred to the lower lip defect.  The flap was then sutured into place with a three layer technique, closing the orbicularis oris muscle layer with subcutaneous buried sutures, followed by a mucosal layer and an epidermal layer.
Abbe Flap (Lower To Upper Lip) Text: The defect of the upper lip was assessed and measured.  Given the location and size of the defect, an Abbe flap was deemed most appropriate.  Using a sterile surgical marker, an appropriate Abbe flap was measured and drawn on the lower lip. Local anesthesia was then infiltrated. A scalpel was then used to incise the upper lip through and through the skin, vermilion, muscle and mucosa, leaving the flap pedicled on the opposite side.  The flap was then rotated and transferred to the lower lip defect.  The flap was then sutured into place with a three layer technique, closing the orbicularis oris muscle layer with subcutaneous buried sutures, followed by a mucosal layer and an epidermal layer.
Estlander Flap (Upper To Lower Lip) Text: The defect of the lower lip was assessed and measured.  Given the location and size of the defect, an Estlander flap was deemed most appropriate.  Using a sterile surgical marker, an appropriate Estlander flap was measured and drawn on the upper lip. Local anesthesia was then infiltrated. A scalpel was then used to incise the lateral aspect of the flap, through skin, muscle and mucosa, leaving the flap pedicled medially.  The flap was then rotated and positioned to fill the lower lip defect.  The flap was then sutured into place with a three layer technique, closing the orbicularis oris muscle layer with subcutaneous buried sutures, followed by a mucosal layer and an epidermal layer.
Cheiloplasty (Less Than 50%) Text: A decision was made to reconstruct the defect with a  cheiloplasty.  The defect was undermined extensively.  Additional obicularis oris muscle was excised with a 15 blade scalpel.  The defect was converted into a full thickness wedge, of less than 50% of the vertical height of the lip, to facilite a better cosmetic result.  Small vessels were then tied off with 5-0 monocyrl. The obicularis oris, superficial fascia, adipose and dermis were then reapproximated.  After the deeper layers were approximated the epidermis was reapproximated with particular care given to realign the vermilion border.
Cheiloplasty (Complex) Text: A decision was made to reconstruct the defect with a  cheiloplasty.  The defect was undermined extensively.  Additional obicularis oris muscle was excised with a 15 blade scalpel.  The defect was converted into a full thickness wedge to facilite a better cosmetic result.  Small vessels were then tied off with 5-0 monocyrl. The obicularis oris, superficial fascia, adipose and dermis were then reapproximated.  After the deeper layers were approximated the epidermis was reapproximated with particular care given to realign the vermilion border.
Ear Wedge Repair Text: A wedge excision was completed by carrying down an excision through the full thickness of the ear and cartilage with an inward facing Burow's triangle. The wound was then closed in a layered fashion.
Full Thickness Lip Wedge Repair (Flap) Text: Given the location of the defect and the proximity to free margins a full thickness wedge repair was deemed most appropriate.  Using a sterile surgical marker, the appropriate repair was drawn incorporating the defect and placing the expected incisions perpendicular to the vermilion border.  The vermilion border was also meticulously outlined to ensure appropriate reapproximation during the repair.  The area thus outlined was incised through and through with a #15 scalpel blade.  The muscularis and dermis were reaproximated with deep sutures following hemostasis. Care was taken to realign the vermilion border before proceeding with the superficial closure.  Once the vermilion was realigned the superfical and mucosal closure was finished.
Ftsg Text: The defect edges were debeveled with a #15 scalpel blade.  Given the location of the defect, shape of the defect and the proximity to free margins a full thickness skin graft was deemed most appropriate.  Using a sterile surgical marker, the primary defect shape was transferred to the donor site. The area thus outlined was incised deep to adipose tissue with a #15 scalpel blade.  The harvested graft was then trimmed of adipose tissue until only dermis and epidermis was left.  The skin margins of the secondary defect were undermined to an appropriate distance in all directions utilizing iris scissors.  The secondary defect was closed with interrupted buried subcutaneous sutures.  The skin edges were then re-apposed with running  sutures.  The skin graft was then placed in the primary defect and oriented appropriately.
Split-Thickness Skin Graft Text: The defect edges were debeveled with a #15 scalpel blade.  Given the location of the defect, shape of the defect and the proximity to free margins a split thickness skin graft was deemed most appropriate.  Using a sterile surgical marker, the primary defect shape was transferred to the donor site. The split thickness graft was then harvested.  The skin graft was then placed in the primary defect and oriented appropriately.
Burow's Graft Text: The defect edges were debeveled with a #15 scalpel blade.  Given the location of the defect, shape of the defect, the proximity to free margins and the presence of a standing cone deformity a Burow's skin graft was deemed most appropriate. The standing cone was removed and this tissue was then trimmed to the shape of the primary defect. The adipose tissue was also removed until only dermis and epidermis were left.  The skin margins of the secondary defect were undermined to an appropriate distance in all directions utilizing iris scissors.  The secondary defect was closed with interrupted buried subcutaneous sutures.  The skin edges were then re-apposed with running  sutures.  The skin graft was then placed in the primary defect and oriented appropriately.
Cartilage Graft Text: The defect edges were debeveled with a #15 scalpel blade.  Given the location of the defect, shape of the defect, the fact the defect involved a full thickness cartilage defect a cartilage graft was deemed most appropriate.  An appropriate donor site was identified, cleansed, and anesthetized. The cartilage graft was then harvested and transferred to the recipient site, oriented appropriately and then sutured into place.  The secondary defect was then repaired using a primary closure.
Composite Graft Text: The defect edges were debeveled with a #15 scalpel blade.  Given the location of the defect, shape of the defect, the proximity to free margins and the fact the defect was full thickness a composite graft was deemed most appropriate.  The defect was outline and then transferred to the donor site.  A full thickness graft was then excised from the donor site. The graft was then placed in the primary defect, oriented appropriately and then sutured into place.  The secondary defect was then repaired using a primary closure.
Epidermal Autograft Text: The defect edges were debeveled with a #15 scalpel blade.  Given the location of the defect, shape of the defect and the proximity to free margins an epidermal autograft was deemed most appropriate.  Using a sterile surgical marker, the primary defect shape was transferred to the donor site. The epidermal graft was then harvested.  The skin graft was then placed in the primary defect and oriented appropriately.
Dermal Autograft Text: The defect edges were debeveled with a #15 scalpel blade.  Given the location of the defect, shape of the defect and the proximity to free margins a dermal autograft was deemed most appropriate.  Using a sterile surgical marker, the primary defect shape was transferred to the donor site. The area thus outlined was incised deep to adipose tissue with a #15 scalpel blade.  The harvested graft was then trimmed of adipose and epidermal tissue until only dermis was left.  The skin graft was then placed in the primary defect and oriented appropriately.
Skin Substitute Text: The defect edges were debeveled with a #15 scalpel blade.  Given the location of the defect, shape of the defect and the proximity to free margins a skin substitute graft was deemed most appropriate.  The graft material was trimmed to fit the size of the defect. The graft was then placed in the primary defect and oriented appropriately.
Tissue Cultured Epidermal Autograft Text: The defect edges were debeveled with a #15 scalpel blade.  Given the location of the defect, shape of the defect and the proximity to free margins a tissue cultured epidermal autograft was deemed most appropriate.  The graft was then trimmed to fit the size of the defect.  The graft was then placed in the primary defect and oriented appropriately.
Xenograft Text: The defect edges were debeveled with a #15 scalpel blade.  Given the location of the defect, shape of the defect and the proximity to free margins a xenograft was deemed most appropriate.  The graft was then trimmed to fit the size of the defect.  The graft was then placed in the primary defect and oriented appropriately.
Purse String (Simple) Text: Given the location of the defect and the characteristics of the surrounding skin a purse string closure was deemed most appropriate.  Undermining was performed circumfirentially around the surgical defect.  A purse string suture was then placed and tightened.
Purse String (Intermediate) Text: Given the location of the defect and the characteristics of the surrounding skin a purse string intermediate closure was deemed most appropriate.  Undermining was performed circumfirentially around the surgical defect.  A purse string suture was then placed and tightened.
Partial Purse String (Simple) Text: Given the location of the defect and the characteristics of the surrounding skin a simple purse string closure was deemed most appropriate.  Undermining was performed circumfirentially around the surgical defect.  A purse string suture was then placed and tightened. Wound tension only allowed a partial closure of the circular defect.
Partial Purse String (Intermediate) Text: Given the location of the defect and the characteristics of the surrounding skin an intermediate purse string closure was deemed most appropriate.  Undermining was performed circumfirentially around the surgical defect.  A purse string suture was then placed and tightened. Wound tension only allowed a partial closure of the circular defect.
Localized Dermabrasion With Wire Brush Text: The patient was draped in routine manner.  Localized dermabrasion using 3 x 17 mm wire brush was performed in routine manner to papillary dermis. This spot dermabrasion is being performed to complete skin cancer reconstruction. It also will eliminate the other sun damaged precancerous cells that are known to be part of the regional effect of a lifetime's worth of sun exposure. This localized dermabrasion is therapeutic and should not be considered cosmetic in any regard.
Tarsorrhaphy Text: A tarsorrhaphy was performed using Frost sutures.
Complex Repair And Flap Additional Text (Will Appearing After The Standard Complex Repair Text): The complex repair was not sufficient to completely close the primary defect. The remaining additional defect was repaired with the flap mentioned below.
Complex Repair And Graft Additional Text (Will Appearing After The Standard Complex Repair Text): The complex repair was not sufficient to completely close the primary defect. The remaining additional defect was repaired with the graft mentioned below.
Unique Flap 1 Name: Myocutaneous Island pedicle Flap
Unique Flap 2 Name: Peng Flap
Unique Flap 3 Name: Mercedes Flap
Unique Flap 4 Name: Banner Flap
Unique Flap 5 Name: tunneled myocutaneous flap
Unique Flap 6 Name: Karen-B?ch flap
Unique Flap 7 Name: Mustarde flap
Unique Flap 8 Name: East to West Flap
Unique Flap 1 Text: A decision was made to reconstruct the defect utilizing a myocutaneous Island pedicle Flap based on the levator labii superioris muscle.  A telfa template was made of the defect.  This telfa template was then used to outline the myocutaneous flap, based along the meilolabial fold.  The donor area for the pedicle flap was then injected with anesthesia.  The flap was excised through the skin and subcutaneous tissue down to the layer of the underlying musculature.  The myocutaneous flap was carefully excised within this deep plane to maintain its blood supply. Based on the muscle. The edges of the donor site were undermined.   The donor site was closed in a primary fashion to the point of transposition.  The pedicle was then transposed into position and sutured.  Once the flap was sutured into place, adequate blood supply was confirmed with blanching and refill.
Unique Flap 2 Text: A decision was made to reconstruct the defect utilizing a Peng Flap (Bilateral Advancement Rotation Flap). Given the location of the defect and the proximity to free margins, this flap was deemed most appropriate.  Using a sterile surgical marker, the appropriate rotation flaps were drawn incorporating the defect and placing the expected incisions within the relaxed skin tension lines where possible.    The area thus outlined was incised deep to adipose tissue with a #15 scalpel blade.  The skin margins were undermined to an appropriate distance in all directions utilizing iris scissors.
Unique Flap 3 Text: The defect edges were debeveled with a #15 scalpel blade.  Given the location of the defect, shape of the defect and the proximity to free margins a Mercedes (double advancement flap) was deemed most appropriate.  Using a sterile surgical marker, the appropriate transposition flaps were drawn incorporating the defect and placing the expected incisions within the relaxed skin tension lines where possible.    The area thus outlined was incised deep to adipose tissue with a #15 scalpel blade.  The skin margins were undermined to an appropriate distance in all directions utilizing iris scissors.  Hemostasis was achieved with electrocautery.  The flaps were then advanced into the defect and anchored with interrupted buried subcutaneous sutures.
Unique Flap 4 Text: The defect edges were debeveled with a #15 scalpel blade.  Given the location of the defect and the proximity to free margins a Banner transposition flap was deemed most appropriate.  Using a sterile surgical marker, an appropriate Banner transposition flap was drawn incorporating the defect.    The area thus outlined was incised deep to adipose tissue with a #15 scalpel blade.  The skin margins were undermined to an appropriate distance in all directions utilizing iris scissors.
Unique Flap 5 Text: A decision was made to reconstruct the defect utilizing a tunneled myocutaneous Island pedicle Flap based on the anterior auricularis muscle.  A telfa template was made of the defect.  This telfa template was then used to outline the myocutaneous flap, based along the preauricular fold.  The donor area for the pedicle flap was then injected with anesthesia.  The flap was excised through the skin and subcutaneous tissue down to the layer of the underlying musculature.  The myocutaneous flap was carefully excised within this deep plane to maintain its blood supply based on the muscle. The edges of the donor site were undermined.   The donor site was closed in a primary fashion to the point of transposition.  The pedicle was then transposed through a tunnel into position and sutured.  Once the flap was sutured into place, adequate blood supply was confirmed with blanching and refill.
Unique Flap 6 Text: A decision was made to reconstruct the defect utilizing an Anti-aging-B?ch Flap (Bilateral helical Advancement Rotation Flap). Given the location of the defect and the proximity to free margins, this flap was deemed most appropriate.  Using a sterile surgical marker, the appropriate flaps were drawn incorporating the defect and placing the expected incisions within the relaxed skin tension lines where possible.  The area thus outlined was incised deep to adipose tissue with a #15 scalpel blade.  The skin margins were undermined to an appropriate distance in all directions utilizing iris scissors. Cartilage was incorporated into the flap arms to maintain helical anatomy.
Unique Flap 7 Text: A decision was made to reconstruct the defect utilizing a Mustarde Flap (Advancement Rotation Flap). Given the location of the defect and the proximity to free margins, this flap was deemed most appropriate.  Using a sterile surgical marker, the appropriate rotation flap was drawn incorporating the defect and placing the expected incisions within the relaxed skin tension lines where possible.    The area thus outlined was incised deep to adipose tissue with a #15 scalpel blade.  The skin margins were undermined to an appropriate distance in all directions utilizing iris scissors. The flap was advanced and rotated under the eyelid with a sling created laterally to keep ectropion minimal.
Unique Flap 8 Text: A decision was made to reconstruct the defect utilizing an East to West Flap (Modified Burows Advancement Flap). Given the location of the defect and the proximity to free margins, this flap was deemed most appropriate.  Using a sterile surgical marker, the appropriate advancement flaps were drawn incorporating the defect and placing the expected incisions within the relaxed skin tension lines where possible.    The area thus outlined was incised deep to adipose tissue with a #15 scalpel blade.  The skin margins were undermined to an appropriate distance in all directions utilizing iris scissors. Minimal alar distortion was created with flap approximation.
Manual Repair Warning Statement: We plan on removing the manually selected variable below in favor of our much easier automatic structured text blocks found in the previous tab. We decided to do this to help make the flow better and give you the full power of structured data. Manual selection is never going to be ideal in our platform and I would encourage you to avoid using manual selection from this point on, especially since I will be sunsetting this feature. It is important that you do one of two things with the customized text below. First, you can save all of the text in a word file so you can have it for future reference. Second, transfer the text to the appropriate area in the Library tab. Lastly, if there is a flap or graft type which we do not have you need to let us know right away so I can add it in before the variable is hidden. No need to panic, we plan to give you roughly 6 months to make the change.
Same Histology In Subsequent Stages Text: The pattern and morphology of the tumor is as described in the first stage.
No Residual Tumor Seen Histology Text: There were no malignant cells seen in the sections examined.
Inflammation Suggestive Of Cancer Camouflage Histology Text: There was a dense lymphocytic infiltrate which prevented adequate histologic evaluation of adjacent structures.
Bcc Histology Text: There were numerous aggregates of basaloid cells.
Bcc Infiltrative Histology Text: There were numerous aggregates of basaloid cells demonstrating an infiltrative pattern.
Mart-1 - Positive Histology Text: MART-1 staining demonstrates areas of higher density and clustering of melanocytes with Pagetoid spread upwards within the epidermis. The surgical margins are positive for tumor cells.
Mart-1 - Negative Histology Text: MART-1 staining demonstrates a normal density and pattern of melanocytes along the dermal-epidermal junction. The surgical margins are negative for tumor cells.
Information: Selecting Yes will display possible errors in your note based on the variables you have selected. This validation is only offered as a suggestion for you. PLEASE NOTE THAT THE VALIDATION TEXT WILL BE REMOVED WHEN YOU FINALIZE YOUR NOTE. IF YOU WANT TO FAX A PRELIMINARY NOTE YOU WILL NEED TO TOGGLE THIS TO 'NO' IF YOU DO NOT WANT IT IN YOUR FAXED NOTE.

## 2023-03-09 ENCOUNTER — APPOINTMENT (RX ONLY)
Dept: URBAN - METROPOLITAN AREA CLINIC 36 | Facility: CLINIC | Age: 88
Setting detail: DERMATOLOGY
End: 2023-03-09

## 2023-03-09 DIAGNOSIS — Z48.02 ENCOUNTER FOR REMOVAL OF SUTURES: ICD-10-CM

## 2023-03-09 DIAGNOSIS — L90.5 SCAR CONDITIONS AND FIBROSIS OF SKIN: ICD-10-CM

## 2023-03-09 PROCEDURE — 99024 POSTOP FOLLOW-UP VISIT: CPT

## 2023-03-09 PROCEDURE — ? REFERRAL CORRESPONDENCE

## 2023-03-09 PROCEDURE — ? EVO ERBIUM YAG 2940NM

## 2023-03-09 PROCEDURE — ? SUTURE REMOVAL (GLOBAL PERIOD)

## 2023-03-09 ASSESSMENT — LOCATION ZONE DERM: LOCATION ZONE: NOSE

## 2023-03-09 ASSESSMENT — LOCATION DETAILED DESCRIPTION DERM: LOCATION DETAILED: NASAL DORSUM

## 2023-03-09 ASSESSMENT — LOCATION SIMPLE DESCRIPTION DERM: LOCATION SIMPLE: NOSE

## 2023-03-09 NOTE — PROCEDURE: SUTURE REMOVAL (GLOBAL PERIOD)
Detail Level: Detailed
Add 73602 Cpt? (Important Note: In 2017 The Use Of 49716 Is Being Tracked By Cms To Determine Future Global Period Reimbursement For Global Periods): yes

## 2023-03-09 NOTE — PROCEDURE: EVO ERBIUM YAG 2940NM
Detail Level: Simple
Fluence (J/Cm2): 900
External Cooling Fan Speed: 0
Consent: Written consent obtained.  Risks reviewed including but not limited to erythema, swelling, crusting, scabbing, blistering, scarring, and darker or lighter pigmentary change.  Patient understands that results are not guaranteed and multiple treatments may be needed to achieve desired result.
Add Another Setting?: no
Location Override: nose
Procedure Note: Treatment was administered using the setting parameters listed above.
Treatment Number: 1
Post-Care Instructions: I reviewed with the patient in detail post-care instructions. Patient should avoid sun exposure before and after treatment.  Diligent sunscreen use and sun avoidance advised.
Anesthesia Type: 1% lidocaine with epinephrine

## 2023-03-15 PROBLEM — E87.6 HYPOKALEMIA: Status: RESOLVED | Noted: 2023-01-01 | Resolved: 2023-01-01

## 2023-03-15 PROBLEM — E87.6 HYPOKALEMIA: Status: ACTIVE | Noted: 2023-01-01

## 2023-03-15 PROBLEM — I25.9 MYOCARDIAL ISCHEMIA: Status: ACTIVE | Noted: 2023-01-01

## 2023-03-15 PROBLEM — I48.0 PAROXYSMAL ATRIAL FIBRILLATION (HCC): Status: ACTIVE | Noted: 2023-01-01

## 2023-03-15 PROBLEM — K92.0 HEMATEMESIS: Status: ACTIVE | Noted: 2023-01-01

## 2023-03-15 PROBLEM — I10 PRIMARY HYPERTENSION: Status: ACTIVE | Noted: 2023-01-01

## 2023-03-15 PROBLEM — I61.9 INTRAPARENCHYMAL HEMORRHAGE OF BRAIN (HCC): Status: ACTIVE | Noted: 2023-01-01

## 2023-03-15 PROBLEM — J81.0 ACUTE PULMONARY EDEMA (HCC): Status: ACTIVE | Noted: 2023-01-01

## 2023-03-15 NOTE — CONSULTS
Critical Care/Pulmonary Consultation    Date of Service: 3/15/2023    Date of Admission:  3/15/2023  2:19 PM    Consulting Physician: Jeremy M Gonda, M.D.    Chief Complaint:  Possible Stroke (JOHNW last night at 2100, was found on the floor next to his bed, no trauma noted, R sided deficits, AOx2, follows commands. )      History of Present Illness:     Alireza Hernandez is a 87 y.o. male with a past medical history of A-fib on Eliquis, hyperlipidemia, hypertension, GERD who presented to our facility for further evaluation after experiencing altered mental status after being found down with right-sided weakness.  Patient states he was walking towards his bed when unfortunately he fainted, unsure if patient lost consciousness or had head strike.  Fortunately, patient's neighbor went to check on him and noted him found down with right-sided weakness and slurred speech and altered mentation..    On arrival to our facility, patient continues to have right-sided weakness, CT head revealed left thalamic hemorrhage with associated vasogenic edema, CTA head neck without any obvious vascular process or aneurysm.  Eliquis was reversed with Kcentra in the ED.  Patient still experiences some dysarthria and mild aphasia, but does follow commands and answer questions appropriately.    Review of Systems   Constitutional:  Negative for chills and fever.   Eyes:  Negative for blurred vision and double vision.   Respiratory:  Negative for cough and shortness of breath.    Cardiovascular:  Negative for chest pain and palpitations.   Gastrointestinal:  Negative for heartburn, nausea and vomiting.   Genitourinary:  Negative for dysuria and urgency.   Neurological:  Positive for speech change, focal weakness and weakness. Negative for dizziness and headaches.     Home Medications       Reviewed by Tanner Dougherty (Pharmacy Tech) on 03/15/23 at 1531  Med List Status: Unable to Obtain     Medication Last Dose Status   Apixaban (ELIQUIS PO)   Active                          Past Medical History:   Diagnosis Date    A-fib (HCC)        History reviewed. No pertinent surgical history.    Allergies: Patient has no known allergies.    History reviewed. No pertinent family history.    Vitals:    03/15/23 1432 03/15/23 1442 03/15/23 1500 03/15/23 1501   Weight:   78.5 kg (173 lb 1 oz)    Weight % change since last entry.:   0 %    BP: (!) 143/71   135/62   Pulse: (!) 102 84  88   Resp:  20  (!) 22    03/15/23 1516 03/15/23 1531   Weight:     Weight % change since last entry.:     BP: (!) 147/63 (!) 160/71   Pulse: 79 93   Resp: 17 19       Physical Examination  Physical Exam  Constitutional:       Appearance: Normal appearance.   HENT:      Head: Normocephalic.      Mouth/Throat:      Mouth: Mucous membranes are moist.      Pharynx: Oropharynx is clear.   Eyes:      Pupils: Pupils are equal, round, and reactive to light.   Cardiovascular:      Rate and Rhythm: Normal rate and regular rhythm.   Pulmonary:      Effort: Pulmonary effort is normal.      Breath sounds: Normal breath sounds.   Abdominal:      General: Abdomen is flat.      Palpations: Abdomen is soft.   Skin:     General: Skin is warm and dry.      Capillary Refill: Capillary refill takes less than 2 seconds.   Neurological:      Mental Status: He is alert and oriented to person, place, and time.      Sensory: Sensory deficit (Paresthesia noted across all right-sided) present.      Motor: Weakness present.      Comments: 1 of 5 strength on right upper and lower extremity, 5 out of 5 on left side   Psychiatric:         Mood and Affect: Mood normal.         Behavior: Behavior normal.        No intake or output data in the 24 hours ending 03/15/23 1559    Recent Labs     03/15/23  1439   WBC 9.9   NEUTSPOLYS 83.60*   LYMPHOCYTES 10.30*   MONOCYTES 4.70   EOSINOPHILS 0.00   BASOPHILS 0.30   ASTSGOT 15   ALTSGPT 19   ALKPHOSPHAT 80   TBILIRUBIN 0.6     Recent Labs     03/15/23  1439   SODIUM 141    POTASSIUM 3.7   CHLORIDE 105   CO2 20   BUN 13   CREATININE 0.91   CALCIUM 9.1     Recent Labs     03/15/23  1439   ALTSGPT 19   ASTSGOT 15   ALKPHOSPHAT 80   TBILIRUBIN 0.6   GLUCOSE 147*         DX-CHEST-PORTABLE (1 VIEW)   Final Result      1.  Mild central pulmonary vascular congestion.   2.  Stable mild enlargement of the cardiomediastinal silhouette.   3.  Bilateral basilar atelectasis and/or consolidation. Underlying infection is possible.      CT-CTA NECK WITH & W/O-POST PROCESSING   Final Result      1.  CT angiogram of the neck within normal limits for age.   2.  Left internal carotid artery stenosis measuring greater than 50%.      CT-CTA HEAD WITH & W/O-POST PROCESS   Final Result      1.  CT angiogram of the [place] of He within normal limits.   2.  2.4 cm left thalamic intraparenchymal hematoma.      Based solely on CT findings, the brain injury guideline category is mBIG 3.      EDH   IVH   Displaced skull fx   SDH > 8mm   IPH > 8mm or multiple   SAH bi-hemispheric or > 3mm      The original BIG retrospective analysis found radiographic progression in 0% of BIG 1 patients and 2.6% BIG 2.      Findings were discussed with Dr. DANIEL PARKINSON on 3/15/2023 3:11 PM.         CT-HEAD W/O   Final Result      1.  Acute left thalamic intraparenchymal hematoma measuring 2.3 x 1.8 x 3.4 cm..   2.  Chronic microvascular ischemic changes and chronic right thalamic lacunar infarct.             Patient Active Problem List   Diagnosis    Intraparenchymal hemorrhage of brain (HCC)    Primary hypertension    Paroxysmal atrial fibrillation (HCC)    Acute pulmonary edema (HCC)    Hypokalemia    Myocardial ischemia       Assessment and Plan:    * Intraparenchymal hemorrhage of brain (HCC)- (present on admission)  Assessment & Plan  Patient with findings as above on CT head.  - Admit to neuro ICU, every 2 neurochecks  - Maintain blood pressure tween systolic 101 40  - Hold anticoagulation  - Repeat head CT in 6  hours  - Neurosurgery, neurology following appreciate recs    Hematemesis  Assessment & Plan  Patient with reported episode of hematemesis prior to arrival.  - No signs of active bleeding at this time, will however monitor serial H&H's, Eliquis reversed as above.  - Patient currently n.p.o., continue IV PPI twice daily    Myocardial ischemia  Assessment & Plan  Concern for possible NSTEMI in the setting of elevated troponin, most likely secondary to critical illness in setting of IPH.  - We will continue monitor/trend troponins, EKG  - Echo ordered, particular in setting of possible pulmonary edema      Acute pulmonary edema (HCC)  Assessment & Plan  Chest x-ray noted to have mild central pulmonary vascular congestion.  Patient satting well on room air with no complaints of shortness of breath.  - One-time dose of Lasix given, will continue to monitor for future needs    Paroxysmal atrial fibrillation (HCC)  Assessment & Plan  Patient with history above, currently on apixaban and metoprolol.  - Hold apixaban in setting of IPH, resume home metoprolol    Primary hypertension  Assessment & Plan  Patient with history of above, will resume home medications with goal blood pressure between systolic 140      Plan discussed with my attending, Dr. Gonda

## 2023-03-15 NOTE — DISCHARGE PLANNING
SW attempting to contact Pt family. Voice mail left with Pt. Neighbor. SW waiting for call back. SW will continue to look for Pt family.

## 2023-03-15 NOTE — ED PROVIDER NOTES
ED Provider Note    CHIEF COMPLAINT  Chief Complaint   Patient presents with    Possible Stroke     LKW last night at 2100, was found on the floor next to his bed, no trauma noted, R sided deficits, AOx2, follows commands.        HPI/ROS  LIMITATION TO HISTORY   Select: Altered mental status / Confusion  OUTSIDE HISTORIAN(S):  [facility] reports patient was last known well yesterday evening today was found on the floor next to his bed he has evidence of right-sided deficits he is not hypertensive nor hypoglycemic prior to arrival.  He does have a history of blood thinning medications takes apixaban    Alireza Hernandez is a 87 y.o. male who presents to the emerge department for concern for stroke out of the window for TNK as last known well was yesterday.  Patient's been a little confused with trying to follow commands and has right-sided deficits on examination.  There is no history of trauma but he is on a blood thinning medication patient denies any pain at this time but is not oriented to person place or time  Patient did have hematemesis on arrival was dark red but definitely consistent with bloody emesis    PAST MEDICAL HISTORY       SURGICAL HISTORY  patient denies any surgical history    FAMILY HISTORY  No family history on file.    SOCIAL HISTORY  Social History     Tobacco Use    Smoking status: Not on file    Smokeless tobacco: Not on file   Substance and Sexual Activity    Alcohol use: Not on file    Drug use: Not on file    Sexual activity: Not on file       CURRENT MEDICATIONS  Home Medications    **Home medications have not yet been reviewed for this encounter**         ALLERGIES  No Known Allergies    PHYSICAL EXAM  VITAL SIGNS: BP (!) 151/65   Pulse 86   Temp 36.3 °C (97.3 °F) (Temporal)   Resp 15   Wt 72.2 kg (159 lb 2.8 oz)   SpO2 98%   BMI 23.51 kg/m²    Pulse Ox Interpretation:   Pulse Ox is normal   Constitutional: Alert in no apparent distress.  HENT: Normocephalic atraumatic, MMM  Eyes: PER,  Conjunctiva normal, Non-icteric.   Neck: Normal range of motion, No tenderness, Supple, No stridor.   Cardiovascular: Regular rate and rhythm, no murmurs.   Thorax & Lungs: Normal breath sounds, No respiratory distress, No wheezing, No chest tenderness.   Abdomen: Bowel sounds normal, Soft, No tenderness, No pulsatile masses. No peritoneal signs.  Skin: Warm, Dry, No erythema, No rash.   Back: No bony tenderness, No CVA tenderness.   Extremities/MSK: Intact equal distal pulses, No edema, No tenderness, No cyanosis, no major deformities noted  Neurologic: Alert and right-sided facial droop with a tongue deviation of the right he does have some strength of the right upper extremity against gravity but right lower extremity is a sensate without movement confusion and some aphasia but no dysarthria      DIAGNOSTIC STUDIES / PROCEDURES  EKG  I have independently interpreted this EKG  Results for orders placed or performed during the hospital encounter of 03/15/23   EKG (NOW)   Result Value Ref Range    Report       [facility] Emergency Dept.    Test Date:  2023-03-15  Pt Name:    RODERICK MILLS                 Department: ER  MRN:        [id number]                      Room:        08  Gender:     Male                         Technician: [id number]  :        [id number]                   Requested By:ER TRIAGE PROTOCOL  Order #:    [id number]                    Reading MD:    Measurements  Intervals                                Axis  Rate:       92                           P:          0  DE:         79                           QRS:        -1  QRSD:       97                           T:          26  QT:         374  QTc:        463    Interpretive Statements  Sinus rhythm  Multiple premature complexes, vent & supraven  Short DE interval  Compared to ECG 2017 12:34:03  Short DE interval now present  First degree AV block no longer present           LABS  Labs Reviewed   CBC WITH DIFFERENTIAL -  Abnormal; Notable for the following components:       Result Value    MCHC 33.1 (*)     Neutrophils-Polys 83.60 (*)     Lymphocytes 10.30 (*)     Immature Granulocytes 1.10 (*)     Neutrophils (Absolute) 8.26 (*)     All other components within normal limits    Narrative:     Indicate which anticoagulants the patient is on:->UNKNOWN  Biotin intake of greater than 5 mg per day may interfere with  troponin levels, causing false low values.   COMP METABOLIC PANEL - Abnormal; Notable for the following components:    Glucose 147 (*)     All other components within normal limits    Narrative:     Indicate which anticoagulants the patient is on:->UNKNOWN  Biotin intake of greater than 5 mg per day may interfere with  troponin levels, causing false low values.   PROTHROMBIN TIME - Abnormal; Notable for the following components:    PT 15.7 (*)     INR 1.27 (*)     All other components within normal limits    Narrative:     Indicate which anticoagulants the patient is on:->UNKNOWN  Biotin intake of greater than 5 mg per day may interfere with  troponin levels, causing false low values.   TROPONIN - Abnormal; Notable for the following components:    Troponin T 20 (*)     All other components within normal limits    Narrative:     Indicate which anticoagulants the patient is on:->UNKNOWN  Biotin intake of greater than 5 mg per day may interfere with  troponin levels, causing false low values.   PLATELET MAPPING WITH BASIC TEG - Abnormal; Notable for the following components:    Reaction Time Initial-R 4.2 (*)     TEG Functional Fibrinogen(MA) 32.5 (*)     All other components within normal limits    Narrative:     Do you want to extend TEG graph to LY30? (If no, graph will  terminate at MA)->Yes   APTT    Narrative:     Indicate which anticoagulants the patient is on:->UNKNOWN  Biotin intake of greater than 5 mg per day may interfere with  troponin levels, causing false low values.   COD (ADULT)   ABO RH CONFIRM   CORRECTED  CALCIUM    Narrative:     Indicate which anticoagulants the patient is on:->UNKNOWN  Biotin intake of greater than 5 mg per day may interfere with  troponin levels, causing false low values.   ESTIMATED GFR    Narrative:     Indicate which anticoagulants the patient is on:->UNKNOWN  Biotin intake of greater than 5 mg per day may interfere with  troponin levels, causing false low values.   PROCALCITONIN   LIPID PROFILE   SODIUM SERUM (NA)   HEMOGLOBIN AND HEMATOCRIT   TROPONIN   SODIUM SERUM (NA)   TROPONIN   HEMOGLOBIN AND HEMATOCRIT         RADIOLOGY  I have independently interpreted the diagnostic imaging associated with this visit and am waiting the final reading from the radiologist.   My preliminary interpretation is as follows:   Ct head - L thalamic bleed w mild edema    CTA no large occlusion noted     Radiologist interpretation:     DX-CHEST-PORTABLE (1 VIEW)   Final Result      1.  Mild central pulmonary vascular congestion.   2.  Stable mild enlargement of the cardiomediastinal silhouette.   3.  Bilateral basilar atelectasis and/or consolidation. Underlying infection is possible.      CT-CTA NECK WITH & W/O-POST PROCESSING   Final Result      1.  CT angiogram of the neck within normal limits for age.   2.  Left internal carotid artery stenosis measuring greater than 50%.      CT-CTA HEAD WITH & W/O-POST PROCESS   Final Result      1.  CT angiogram of the [place] of [place] within normal limits.   2.  2.4 cm left thalamic intraparenchymal hematoma.      Based solely on CT findings, the brain injury guideline category is mBIG 3.      EDH   IVH   Displaced skull fx   SDH > 8mm   IPH > 8mm or multiple   SAH bi-hemispheric or > 3mm      The original BIG retrospective analysis found radiographic progression in 0% of BIG 1 patients and 2.6% BIG 2.      Findings were discussed with Dr. DANIEL PARKINSON on 3/15/2023 3:11 PM.         CT-HEAD W/O   Final Result      1.  Acute left thalamic intraparenchymal hematoma  measuring 2.3 x 1.8 x 3.4 cm..   2.  Chronic microvascular ischemic changes and chronic right thalamic lacunar infarct.         EC-ECHOCARDIOGRAM COMPLETE W/O CONT    (Results Pending)   MR-BRAIN-W/O    (Results Pending)   CT-HEAD W/O    (Results Pending)         COURSE & MEDICAL DECISION MAKING    ED Observation Status? No; Patient does not meet criteria for ED Observation.     INITIAL ASSESSMENT, COURSE AND PLAN  Care Narrative patient is emerged department as a stroke IR.  He does have a mild moderately elevated NIH 14 on my calculation.  He was taken emergently to CT scan however he is not a TNK candidate as 1 he is on apixaban and 2 he would be out of the window his last known well was 24 hours prior to arrival.  However physical examination is concerning possible bleed which is definitely on the differential.  He is not an extremely hypertensive and does not require blood pressure management at this time.  The patient did have bloody emesis on arrival and thus being on apixaban he was started on Protonix and will likely be reversed      Unfortunately head CT reveals a thalamic bleed.    3:08 PM  Neurology PA at bedside - requests ICU admit discussed reversal 2/2 to both ICH And hematemesis Kcenta ordered     I spoke with Dr. Gonda with the ICU who has accepted the patient for hospitalization.      I spoke with Dr. Olivo with neurosurgical services who will consult on the patient.    CRITICAL CARE  The very real possibilty of a deterioration of this patient's condition required the highest level of my preparedness for sudden, emergent intervention.  I provided critical care services, which included medication orders, frequent reevaluations of the patient's condition and response to treatment, ordering and reviewing test results, and discussing the case with various consultants.  The critical care time associated with the care of the patient was 35 minutes. Review chart for interventions. This time is  exclusive of any other billable procedures.           ADDITIONAL PROBLEM LIST  Hemorrhagic stroke  Strict hypertension control  GI bleed    DISPOSITION AND DISCUSSIONS    Patient was admitted in critical condition for thalamic stroke.  Type blood pressure control be given we did do Kcentra to reverse the apixaban.  And he will be admitted to the ICU.    I have discussed management of the patient with the following physicians and SILKE's: Dr. Gonda Dr Keyvani, DR Olivo    Discussion of management with other Q or appropriate source(s): None     Decision tools and prescription drugs considered including, but not limited to: NIH Stroke Scale 14 .    FINAL DIAGNOSIS  Hemorrhagic stroke  Strict hypertension control  GI bleed       Electronically signed by: Kimberly Peck M.D., 3/15/2023 2:23 PM

## 2023-03-15 NOTE — ED TRIAGE NOTES
MAYELIN BATES to the charge desk for a stroke IR  Chief Complaint   Patient presents with    Possible Stroke     LKW last night at 2100, was found on the floor next to his bed, no trauma noted, R sided deficits, AOx2, follows commands.      Upon arrival pt started vomiting a very dark liquid, pt is on blood thinners for a-fib. Pt was taken to a room, 2 IVs placed, medicated for nausea and then taken to CT.  Pt has a R sided facial droop and cannot move his R arm or R leg.   Report given to Leidy FOSTER.

## 2023-03-15 NOTE — ED NOTES
Report from Nicholas FOSTER.  Pt has been assigned to R 114.  Report called to Teresa FOSTER.  ICU coming to get patient.  Awaiting Kcentra and protonix from central pharmacy,

## 2023-03-15 NOTE — CONSULTS
Chief Complaint   Patient presents with    Possible Stroke     LKW last night at 2100, was found on the floor next to his bed, no trauma noted, R sided deficits, AOx2, follows commands.        Problem List Items Addressed This Visit       * (Principal) Intraparenchymal hemorrhage of brain (HCC)    Relevant Orders    Referral to Physiatry (PMR)    Referral to Neurology       Neurology Stroke Alert Consultation     History of present illness:  This is an 87-year old male with PMHx significant for Afib on Eliquis (last dose reportedly last night), hypertension, further details are limited who presented to [facility] on 3/15/23 for a chief complaint of altered mentation/found down with Right sided weakness. Per report the christine was last seen in his usual state of health by his neighbor at 2030 last night 3/14/23; at appx 1300 today, he was found by the same neighbor down on the ground next to his bed with Right sided weakness and slurred speech. EMS was called; on arrival here, patient was vomiting bloody/coffee ground emesis. SBP 120s-130s. Stat CT head revealed small Left thalamic hemorrhage with associated vasogenic edema, given this appearance likely at least 6+ hours old. CTA head/neck with no obvious vascular process nor aneurysm. Plan now for reversal (given coffee ground/blood emesis; no obvious concern for active bleeding/extravasation intracranially) of Eliquis with Kcentra. Currently patient is sitting up in stretcher; awake and alert. Speech is dysarthric, perhaps mildly expressively aphasic, follows simple commands and nods yes/no appropriately to questions. Still with RUE/RLE weakness, SBP currently 140s. ICH score 1.     Neurology has been consulted by Dr. Kimberly Peck to further evaluate the findings noted above.     Past medical history:   Past Medical History:   Diagnosis Date    A-fib (HCC)        Past surgical history:   History reviewed. No pertinent surgical history.    Family history:    History reviewed. No pertinent family history.    Social history:   Social History     Socioeconomic History    Marital status: Single     Spouse name: Not on file    Number of children: Not on file    Years of education: Not on file    Highest education level: Not on file   Occupational History    Not on file   Tobacco Use    Smoking status: Not on file    Smokeless tobacco: Not on file   Vaping Use    Vaping Use: Not on file   Substance and Sexual Activity    Alcohol use: Not on file    Drug use: Not on file    Sexual activity: Not on file   Other Topics Concern    Not on file   Social History Narrative    Not on file     Social Determinants of Health     Financial Resource Strain: Not on file   Food Insecurity: Not on file   Transportation Needs: Not on file   Physical Activity: Not on file   Stress: Not on file   Social Connections: Not on file   Intimate Partner Violence: Not on file   Housing Stability: Not on file       Current medications:   Current Facility-Administered Medications   Medication Dose    pantoprazole (Protonix) 80 mg in  mL IVPB  80 mg    pantoprazole (Protonix) 80 mg in  mL Infusion  8 mg/hr    prothrombin complex conc human (Kcentra) 4,000 Units infusion  50 Units/kg    Respiratory Therapy Consult      NS infusion      LORazepam (ATIVAN) injection 2 mg  2 mg    acetaminophen (Tylenol) tablet 650 mg  650 mg    Or    acetaminophen (TYLENOL) suppository 650 mg  650 mg    ondansetron (ZOFRAN ODT) dispertab 4 mg  4 mg    Or    ondansetron (ZOFRAN) syringe/vial injection 4 mg  4 mg    MD Alert...ICU Electrolyte Replacement per Pharmacy      niCARdipine (CARDENE) 25 mg in  mL Standard Infusion  0-15 mg/hr    labetalol (NORMODYNE/TRANDATE) injection 10 mg  10 mg    hydrALAZINE (APRESOLINE) injection 10 mg  10 mg    enalaprilat (Vasotec) injection 1.25 mg 1 mL  1.25 mg    furosemide (LASIX) injection 20 mg  20 mg     Current Outpatient Medications   Medication    Apixaban  (ELIQUIS PO)       Medication Allergy:  No Known Allergies    Review of systems:   As noted above; otherwise limited secondary to aphasia/dysarthria and patient's condition.        Physical examination:   Vitals:    03/15/23 1500 03/15/23 1501 03/15/23 1516 03/15/23 1531   BP:  135/62 (!) 147/63 (!) 160/71   Pulse:  88 79 93   Resp:  (!) 22 17 19   SpO2:  93% 94% 92%   Weight: 78.5 kg (173 lb 1 oz)        General: Patient in no acute distress  HEENT: Normocephalic, no signs of acute trauma.   Neck: supple, no meningeal signs or carotid bruits. There is normal range of motion. No tenderness on exam.   Chest: clear to auscultation. No cough.   CV: RRR, no murmurs.   Skin: no signs of acute rashes or trauma.   Musculoskeletal: joints exhibit full range of motion, without any pain to palpation. There are no signs of joint or muscle swelling. There is no tenderness to deep palpation of muscles.   Psychiatric: No hallucinatory behavior.       NEUROLOGICAL EXAM:   Mental status, orientation: Awake, alert and fully oriented.   Speech and language: speech is dysarthric, mildly aphasic. The patient follows simple commands.   Cranial nerve exam: Pupils are 3-4 mm bilaterally and equally reactive to light Visual fields are intact by confrontation. There is no nystagmus on primary or secondary gaze. Intact full EOM in all directions of gaze. Face appears symmetric. Sensation in the face is intact to light touch. Uvula is midline. Palate elevates symmetrically. Tongue is midline and without any signs of tongue biting or fasciculations. Shoulder shrug is intact bilaterally.   Motor exam: Strength is 5/5 in LUE/LLE; 1+/5 to RUE/RLE. Tone is normal. No abnormal movements were seen on exam.   Sensory exam Decreased to light touch/pinprick on the Right, normal sensation on the left.   Deep tendon reflexes: Plantar responses are flexor. There is no clonus.   Coordination: Deferred patient does not participate.   Gait: Not assessed at  this time as patient is a fall risk.     ICH Score: 1  (Age greater than 80)      ANCILLARY DATA REVIEWED:     Lab Data Review:  Recent Results (from the past 24 hour(s))   ABO Rh Confirm    Collection Time: 03/15/23  2:30 PM   Result Value Ref Range    ABO Rh Confirm A POS    CBC WITH DIFFERENTIAL    Collection Time: 03/15/23  2:39 PM   Result Value Ref Range    WBC 9.9 4.8 - 10.8 K/uL    RBC 5.65 4.70 - 6.10 M/uL    Hemoglobin 16.3 14.0 - 18.0 g/dL    Hematocrit 49.2 42.0 - 52.0 %    MCV 87.1 81.4 - 97.8 fL    MCH 28.8 27.0 - 33.0 pg    MCHC 33.1 (L) 33.7 - 35.3 g/dL    RDW 42.1 35.9 - 50.0 fL    Platelet Count 246 164 - 446 K/uL    MPV 11.1 9.0 - 12.9 fL    Neutrophils-Polys 83.60 (H) 44.00 - 72.00 %    Lymphocytes 10.30 (L) 22.00 - 41.00 %    Monocytes 4.70 0.00 - 13.40 %    Eosinophils 0.00 0.00 - 6.90 %    Basophils 0.30 0.00 - 1.80 %    Immature Granulocytes 1.10 (H) 0.00 - 0.90 %    Nucleated RBC 0.00 /100 WBC    Neutrophils (Absolute) 8.26 (H) 1.82 - 7.42 K/uL    Lymphs (Absolute) 1.02 1.00 - 4.80 K/uL    Monos (Absolute) 0.46 0.00 - 0.85 K/uL    Eos (Absolute) 0.00 0.00 - 0.51 K/uL    Baso (Absolute) 0.03 0.00 - 0.12 K/uL    Immature Granulocytes (abs) 0.11 0.00 - 0.11 K/uL    NRBC (Absolute) 0.00 K/uL   COMP METABOLIC PANEL    Collection Time: 03/15/23  2:39 PM   Result Value Ref Range    Sodium 141 135 - 145 mmol/L    Potassium 3.7 3.6 - 5.5 mmol/L    Chloride 105 96 - 112 mmol/L    Co2 20 20 - 33 mmol/L    Anion Gap 16.0 7.0 - 16.0    Glucose 147 (H) 65 - 99 mg/dL    Bun 13 8 - 22 mg/dL    Creatinine 0.91 0.50 - 1.40 mg/dL    Calcium 9.1 8.5 - 10.5 mg/dL    AST(SGOT) 15 12 - 45 U/L    ALT(SGPT) 19 2 - 50 U/L    Alkaline Phosphatase 80 30 - 99 U/L    Total Bilirubin 0.6 0.1 - 1.5 mg/dL    Albumin 4.1 3.2 - 4.9 g/dL    Total Protein 7.4 6.0 - 8.2 g/dL    Globulin 3.3 1.9 - 3.5 g/dL    A-G Ratio 1.2 g/dL   PROTHROMBIN TIME    Collection Time: 03/15/23  2:39 PM   Result Value Ref Range    PT 15.7 (H) 12.0 -  14.6 sec    INR 1.27 (H) 0.87 - 1.13   APTT    Collection Time: 03/15/23  2:39 PM   Result Value Ref Range    APTT 25.4 24.7 - 36.0 sec   COD (ADULT)    Collection Time: 03/15/23  2:39 PM   Result Value Ref Range    ABO Grouping Only A     Rh Grouping Only POS     Antibody Screen-Cod NEG    TROPONIN    Collection Time: 03/15/23  2:39 PM   Result Value Ref Range    Troponin T 20 (H) 6 - 19 ng/L   CORRECTED CALCIUM    Collection Time: 03/15/23  2:39 PM   Result Value Ref Range    Correct Calcium 9.0 8.5 - 10.5 mg/dL   ESTIMATED GFR    Collection Time: 03/15/23  2:39 PM   Result Value Ref Range    GFR (CKD-EPI) 82 >60 mL/min/1.73 m 2       Labs reviewed by me.         Imaging reviewed by me:     DX-CHEST-PORTABLE (1 VIEW)   Final Result      1.  Mild central pulmonary vascular congestion.   2.  Stable mild enlargement of the cardiomediastinal silhouette.   3.  Bilateral basilar atelectasis and/or consolidation. Underlying infection is possible.      CT-CTA NECK WITH & W/O-POST PROCESSING   Final Result      1.  CT angiogram of the neck within normal limits for age.   2.  Left internal carotid artery stenosis measuring greater than 50%.      CT-CTA HEAD WITH & W/O-POST PROCESS   Final Result      1.  CT angiogram of the [place] of [place] within normal limits.   2.  2.4 cm left thalamic intraparenchymal hematoma.      Based solely on CT findings, the brain injury guideline category is mBIG 3.      EDH   IVH   Displaced skull fx   SDH > 8mm   IPH > 8mm or multiple   SAH bi-hemispheric or > 3mm      The original BIG retrospective analysis found radiographic progression in 0% of BIG 1 patients and 2.6% BIG 2.      Findings were discussed with Dr. DANIEL PARKINSON on 3/15/2023 3:11 PM.         CT-HEAD W/O   Final Result      1.  Acute left thalamic intraparenchymal hematoma measuring 2.3 x 1.8 x 3.4 cm..   2.  Chronic microvascular ischemic changes and chronic right thalamic lacunar infarct.                 Modified [place]  Scale (MRS): 0 = No symptoms      ASSESSMENT AND PLAN:  87-year old male with PMHx significant for Afib on Eliquis (last dose reportedly last night), hypertension, further details are limited who presented [facility] for a chief complaint of altered mentation/found down with Right sided weakness. LKW 2030 3/14. Here, CT head revealed small Left thalamic hemorrhage with associated vasogenic edema, given this appearance likely at least 6+ hours old. Etiology likely hypertensive though patient is not hypertensive currently. CTA head/neck with no obvious vascular process nor aneurysm. Patient received Kcentra in ED given concern for active GI bleeding. Plan now for ICU admission, q2h neuro checks, continued blood pressure management and surveillance.     Recommendations/Plan:     -Admit to ICU.   -q2h and PRN neuro assessment. VS per nursing/unit protocol. Please notify neurology with changes in exam.  -SBP goal 100-140 strict; antihypertensives per ICU team.  -Obtain MRI Brain wo contrast non urgently.   -No antithrombotics, no anticoagulation at this time.   -Maintain strict euthermia, euglycemia, euvolemia, eunatremia. Current Na is 141.   -PT/OT/SLP eval and treat. Physiatry consult.   -All other medical management per ICU team.   -DVT PPX: SCDs.    The plan of care above has been discussed with Dr. Sauer. Please call with questions.     ERNA Herbert.  [facility]

## 2023-03-15 NOTE — ED NOTES
Med Rec complete per patient/home pharmacy and family @ bedside  Allergies reviewed  Preferred Pharmacy: [facility]     Patient unsure if he takes Losartan in AM or PM  Patient reports he thinks today was supposed to be his last dose of Macrobid to complete course.

## 2023-03-16 PROBLEM — E87.8 ELECTROLYTE ABNORMALITY: Status: ACTIVE | Noted: 2023-01-01

## 2023-03-16 NOTE — ASSESSMENT & PLAN NOTE
Patient with reported episode of hematemesis prior to arrival.  - No signs of active bleeding at this time, will however monitor serial H&H's, Eliquis reversed as above.  - Patient currently n.p.o., continue IV PPI twice daily

## 2023-03-16 NOTE — DISCHARGE PLANNING
[facility] Transitional [facility] Coordination    Referral from: Dr. Gonda    Insurance Provider on Facesheet: [facility]    Potential Rehab Diagnosis: CVA    Chart review indicates patient may have on going medical management and may have therapy needs to possibly meet inpatient rehab facility criteria with the goal of returning to community.    D/C support will need to be verified: Sister    Physiatry consultation pended per protocol.  W/U & TX pending.     Thank you for the referral.

## 2023-03-16 NOTE — DISCHARGE PLANNING
Case Management Discharge Planning    Admission Date: 3/15/2023  GMLOS: 4.5  ALOS: 1    6-Clicks ADL Score: 13  6-Clicks Mobility Score: 8  PT and/or OT Eval ordered: Yes  Post-acute Referrals Ordered: No  Post-acute Choice Obtained: No  Has referral(s) been sent to post-acute provider:  No      Anticipated Discharge Dispo: Discharge Disposition: Disch to  rehab facility or distinct part unit (62)    DME Needed: Uncertain at this time.    Action(s) Taken: Updated Provider/Nurse on Discharge Plan    Escalations Completed: None    Medically Clear: No    Next Steps: CM will continue to monitor for changes and update discharge as appropriate. Follow-up with Attending and Bedside RN as needed.    Barriers to Discharge: Medical clearance, Pending PT Evaluation, and Pending Procedures    Is the patient up for discharge tomorrow: No

## 2023-03-16 NOTE — THERAPY
Speech Language Pathology   Clinical Swallow Evaluation     Patient Name: Alireza Hernandez  AGE:  87 y.o., SEX:  male  Medical Record #: [id number]  Date of Service: 3/16/2023      History of Present Illness  87 y.o. 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 CMH notable for concern of possible NSTEMI, acute pulmonary edema, and paroxysmal afib. PMH notable for HTN, HLD, and GERD.     Imaging:   3/15/23 CT Head:   1.  Left thalamic and basal ganglia hemorrhage, overall appears stable since prior study.  2.  Nonspecific white matter changes, commonly associated with small vessel ischemic disease.  Associated mild cerebral atrophy is noted.  3.  Chronic appearing sphenoid sinusitis changes    3/15/23 CXR:   1.  Mild central pulmonary vascular congestion.  2.  Stable mild enlargement of the cardiomediastinal silhouette.  3.  Bilateral basilar atelectasis and/or consolidation. Underlying infection is possible.    General Information:  Vitals  O2 (LPM): 4  O2 Delivery Device: Silicone Nasal Cannula       Prior Living Situation & Level of Function:  Housing / Facility: 1 Story Apartment / Condo  Lives with - Patient's Self Care Capacity: Alone and Able to Care For Self  Communication: WFL  Swallowing: WFL       Oral Mechanism Evaluation:  Dentition: Poor, Natural dentition, Some missing dentition   Facial Symmetry: Central right facial droop  Facial Sensation: Impaired - right     Labial Observations: Right sided weakness   Lingual Observations: Midline  Motor Speech: WFL          Laryngeal Function:  Secretion Management: Adequate  Voice Quality: Breathy continuous, Strain (Mild, new)  Cough: Perceptually weak       Subjective  Patient reports consuming a regular diet at baseline. During evaluation, reported sensation felt different in throat/oral cavity. Patient agreeable to FEES.      Assessment  Current Method of Nutrition: NPO until cleared  "by speech pathology  Positioning: Macario's (60-90 degrees)  Bolus Administration: SLP, Patient  O2 (LPM): 4 O2 Delivery Device: Silicone Nasal Cannula  Factor(s) Affecting Performance: None  Tracheostomy : No      Swallowing Trials:  Swallowing Trials  Ice: WFL  Thin Liquid (TN0): Impaired  Liquidised (LQ3): WFL      Comments: Adequate oral bolus acceptance/containment, incomplete AP transfer with R lingual residue which cleared with liquid wash. No cough/throat clear appreciated with PO. Vocal quality remained stable throughout assessment. Three swallows completed per bolus of TN0. Provided education regarding risk for silent aspiration secondary to impaired sensation with indication for swallowing diagnostic (FEES). Patient agreeable.       Clinical Impressions  Patient presents with signs concerning for pharyngeal inefficiency. He is at increased risk for silent aspiration related to neurologic lesion location and suspected impaired laryngeal sensation. Mild dysphonia with breathy/strained vocal quality, which is not baseline. Patient will benefit from swallowing diagnostic to objectively evaluation swallow function and guide treatment plan.     Recommendations  Diet Consistency: NPO pending FEES; Clear for minimal ice chips/hour after oral care to reduce xerostomia and mitigate disuse atrophy of swallow musculature   Instrumentation: FEES  Medication: Non Oral, Crush with applesauce, as appropriate  Oral Care: Q4h         SLP Treatment Plan  Treatment Plan: Dysphagia Treatment  SLP Frequency: 3x Per Week  Estimated Duration: Until Therapy Goals Met      Anticipated Discharge Needs  Discharge Recommendations:  (TBD pending swallowing diagnostic/cognitive evaluation)            Patient / Family Goals  Patient / Family Goal #1: \"I can't feel it\" to swallowing  Short Term Goals  Short Term Goal # 1: Patient will participate in swallowing diagnostic to rule out silent aspiration/guide treatment plan.      Ania HOGAN" Champ, SLP

## 2023-03-16 NOTE — ASSESSMENT & PLAN NOTE
Chest x-ray noted to have mild central pulmonary vascular congestion.  Patient Spo2 saturation in the 90s without well on room air with no complaints of shortness of breath.  -One-time dose of Lasix given, will continue to monitor for future needs  -CXR PRN

## 2023-03-16 NOTE — ASSESSMENT & PLAN NOTE
-Patient with history of above- Restarted home medications with goal blood pressure between systolic 140- holding metoprolol for bradycardia

## 2023-03-16 NOTE — CARE PLAN
The patient is Watcher - Medium risk of patient condition declining or worsening    Shift Goals  Clinical Goals: CT, MRI, stable Q1 neuro exams  Patient Goals: Rest  Family Goals: MARGIE    Progress made toward(s) clinical / shift goals:    Pt educated about hemorrhagic stroke, NPO status, pending speech evaluation, laboratory tests, and mobility restrictions. Q2 turns performed to prevent skin breakdown. Patient bed locked, in lowest position, and bed alarm is on to prevent falls. Patient is verbalizing feelings about condition and is appropriate and calm. Regular urinary output established, tabares catheter in place to allow for strict tracking of I/O. Neuro status and NIHSS remain stable or improving for the duration of the shift.       Problem: Knowledge Deficit - Standard  Goal: Patient and family/care givers will demonstrate understanding of plan of care, disease process/condition, diagnostic tests and medications  Outcome: Progressing     Problem: Skin Integrity  Goal: Skin integrity is maintained or improved  Outcome: Progressing     Problem: Fall Risk  Goal: Patient will remain free from falls  Outcome: Progressing     Problem: Psychosocial  Goal: Patient's level of anxiety will decrease  Outcome: Progressing  Goal: Patient's ability to verbalize feelings about condition will improve  Outcome: Progressing     Problem: Hemodynamics  Goal: Patient's hemodynamics, fluid balance and neurologic status will be stable or improve  Outcome: Progressing     Problem: Risk for Aspiration  Goal: Patient's risk for aspiration will be absent or decrease  Outcome: Progressing     Problem: Urinary Elimination  Goal: Establish and maintain regular urinary output  Outcome: Progressing     Problem: Cardiac - Atrial Fibrillation  Goal: Patient will achieve & maintain adequate cardiac output and rate control  Outcome: Progressing     Problem: Optimal Care of the Stroke Patient  Goal: Optimal acute care for the stroke  patient  Outcome: Progressing     Problem: Knowledge Deficit - Stroke Education  Goal: Patient's knowledge of stroke and risk factors will improve  Outcome: Progressing     Problem: Neuro Status  Goal: Neuro status will remain stable or improve  Outcome: Progressing

## 2023-03-16 NOTE — ASSESSMENT & PLAN NOTE
Hypokalemia, hypomagnesemia     Replace K  And mag  Am labs with BMP, phos, mag - replace as indicated

## 2023-03-16 NOTE — HOSPITAL COURSE
Alireza Hernandez is a 87 year-old male with a PMHX of HTN, atrial fibrillation (Eliquis), hyperlipidemia, GERD who was transported to [facility] by EMS after his neighbor found him at approx 1330 (3/15) down on the ground next to his bed with right sided weakness. Upon arrival to the ED he was normotensive with continued right sided weakness, dysarthria and some expressive aphasia. He also had an episode of  nausea with bloody/coffee ground emesis.  CT head was done in the ED which showed a left thalamic intraparenchymal hematoma, chronic right thalamic lacunar infarct. Kcentra was administered and he was transferred to ICU for critical care management

## 2023-03-16 NOTE — PROGRESS NOTES
Chief Complaint   Patient presents with    Possible Stroke     LKW last night at 2100, was found on the floor next to his bed, no trauma noted, R sided deficits, AOx2, follows commands.        Problem List Items Addressed This Visit       * (Principal) Intraparenchymal hemorrhage of brain (HCC)     Patient with findings as above on CT head.  - Admit to neuro ICU, every 2 neurochecks  - Maintain blood pressure tween systolic 101 40  - Hold anticoagulation  - Repeat head CT in 6 hours  - Neurosurgery, neurology following appreciate recs         Relevant Orders    Referral to Physiatry (PMR)    Referral to Neurology       Neurology Stroke Progress Note    History of present illness:  This is an 87-year old male with PMHx significant for Afib on Eliquis (last dose reportedly last night), hypertension, further details are limited who presented to [facility] on 3/15/23 for a chief complaint of altered mentation/found down with Right sided weakness. Per report the christine was last seen in his usual state of health by his neighbor at 2030 last night 3/14/23; at appx 1300 today, he was found by the same neighbor down on the ground next to his bed with Right sided weakness and slurred speech. EMS was called; on arrival here, patient was vomiting bloody/coffee ground emesis. SBP 120s-130s. Stat CT head revealed small Left thalamic hemorrhage with associated vasogenic edema, given this appearance likely at least 6+ hours old. CTA head/neck with no obvious vascular process nor aneurysm. Plan now for reversal (given coffee ground/blood emesis; no obvious concern for active bleeding/extravasation intracranially) of Eliquis with Kcentra. Currently patient is sitting up in stretcher; awake and alert. Speech is dysarthric, perhaps mildly expressively aphasic, follows simple commands and nods yes/no appropriately to questions. Still with RUE/RLE weakness, SBP currently 140s. ICH score 1.     Neurology has been consulted by   Protecting Yourself From the Sun    · Apply broad spectrum water resistant sunscreen with an SPF of at least 30 to exposed areas of the skin. Dont forget the ears and lips! Remember to reapply sunscreen about every 2 hours and after swimming or sweating. · Wear sun protective clothing. Swim shirts (aka. rash guards) are a great idea and negates the need to reapply sunscreen in those areas.      · Seek the shade whenever possible especially between the hours of 10 am and 4 pm when the suns rays are the strongest.     · Avoid tanning beds  · Kimberly Peck to further evaluate the findings noted above.     Interval, 3/16/23:  Patient is laying in bed; arousable, oriented. Speech more clear today, however still dysarthric. SBP 110s-120s overnight, not requirng PRN antihypertensives nor nicardipine. Patient received Kcentra reversal per ED; No sign of active bleeding. MRI Brain wo contrast ordered and pending.     No changes to HPI as was previously documented.     Past medical history:   Past Medical History:   Diagnosis Date    A-fib (HCC)     BPH (benign prostatic hyperplasia)     Cancer (HCC)     Hypertension     Psychiatric problem 2022    Anxiety, depression. Did MRI to r/o biological cause. No tx provided.    Stroke (HCC) 2018    minor stroke, patient doesn't remember details       Past surgical history:   Past Surgical History:   Procedure Laterality Date    HIP REPLACEMENT, TOTAL Right 2004       Family history:   Family History   Problem Relation Age of Onset    No Known Problems Mother     No Known Problems Father        Social history:   Social History     Socioeconomic History    Marital status: Single     Spouse name: Not on file    Number of children: Not on file    Years of education: Not on file    Highest education level: Not on file   Occupational History    Not on file   Tobacco Use    Smoking status: Never    Smokeless tobacco: Never   Vaping Use    Vaping Use: Never used   Substance and Sexual Activity    Alcohol use: Never    Drug use: Never    Sexual activity: Not on file   Other Topics Concern    Not on file   Social History Narrative    Not on file     Social Determinants of Health     Financial Resource Strain: Not on file   Food Insecurity: Not on file   Transportation Needs: Not on file   Physical Activity: Not on file   Stress: Not on file   Social Connections: Not on file   Intimate Partner Violence: Not on file   Housing Stability: Not on file       Current medications:   Current Facility-Administered Medications    Medication Dose    magnesium sulfate IVPB premix 2 g  2 g    potassium chloride (KCL) ivpb 10 mEq  10 mEq    Respiratory Therapy Consult      NS infusion      LORazepam (ATIVAN) injection 2 mg  2 mg    acetaminophen (Tylenol) tablet 650 mg  650 mg    Or    acetaminophen (TYLENOL) suppository 650 mg  650 mg    ondansetron (ZOFRAN ODT) dispertab 4 mg  4 mg    Or    ondansetron (ZOFRAN) syringe/vial injection 4 mg  4 mg    MD Alert...ICU Electrolyte Replacement per Pharmacy      niCARdipine (CARDENE) 25 mg in  mL Standard Infusion  0-15 mg/hr    metoprolol SR (TOPROL XL) tablet 50 mg  50 mg    losartan (COZAAR) tablet 75 mg  75 mg    simvastatin (ZOCOR) tablet 20 mg  20 mg    pantoprazole (Protonix) injection 40 mg  40 mg    enalaprilat (Vasotec) injection 1.25 mg 1 mL  1.25 mg    hydrALAZINE (APRESOLINE) injection 10-20 mg  10-20 mg    labetalol (NORMODYNE/TRANDATE) injection 10-20 mg  10-20 mg       Medication Allergy:  No Known Allergies    Review of systems:   As noted above; otherwise limited secondary to aphasia/dysarthria and patient's condition.        Physical examination:   Vitals:    03/16/23 0500 03/16/23 0600 03/16/23 0700 03/16/23 0800   BP: 128/58 133/57 132/67 115/58   Pulse: 70 68 70 74   Resp: 15 17 (!) 23 (!) 24   Temp:    37.3 °C (99.1 °F)   TempSrc:       SpO2: 99% 99% 97% 100%   Weight:         General: Patient in no acute distress  HEENT: Normocephalic, no signs of acute trauma.   Neck: supple, no meningeal signs or carotid bruits. There is normal range of motion. No tenderness on exam.   Chest: clear to auscultation. No cough.   CV: RRR, no murmurs.   Skin: no signs of acute rashes or trauma.   Musculoskeletal: joints exhibit full range of motion, without any pain to palpation. There are no signs of joint or muscle swelling. There is no tenderness to deep palpation of muscles.   Psychiatric: No hallucinatory behavior.       NEUROLOGICAL EXAM:   Mental status, orientation: Awake, alert  and fully oriented.   Speech and language: speech is dysarthric, mildly aphasic. The patient follows simple commands.   Cranial nerve exam: Pupils are 3-4 mm bilaterally and equally reactive to light Visual fields are intact by confrontation. There is no nystagmus on primary or secondary gaze. Intact full EOM in all directions of gaze. Face appears symmetric. Sensation in the face is intact to light touch. Uvula is midline. Palate elevates symmetrically. Tongue is midline and without any signs of tongue biting or fasciculations. Shoulder shrug is intact bilaterally.   Motor exam: Strength is 5/5 in LUE/LLE; 2/5 to RUE/RLE; mildly improved today. Tone is normal. No abnormal movements were seen on exam.   Sensory exam Decreased to light touch/pinprick on the Right, normal sensation on the left.   Deep tendon reflexes: Plantar responses are flexor. There is no clonus.   Coordination: Deferred patient does not participate.   Gait: Not assessed at this time as patient is a fall risk.     ICH Score: 1  (Age greater than 80)      ANCILLARY DATA REVIEWED:     Lab Data Review:  Recent Results (from the past 24 hour(s))   ABO Rh Confirm    Collection Time: 03/15/23  2:30 PM   Result Value Ref Range    ABO Rh Confirm A POS    CBC WITH DIFFERENTIAL    Collection Time: 03/15/23  2:39 PM   Result Value Ref Range    WBC 9.9 4.8 - 10.8 K/uL    RBC 5.65 4.70 - 6.10 M/uL    Hemoglobin 16.3 14.0 - 18.0 g/dL    Hematocrit 49.2 42.0 - 52.0 %    MCV 87.1 81.4 - 97.8 fL    MCH 28.8 27.0 - 33.0 pg    MCHC 33.1 (L) 33.7 - 35.3 g/dL    RDW 42.1 35.9 - 50.0 fL    Platelet Count 246 164 - 446 K/uL    MPV 11.1 9.0 - 12.9 fL    Neutrophils-Polys 83.60 (H) 44.00 - 72.00 %    Lymphocytes 10.30 (L) 22.00 - 41.00 %    Monocytes 4.70 0.00 - 13.40 %    Eosinophils 0.00 0.00 - 6.90 %    Basophils 0.30 0.00 - 1.80 %    Immature Granulocytes 1.10 (H) 0.00 - 0.90 %    Nucleated RBC 0.00 /100 WBC    Neutrophils (Absolute) 8.26 (H) 1.82 - 7.42 K/uL     Lymphs (Absolute) 1.02 1.00 - 4.80 K/uL    Monos (Absolute) 0.46 0.00 - 0.85 K/uL    Eos (Absolute) 0.00 0.00 - 0.51 K/uL    Baso (Absolute) 0.03 0.00 - 0.12 K/uL    Immature Granulocytes (abs) 0.11 0.00 - 0.11 K/uL    NRBC (Absolute) 0.00 K/uL   COMP METABOLIC PANEL    Collection Time: 03/15/23  2:39 PM   Result Value Ref Range    Sodium 141 135 - 145 mmol/L    Potassium 3.7 3.6 - 5.5 mmol/L    Chloride 105 96 - 112 mmol/L    Co2 20 20 - 33 mmol/L    Anion Gap 16.0 7.0 - 16.0    Glucose 147 (H) 65 - 99 mg/dL    Bun 13 8 - 22 mg/dL    Creatinine 0.91 0.50 - 1.40 mg/dL    Calcium 9.1 8.5 - 10.5 mg/dL    AST(SGOT) 15 12 - 45 U/L    ALT(SGPT) 19 2 - 50 U/L    Alkaline Phosphatase 80 30 - 99 U/L    Total Bilirubin 0.6 0.1 - 1.5 mg/dL    Albumin 4.1 3.2 - 4.9 g/dL    Total Protein 7.4 6.0 - 8.2 g/dL    Globulin 3.3 1.9 - 3.5 g/dL    A-G Ratio 1.2 g/dL   PROTHROMBIN TIME    Collection Time: 03/15/23  2:39 PM   Result Value Ref Range    PT 15.7 (H) 12.0 - 14.6 sec    INR 1.27 (H) 0.87 - 1.13   APTT    Collection Time: 03/15/23  2:39 PM   Result Value Ref Range    APTT 25.4 24.7 - 36.0 sec   COD (ADULT)    Collection Time: 03/15/23  2:39 PM   Result Value Ref Range    ABO Grouping Only A     Rh Grouping Only POS     Antibody Screen-Cod NEG    TROPONIN    Collection Time: 03/15/23  2:39 PM   Result Value Ref Range    Troponin T 20 (H) 6 - 19 ng/L   CORRECTED CALCIUM    Collection Time: 03/15/23  2:39 PM   Result Value Ref Range    Correct Calcium 9.0 8.5 - 10.5 mg/dL   ESTIMATED GFR    Collection Time: 03/15/23  2:39 PM   Result Value Ref Range    GFR (CKD-EPI) 82 >60 mL/min/1.73 m 2   PROCALCITONIN    Collection Time: 03/15/23  2:39 PM   Result Value Ref Range    Procalcitonin 0.12 <0.25 ng/mL   Lipid Profile    Collection Time: 03/15/23  2:39 PM   Result Value Ref Range    Cholesterol,Tot 128 100 - 199 mg/dL    Triglycerides 108 0 - 149 mg/dL    HDL 51 >=40 mg/dL    LDL 55 <100 mg/dL   PLATELET MAPPING WITH BASIC TEG     Collection Time: 03/15/23  4:02 PM   Result Value Ref Range    Reaction Time Initial-R 4.2 (L) 4.6 - 9.1 min    React Time Initial Hep 4.9 4.3 - 8.3 min    Clot Kinetics-K 1.0 0.8 - 2.1 min    Clot Angle-Angle 75.9 63.0 - 78.0 degrees    Maximum Clot Strength-MA 65.8 52.0 - 69.0 mm    TEG Functional Fibrinogen(MA) 32.5 (H) 15.0 - 32.0 mm    Lysis 30 minutes-LY30 0.0 0.0 - 2.6 %    % Inhibition ADP 1.1 0.0 - 17.0 %    % Inhibition AA 3.3 0.0 - 11.0 %    TEG Algorithm Link Algorithm    EKG (NOW)    Collection Time: 03/15/23  4:44 PM   Result Value Ref Range    Report       [facility] Emergency Dept.    Test Date:  2023-03-15  Pt Name:    RODERICK MILLS                 Department: ER  MRN:        [id number]                      Room:       [facility]  Gender:     Male                         Technician: [id number]  :        [id number]                   Requested By:ER TRIAGE PROTOCOL  Order #:    [id number]                    Reading MD:    Measurements  Intervals                                Axis  Rate:       92                           P:          0  SD:         79                           QRS:        -1  QRSD:       97                           T:          26  QT:         374  QTc:        463    Interpretive Statements  Sinus rhythm  Multiple premature complexes, vent & supraven  Short SD interval  Compared to ECG 2017 12:34:03  Short SD interval now present  First degree AV block no longer present     HEMOGLOBIN AND HEMATOCRIT    Collection Time: 03/15/23  8:25 PM   Result Value Ref Range    Hemoglobin 14.3 14.0 - 18.0 g/dL    Hematocrit 42.5 42.0 - 52.0 %   TROPONIN    Collection Time: 03/15/23  8:25 PM   Result Value Ref Range    Troponin T 25 (H) 6 - 19 ng/L   SODIUM SERUM (NA)    Collection Time: 03/15/23  8:25 PM   Result Value Ref Range    Sodium 142 135 - 145 mmol/L   POCT glucose device results    Collection Time: 03/15/23  8:28 PM   Result Value Ref Range    POC Glucose, Blood 127 (H) 65 -  99 mg/dL   POCT glucose device results    Collection Time: 03/16/23 12:24 AM   Result Value Ref Range    POC Glucose, Blood 121 (H) 65 - 99 mg/dL   TROPONIN    Collection Time: 03/16/23 12:25 AM   Result Value Ref Range    Troponin T 23 (H) 6 - 19 ng/L   HEMOGLOBIN AND HEMATOCRIT    Collection Time: 03/16/23 12:25 AM   Result Value Ref Range    Hemoglobin 14.1 14.0 - 18.0 g/dL    Hematocrit 41.6 (L) 42.0 - 52.0 %   SODIUM SERUM (NA)    Collection Time: 03/16/23 12:25 AM   Result Value Ref Range    Sodium 142 135 - 145 mmol/L   POCT glucose device results    Collection Time: 03/16/23  3:48 AM   Result Value Ref Range    POC Glucose, Blood 110 (H) 65 - 99 mg/dL   CBC with Differential    Collection Time: 03/16/23  3:50 AM   Result Value Ref Range    WBC 9.9 4.8 - 10.8 K/uL    RBC 4.85 4.70 - 6.10 M/uL    Hemoglobin 14.0 14.0 - 18.0 g/dL    Hematocrit 41.6 (L) 42.0 - 52.0 %    MCV 85.8 81.4 - 97.8 fL    MCH 28.9 27.0 - 33.0 pg    MCHC 33.7 33.7 - 35.3 g/dL    RDW 41.9 35.9 - 50.0 fL    Platelet Count 250 164 - 446 K/uL    MPV 10.7 9.0 - 12.9 fL    Neutrophils-Polys 76.60 (H) 44.00 - 72.00 %    Lymphocytes 14.00 (L) 22.00 - 41.00 %    Monocytes 8.60 0.00 - 13.40 %    Eosinophils 0.00 0.00 - 6.90 %    Basophils 0.20 0.00 - 1.80 %    Immature Granulocytes 0.60 0.00 - 0.90 %    Nucleated RBC 0.00 /100 WBC    Neutrophils (Absolute) 7.61 (H) 1.82 - 7.42 K/uL    Lymphs (Absolute) 1.39 1.00 - 4.80 K/uL    Monos (Absolute) 0.85 0.00 - 0.85 K/uL    Eos (Absolute) 0.00 0.00 - 0.51 K/uL    Baso (Absolute) 0.02 0.00 - 0.12 K/uL    Immature Granulocytes (abs) 0.06 0.00 - 0.11 K/uL    NRBC (Absolute) 0.00 K/uL   Complete Metabolic Panel    Collection Time: 03/16/23  3:50 AM   Result Value Ref Range    Sodium 143 135 - 145 mmol/L    Potassium 3.6 3.6 - 5.5 mmol/L    Chloride 106 96 - 112 mmol/L    Co2 22 20 - 33 mmol/L    Anion Gap 15.0 7.0 - 16.0    Glucose 122 (H) 65 - 99 mg/dL    Bun 15 8 - 22 mg/dL    Creatinine 0.90 0.50 - 1.40  mg/dL    Calcium 8.2 (L) 8.5 - 10.5 mg/dL    AST(SGOT) 15 12 - 45 U/L    ALT(SGPT) 16 2 - 50 U/L    Alkaline Phosphatase 62 30 - 99 U/L    Total Bilirubin 0.6 0.1 - 1.5 mg/dL    Albumin 3.6 3.2 - 4.9 g/dL    Total Protein 6.4 6.0 - 8.2 g/dL    Globulin 2.8 1.9 - 3.5 g/dL    A-G Ratio 1.3 g/dL   Magnesium    Collection Time: 03/16/23  3:50 AM   Result Value Ref Range    Magnesium 1.8 1.5 - 2.5 mg/dL   CORRECTED CALCIUM    Collection Time: 03/16/23  3:50 AM   Result Value Ref Range    Correct Calcium 8.5 8.5 - 10.5 mg/dL   ESTIMATED GFR    Collection Time: 03/16/23  3:50 AM   Result Value Ref Range    GFR (CKD-EPI) 83 >60 mL/min/1.73 m 2   HEMOGLOBIN A1C    Collection Time: 03/16/23  3:50 AM   Result Value Ref Range    Glycohemoglobin 5.9 (H) 4.0 - 5.6 %    Est Avg Glucose 123 mg/dL       Labs reviewed by me.         Imaging reviewed by me:     CT-HEAD W/O   Final Result         1.  Left thalamic and basal ganglia hemorrhage, overall appears stable since prior study.   2.  Nonspecific white matter changes, commonly associated with small vessel ischemic disease.  Associated mild cerebral atrophy is noted.   3.  Chronic appearing sphenoid sinusitis changes      DX-CHEST-PORTABLE (1 VIEW)   Final Result      1.  Mild central pulmonary vascular congestion.   2.  Stable mild enlargement of the cardiomediastinal silhouette.   3.  Bilateral basilar atelectasis and/or consolidation. Underlying infection is possible.      CT-CTA NECK WITH & W/O-POST PROCESSING   Final Result      1.  CT angiogram of the neck within normal limits for age.   2.  Left internal carotid artery stenosis measuring greater than 50%.      CT-CTA HEAD WITH & W/O-POST PROCESS   Final Result      1.  CT angiogram of the [place] within normal limits.   2.  2.4 cm left thalamic intraparenchymal hematoma.      Based solely on CT findings, the brain injury guideline category is mBIG 3.      EDH   IVH   Displaced skull fx   SDH > 8mm   IPH > 8mm or  multiple   SAH bi-hemispheric or > 3mm      The original BIG retrospective analysis found radiographic progression in 0% of BIG 1 patients and 2.6% BIG 2.      Findings were discussed with Dr. DANIEL PARKINSON on 3/15/2023 3:11 PM.         CT-HEAD W/O   Final Result      1.  Acute left thalamic intraparenchymal hematoma measuring 2.3 x 1.8 x 3.4 cm..   2.  Chronic microvascular ischemic changes and chronic right thalamic lacunar infarct.         EC-ECHOCARDIOGRAM COMPLETE W/O CONT    (Results Pending)   MR-BRAIN-W/O    (Results Pending)           Modified [place] Scale (MRS): 0 = No symptoms      ASSESSMENT AND PLAN:  87-year old male with PMHx significant for Afib on Eliquis (last dose reportedly last night), hypertension, further details are limited who presented to [facility] on 3/15/23 for a chief complaint of altered mentation/found down with Right sided weakness. [facility] 2030 3/14. Here, CT head revealed small Left thalamic hemorrhage with associated vasogenic edema, given this appearance likely at least 6+ hours old. Etiology questionably hypertensive though patient has not been hypertensive thus far; CTA head/neck with no obvious vascular process nor aneurysm. Given unclear nature/etiology of hemorrhage feel it reasonable to obtain MRI Brain wo contrast to rule out CAA (helpful in determining if patient will eventually be safe to restart AC for Afib).     Recommendations/Plan:     -q2h and PRN neuro assessment. VS per nursing/unit protocol. Please notify neurology with changes in exam.  -SBP goal 100-140 strict; antihypertensives per ICU team.  -Obtain MRI Brain wo contrast.  -No antithrombotics, no anticoagulation at this time.   -Maintain strict euthermia, euglycemia, euvolemia, eunatremia. Current Na is 141.   -PT/OT/SLP eval and treat. Physiatry consult.   -All other medical management per ICU team.   -DVT PPX: SCDs.    Will follow. The plan of care above has been discussed with Dr. Rivera. Please call  with questions.     ERNA Herbert.  [place] [employer]

## 2023-03-16 NOTE — THERAPY
Speech Language Pathology   Flexible Endoscopic Evaluation of Swallowing (FEES)        Patient Name: Alireza Hernandez  AGE:  87 y.o., SEX:  male  Medical Record #: [id number]  Date of Service: 3/16/2023      History of Present Illness  87 y.o. 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 CMH notable for concern of possible NSTEMI, acute pulmonary edema, and paroxysmal afib. PMH notable for HTN, HLD, and GERD.       Pertinent Information  Current Method of Nutrition: NPO until cleared by speech pathology  Patient Behaviors:  (Appropriate, calm, cooperative)   Dentition: Poor, Natural dentition, Some missing dentition   Feeding Tube: None   Tracheostomy: No   Factor(s) Affecting Performance: None       Discussed the risks, benefits, and alternatives of the FEES procedure. Patient/family acknowledged and agreed to proceed. Study completed under the supervision of Ximena Allison, DARSHAN.       Assessment  Flexible Endoscopic Evaluation of Swallowing (FEES) completed at bedside today. The endoscope was passed transnasally via Right nare to evaluate the anatomy and physiology of swallowing. Pt tolerated the procedure with no apparent distress.    Anatomic Findings: WFL  Vocal Fold Motion: reduced mobility of R arytenoid, incomplete adduction during cough  Secretion Management: Adequate  PO Trials: Ice Chips, Thin Liquid, Mildly Thick Liquid, Liquidised, Pudding, Regular Solid, Mixed      Consistency PAS Score Timing Residue Comments   Thin Liquid 8 Pre Swallow, During swallow Vallecular Residue: Trace (1%-5%)  Pyriform Sinus Residue: None (0%) Tsp - PAS 1  Cup - PAS 8, PAS 6 x2, PAS 1  Cup with 3-second prep - PAS 8  Cup with chin tuck - PAS 5 x2  Straw - PAS 2, PAS 1   Mildly Thick 8 Pre Swallow Vallecular Residue: Trace (1%-5%)  Pyriform Sinus Residue: None (0%) Tsp - PAS 5  Cup - PAS 8 x2  Straw - PAS 8, PAS 5, PAS 1   Liquidised 1 N/A Vallecular  Residue: Mild (5%-25%)   (Mild posterior pharyngeal wall residue)  Pyriform Sinus Residue: None (0%) x2   Pudding 3 Post Swallow Vallecular Residue: Moderate (25%-50%)  Pyriform Sinus Residue: None (0%) PAS 3 (high, trace), PAS 1   Regular Solid 1 N/A Vallecular Residue: Trace (1%-5%)  Pyriform Sinus Residue: Trace (1%-5%)    Mixed (Soft & Bite Solid, Thin Liquid) 1 N/A Vallecular Residue: Moderate (25%-50%)  Pyriform Sinus Residue: Mild (5%-25%) x2     Penetration-Aspiration Scale (PAS)  1     No contrast enters airway  2     Contrast enters the airway, remains above the vocal folds, and is ejected from the airway (not seen in the airway at the end of the swallow).  3     Contrast enters the airway, remains above the vocal folds, and is not ejected from the airway (is seen in the airway after the swallow).  4     Contrast enters the airway, contacts the vocal folds, and is ejected from the airway.  5     Contrast enters the airway, contacts the vocal folds, and is not ejected from the airway  6     Contrast enters the airway, crosses the plane of the vocal folds, and is ejected from the airway.  7     Contrast enters the airway, crosses the plane of the vocal folds, and is not ejected from the airway despite effort.  8     Contrast enters the airway, crosses the plane of the vocal folds, is not ejected from the airway and there is no response to aspiration.      Oral phase:  R anterior bolus loss with dribble down chin/neck of thin liquids. Loss of bolus control with liquid consistencies. Prolonged and piecemeal deglutition of solids - patient with instances of un-chewed peach visualized in pharynx. Swallow initiation variable at level of vallecula to pyriform sinuses.     Pharyngeal phase:  Safety:  Delayed and incomplete laryngeal vestibule closure with delayed epiglottic inversion resulted in trace and silent aspiration of thin and mildly thick liquids. During (inferred) larger amounts of aspiration of thin  liquids, patient with effective cough response. A cued cough was effective in clearing aspirate. Single instance of high/trace laryngeal penetration of pureed solids, which cleared with cleansing swallows.     Efficiency:  Impaired base of tongue retraction resulted in moderate vallecular residue with pureed and mixed (SB6/TN0) consistencies as well as mild vallecular and posterior pharyngeal wall residue with liquidized consistencies. Impaired pharyngeal shortening resulted in mild pyriform sinus residue of mixed consistencies (SB6/TN0).       Compensatory Strategies:  - Cued cough is effective in clearing aspirate/penetrate from laryngeal vestibule.   - Multiple swallows are effective in reducing pharyngeal residue.   - 3-second prep did not eliminate airway invasion.   - Chin tuck did not eliminate airway invasion.     Severity Rating:  Severity Rating - DALTON: Moderate      Clinical Impressions  Patient presents with a moderate oropharyngeal dysphagia, like acute related to thalamic/basal ganglia hemorrhage. Swallow safety and efficiency are both impaired. Primary deficits include incomplete laryngeal vestibule closure and reduced base of tongue retraction. Deficits are best managed with compensatory strategies at this time. Patient is a good candidate for behavioral and exercise based swallowing rehabilitation.       Recommendations  Diet Consistency: Soft and bite size solids/thin liquids with cough+re-swallow after each sip  Medication: Whole with liquid (or whole with applesauce)  Supervision: Distant supervision - check on patient 2-3 times per meal, Assist with meal tray set up  Positioning: Fully upright and midline during oral intake, Meals sitting upright in a chair, as tolerated  Strategies: Small bites/sips, Slow rate of intake, Multiple swallows (x2) per bite/sips (cough + re-swallow with each sip of liquid)  Oral Care:  (TID)  Additional Instrumentation: None         SLP Treatment Plan  Treatment  "Plan: Dysphagia Treatment  SLP Frequency: 3x Per Week  Estimated Duration: Until Therapy Goals Met      Anticipated Discharge Needs  Discharge Recommendations: Recommend post-acute placement for additional speech therapy services prior to discharge home   Therapy Recommendations Upon DC: Dysphagia Training       Patient / Family Goals  Patient / Family Goal #1: \"I can't feel it\" to swallowing  Goal #1 Outcome: Progressing as expected  Short Term Goal # 1: Patient will participate in swallowing diagnostic to rule out silent aspiration/guide treatment plan.  Goal Outcome # 1: Goal met, new goal added  Short Term Goal # 1 B : Patient will consume a diet of SB6/TN0 with strategies without overt indicators of aspiration or decline in pulmonary status.  Short Term Goal # 2: Patient will complete swallowing exercises targeting LVC and BOT retraction x15 with good accuracy.      Ania Oakes, SLP  "

## 2023-03-16 NOTE — CARE PLAN
The patient is Watcher - Medium risk of patient condition declining or worsening    Shift Goals  Clinical Goals: CT, MRI, stable Q2 neuro exams  Patient Goals: Rest  Family Goals: MARGIE    Progress made toward(s) clinical / shift goals:  blood pressure maintained below goal of <140    Patient is not progressing towards the following goals:unable to urinate without tabares catheter  Swallow evaluation ordered      Problem: Nutrition  Goal: Patient's nutritional and fluid intake will be adequate or improve  Outcome: Not Met     Problem: Urinary Elimination  Goal: Establish and maintain regular urinary output  Outcome: Not Met

## 2023-03-16 NOTE — ASSESSMENT & PLAN NOTE
Concern for possible NSTEMI in the setting of elevated troponin, most likely secondary to critical illness in setting of IPH.- Troponin decreasing pt without chest pain.  - We will continue monitor/trend troponins, EKG  - Echo- LVEF 65%; mild aortic insufficiency, normal wall motion

## 2023-03-16 NOTE — ASSESSMENT & PLAN NOTE
- left thalamic ICH - noted on CT head  -Q 4 hr neuro checks   - MRI fu showed a Left thalamic and left corona radiata hemorrhage with mild surrounding vasogenic edema with slight mass effect upon the left lateral ventricle without midline shift as well as remote infarcts in the right cerebellum, left kylee and right thalamus.  -Unknown etiology of ICH possible HTN -No amyloid noted on MRI   - Maintain blood pressure tween systolic 100- 140-IVP antihypertensives as needed to keep SBP within parameters  - Okay to start anticoagulation for DVT ppx - holding NOAC per Neurology recs of  5-6 weeks   - Maintain strict euthermia, euglycemia, euvolemia, eunatremia-will check A1c  -PT/OT/SLP-physiatry

## 2023-03-16 NOTE — PROGRESS NOTES
"Critical Care Progress Note    Date of admission  3/15/2023    Chief Complaint  87 y.o. male admitted 3/15/2023 with right sided weakness, dysarthria    Hospital Course  Alireza Hernandez is a 87 year-old male with a PMHX of HTN, atrial fibrillation (Eliquis), hyperlipidemia, GERD who was transported to [facility] by EMS after his neighbor found him at approx 1330 (3/15) down on the ground next to his bed with right sided weakness. Upon arrival to the ED he was normotensive with continued right sided weakness, dysarthria and some expressive aphasia. He also had an episode of  nausea with bloody/coffee ground emesis.  CT head was done in the ED which showed a left thalamic intraparenchymal hematoma, chronic right thalamic lacunar infarct. Kcentra was administered and he was transferred to ICU for critical care management     Interval Problem Update  Reviewed last 24 hour events:  No acute changes overnight   -T max- 373  - SBP goal 100-140  -MRI head ordered and pending  -SR- afib  -Labetolol given 1 time for SBP management last night   -Keep less than 140  -Mag and potassium replacment     Review of Systems  Review of Systems   Constitutional:  Negative for chills and fever.   HENT:  Negative for hearing loss.    Eyes:  Negative for blurred vision and double vision.   Respiratory:  Negative for cough and shortness of breath.    Cardiovascular:  Negative for chest pain and palpitations.   Gastrointestinal:  Negative for abdominal pain, heartburn, nausea and vomiting.   Genitourinary:  Negative for dysuria.   Musculoskeletal:  Negative for myalgias.   Skin:  Negative for rash.   Neurological:  Positive for sensory change, speech change and focal weakness. Negative for dizziness, seizures, loss of consciousness and headaches.        \"I cant feel anything on my right side.  Cant move right side\"      Vital Signs for last 24 hours   Temp:  [36.3 °C (97.3 °F)-37.3 °C (99.2 °F)] 37.3 °C (99.2 °F)  Pulse:  [] " 68  Resp:  [14-34] 17  BP: (124-166)/(57-81) 133/57  SpO2:  [92 %-100 %] 99 %    Hemodynamic parameters for last 24 hours       Respiratory Information for the last 24 hours       Physical Exam   Physical Exam  Vitals and nursing note reviewed.   Constitutional:       General: He is awake.   HENT:      Head: Normocephalic and atraumatic.      Mouth/Throat:      Mouth: Mucous membranes are dry.   Cardiovascular:      Rate and Rhythm: Normal rate. Rhythm irregular.      Pulses:           Radial pulses are 2+ on the right side and 2+ on the left side.        Dorsalis pedis pulses are 1+ on the right side and 1+ on the left side.   Pulmonary:      Breath sounds: Normal air entry. Examination of the right-lower field reveals decreased breath sounds. Examination of the left-lower field reveals decreased breath sounds. Decreased breath sounds present.   Abdominal:      General: Bowel sounds are normal.      Palpations: Abdomen is soft.   Genitourinary:     Comments: Patiño to down drain- clear yellow urine   Skin:     General: Skin is warm.      Capillary Refill: Capillary refill takes less than 2 seconds.   Neurological:      Mental Status: He is alert and oriented to person, place, and time.      GCS: GCS eye subscore is 4. GCS verbal subscore is 5. GCS motor subscore is 6.      Comments: AAO X 4  Right upper and lower extrem flaccid, no sensory  Left upper and lower extrem strong  Dysarthria  Right facial droop     Psychiatric:         Mood and Affect: Mood normal.         Behavior: Behavior is cooperative.       Medications  Current Facility-Administered Medications   Medication Dose Route Frequency Provider Last Rate Last Admin    Respiratory Therapy Consult   Nebulization Continuous RT Jeremy M Gonda, M.D.        NS infusion   Intravenous Continuous Jeremy M Gonda, M.D. 80 mL/hr at 03/15/23 1740 New Bag at 03/15/23 1740    LORazepam (ATIVAN) injection 2 mg  2 mg Intravenous Q5 MIN PRN Jeremy M Gonda, M.D.         acetaminophen (Tylenol) tablet 650 mg  650 mg Oral Q4HRS PRN Jeremy M Gonda, M.D.        Or    acetaminophen (TYLENOL) suppository 650 mg  650 mg Rectal Q4HRS PRN Jeremy M Gonda, M.D.        ondansetron (ZOFRAN ODT) dispertab 4 mg  4 mg Oral Q4HRS PRN Jeremy M Gonda, M.D.        Or    ondansetron (ZOFRAN) syringe/vial injection 4 mg  4 mg Intravenous Q4HRS PRN Jeremy M Gonda, M.D.   4 mg at 03/15/23 2115    MD Alert...ICU Electrolyte Replacement per Pharmacy   Other PHARMACY TO DOSE Jeremy M Gonda, M.D.        niCARdipine (CARDENE) 25 mg in  mL Standard Infusion  0-15 mg/hr Intravenous Continuous Jeremy M Gonda, M.D.   Dose not Required at 03/15/23 1615    metoprolol SR (TOPROL XL) tablet 50 mg  50 mg Oral DAILY Rigo Dillard M.D.        losartan (COZAAR) tablet 75 mg  75 mg Oral DAILY Rigo Dillard M.D.        simvastatin (ZOCOR) tablet 20 mg  20 mg Oral Nightly Rigo Dillard M.D.        pantoprazole (Protonix) injection 40 mg  40 mg Intravenous BID Jeremy M Gonda, M.D.   40 mg at 03/15/23 2019    enalaprilat (Vasotec) injection 1.25 mg 1 mL  1.25 mg Intravenous Q6HRS PRN Jeremy M Gonda, M.D.        hydrALAZINE (APRESOLINE) injection 10-20 mg  10-20 mg Intravenous Q4HRS PRN Rehana LEE Latmiguel angel        labetalol (NORMODYNE/TRANDATE) injection 10-20 mg  10-20 mg Intravenous Q4HRS PRN Rehana LEE Latona   10 mg at 03/15/23 2019       Fluids    Intake/Output Summary (Last 24 hours) at 3/16/2023 0700  Last data filed at 3/16/2023 0600  Gross per 24 hour   Intake 673.3 ml   Output 801 ml   Net -127.7 ml       Laboratory          Recent Labs     03/15/23  1439 03/15/23 2025 03/16/23  0025 03/16/23  0350   SODIUM 141 142 142 143   POTASSIUM 3.7  --   --  3.6   CHLORIDE 105  --   --  106   CO2 20  --   --  22   BUN 13  --   --  15   CREATININE 0.91  --   --  0.90   MAGNESIUM  --   --   --  1.8   CALCIUM 9.1  --   --  8.2*     Recent Labs     03/15/23  1439 03/16/23  0350   ALTSGPT 19 16   ASTSGOT 15 15    ALKPHOSPHAT 80 62   TBILIRUBIN 0.6 0.6   GLUCOSE 147* 122*     Recent Labs     03/15/23  1439 03/16/23  0350   WBC 9.9 9.9   NEUTSPOLYS 83.60* 76.60*   LYMPHOCYTES 10.30* 14.00*   MONOCYTES 4.70 8.60   EOSINOPHILS 0.00 0.00   BASOPHILS 0.30 0.20   ASTSGOT 15 15   ALTSGPT 19 16   ALKPHOSPHAT 80 62   TBILIRUBIN 0.6 0.6     Recent Labs     03/15/23  1439 03/15/23  2025 03/16/23  0025 03/16/23  0350   RBC 5.65  --   --  4.85   HEMOGLOBIN 16.3 14.3 14.1 14.0   HEMATOCRIT 49.2 42.5 41.6* 41.6*   PLATELETCT 246  --   --  250   PROTHROMBTM 15.7*  --   --   --    APTT 25.4  --   --   --    INR 1.27*  --   --   --        Imaging  X-Ray:  I have personally reviewed the images and compared with prior images.  EKG:  I have personally reviewed the images and compared with prior images.  CT:    Reviewed    Assessment/Plan  * Intraparenchymal hemorrhage of brain (HCC)- (present on admission)  Assessment & Plan  Patient with findings as above on CT head.  - Eevery 2 neurochecks  - Maintain blood pressure tween systolic 100- 40  - Hold anticoagulation- DVT ppx with SCDs  - FU MRI of brain ordered and pending  -Maintain strict euthermia, euglycemia, euvolemia, eunatremia-will check A1c    Electrolyte abnormality  Assessment & Plan  Hypomagnesemia, hypokalemia   Replace mag and K  Am labs with BMP, phos, mag - replace as indicated    Hematemesis  Assessment & Plan  Patient with reported episode of hematemesis prior to arrival.  - No signs of active bleeding at this time, will however monitor serial H&H's, Eliquis reversed as above.  - Patient currently n.p.o., continue IV PPI twice daily    Myocardial ischemia  Assessment & Plan  Concern for possible NSTEMI in the setting of elevated troponin, most likely secondary to critical illness in setting of IPH.- Troponin decreasing pt without chest pain.  - We will continue monitor/trend troponins, EKG  - Echo ordered and pending      Acute pulmonary edema (HCC)  Assessment & Plan  Chest  x-ray noted to have mild central pulmonary vascular congestion.  Patient satting well on room air with no complaints of shortness of breath.  - One-time dose of Lasix given, will continue to monitor for future needs    Paroxysmal atrial fibrillation (HCC)  Assessment & Plan  Patient with history above, currently on apixaban and metoprolol.  - Hold apixaban in setting of IPH, resume home metoprolol once taking PO    Primary hypertension  Assessment & Plan  -Patient with history of above, will resume home medications with goal blood pressure between systolic 140   -SLP with FEES ordered to assess swallow- ability to tolerate oral meds and food           VTE:  Contraindicated  Ulcer: PPI  Lines: Patiño Catheter  Ongoing indication addressed    I have performed a physical exam and reviewed and updated ROS and Plan today (3/16/2023). In review of yesterday's note (3/15/2023), there are no changes except as documented above.     Discussed patient condition and risk of morbidity and/or mortality with RN, RT, Therapies, Pharmacy, Dietary, Charge nurse / hot rounds, Patient, and neurology  The patient remains critically ill.  Critical care time =50minutes in directly providing and coordinating critical care and extensive data review.  No time overlap and excludes procedures. Please note that this dictation was created using voice recognition software. The accuracy of the dictation is limited to the abilities of the software. I have made every reasonable attempt to correct obvious errors, but I expect that there are errors of grammar and possibly content that I did not discover before finalizing the note.

## 2023-03-16 NOTE — ASSESSMENT & PLAN NOTE
Patient with history above, currently on apixaban and metoprolol.-   - Hold apixaban in setting of IPH,  -Hold home metoprolol 2nd to bradycardia

## 2023-03-17 PROBLEM — R33.8 ACUTE URINARY RETENTION: Status: ACTIVE | Noted: 2023-01-01

## 2023-03-17 NOTE — THERAPY
Speech Language Pathology   Cognitive Evaluation     Patient Name: Alireza Hernandez  AGE:  87 y.o., SEX:  male  Medical Record #: [id number]  Date of Service: 3/17/2023      History of Present Illness  87 y.o. 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 CMH notable for concern of possible NSTEMI, acute pulmonary edema, and paroxysmal afib.       PMHx:  HTN, HLD, and GERD.     General Information  Vitals  O2 (LPM): 2  O2 Delivery Device: Silicone Nasal Cannula           Prior Living Situation & Level of Function  Lives with - Patient's Self Care Capacity: Alone and Able to Care For Self       Communication Domain(s)        Cognitive-Linguistic: Moderate  Reading: WFL  Social/Pragmatic: WFL      Assessment    Pt seen on this date for a cognitive-linguistic evaluation. Portions of the Cognistat (The Neurobehavioral Cognitive Status Examination) were presented to the pt and they received the following scores:    Orientation: Average  Attention: Mild  Repetition: Average  Memory: Moderate  Calculations: Mild  Similarities: Average  Judgement: Average    Non-standardized measures were used to assess other cognitive domains and severe deficits found in medication management and minimal deficits found in auditory comprehension. Confrontational naming and reading aloud were found to be within normal limits (when sentences were written in large print). Would recommend further assessment of reading/following written directions with large print as pt wears reading glasses at baseline and did not have at bedside. Pt endorsed that he lives at home independently and independently manages his IADLs. He reported that his sister lives nearby but would be not be able to assist with IADLs. Pt would benefit with supervion of IADLs upon d/c to ensure safety.    Of note, cognitive-linguistic evaluations assess performance on various cognitive-linguistic domains  "such as expressive and receptive language, attention, memory, executive function, problem solving, etc. It is not within the scope of Speech Language Pathologists to determine capacity, please defer to medical team or psych for capacity evaluation. Thank you.       Dysphagia treatment:    Pt also seen on this date for dysphagia therapy. Pt unable to recall swallowing precautions independently so he was re-educated and needed mod-max cues through session for implementation. Written directions were provided to him and hung on wall to help with cueing. With cues, pt able to follow swallow-cough-swallow strategy in 100% of opportunities. Pt will need 1:1 supervision for implementation of strategies.    Clinical Impressions  Pt is presenting with cognitive deficits which are likely acute given acute CVA. Pt will benefit from supervision with IADLs upon d/c to ensure safety.    Continue soft and bite size/thin liquid diet with swallow-cough-swallow strategy as pt found to have aspiration with thin liquids. Cough-swallow strategy helped to eject aspirate from trachea.          Recommendations  Supervision Needs Upons Discharge: Direct assistance with IADLs (see below)  IADLs: Medication management, Financial management, Appointment management         SLP Treatment Plan  Treatment Plan: Dysphagia Treatment, Cognitive Treatment  SLP Frequency: 3x Per Week  Estimated Duration: Until Therapy Goals Met      Anticipated Discharge Needs  Discharge Recommendations: Recommend post-acute placement for additional speech therapy services prior to discharge home  Therapy Recommendations Upon DC: Dysphagia Training, Cognitive-Linguistic Training, Patient / Family / Caregiver Education, Community Re-Integration      Patient / Family Goals  Patient / Family Goal #1: \"I can't feel it\" to swallowing  Goal #1 Outcome: Progressing as expected  Short Term Goal # 1: Patient will participate in swallowing diagnostic to rule out silent " aspiration/guide treatment plan.  Goal Outcome # 1: Goal met, new goal added  Short Term Goal # 1 B : Patient will consume a diet of SB6/TN0 with strategies without overt indicators of aspiration or decline in pulmonary status.  Goal Outcome  # 1 B: Progressing as expected  Short Term Goal # 2: Patient will complete swallowing exercises targeting LVC and BOT retraction x15 with good accuracy.  Short Term Goal # 3: Pt will complete functional memory tasks with >80% accuracy and min clinician cues  Short Term Goal # 4: Pt will recall swallowing strategies independently  Short Term Goal # 5: Pt will complete functional medication management tasks with >75% accuracy and min clinician cues      Arti Calzada, SLP

## 2023-03-17 NOTE — ASSESSMENT & PLAN NOTE
Tabares cath placed for urinary retention  Difficult tabares placement requiring coude cath  Consider starting flomax  Void trail once tabares removed

## 2023-03-17 NOTE — THERAPY
Physical Therapy   Initial Evaluation     Patient Name: Alireza Hernandez  Age:  87 y.o., Sex:  male  Medical Record #: [id number]  Today's Date: 3/16/2023     Precautions  Precautions: Fall Risk;Swallow Precautions  Comments: SBP <140    Assessment  Patient is 87 y.o. M presenting with altered mental status with R sided deficits and diagnosed with R thalamic hemorrhage. Pt's chief complaint is R sided weakness. Pt was highly motivated for therapy but treatment was limited by fatigue. RLE grossly 2-/5 and able to bear weight with standing trials. Pt will benefit from continued skilled therapy. Discharge as follows:    Plan A: acute rehabilitation due to pt's PLOF (independence), high motivation, 2-/5 RLE strength indicating progress can be made in function, and present DTR's on involved side.    Plan B: SNF if patient does not qualify for acute rehab.    Plan    Physical Therapy Initial Treatment Plan   Treatment Plan : (P) Bed Mobility, Electrical Stimulation - Attended, Equipment, Gait Training, Manual Therapy, Neuro Re-Education / Balance, Therapeutic Activities, Therapeutic Exercise  Treatment Frequency: (P) 4 Times per Week  Duration: (P) Until Therapy Goals Met    DC Equipment Recommendations: (P) Unable to determine at this time  Discharge Recommendations: (P) Recommend post-acute placement for additional physical therapy services prior to discharge home       Abridged Subjective/Objective       03/16/23 1501   Precautions   Precautions Fall Risk;Swallow Precautions   Comments SBP <140   Vitals   Vitals Comments pt's pulse rate oscillated between ~70 and 98~ at rest and seemed irregular; pt consistently 160's EOB; no noted neurologic changes and pt reported it felt good to sit EOB;   Pain 0 - 10 Group   Therapist Pain Assessment Post Activity Pain Same as Prior to Activity   Prior Living Situation   Prior Services Housekeeping / Homemaker Services   Housing / Facility 1 Story Apartment / Condo   Steps Into Home  4   Steps In Home 0   Rail Both Rail (Steps in Home)   Lives with - Patient's Self Care Capacity Alone and Able to Care For Self   Comments Pt has a /transporter   Cognition    Cognition / Consciousness X   Speech/ Communication Dysarthric   Level of Consciousness Alert   Comments pt reports that slur was not present before   Passive ROM Upper Body   Passive ROM Upper Body WDL   Active ROM Upper Body   Active ROM Upper Body  X   Dominant Hand Right   Comments Pt has no RUE AROM   Strength Upper Body   Upper Body Strength  X   Comments Pt has trace contractions in RUE   Sensation Upper Body   Upper Extremity Sensation  X   Rt Upper Extremity Light Touch Absent   Rt Upper Extremity Proprioception Absent   Comments Present DTR's with no clonus   Upper Body Muscle Tone   Upper Body Muscle Tone  X   Rt Upper Extremity Muscle Tone Non Functional   Passive ROM Lower Body   Passive ROM Lower Body WDL   Strength Lower Body   Lower Body Strength  X   Comments pt's R side has trace contractions; R knee E was 2-   Sensation Lower Body   Lower Extremity Sensation   X   Comments R side absent light touch, proprioception, temperature, deep pressure but DTR's present no clonus;   Neurological Concerns   Sitting Posture During ADL's   (Slight lean to R side with slight slump)   Coordination Upper Body   Coordination X   Comments LUE is WNL; RUE is non-functional   Coordination Lower Body    Coordination Lower Body  X   Comments LLE is WNL; RLE is non-functional   Balance Assessment   Sitting Balance (Static) Poor -   Sitting Balance (Dynamic) Trace +   Standing Balance (Static) Dependent   Standing Balance (Dynamic) Dependent   Weight Shift Sitting Poor   Weight Shift Standing Absent   Comments Pt required 2-person assistance in sitting and standing   Bed Mobility    Supine to Sit Total Assist   Sit to Supine Total Assist   Scooting Total Assist   Functional Mobility   Sit to Stand Maximal Assist   Mobility Supine <> Sit;  STS .1   Activity Tolerance   Sitting Edge of Bed >15 min w/ maxA   Standing ~2 min w/ maxA   Comments Pt was very motivated to participate in therapy today and only reported fatigue at the end of the session;   Short Term Goals    Short Term Goal # 1 Pt will demonstrate supine <> sit with Erick within 6 visits for bed mobility at home.   Short Term Goal # 2 Pt will demonstrate fair+ seated balance holding midline with unilateral support within 6 visits for functional mobility at home.   Short Term Goal # 3 Pt will squat pivot to his R to a wheelchair with Erick within 6 visits for home mobility.   Short Term Goal # 4 Pt will self propel with BUE for 150 feet within 6 visits for home navigation.   Short Term Goal # 5 Pt will ambulate with BLE with Erick for 10 feet within 6 visits for home navigation.   Education Group   Education Provided Role of Physical Therapist   Role of Physical Therapist Patient Response Patient;Eager;Explanation;Verbal Demonstration   Physical Therapy Initial Treatment Plan    Treatment Plan  Bed Mobility;Electrical Stimulation - Attended;Equipment;Gait Training;Manual Therapy;Neuro Re-Education / Balance;Therapeutic Activities;Therapeutic Exercise   Treatment Frequency 4 Times per Week   Duration Until Therapy Goals Met   Anticipated Discharge Equipment and Recommendations   DC Equipment Recommendations Unable to determine at this time   Discharge Recommendations Recommend post-acute placement for additional physical therapy services prior to discharge home   Interdisciplinary Plan of Care Collaboration   IDT Collaboration with  Nursing;Physical Therapist;Occupational Therapist   Patient Position at End of Therapy In Bed;Bed Alarm On   Collaboration Comments Pt left in supine with HOB at 30 degrees and call button within reach; Nursing notified that patient was mobilized and tolerated treatment well but felt fatigued by the end.   Session Information   Date / Session Number  3/16-1 (1/4; 3/22)

## 2023-03-17 NOTE — THERAPY
"Occupational Therapy   Initial Evaluation     Patient Name: Alireza Hernandez  Age:  87 y.o., Sex:  male  Medical Record #: [id number]  Today's Date: 3/16/2023     Precautions: Fall Risk, Swallow Precautions  Comments: SBP <140    Assessment    Patient is 87 y.o. male with h/o a-fib, HTN, admitted with confusion, R-sided deficits. Pt dx with L IPH. Seen now for OT eval and treatment. See grid below for details. Pt presents with dense R hemiplegia, absent sensation to RUE. He demos mild R lateral lean/pushing in sitting. Pt very pleasant, following commands. He requires max/total A for LB ADL and functional mobility. Treatment included education on safe positioning and sensory input to RUE as well as frequent attempts at AROM for neuromuscular re-education. Pt will benefit from ongoing acute OT. Recommend physiatry consult.     Plan    Occupational Therapy Initial Treatment Plan   Treatment Interventions: Self Care / Activities of Daily Living, Adaptive Equipment, Manual Therapy Techniques, Neuro Re-Education / Balance, Therapeutic Exercises, Therapeutic Activity, Sensory Integration Techniques, Orthotics Treatment  Treatment Frequency: 4 Times per Week  Duration: Until Therapy Goals Met    DC Equipment Recommendations: Unable to determine at this time  Discharge Recommendations: Recommend post-acute placement for additional occupational therapy services prior to discharge home      Objective       03/16/23 1521   Prior Living Situation   Prior Services Home-Independent;Housekeeping / Homemaker Services   Housing / Facility 1 Story Apartment / Condo   Steps Into Home 4   Steps In Home 0   Rail Both Rail (Steps into Home)   Bathroom Set up Walk In Shower;Grab Bars   Equipment Owned Grab Bar(s) In Tub / Shower   Comments Pt lives alone. He has a \"helper\" for transportation and housekeeping   Prior Level of ADL Function   Comments Pt was independent with BADL PTA   Prior Level of IADL Function   Comments Pt was " "independent with I-ADL and functional mobility PTA   Cognition    Speech/ Communication Dysarthric   Level of Consciousness Alert   Comments oriented to all but exact day of month; states reason for admit: \"I had an attack\"   Active ROM Upper Body   Dominant Hand Right   Lt Shoulder Flexion Degrees 110  (pt reports long-standing limitation; likely due to OA)   Comments RUE no active motor   Strength Upper Body   Rt Shoulder Adduction Strength 1 (T)   Rt Elbow Flexion Strength 1 (T)   Rt Elbow Extension Strength 1 (T)   Comments RUE all else 0/5; LUE WNL   Sensation Upper Body   Rt Upper Extremity Light Touch Absent   Rt Upper Extremity Proprioception Absent   Comments LUE WNL   Upper Body Muscle Tone   Rt Upper Extremity Muscle Tone Non Functional   Neurological Concerns   Sitting Posture During ADL's Lateral Lean Right;Posterior Lean   Rt Upper Extremity Gross Motor Control Absent   Rt Upper Extremity Functional Use Absent   Coordination Upper Body   Comments LUE grossly WNL (non-dominant limb); RUE non-functional   Balance Assessment   Sitting Balance (Static) Poor +   Sitting Balance (Dynamic) Poor -   Standing Balance (Static) Trace   Standing Balance (Dynamic) Dependent   Weight Shift Sitting Poor   Weight Shift Standing Absent   Comments 2-person assist stand   Bed Mobility    Supine to Sit Total Assist   Sit to Supine Total Assist   Scooting Maximal Assist  (seated)   ADL Assessment   Grooming Moderate Assist  (bed-level oral care (all-in-one) using L hand)   Lower Body Dressing Total Assist  (B socks)   Toileting   (NT; Patiño, no BM)   Functional Mobility   Sit to Stand Maximal Assist   Bed, Chair, Wheelchair Transfer Unable to Participate   Mobility Supine > < EOB, STS x 1   Visual Perception   Visual Perception  WDL   Comments denies acuity changes or diplopia; able to locate and read wall clock   Short Term Goals   Short Term Goal # 1 Pt will sit EOB with CGA >5 min to participate in ADL task   Short Term " Goal # 2 Pt will don gown using christiano technique with min A   Short Term Goal # 3 Pt will complete seated oral care using christiano technique with supv   Short Term Goal # 4 Pt will use RUE as functional stabilizer during ADL task

## 2023-03-17 NOTE — PROGRESS NOTES
"Critical Care Progress Note    Date of admission  3/15/2023    Chief Complaint  87 y.o. male admitted 3/15/2023 with right sided weakness, dysarthria    Hospital Course  Alireza Hernandez is a 87 year-old male with a PMHX of HTN, atrial fibrillation (Eliquis), hyperlipidemia, GERD who was transported to [facility] by EMS after his neighbor found him at approx 1330 (3/15) down on the ground next to his bed with right sided weakness. Upon arrival to the ED he was normotensive with continued right sided weakness, dysarthria and some expressive aphasia. He also had an episode of  nausea with bloody/coffee ground emesis.  CT head was done in the ED which showed a left thalamic intraparenchymal hematoma, chronic right thalamic lacunar infarct. Kcentra was administered and he was transferred to ICU for critical care management     Interval Problem Update  Reviewed last 24 hour events    -No acute changes overnight   -T max- 99.2  - SBP goal 100-140  -MRI head ordered and pending clearance with abdomen and chest films  -SR- afib  -Labetolol given 1 time for SBP management last night   -Holding metoprolol secondary to bradycardia  -Keep less than 140  -Electrolyte - replaced phos, calcium     Review of Systems  Review of Systems   Constitutional:  Negative for chills and fever.   HENT:  Negative for hearing loss.    Eyes:  Negative for blurred vision and double vision.   Respiratory:  Negative for cough and shortness of breath.    Cardiovascular:  Negative for chest pain and palpitations.   Gastrointestinal:  Negative for abdominal pain, heartburn, nausea and vomiting.   Genitourinary:  Negative for dysuria.   Musculoskeletal:  Negative for myalgias.   Skin:  Negative for rash.   Neurological:  Positive for sensory change, speech change and focal weakness. Negative for dizziness, seizures, loss of consciousness and headaches.        \"My arm and leg are the same today.  I still cannot move them\"      Vital Signs for " last 24 hours   Temp:  [37.1 °C (98.8 °F)-37.3 °C (99.2 °F)] 37.1 °C (98.8 °F)  Pulse:  [] 82  Resp:  [13-42] 15  BP: (108-143)/(50-67) 113/56  SpO2:  [95 %-100 %] 98 %    Hemodynamic parameters for last 24 hours       Respiratory Information for the last 24 hours       Physical Exam   Physical Exam  Vitals and nursing note reviewed.   Constitutional:       General: He is awake.   HENT:      Head: Normocephalic and atraumatic.      Mouth/Throat:      Mouth: Mucous membranes are dry.   Neck:      Comments: Trachea midline  Cardiovascular:      Rate and Rhythm: Bradycardia present. Rhythm irregular.      Pulses:           Radial pulses are 2+ on the right side and 2+ on the left side.        Dorsalis pedis pulses are 1+ on the right side and 1+ on the left side.   Pulmonary:      Breath sounds: Normal air entry. Examination of the right-lower field reveals decreased breath sounds. Examination of the left-lower field reveals decreased breath sounds. Decreased breath sounds present.   Abdominal:      General: Bowel sounds are normal.      Palpations: Abdomen is soft.      Comments: BS present   Genitourinary:     Comments: Patiño to down drain- clear yellow urine   Skin:     General: Skin is warm.      Capillary Refill: Capillary refill takes less than 2 seconds.   Neurological:      Mental Status: He is alert and oriented to person, place, and time.      GCS: GCS eye subscore is 4. GCS verbal subscore is 5. GCS motor subscore is 6.      Comments: AAO X 4  Right upper and lower extrem flaccid  No sensation right upper extrem  Slight sensation to right lower extrem  Left upper and lower extrem strong  Right facial droop, dysarthric        Psychiatric:         Attention and Perception: Attention normal.         Mood and Affect: Mood normal.         Behavior: Behavior is cooperative.         Cognition and Memory: Cognition normal.       Medications  Current Facility-Administered Medications   Medication Dose Route  Frequency Provider Last Rate Last Admin    pantoprazole (Protonix) injection 40 mg  40 mg Intravenous BID Krunal Lipscomb M.D.   40 mg at 03/17/23 0541    Respiratory Therapy Consult   Nebulization Continuous RT Jeremy M Gonda, M.D.        NS infusion   Intravenous Continuous Jeremy M Gonda, M.D. 80 mL/hr at 03/16/23 2142 New Bag at 03/16/23 2142    LORazepam (ATIVAN) injection 2 mg  2 mg Intravenous Q5 MIN PRN Jeremy M Gonda, M.D.        acetaminophen (Tylenol) tablet 650 mg  650 mg Oral Q4HRS PRN Jeremy M Gonda, M.D.        Or    acetaminophen (TYLENOL) suppository 650 mg  650 mg Rectal Q4HRS PRN Jeremy M Gonda, M.D.        ondansetron (ZOFRAN ODT) dispertab 4 mg  4 mg Oral Q4HRS PRN Jeremy M Gonda, M.D.        Or    ondansetron (ZOFRAN) syringe/vial injection 4 mg  4 mg Intravenous Q4HRS PRN Jeremy M Gonda, M.D.   4 mg at 03/15/23 2115    MD Alert...ICU Electrolyte Replacement per Pharmacy   Other PHARMACY TO DOSE Jeremy M Gonda, M.D.        niCARdipine (CARDENE) 25 mg in  mL Standard Infusion  0-15 mg/hr Intravenous Continuous Jeremy M Gonda, M.D.   Dose not Required at 03/15/23 1615    metoprolol SR (TOPROL XL) tablet 50 mg  50 mg Oral DAILY Rigo Dillard M.D.   50 mg at 03/17/23 0542    losartan (COZAAR) tablet 75 mg  75 mg Oral DAILY Rigo Dillard M.D.   75 mg at 03/17/23 0542    simvastatin (ZOCOR) tablet 20 mg  20 mg Oral Nightly Rigo Dillard M.D.   20 mg at 03/16/23 2103    enalaprilat (Vasotec) injection 1.25 mg 1 mL  1.25 mg Intravenous Q6HRS PRN Jeremy M Gonda, M.D.        hydrALAZINE (APRESOLINE) injection 10-20 mg  10-20 mg Intravenous Q4HRS PRN Rehana L. Latona   10 mg at 03/16/23 1814    labetalol (NORMODYNE/TRANDATE) injection 10-20 mg  10-20 mg Intravenous Q4HRS PRN Rehana LEE Latona   10 mg at 03/15/23 2019       Fluids    Intake/Output Summary (Last 24 hours) at 3/17/2023 0820  Last data filed at 3/17/2023 0600  Gross per 24 hour   Intake 781.69 ml   Output 775 ml   Net 6.69 ml          Laboratory          Recent Labs     03/15/23  1439 03/15/23  2025 03/16/23  0025 03/16/23  0350 03/17/23  0405   SODIUM 141   < > 142 143 142   POTASSIUM 3.7  --   --  3.6 4.0   CHLORIDE 105  --   --  106 109   CO2 20  --   --  22 22   BUN 13  --   --  15 16   CREATININE 0.91  --   --  0.90 0.75   MAGNESIUM  --   --   --  1.8 2.2   PHOSPHORUS  --   --   --   --  1.9*   CALCIUM 9.1  --   --  8.2* 7.7*    < > = values in this interval not displayed.       Recent Labs     03/15/23  1439 03/16/23  0350 03/17/23  0405   ALTSGPT 19 16 12   ASTSGOT 15 15 15   ALKPHOSPHAT 80 62 54   TBILIRUBIN 0.6 0.6 0.5   GLUCOSE 147* 122* 109*       Recent Labs     03/15/23  1439 03/16/23  0350 03/17/23  0405   WBC 9.9 9.9 8.6   NEUTSPOLYS 83.60* 76.60* 71.50   LYMPHOCYTES 10.30* 14.00* 15.60*   MONOCYTES 4.70 8.60 10.20   EOSINOPHILS 0.00 0.00 1.60   BASOPHILS 0.30 0.20 0.50   ASTSGOT 15 15 15   ALTSGPT 19 16 12   ALKPHOSPHAT 80 62 54   TBILIRUBIN 0.6 0.6 0.5       Recent Labs     03/15/23  1439 03/15/23  2025 03/16/23  0025 03/16/23  0350 03/17/23  0405   RBC 5.65  --   --  4.85 4.52*   HEMOGLOBIN 16.3   < > 14.1 14.0 12.9*   HEMATOCRIT 49.2   < > 41.6* 41.6* 40.2*   PLATELETCT 246  --   --  250 223   PROTHROMBTM 15.7*  --   --   --   --    APTT 25.4  --   --   --   --    INR 1.27*  --   --   --   --     < > = values in this interval not displayed.         Imaging  X-Ray:  I have personally reviewed the images and compared with prior images.  EKG:  I have personally reviewed the images and compared with prior images.  CT:    Reviewed    Assessment/Plan  * Intraparenchymal hemorrhage of brain (HCC)- (present on admission)  Assessment & Plan  Patient with findings as above on CT head.  - Eevery 2 neurochecks-  - Maintain blood pressure tween systolic 100- 140-IVP antihypertensives as needed to keep SBP within parameters  - Hold anticoagulation- DVT ppx with SCDs  - FU MRI of brain ordered and pending-  - Maintain strict  euthermia, euglycemia, euvolemia, eunatremia-will check A1c  -PT/OT/SLP-physiatry    Acute urinary retention  Assessment & Plan  Tabares cath placed for urinary retention  Difficult tabares placement requiring coude cath  Consider starting flomax  Void trail once tabares removed    Electrolyte abnormality  Assessment & Plan  Hypophosphatemia, hypocalemia    Replace phos and calcium  Am labs with BMP, phos, mag - replace as indicated    Hematemesis  Assessment & Plan  Patient with reported episode of hematemesis prior to arrival.  - No signs of active bleeding at this time, will however monitor serial H&H's, Eliquis reversed as above.  - Patient currently n.p.o., continue IV PPI twice daily    Myocardial ischemia  Assessment & Plan  Concern for possible NSTEMI in the setting of elevated troponin, most likely secondary to critical illness in setting of IPH.- Troponin decreasing pt without chest pain.  - We will continue monitor/trend troponins, EKG  - Echo- LVEF 65%; mild aortic insufficiency, normal wall motion      Acute pulmonary edema (HCC)  Assessment & Plan  Chest x-ray noted to have mild central pulmonary vascular congestion.  Patient satting well on room air with no complaints of shortness of breath.  -One-time dose of Lasix given, will continue to monitor for future needs  -CXR PRN     Paroxysmal atrial fibrillation (HCC)  Assessment & Plan  Patient with history above, currently on apixaban and metoprolol.-   - Hold apixaban in setting of IPH,  -Hold home metoprolol 2nd to bradycardia     Primary hypertension  Assessment & Plan  -Patient with history of above- Restarted home medications with goal blood pressure between systolic 140- holding metoprolol for bradycardia                 VTE:  Contraindicated  Ulcer: PPI  Lines: Tabares Catheter  Ongoing indication addressed    I have performed a physical exam and reviewed and updated ROS and Plan today (3/17/2023). In review of yesterday's note (3/16/2023), there are no  changes except as documented above.     Discussed patient condition and risk of morbidity and/or mortality with RN, RT, Therapies, Pharmacy, Dietary, Charge nurse / hot rounds, Patient, and neurology  The patient remains critically ill.  Critical care time =45 minutes in directly providing and coordinating critical care and extensive data review.  No time overlap and excludes procedures. Please note that this dictation was created using voice recognition software. The accuracy of the dictation is limited to the abilities of the software. I have made every reasonable attempt to correct obvious errors, but I expect that there are errors of grammar and possibly content that I did not discover before finalizing the note.

## 2023-03-18 PROBLEM — J81.0 ACUTE PULMONARY EDEMA (HCC): Status: RESOLVED | Noted: 2023-01-01 | Resolved: 2023-01-01

## 2023-03-18 NOTE — ASSESSMENT & PLAN NOTE
As per history.  Likely uncontrolled hypertension leading to hypertensive bleed.  Mildly elevated NT proBNP noted.    Continue metoprolol SR 25 mg daily  Losartan 75 mg daily  Can consider diuretic if persistently elevated  IV labetalol or IV enalaprilat as needed for SBP greater than 140

## 2023-03-18 NOTE — PROGRESS NOTES
Critical Care Progress Note    Date of admission  3/15/2023    Chief Complaint  87 y.o. male admitted 3/15/2023 with right sided weakness, dysarthria    Hospital Course  Alireza Hernandez is a 87 year-old male with a PMHX of HTN, atrial fibrillation (Eliquis), hyperlipidemia, GERD who was transported to [facility] by EMS after his neighbor found him at approx 1330 (3/15) down on the ground next to his bed with right sided weakness. Upon arrival to the ED he was normotensive with continued right sided weakness, dysarthria and some expressive aphasia. He also had an episode of  nausea with bloody/coffee ground emesis.  CT head was done in the ED which showed a left thalamic intraparenchymal hematoma, chronic right thalamic lacunar infarct. Kcentra was administered and he was transferred to ICU for critical care management     Interval Problem Update  Reviewed last 24 hour events    -No acute changes overnight   -T max- 99.2  -SBP goal 100-140  -SR- afib-Holding metoprolol secondary to bradycardia  -Did not require any PRN IVP antihypertensives last night for SBP management  - Started DVT ppx   -Patiño- placed for retention   -LBM today  -I/O since admission+1000  -Electrolyte - Replaced mag, Potassium    -Pt no longer requires critical care management and will be transferred to the floor. Discussed with my attending, Dr. Lipscomb and the Hospitalist, Dr. Olvera who will assume care. Please contact Critical Care service for any questions and or concerns      Review of Systems  Review of Systems   Constitutional:  Negative for chills and fever.   HENT:  Negative for hearing loss.    Eyes:  Negative for blurred vision and double vision.   Respiratory:  Negative for cough, hemoptysis and shortness of breath.    Cardiovascular:  Negative for chest pain and palpitations.   Gastrointestinal:  Negative for abdominal pain, heartburn, nausea and vomiting.   Genitourinary:  Negative for dysuria.   Musculoskeletal:  Negative  "for myalgias.   Skin:  Negative for rash.   Neurological:  Positive for sensory change, speech change and focal weakness. Negative for dizziness, seizures, loss of consciousness and headaches.        \" I still cant feel anything on my right side\"      Vital Signs for last 24 hours   Temp:  [36.7 °C (98.1 °F)-37.1 °C (98.8 °F)] 36.9 °C (98.4 °F)  Pulse:  [] 90  Resp:  [12-21] 15  BP: (110-148)/(52-78) 122/55  SpO2:  [93 %-100 %] 97 %    Hemodynamic parameters for last 24 hours       Respiratory Information for the last 24 hours       Physical Exam   Physical Exam  Vitals and nursing note reviewed.   Constitutional:       General: He is awake.   HENT:      Head: Normocephalic and atraumatic.      Mouth/Throat:      Mouth: Mucous membranes are moist.   Eyes:      General: Lids are normal.   Neck:      Comments: Trachea midline  Cardiovascular:      Rate and Rhythm: Bradycardia present. Rhythm irregular.      Pulses:           Radial pulses are 2+ on the right side and 2+ on the left side.        Dorsalis pedis pulses are 1+ on the right side and 1+ on the left side.   Pulmonary:      Breath sounds: Normal air entry. Examination of the right-lower field reveals decreased breath sounds. Examination of the left-lower field reveals decreased breath sounds. Decreased breath sounds present.      Comments: Without any respiratory distress   Chest:   Breasts:     Breasts are symmetrical.   Abdominal:      General: Bowel sounds are normal.      Palpations: Abdomen is soft.      Comments: BS present   Genitourinary:     Comments: Patiño to down drain- clear yellow urine   Skin:     General: Skin is warm.      Capillary Refill: Capillary refill takes less than 2 seconds.   Neurological:      Mental Status: He is alert and oriented to person, place, and time.      GCS: GCS eye subscore is 4. GCS verbal subscore is 5. GCS motor subscore is 6.      Comments: Dysarthric  Right facial droop  Right upper extrem flaccid without " sensation'  Right lower extrem with slight movement on command- slight sensation to hard touch  Left side extrem strong  HEATHER 3-4 mm       Psychiatric:         Attention and Perception: Attention normal.         Mood and Affect: Mood normal.         Behavior: Behavior is cooperative.         Cognition and Memory: Cognition normal.      Comments: Dysarthric        Medications  Current Facility-Administered Medications   Medication Dose Route Frequency Provider Last Rate Last Admin    omeprazole (PRILOSEC) capsule 20 mg  20 mg Oral BID DAMON PosadaP.RDANI   20 mg at 03/18/23 0507    Respiratory Therapy Consult   Nebulization Continuous RT Jeremy M Gonda, M.D.        NS infusion   Intravenous Continuous Jeremy M Gonda, M.D. 80 mL/hr at 03/18/23 0324 New Bag at 03/18/23 0324    LORazepam (ATIVAN) injection 2 mg  2 mg Intravenous Q5 MIN PRN Jeremy M Gonda, M.D.        acetaminophen (Tylenol) tablet 650 mg  650 mg Oral Q4HRS PRN Jeremy M Gonda, M.D.        Or    acetaminophen (TYLENOL) suppository 650 mg  650 mg Rectal Q4HRS PRN Jeremy M Gonda, M.D.        ondansetron (ZOFRAN ODT) dispertab 4 mg  4 mg Oral Q4HRS PRN Jeremy M Gonda, M.D.        Or    ondansetron (ZOFRAN) syringe/vial injection 4 mg  4 mg Intravenous Q4HRS PRN Jeremy M Gonda, M.D.   4 mg at 03/15/23 2115    MD Alert...ICU Electrolyte Replacement per Pharmacy   Other PHARMACY TO DOSE Jeremy M Gonda, M.D.        [Held by provider] metoprolol SR (TOPROL XL) tablet 50 mg  50 mg Oral DAILY Rigo Dillard M.D.   50 mg at 03/17/23 0542    losartan (COZAAR) tablet 75 mg  75 mg Oral DAILY Rigo Dillard M.D.   75 mg at 03/18/23 0507    simvastatin (ZOCOR) tablet 20 mg  20 mg Oral Nightly Rigo Dillard M.D.   20 mg at 03/17/23 2135    enalaprilat (Vasotec) injection 1.25 mg 1 mL  1.25 mg Intravenous Q6HRS PRN Jeremy M Gonda, M.D.        hydrALAZINE (APRESOLINE) injection 10-20 mg  10-20 mg Intravenous Q4HRS PRN Rehana LEE Latona   10 mg at 03/16/23 1814     labetalol (NORMODYNE/TRANDATE) injection 10-20 mg  10-20 mg Intravenous Q4HRS PRN Rehana Ware   10 mg at 03/15/23 2019       Fluids    Intake/Output Summary (Last 24 hours) at 3/18/2023 0612  Last data filed at 3/18/2023 0400  Gross per 24 hour   Intake 1951.47 ml   Output 815 ml   Net 1136.47 ml         Laboratory          Recent Labs     03/16/23 0350 03/17/23 0405 03/18/23  0315   SODIUM 143 142 142   POTASSIUM 3.6 4.0 3.6   CHLORIDE 106 109 111   CO2 22 22 21   BUN 15 16 12   CREATININE 0.90 0.75 0.71   MAGNESIUM 1.8 2.2 1.9   PHOSPHORUS  --  1.9* 2.6   CALCIUM 8.2* 7.7* 7.4*       Recent Labs     03/16/23 0350 03/17/23 0405 03/18/23 0315   ALTSGPT 16 12 11   ASTSGOT 15 15 12   ALKPHOSPHAT 62 54 51   TBILIRUBIN 0.6 0.5 0.6   GLUCOSE 122* 109* 107*       Recent Labs     03/16/23 0350 03/17/23 0405 03/18/23  0315   WBC 9.9 8.6 9.2   NEUTSPOLYS 76.60* 71.50 72.10*   LYMPHOCYTES 14.00* 15.60* 16.60*   MONOCYTES 8.60 10.20 8.50   EOSINOPHILS 0.00 1.60 1.90   BASOPHILS 0.20 0.50 0.40   ASTSGOT 15 15 12   ALTSGPT 16 12 11   ALKPHOSPHAT 62 54 51   TBILIRUBIN 0.6 0.5 0.6       Recent Labs     03/15/23  1439 03/15/23  2025 03/16/23  0350 03/17/23  0405 03/18/23  0315   RBC 5.65  --  4.85 4.52* 4.54*   HEMOGLOBIN 16.3   < > 14.0 12.9* 13.3*   HEMATOCRIT 49.2   < > 41.6* 40.2* 39.2*   PLATELETCT 246  --  250 223 173   PROTHROMBTM 15.7*  --   --   --   --    APTT 25.4  --   --   --   --    INR 1.27*  --   --   --   --     < > = values in this interval not displayed.         Imaging  MRI:   Reviewed    Assessment/Plan  * Intraparenchymal hemorrhage of brain (HCC)- (present on admission)  Assessment & Plan  - left thalamic ICH - noted on CT head  -Q 4 hr neuro checks   - MRI fu showed a Left thalamic and left corona radiata hemorrhage with mild surrounding vasogenic edema with slight mass effect upon the left lateral ventricle without midline shift as well as remote infarcts in the right cerebellum, left kylee and  right thalamus.  -Unknown etiology of ICH possible HTN -No amyloid noted on MRI   - Maintain blood pressure tween systolic 100- 140-IVP antihypertensives as needed to keep SBP within parameters  - Okay to start anticoagulation for DVT ppx - holding NOAC per Neurology recs of  5-6 weeks   - Maintain strict euthermia, euglycemia, euvolemia, eunatremia-will check A1c  -PT/OT/SLP-physiatry    Acute urinary retention  Assessment & Plan  Tabares cath placed for urinary retention  Difficult tabares placement requiring coude cath  Consider starting flomax  Void trail once tabares removed    Electrolyte abnormality  Assessment & Plan  Hypokalemia, hypomagnesemia     Replace K  And mag  Am labs with BMP, phos, mag - replace as indicated    Hematemesis  Assessment & Plan  Patient with reported episode of hematemesis prior to arrival.  - No signs of active bleeding at this time, will however monitor serial H&H's, Eliquis reversed as above.  - Patient currently n.p.o., continue IV PPI twice daily    Myocardial ischemia  Assessment & Plan  Concern for possible NSTEMI in the setting of elevated troponin, most likely secondary to critical illness in setting of IPH.- Troponin decreasing pt without chest pain.  - We will continue monitor/trend troponins, EKG  - Echo- LVEF 65%; mild aortic insufficiency, normal wall motion      Paroxysmal atrial fibrillation (HCC)  Assessment & Plan  Patient with history above, currently on apixaban and metoprolol.-   - Hold apixaban in setting of IPH,  -Hold home metoprolol 2nd to bradycardia     Primary hypertension  Assessment & Plan  -Patient with history of above- Restarted home medications with goal blood pressure between systolic 140- holding metoprolol for bradycardia                 VTE:  Contraindicated  Ulcer: PPI  Lines: Tabares Catheter  Ongoing indication addressed    I have performed a physical exam and reviewed and updated ROS and Plan today (3/18/2023). In review of yesterday's note  (3/17/2023), there are no changes except as documented above.     Discussed patient condition and risk of morbidity and/or mortality with RN, RT, Therapies, Pharmacy, Dietary, Charge nurse / hot rounds, Patient, and neurology  Please note that this dictation was created using voice recognition software. The accuracy of the dictation is limited to the abilities of the software. I have made every reasonable attempt to correct obvious errors, but I expect that there are errors of grammar and possibly content that I did not discover before finalizing the note.

## 2023-03-18 NOTE — THERAPY
Physical Therapy   Daily Treatment     Patient Name: Alireza Hernandez  Age:  87 y.o., Sex:  male  Medical Record #: [id number]  Today's Date: 3/17/2023     Precautions  Precautions: (P) Fall Risk;Swallow Precautions    Assessment    Pt is an 87 y.o. M diagnosed with a L intraparenchymal hemmorrhage with chief c/o R sided sensory and motor deficits. Treatment today was most limited by pt reported fatigue, however, pt is highly motivated and gave great effort. Due to patient's high motivation, present contractions and reflexes on his affected side, and unaffected cognition he would benefit most from acute rehab and SNF should only be considered as a backup plan.    Plan    Treatment Plan Status:    Type of Treatment: (P) Bed Mobility, Electrical Stimulation - Attended, Equipment, Gait Training, Manual Therapy, Neuro Re-Education / Balance, Therapeutic Activities, Therapeutic Exercise  Treatment Frequency: (P) 4 Times per Week  Treatment Duration: (P) Until Therapy Goals Met    DC Equipment Recommendations: (P) Unable to determine at this time  Discharge Recommendations: (P) Recommend post-acute placement for additional physical therapy services prior to discharge home      Abridged Subjective/Objective       03/17/23 1546    Services   Is patient using  services for this encounter? No   Precautions   Precautions Fall Risk;Swallow Precautions   Pain 0 - 10 Group   Therapist Pain Assessment Post Activity Pain Same as Prior to Activity   Cognition    Cognition / Consciousness X   Speech/ Communication Dysarthric   Level of Consciousness Alert   Passive ROM Lower Body   Passive ROM Lower Body WDL   Strength Lower Body   Lower Body Strength  X   Comments R side global weakness (Dorsiflexors=trace; plantarflexors=trace; knee extensors=1+; hip flexors=1+) with progressive fatigue ~5 reps max achievable;   Sensation Lower Body   Lower Extremity Sensation   X   Comments R side absent   Supine Lower Body  Exercise   Supine Lower Body Exercises Yes   Comments All bilateral; all ~ 5 rep fatigue; DF; PF; Knee E; Hip F;   Standing Lower Body Exercises   Standing Lower Body Exercises Yes   Comments STS x1 with ModA   Neurological Concerns   Sitting Posture During ADL's Lateral Lean Right;Posterior Lean   Balance   Sitting Balance (Static) Poor +   Sitting Balance (Dynamic) Poor +   Standing Balance (Static) Trace +   Standing Balance (Dynamic) Dependent   Weight Shift Sitting Poor   Weight Shift Standing Absent   Bed Mobility    Supine to Sit Total Assist   Sit to Supine Maximal Assist   Scooting Maximal Assist   Functional Mobility   Sit to Stand Maximal Assist   Mobility Supine <> EOB; STS   ICU Target Mobility Level   ICU Mobility - Targeted Level Level 2   Activity Tolerance   Sitting Edge of Bed ~20 min   Standing ~2 min   Comments Pt was highly motivated for PT and reported at the end of treatment that he wants us to come back soon.   Short Term Goals    Short Term Goal # 1 Pt will demonstrate supine <> sit with Erick within 6 visits for bed mobility at home.   Goal Outcome # 1 goal not met   Short Term Goal # 2 Pt will demonstrate fair+ seated balance holding midline with unilateral support within 6 visits for functional mobility at home.   Goal Outcome # 2 Goal not met   Short Term Goal # 3 Pt will squat pivot to his R to a wheelchair with Erick within 6 visits for home mobility.   Goal Outcome # 3 Goal not met   Short Term Goal # 4 Pt will self propel with BUE for 150 feet within 6 visits for home navigation.   Goal Outcome # 4 Goal not met   Short Term Goal # 5 Pt will ambulate with BLE with Erick for 10 feet within 6 visits for home navigation.   Goal Outcome # 5 Goal not met   Education Group   Education Provided Role of Physical Therapist   Role of Physical Therapist Patient Response Patient;Eager;Explanation;Verbal Demonstration;Action Demonstration   Physical Therapy Treatment Plan   Treatment Plan  Bed  Mobility;Electrical Stimulation - Attended;Equipment;Gait Training;Manual Therapy;Neuro Re-Education / Balance;Therapeutic Activities;Therapeutic Exercise   Treatment Frequency 4 Times per Week   Duration Until Therapy Goals Met   Anticipated Discharge Equipment and Recommendations   DC Equipment Recommendations Unable to determine at this time   Discharge Recommendations Recommend post-acute placement for additional physical therapy services prior to discharge home   Interdisciplinary Plan of Care Collaboration   IDT Collaboration with  Nursing   Patient Position at End of Therapy In Bed  (Transport team arrived at the end of tx to take pt to a scheduled MRI)   Collaboration Comments Nursing was notified about events of tx.   Session Information   Date / Session Number  3/17-2 (2/4; 3/22)

## 2023-03-18 NOTE — CONSULTS
Hospital Medicine Consultation    Date of Service  3/18/2023    Referring Physician  Krunal Lipscomb M.D.    Consulting Physician  Andres Mcwilliams M.D.    Reason for Consultation  Hospital medicine consultation requested in a patient admitted with right-sided weakness, dysarthria, found to have a intraparenchymal hemorrhage involving the left thalamic brain.    History of Presenting Illness  87 y.o. male who presented 3/15/2023 with the patient has a history of hypertension, chronic atrial fibrillation, chronic anticoagulation on Eliquis, dyslipidemia, GERD, advanced age at age 87, brought to the hospital after he was found on the floor next to his bed, right-sided deficits noted, alert and oriented x2 initially, was outside of the window for TNK, evaluated initially as a acute CVA, reportedly had an episode of dark emesis prior to admission, rushed to the CT scan, initially NIH 14, found to have a left thalamic intraparenchymal hemorrhage, admitted to ICU.  The patient received Kcentra, started on PPI secondary to concern of possible GI bleeding.  Neurology consulted, the patient has a sister, no children or the wife, he does have a caregiver, he is usually followed at the [facility], he is a right-hand-dominant male, he has a advanced directive showing DNR/DNI.  The patient mated to ICU, cared for and slowly improving, MRI did not show any underlying additional diagnosis or explanation for the patient's bleed, possibly hypertensive, no evidence of amyloid angiopathy, anticoagulation is on hold, to restart possibly in 5 to 6 weeks.  The patient with persistent right-sided weakness, dysarthria, aphasia, dysphagia, cleared for a diet with thin liquids, soft and bite sized food.  The patient denies headache today, he remains weak, he is not complaining of additional nausea vomiting or abdominal discomfort  He confirms that he lives in an apartment with 6 stairs, he lived independently prior  He does have a  "sister, no children, no spouse    Review of Systems  Review of Systems   Constitutional:  Positive for malaise/fatigue.   HENT: Negative.     Eyes: Negative.  Negative for blurred vision and double vision.   Respiratory: Negative.     Cardiovascular: Negative.  Negative for chest pain.   Gastrointestinal: Negative.  Negative for nausea and vomiting.   Genitourinary: Negative.         History of retention, decreased stream, BPH   Musculoskeletal: Negative.    Skin: Negative.    Neurological:  Positive for speech change, focal weakness and weakness. Negative for dizziness and headaches.   Endo/Heme/Allergies: Negative.    Psychiatric/Behavioral:  The patient is nervous/anxious.    All other systems reviewed and are negative.    Past Medical History   has a past medical history of A-fib (HCC), BPH (benign prostatic hyperplasia), Cancer ([facility]), Hypertension, Psychiatric problem (2022), and Stroke ([facility]) (2018).    Surgical History   has a past surgical history that includes hip replacement, total (Right, 2004).    Family History  family history includes No Known Problems in his father and mother.    Social History   reports that he has never smoked. He has never used smokeless tobacco. He reports that he does not drink alcohol and does not use drugs.    Medications  Prior to Admission Medications   Prescriptions Last Dose Informant Patient Reported? Taking?   apixaban (ELIQUIS) 5mg Tab 3/14/2023 at PM Patient, Patient's Home Pharmacy Yes Yes   Sig: Take 5 mg by mouth 2 times a day.   bismuth subsalicylate (PEPTO-BISMOL) 262 MG/15ML Suspension \"FEW DAYS AGO\" at PRN Friend Yes Yes   Sig: Take 30 mL by mouth every 6 hours as needed.   calcium carbonate (TUMS) 500 MG Chew Tab UNK at PRN Patient, Patient's Home Pharmacy Yes Yes   Sig: Chew 1,000 mg 2 times a day.   famotidine (PEPCID) 20 MG Tab \"FEW DAYS AGO\" at PRN Patient, Patient's Home Pharmacy Yes Yes   Sig: Take 20 mg by mouth 1 time a day as needed.   finasteride " "(PROSCAR) 5 MG Tab 3/14/2023 at AM Patient, Patient's Home Pharmacy Yes Yes   Sig: Take 5 mg by mouth every day.   loperamide (IMODIUM) 2 MG Cap \"FEW DAYS AGO\" at PRN Friend Yes Yes   Sig: Take 2 mg by mouth 4 times a day as needed for Diarrhea.   losartan (COZAAR) 50 MG Tab 3/14/2023 at UNK Patient, Patient's Home Pharmacy Yes Yes   Sig: Take 75 mg by mouth every day. 75 mg = 1.5 tablets daily   metoprolol SR (TOPROL XL) 100 MG TABLET SR 24 HR 3/14/2023 at PM Patient, Patient's Home Pharmacy Yes Yes   Sig: Take 50 mg by mouth every day. 50 mg = 1/2 tablet   nitrofurantoin (MACROBID) 100 MG Cap 3/14/2023 at PM Patient, Patient's Home Pharmacy Yes Yes   Sig: Take 100 mg by mouth 2 times a day. 5 day course started 3/10/23   simvastatin (ZOCOR) 20 MG Tab 3/14/2023 at PM Patient, Patient's Home Pharmacy Yes Yes   Sig: Take 20 mg by mouth every evening.      Facility-Administered Medications: None       Allergies  No Known Allergies    Physical Exam  Temp:  [36.9 °C (98.4 °F)-37 °C (98.6 °F)] 36.9 °C (98.4 °F)  Pulse:  [] (P) 75  Resp:  [14-31] (P) 18  BP: (111-148)/(55-78) (P) 148/63  SpO2:  [93 %-98 %] (P) 96 %    Physical Exam  Vitals and nursing note reviewed.   Constitutional:       Appearance: He is well-developed. He is ill-appearing.   HENT:      Head: Normocephalic.      Comments: Right facial droop  Eyes:      Pupils: Pupils are equal, round, and reactive to light.   Cardiovascular:      Rate and Rhythm: Normal rate and regular rhythm.      Heart sounds: Normal heart sounds.   Pulmonary:      Effort: Pulmonary effort is normal.      Breath sounds: Rhonchi present.   Abdominal:      General: Bowel sounds are normal.      Palpations: Abdomen is soft.   Genitourinary:     Penis: Normal.       Rectum: Normal.   Musculoskeletal:         General: Normal range of motion.      Cervical back: Normal range of motion.   Skin:     General: Skin is warm.   Neurological:      Mental Status: He is alert and oriented to " person, place, and time.      Cranial Nerves: Dysarthria and facial asymmetry present.      Sensory: Sensory deficit present.      Motor: Weakness present.      Coordination: Coordination abnormal.      Comments: Right facial droop  Dysarthria  Right-sided weakness, upper and lower extremity     Psychiatric:         Behavior: Behavior normal.       Fluids  Date 03/18/23 0700 - 03/19/23 0659   Shift 5740-9505 4996-0376 7318-5537 24 Hour Total   INTAKE   I.V. 1219.1   1219.1   Shift Total 1219.1   1219.1   OUTPUT   Urine 220   220   Shift Total 220   220   Weight (kg) 72 72 72 72       Laboratory  Recent Labs     03/16/23  0350 03/17/23  0405 03/18/23  0315   WBC 9.9 8.6 9.2   RBC 4.85 4.52* 4.54*   HEMOGLOBIN 14.0 12.9* 13.3*   HEMATOCRIT 41.6* 40.2* 39.2*   MCV 85.8 88.9 86.3   MCH 28.9 28.5 29.3   MCHC 33.7 32.1* 33.9   RDW 41.9 44.3 43.5   PLATELETCT 250 223 173   MPV 10.7 11.0 11.1     Recent Labs     03/16/23  0350 03/17/23  0405 03/18/23  0315   SODIUM 143 142 142   POTASSIUM 3.6 4.0 3.6   CHLORIDE 106 109 111   CO2 22 22 21   GLUCOSE 122* 109* 107*   BUN 15 16 12   CREATININE 0.90 0.75 0.71   CALCIUM 8.2* 7.7* 7.4*     Recent Labs     03/15/23  1439   APTT 25.4   INR 1.27*          Recent Labs     03/15/23  1439   TRIGLYCERIDE 108   HDL 51   LDL 55        Imaging  MR-BRAIN-W/O   Final Result         Left thalamic and left corona radiata hemorrhage with mild surrounding vasogenic edema. There is slight mass effect upon the left lateral ventricle without midline shift.      Remote infarcts in the right cerebellum, left kylee and right thalamus.      [id number] VIEW   Final Result      1.  No electronic device to preclude MRI imaging.   2.  RIGHT hip prosthesis in place.      EC-ECHOCARDIOGRAM COMPLETE W/O CONT   Final Result      CT-HEAD W/O   Final Result         1.  Left thalamic and basal ganglia hemorrhage, overall appears stable since prior study.   2.  Nonspecific white matter changes, commonly associated  with small vessel ischemic disease.  Associated mild cerebral atrophy is noted.   3.  Chronic appearing sphenoid sinusitis changes      DX-CHEST-PORTABLE (1 VIEW)   Final Result      1.  Mild central pulmonary vascular congestion.   2.  Stable mild enlargement of the cardiomediastinal silhouette.   3.  Bilateral basilar atelectasis and/or consolidation. Underlying infection is possible.      CT-CTA NECK WITH & W/O-POST PROCESSING   Final Result      1.  CT angiogram of the neck within normal limits for age.   2.  Left internal carotid artery stenosis measuring greater than 50%.      CT-CTA HEAD WITH & W/O-POST PROCESS   Final Result      1.  CT angiogram of the [place] of [place] within normal limits.   2.  2.4 cm left thalamic intraparenchymal hematoma.      Based solely on CT findings, the brain injury guideline category is mBIG 3.      EDH   IVH   Displaced skull fx   SDH > 8mm   IPH > 8mm or multiple   SAH bi-hemispheric or > 3mm      The original BIG retrospective analysis found radiographic progression in 0% of BIG 1 patients and 2.6% BIG 2.      Findings were discussed with Dr. DANIEL PARKINSON on 3/15/2023 3:11 PM.         CT-HEAD W/O   Final Result      1.  Acute left thalamic intraparenchymal hematoma measuring 2.3 x 1.8 x 3.4 cm..   2.  Chronic microvascular ischemic changes and chronic right thalamic lacunar infarct.             Assessment/Plan  * Intraparenchymal hemorrhage of brain (HCC)- (present on admission)  Assessment & Plan  Affecting the left thalamus  Possibly hypertensive in origin  MRI without additional diagnosis or explanation  Strict blood pressure control  No anticoagulation for the time being  Might be able to resume Eliquis in 5 to 6 weeks as per neurology  Will need close outpatient follow-up with follow-up CT  PT/OT/SLP and rehab  Previously fully functional    Paroxysmal atrial fibrillation (HCC)  Assessment & Plan  Currently in a sinus rhythm, continue beta-blockade  Currently off  anticoagulation secondary to the patient's intra parenchymal hemorrhage  Monitor telemetry    Primary hypertension  Assessment & Plan  History of, strict blood pressure control in light of the patient's intraparenchymal hemorrhage    Hematemesis  Assessment & Plan  On presentation, hemoglobin fairly stable, continue acid blockade  Monitor hemoglobin  Consider outpatient GI follow-up if indicated      Acute urinary retention  Assessment & Plan  With a history of BPH, resume home medication regimen    Electrolyte abnormality  Assessment & Plan   closely, replace as indicated    Myocardial ischemia  Assessment & Plan  Likely type II injury, no complaints of chest pain or ischemic EKG changes      Acute pulmonary edema (HCC)  Assessment & Plan  Patient without further symptoms,  Monitor for oxygen needs      Plan  Continue diligent blood pressure control  Close neuro monitoring and assessment of deficits  PT OT, SLP  Determination of discharge level of care  Continue control heart rate, atrial fibrillation  Anticoagulation currently contraindicated as per neurology for the next 5 to 6 weeks  Will need outpatient CT follow-up  Patient confirmed DNR/DNI status  Patient might not be able to return to independent living, limited social support  Monitor hemoglobin closely, evaluate possibly outpatient for evidence of initial hematemesis  See orders  Medically complex high-risk patient    Thank you for consulting with us, we will follow along closely while the patient is hospitalized      Please note that this dictation was created using voice recognition software. I have made every reasonable attempt to correct obvious errors, but I expect that there are errors of grammar and possibly context that I did not discover before finalizing the note.

## 2023-03-18 NOTE — DOCUMENTATION QUERY
"                                                                         [facility]                                                                       Query Response Note      PATIENT:               RODERICK MILLS  ACCT #:                  [id number]  MRN:                     [id number]  :                      1935  ADMIT DATE:       3/15/2023 2:19 PM  DISCH DATE:          RESPONDING  PROVIDER #:        [id number]           QUERY TEXT:    Please clarify the relationship, if any, between left thalamic hemorrhage, hematemesis and oral anticoagulant.  NOTE:  If an appropriate response is not listed below, please respond with a new note.        The patient's Clinical Indicators include:  86yo with dx of atrial fibrillation on Eliquis, left thalamic hemorrhage associated with vasogenic edema, coffee ground emesis    03/15 Keyvani consult \"PMHx significant for Afib on Eliquis (last dose reportedly last night)\"  03/15 Gonda consult \"He was also noted to have some coffee-ground emesis in the ED\"    Risk factors: advanced age, oral anticoagulants, HTN, atrial fibrillation  Treatments: Kcentra, IVF, labwork, imaging, monitoring, specialty consultation    Contact me with questions.    Thank you,  NIKHIL Betancourt, CDI  [email or web address]  Options provided:   -- Left thalamic hemorrhage and hematemesis are due to/associated with use of oral anticoagulant)   -- Left thalamic hemorrhage and hematemesis are not due to/associated with use of anticoagulant   -- Other explanation:Other explanation-, please specify   -- Unable to determine      Query created by: Nadira Sanabria on 3/17/2023 5:58 AM    RESPONSE TEXT:    Left thalamic hemorrhage and hematemesis are due to/associated with use of oral anticoagulant)          Electronically signed by:  TIFFANIE SILVA MD 3/18/2023 4:13 PM              "

## 2023-03-18 NOTE — ASSESSMENT & PLAN NOTE
Currently in a sinus rhythm, continue beta-blockade  Increasing heart rates after metoprolol being held this morning for unclear reasons.  I have resumed patient's home metoprolol this afternoon.  Currently off anticoagulation secondary to the patient's intra parenchymal hemorrhage  Continue monitor telemetry

## 2023-03-18 NOTE — ASSESSMENT & PLAN NOTE
CT scan performed on 3/15/2023.    Most likely due to uncontrolled hypertension.  Amyloid without evidence of amyloidosis.  Resume apixaban in 5 to 6 weeks as per neurology recommendations.  Blood pressure goal less than 130/80.

## 2023-03-18 NOTE — PROGRESS NOTES
Neurology Progress Note  Neurohospitalist Service, [facility]    Referring Physician: Krunal Lipscomb M.D.    Chief Complaint   Patient presents with    Possible Stroke     LKW last night at 2100, was found on the floor next to his bed, no trauma noted, R sided deficits, AOx2, follows commands.        HPI: Refer to initial documented Neurology H&P, as detailed in the patient's chart.    Interval History 3/18: No new issues overnight    Review of systems: In addition to what is detailed in the HPI and/or updated in the interval history, all other systems reviewed and are negative.    Past Medical History:    has a past medical history of A-fib (HCC), BPH (benign prostatic hyperplasia), Cancer (HCC), Hypertension, Psychiatric problem (2022), and Stroke (HCC) (2018).    FHx:  family history includes No Known Problems in his father and mother.    SHx:   reports that he has never smoked. He has never used smokeless tobacco. He reports that he does not drink alcohol and does not use drugs.    Medications:    Current Facility-Administered Medications:     potassium chloride SA (Kdur) tablet 20 mEq, 20 mEq, Oral, BID, Cristal Caputo, A.P.R.N.    magnesium sulfate IVPB premix 2 g, 2 g, Intravenous, Once, Cristal Caputo A.P.R.N.    omeprazole (PRILOSEC) capsule 20 mg, 20 mg, Oral, BID, Cristal Caputo A.P.R.N., 20 mg at 03/18/23 0507    Respiratory Therapy Consult, , Nebulization, Continuous RT, Jeremy M Gonda, M.D.    NS infusion, , Intravenous, Continuous, Jeremy M Gonda, M.D., Last Rate: 80 mL/hr at 03/18/23 0324, New Bag at 03/18/23 0324    LORazepam (ATIVAN) injection 2 mg, 2 mg, Intravenous, Q5 MIN PRN, Jeremy M Gonda, M.D.    acetaminophen (Tylenol) tablet 650 mg, 650 mg, Oral, Q4HRS PRN **OR** acetaminophen (TYLENOL) suppository 650 mg, 650 mg, Rectal, Q4HRS PRN, Jeremy M Gonda, M.D.    ondansetron (ZOFRAN ODT) dispertab 4 mg, 4 mg, Oral, Q4HRS PRN **OR** ondansetron (ZOFRAN) syringe/vial injection 4 mg,  4 mg, Intravenous, Q4HRS PRN, Jeremy M Gonda, M.D., 4 mg at 03/15/23 2115    MD Alert...ICU Electrolyte Replacement per Pharmacy, , Other, PHARMACY TO DOSE, Jeremy M Gonda, M.D.    [Held by provider] metoprolol SR (TOPROL XL) tablet 50 mg, 50 mg, Oral, DAILY, Rigo Dillard M.D., 50 mg at 03/17/23 0542    losartan (COZAAR) tablet 75 mg, 75 mg, Oral, DAILY, Rigo Dillard M.D., 75 mg at 03/18/23 0507    simvastatin (ZOCOR) tablet 20 mg, 20 mg, Oral, Nightly, Rigo Dillard M.D., 20 mg at 03/17/23 2135    enalaprilat (Vasotec) injection 1.25 mg 1 mL, 1.25 mg, Intravenous, Q6HRS PRN, Jeremy M Gonda, M.D.    hydrALAZINE (APRESOLINE) injection 10-20 mg, 10-20 mg, Intravenous, Q4HRS PRN, Rehana L. Latona, 10 mg at 03/16/23 1814    labetalol (NORMODYNE/TRANDATE) injection 10-20 mg, 10-20 mg, Intravenous, Q4HRS PRN, Rehana L. Latona, 10 mg at 03/15/23 2019    Physical Examination:     Vitals:    03/18/23 0300 03/18/23 0400 03/18/23 0500 03/18/23 0600   BP: 130/57 127/63 122/55 111/57   Pulse: 98 75 90 65   Resp: 17 17 15 16   Temp:       TempSrc:       SpO2: 98% 97% 97% 98%   Weight:               NEUROLOGICAL EXAM:     Patient is awake and alert x4.  Cranials 2-12 are intact except for the right facial droop.  And dysarthria.  Not aphasic.  Motor examination he has sustained antigravity strength in the left arm and leg.  Minimal movement in the right arm and leg.  Decree sensation to the right compared to left.    Objective Data:    Labs:  Lab Results   Component Value Date/Time    PROTHROMBTM 15.7 (H) 03/15/2023 02:39 PM    INR 1.27 (H) 03/15/2023 02:39 PM      Lab Results   Component Value Date/Time    WBC 9.2 03/18/2023 03:15 AM    RBC 4.54 (L) 03/18/2023 03:15 AM    HEMOGLOBIN 13.3 (L) 03/18/2023 03:15 AM    HEMATOCRIT 39.2 (L) 03/18/2023 03:15 AM    MCV 86.3 03/18/2023 03:15 AM    MCH 29.3 03/18/2023 03:15 AM    MCHC 33.9 03/18/2023 03:15 AM    MPV 11.1 03/18/2023 03:15 AM    NEUTSPOLYS 72.10 (H) 03/18/2023  03:15 AM    LYMPHOCYTES 16.60 (L) 03/18/2023 03:15 AM    MONOCYTES 8.50 03/18/2023 03:15 AM    EOSINOPHILS 1.90 03/18/2023 03:15 AM    BASOPHILS 0.40 03/18/2023 03:15 AM      Lab Results   Component Value Date/Time    SODIUM 142 03/18/2023 03:15 AM    POTASSIUM 3.6 03/18/2023 03:15 AM    CHLORIDE 111 03/18/2023 03:15 AM    CO2 21 03/18/2023 03:15 AM    GLUCOSE 107 (H) 03/18/2023 03:15 AM    BUN 12 03/18/2023 03:15 AM    CREATININE 0.71 03/18/2023 03:15 AM      Lab Results   Component Value Date/Time    CHOLSTRLTOT 128 03/15/2023 02:39 PM    LDL 55 03/15/2023 02:39 PM    HDL 51 03/15/2023 02:39 PM    TRIGLYCERIDE 108 03/15/2023 02:39 PM       Lab Results   Component Value Date/Time    ALKPHOSPHAT 51 03/18/2023 03:15 AM    ASTSGOT 12 03/18/2023 03:15 AM    ALTSGPT 11 03/18/2023 03:15 AM    TBILIRUBIN 0.6 03/18/2023 03:15 AM        Imaging/Testing:  MR-BRAIN-W/O   Final Result         Left thalamic and left corona radiata hemorrhage with mild surrounding vasogenic edema. There is slight mass effect upon the left lateral ventricle without midline shift.      Remote infarcts in the right cerebellum, left kylee and right thalamus.      [id number] VIEW   Final Result      1.  No electronic device to preclude MRI imaging.   2.  RIGHT hip prosthesis in place.      EC-ECHOCARDIOGRAM COMPLETE W/O CONT   Final Result      CT-HEAD W/O   Final Result         1.  Left thalamic and basal ganglia hemorrhage, overall appears stable since prior study.   2.  Nonspecific white matter changes, commonly associated with small vessel ischemic disease.  Associated mild cerebral atrophy is noted.   3.  Chronic appearing sphenoid sinusitis changes      DX-CHEST-PORTABLE (1 VIEW)   Final Result      1.  Mild central pulmonary vascular congestion.   2.  Stable mild enlargement of the cardiomediastinal silhouette.   3.  Bilateral basilar atelectasis and/or consolidation. Underlying infection is possible.      CT-CTA NECK WITH & W/O-POST  PROCESSING   Final Result      1.  CT angiogram of the neck within normal limits for age.   2.  Left internal carotid artery stenosis measuring greater than 50%.      CT-CTA HEAD WITH & W/O-POST PROCESS   Final Result      1.  CT angiogram of the [place] of [place] within normal limits.   2.  2.4 cm left thalamic intraparenchymal hematoma.      Based solely on CT findings, the brain injury guideline category is mBIG 3.      EDH   IVH   Displaced skull fx   SDH > 8mm   IPH > 8mm or multiple   SAH bi-hemispheric or > 3mm      The original BIG retrospective analysis found radiographic progression in 0% of BIG 1 patients and 2.6% BIG 2.      Findings were discussed with Dr. DANIEL PARKINSON on 3/15/2023 3:11 PM.         CT-HEAD W/O   Final Result      1.  Acute left thalamic intraparenchymal hematoma measuring 2.3 x 1.8 x 3.4 cm..   2.  Chronic microvascular ischemic changes and chronic right thalamic lacunar infarct.               Assessment and Plan:    85-year-old male with a past medical history of A-fib was on Eliquis.  Presenting with a left basal ganglia hemorrhagic infarct.  The etiology of the hemorrhage is unknown but presumed to be due to hypertension.  Patient has not been hypertension here.  The MRI brain did not show any signs of amyloid.  CTA head and neck was also unremarkable for aneurysm.  Plan to continue with blood pressure control.  I would recommend restarting the Eliquis and 5 to 6 weeks.  Okay to downgrade to the floors.    Plan:  1.  Every 4 hours neurochecks  2.  No antiplatelets or anticoagulation  3.  Consider restarting Eliquis in 5 to 6 weeks if repeat imaging is unremarkable.  4.  Maintain euvolemia, normothermia  5.  Maintain normal sodium levels.  6.  PT/OT/speech therapy  7.  Okay to start chemical DVT prophylaxis.        Spent 56 minutes reviewing images for MRI, CT scans and the notes of the past few days.  Going over the care plan with the primary team to discharge from the ICU.   Continue with PT and speech therapies.    This chart was partially generated using voice recognition technology and sound alike word replacement may be present, best efforts were made to make the chart accurate.    Efren Logan MD  Board Certified Neurology, ABPN  t) [phone]

## 2023-03-18 NOTE — DISCHARGE PLANNING
[facility] Transitional [facility] Coordination    Physiatry to consult per protocol.   Left thalamic hemorrhage with associated vasogenic edema.

## 2023-03-18 NOTE — CONSULTS
Physical Medicine and Rehabilitation Consultation              Date of initial consultation: 3/18/2023  Requesting provider: ordered by Jeremy M Gonda, M.D. at 03/18/23 1100  Consulting provider: Ladan Diaz D.O.  Reason for consultation: assess for acute inpatient rehab appropriateness  LOS: 3 Day(s)    Chief complaint: R sided weakness     HPI: The patient is a 87 y.o. male with a past medical history of afib on eliquis, HTN, and HLD;  who presented on 3/15/2023  2:19 PM after being found down with R sided weakness. Per documentation, patient's neighbor found him on the floor with R sided weakness and AMS. Upon eval in the ED, CT head obtained showed a left thalamic hemorrhage with associated vasogenic edema, CTA head and neck without obvious vascular abnormalities or aneurysm. Patient was administered K centra for AC reversal. Neurology consulted, etiology of hemorrhagic stroke suspected secondary to HTN. MRI Brain obtained showed left thalamic and left corona radiata hemorrhage with mild surrounding vasogenic edema with slight mass effect upon the left lateral ventricle without midline shift, no amyloid noted. Goal for -140, and patient's AC will not be started for 6 weeks, cleared to start DVT ppx. Patient is on metoprolol for rate control for patients afib.  Echo obtained showed EF of 65%. Patient's hospital course has been complicated by an episode of hematemesis, which has resolved, hgb improved to 13.3. Patient's R sided weakness has been improving in the last 24hrs.     Patient seen and examined at beds, reports he feels well. Denies HA, lightheadedness, SOB, CP, abdominal pain, or changes in numbness/tingling/weakness. Continues to have decreased sensation on the R side, but has new ability to lift his R leg. Patient is tells me he has a sister that lives locally, but she wouldn't be able to provide physical assistance, she is currently caring for patient's nieces and nephews.       Social  "Hx:  Patient lives alone in a 1 story apartment, has a \"helper\" for transportation and house keeping, and may potentially have access to care giving services through the [facility].   4 SHON, but also has access to an elevator   At prior level of function MOD I for mobility but required assistance for IADLS       Tobacco: Denies   Alcohol: Denies   Drugs: Denies     THERAPY:  Restrictions: Fall Risk, Swallow Precautions   PT: Functional mobility   3/17 MaxA/Total A bed mobility, Max A sit to stand,     OT: ADLs  3/16     SLP:   3/16 soft and bite sized solids with thin liquids     IMAGING:  MR-BRAIN-W/O   Final Result           Left thalamic and left corona radiata hemorrhage with mild surrounding vasogenic edema. There is slight mass effect upon the left lateral ventricle without midline shift.       Remote infarcts in the right cerebellum, left kylee and right thalamus.       [id number] VIEW   Final Result       1.  No electronic device to preclude MRI imaging.   2.  RIGHT hip prosthesis in place.       EC-ECHOCARDIOGRAM COMPLETE W/O CONT   Final Result       CT-HEAD W/O   Final Result           1.  Left thalamic and basal ganglia hemorrhage, overall appears stable since prior study.   2.  Nonspecific white matter changes, commonly associated with small vessel ischemic disease.  Associated mild cerebral atrophy is noted.   3.  Chronic appearing sphenoid sinusitis changes       DX-CHEST-PORTABLE (1 VIEW)   Final Result       1.  Mild central pulmonary vascular congestion.   2.  Stable mild enlargement of the cardiomediastinal silhouette.   3.  Bilateral basilar atelectasis and/or consolidation. Underlying infection is possible.       CT-CTA NECK WITH & W/O-POST PROCESSING   Final Result       1.  CT angiogram of the neck within normal limits for age.   2.  Left internal carotid artery stenosis measuring greater than 50%.       CT-CTA HEAD WITH & W/O-POST PROCESS   Final Result       1.  CT angiogram of the [place] of [place] " within normal limits.   2.  2.4 cm left thalamic intraparenchymal hematoma.       Based solely on CT findings, the brain injury guideline category is mBIG 3.       EDH   IVH   Displaced skull fx   SDH > 8mm   IPH > 8mm or multiple   SAH bi-hemispheric or > 3mm       The original BIG retrospective analysis found radiographic progression in 0% of BIG 1 patients and 2.6% BIG 2.       Findings were discussed with Dr. DANIEL PARKINSON on 3/15/2023 3:11 PM.           CT-HEAD W/O   Final Result       1.  Acute left thalamic intraparenchymal hematoma measuring 2.3 x 1.8 x 3.4 cm..   2.  Chronic microvascular ischemic changes and chronic right thalamic lacunar infarct.       PROCEDURES:  None     PMH:  Past Medical History:   Diagnosis Date    A-fib (HCC)     Acute pulmonary edema (HCC) 3/15/2023    BPH (benign prostatic hyperplasia)     Cancer (HCC)     Hypertension     Psychiatric problem 2022    Anxiety, depression. Did MRI to r/o biological cause. No tx provided.    Stroke (HCC) 2018    minor stroke, patient doesn't remember details       PSH:  Past Surgical History:   Procedure Laterality Date    HIP REPLACEMENT, TOTAL Right 2004       FHX:  Family History   Problem Relation Age of Onset    No Known Problems Mother     No Known Problems Father        Medications:  Current Facility-Administered Medications   Medication Dose    potassium chloride SA (Kdur) tablet 20 mEq  20 mEq    metoprolol SR (TOPROL XL) tablet 25 mg  25 mg    finasteride (PROSCAR) tablet 5 mg  5 mg    tamsulosin (FLOMAX) capsule 0.4 mg  0.4 mg    enoxaparin (Lovenox) inj 40 mg  40 mg    omeprazole (PRILOSEC) capsule 20 mg  20 mg    Respiratory Therapy Consult      LORazepam (ATIVAN) injection 2 mg  2 mg    acetaminophen (Tylenol) tablet 650 mg  650 mg    Or    acetaminophen (TYLENOL) suppository 650 mg  650 mg    ondansetron (ZOFRAN ODT) dispertab 4 mg  4 mg    Or    ondansetron (ZOFRAN) syringe/vial injection 4 mg  4 mg    MD Alert...ICU Electrolyte  Replacement per Pharmacy      losartan (COZAAR) tablet 75 mg  75 mg    simvastatin (ZOCOR) tablet 20 mg  20 mg    enalaprilat (Vasotec) injection 1.25 mg 1 mL  1.25 mg    hydrALAZINE (APRESOLINE) injection 10-20 mg  10-20 mg    labetalol (NORMODYNE/TRANDATE) injection 10-20 mg  10-20 mg       Allergies:  No Known Allergies    Physical Exam:  Vitals: /57   Pulse 65   Temp 36.9 °C (98.4 °F) (Temporal)   Resp 16   Wt 72 kg (158 lb 11.7 oz)   SpO2 98%   Gen: NAD, laying comfortably in bed, nursing in room   Head:  NC/AT  Eyes/ Nose/ Mouth: PERRLA, moist mucous membranes  Cardio: RRR, good distal perfusion, warm extremities  Pulm: normal respiratory effort, no cyanosis, breathing comfortably on RA   Abd: Soft NTND, negative borborygmi   Ext: No peripheral edema. No calf tenderness. No clubbing.    Mental status:  A&Ox4 (person, place, date, situation) answers questions appropriately follows commands  Speech: fluent, no aphasia or dysarthria    CRANIAL NERVES:  2,3: visual acuity grossly intact, PERRL  3,4,6: EOMI bilaterally, no nystagmus or diplopia  5: sensation intact to light touch bilaterally and symmetric  7: no facial asymmetry  8: hearing grossly intact  11: SCM/Trapezius strength 5/5 bilaterally      Motor:      Upper Extremity  Myotome R L   Shoulder flexion C5 1/5 5/5   Elbow flexion C5 2/5 5/5   Wrist extension C6 0/5 5/5   Elbow extension C7 2/5 5/5   Finger flexion C8 0/5 5/5   Finger abduction T1 0/5 5/5     Lower Extremity Myotome R L   Hip flexion L2 3/5 5/5   Knee extension L3 3/5 5/5   Ankle dorsiflexion L4 0/5 5/5   Toe extension L5 0/5 5/5   Ankle plantarflexion S1 0/5 5/5       Sensory:   intact to light touch through out left side, decreased sensation to right side     DTRs: 1+ in bilateral  biceps  No clonus at bilateral ankles  Negative Ball b/l     Tone: no spasticity noted, no cogwheeling noted    Coordination:   intact finger to nose LUE   intact fine motor with fingers LUE        Labs: Reviewed and significant for   Recent Labs     03/15/23  1439 03/15/23  2025 03/16/23  0350 03/17/23  0405 03/18/23  0315   RBC 5.65  --  4.85 4.52* 4.54*   HEMOGLOBIN 16.3   < > 14.0 12.9* 13.3*   HEMATOCRIT 49.2   < > 41.6* 40.2* 39.2*   PLATELETCT 246  --  250 223 173   PROTHROMBTM 15.7*  --   --   --   --    APTT 25.4  --   --   --   --    INR 1.27*  --   --   --   --     < > = values in this interval not displayed.     Recent Labs     03/16/23 0350 03/17/23 0405 03/18/23 0315   SODIUM 143 142 142   POTASSIUM 3.6 4.0 3.6   CHLORIDE 106 109 111   CO2 22 22 21   GLUCOSE 122* 109* 107*   BUN 15 16 12   CREATININE 0.90 0.75 0.71   CALCIUM 8.2* 7.7* 7.4*     Recent Results (from the past 24 hour(s))   CBC with Differential    Collection Time: 03/18/23  3:15 AM   Result Value Ref Range    WBC 9.2 4.8 - 10.8 K/uL    RBC 4.54 (L) 4.70 - 6.10 M/uL    Hemoglobin 13.3 (L) 14.0 - 18.0 g/dL    Hematocrit 39.2 (L) 42.0 - 52.0 %    MCV 86.3 81.4 - 97.8 fL    MCH 29.3 27.0 - 33.0 pg    MCHC 33.9 33.7 - 35.3 g/dL    RDW 43.5 35.9 - 50.0 fL    Platelet Count 173 164 - 446 K/uL    MPV 11.1 9.0 - 12.9 fL    Neutrophils-Polys 72.10 (H) 44.00 - 72.00 %    Lymphocytes 16.60 (L) 22.00 - 41.00 %    Monocytes 8.50 0.00 - 13.40 %    Eosinophils 1.90 0.00 - 6.90 %    Basophils 0.40 0.00 - 1.80 %    Immature Granulocytes 0.50 0.00 - 0.90 %    Nucleated RBC 0.00 /100 WBC    Neutrophils (Absolute) 6.60 1.82 - 7.42 K/uL    Lymphs (Absolute) 1.52 1.00 - 4.80 K/uL    Monos (Absolute) 0.78 0.00 - 0.85 K/uL    Eos (Absolute) 0.17 0.00 - 0.51 K/uL    Baso (Absolute) 0.04 0.00 - 0.12 K/uL    Immature Granulocytes (abs) 0.05 0.00 - 0.11 K/uL    NRBC (Absolute) 0.00 K/uL   Complete Metabolic Panel    Collection Time: 03/18/23  3:15 AM   Result Value Ref Range    Sodium 142 135 - 145 mmol/L    Potassium 3.6 3.6 - 5.5 mmol/L    Chloride 111 96 - 112 mmol/L    Co2 21 20 - 33 mmol/L    Anion Gap 10.0 7.0 - 16.0    Glucose 107 (H) 65 - 99  mg/dL    Bun 12 8 - 22 mg/dL    Creatinine 0.71 0.50 - 1.40 mg/dL    Calcium 7.4 (L) 8.5 - 10.5 mg/dL    AST(SGOT) 12 12 - 45 U/L    ALT(SGPT) 11 2 - 50 U/L    Alkaline Phosphatase 51 30 - 99 U/L    Total Bilirubin 0.6 0.1 - 1.5 mg/dL    Albumin 2.9 (L) 3.2 - 4.9 g/dL    Total Protein 5.4 (L) 6.0 - 8.2 g/dL    Globulin 2.5 1.9 - 3.5 g/dL    A-G Ratio 1.2 g/dL   Magnesium    Collection Time: 03/18/23  3:15 AM   Result Value Ref Range    Magnesium 1.9 1.5 - 2.5 mg/dL   PHOSPHORUS    Collection Time: 03/18/23  3:15 AM   Result Value Ref Range    Phosphorus 2.6 2.5 - 4.5 mg/dL   CORRECTED CALCIUM    Collection Time: 03/18/23  3:15 AM   Result Value Ref Range    Correct Calcium 8.3 (L) 8.5 - 10.5 mg/dL   ESTIMATED GFR    Collection Time: 03/18/23  3:15 AM   Result Value Ref Range    GFR (CKD-EPI) 89 >60 mL/min/1.73 m 2         ASSESSMENT:  Patient is a 87 y.o. male admitted with Right sided weakness     [facility] Code / Diagnosis to Support: 0001.2 - Stroke: Right Body Involvement (Left Brain)    Rehabilitation: Impaired ADLs and mobility  Patient is a fair candidate for inpatient rehab based on needs for PT, OT, and speech therapy, however patient lives alone is DC support is unclear at this time. Patient may have access to care giving services through his [facility] insurance, TBD. See dispo details below.     Barriers to transfer include: Insurance authorization, TCCs to verify disposition, medical clearance and bed availability     Additional Recommendations:  Left thalamic hemorrhagic stroke   - originally presented with R sided weakness and AMS   - CT head with evidence of acute left thalamic intraparenchymal hematoma measuring 2.3 x 1.8 x 3.4 cm  - neuro consulted, hemorrhage suspected secdonary to HTN   - MRI brain with evidence of left thalamic and left corona radiata hemorrhage with mild surround vasogenic edema   - not cleared to start AC for 5-6 weks   - continue with PT/OT/SLP, patient has new antigravity movement in RLE  and new movement in RUE when gravity eliminated; anticipate he will have functional gains at next therapy session     Paroxysmal atrial fibrillation   - previously  on eliquis in outpatient setting   - eliquis held due to IPH   - rate controlled on metoprolol     HTN   - BP controlled, goal SBP < 140   - on home dose losartan   - continue metoprolol     Urinary retention   - history of BPH, now with neurogenic bladder at CVA   - tabares in place, required coude catheter   - continue with flomax     Dispo:  - patient is currently functioning below their level of baseline, recommend post acute rehab  - recommend IRF level therapy with 3hr of therapy 5 days per week  IF patient has DC support from sister AND/OR access to caregiving services through his [facility] insurance   - IF patient has no support at time of DC, will need SNF for prolonged rehab course, currently patient is unlikely to become MOD I in a 14 day intensive rehab course   - PM&R will continue to follow         Medical Complexity:  Left thalamic hemorrhagic stroke   Paroxysmal atrial fibrillation   HTN   Urinary retention   Impaired mobility and ADLs       DVT PPX: Lovenox       Thank you for allowing us to participate in the care of this patient.     Patient was seen for 87 minutes on unit/floor of which > 50% of time was spent on counseling and coordination of care regarding the above, including prognosis, risk reduction, benefits of treatment, and options for next stage of care.    Ladan Diaz D.O.   Physical Medicine and Rehabilitation     Please note that this dictation was created using voice recognition software. I have made every reasonable attempt to correct obvious errors, but there may be errors of grammar and possibly content that I did not discover before finalizing the note.

## 2023-03-18 NOTE — ASSESSMENT & PLAN NOTE
On presentation.  Resolved.  Hemoglobin remaining stable.    Continue to monitor for now  Continue omeprazole

## 2023-03-19 PROBLEM — I67.9 FLACCID HEMIPLEGIA OF RIGHT DOMINANT SIDE DUE TO CEREBROVASCULAR DISEASE (HCC): Status: ACTIVE | Noted: 2023-01-01

## 2023-03-19 PROBLEM — Z66 DNR (DO NOT RESUSCITATE): Status: ACTIVE | Noted: 2023-01-01

## 2023-03-19 PROBLEM — G93.6 VASOGENIC EDEMA (HCC): Status: ACTIVE | Noted: 2023-01-01

## 2023-03-19 PROBLEM — G81.01 FLACCID HEMIPLEGIA OF RIGHT DOMINANT SIDE DUE TO CEREBROVASCULAR DISEASE (HCC): Status: ACTIVE | Noted: 2023-01-01

## 2023-03-19 PROBLEM — R79.89 ELEVATED BRAIN NATRIURETIC PEPTIDE (BNP) LEVEL: Status: ACTIVE | Noted: 2023-01-01

## 2023-03-19 PROBLEM — D64.9 NORMOCYTIC ANEMIA: Status: ACTIVE | Noted: 2023-01-01

## 2023-03-19 NOTE — PROGRESS NOTES
Physical Medicine and Rehabilitation Consultation              Date of initial consultation: 3/18/2023  Requesting provider: ordered by Jeremy M Gonda, M.D. at 03/18/23 1100  Consulting provider: Ladan Diaz D.O.  Reason for consultation: assess for acute inpatient rehab appropriateness  LOS: 4 Day(s)    Chief complaint: R sided weakness     HPI: The patient is a 87 y.o. male with a past medical history of afib on eliquis, HTN, and HLD;  who presented on 3/15/2023  2:19 PM after being found down with R sided weakness. Per documentation, patient's neighbor found him on the floor with R sided weakness and AMS. Upon eval in the ED, CT head obtained showed a left thalamic hemorrhage with associated vasogenic edema, CTA head and neck without obvious vascular abnormalities or aneurysm. Patient was administered K centra for AC reversal. Neurology consulted, etiology of hemorrhagic stroke suspected secondary to HTN. MRI Brain obtained showed left thalamic and left corona radiata hemorrhage with mild surrounding vasogenic edema with slight mass effect upon the left lateral ventricle without midline shift, no amyloid noted. Goal for -140, and patient's AC will not be started for 6 weeks, cleared to start DVT ppx. Patient is on metoprolol for rate control for patients afib.  Echo obtained showed EF of 65%. Patient's hospital course has been complicated by an episode of hematemesis, which has resolved, hgb improved to 13.3. Patient's R sided weakness has been improving in the last 24hrs.     3/18: Patient seen and examined at bedside, reports he feels well. Denies HA, lightheadedness, SOB, CP, abdominal pain, or changes in numbness/tingling/weakness. Continues to have decreased sensation on the R side, but has new ability to lift his R leg. Patient is tells me he has a sister that lives locally, but she wouldn't be able to provide physical assistance, she is currently caring for patient's nieces and nephews.  "    3/19: Patient seen and examined in room, patient tells me he was feeling depressed this morning, but then had breakfast and felt better. Reports fatigue, otherwise no changes in numbness/tingling or weakness. Reported he slept ok at night.       Social Hx:  Patient lives alone in a 1 story apartment, has a \"helper\" for transportation and house keeping, and may potentially have access to care giving services through [facility].   4 SHON, but also has access to an elevator   At prior level of function MOD I for mobility but required assistance for IADLS       Tobacco: Denies   Alcohol: Denies   Drugs: Denies     THERAPY:  Restrictions: Fall Risk, Swallow Precautions   PT: Functional mobility   3/17 MaxA/Total A bed mobility, Max A sit to stand,     OT: ADLs  3/16  Total A lower body dressing, Max A sit to stand     SLP:   3/16 soft and bite sized solids with thin liquids     IMAGING:  MR-BRAIN-W/O   Final Result           Left thalamic and left corona radiata hemorrhage with mild surrounding vasogenic edema. There is slight mass effect upon the left lateral ventricle without midline shift.       Remote infarcts in the right cerebellum, left kylee and right thalamus.       [id number] VIEW   Final Result       1.  No electronic device to preclude MRI imaging.   2.  RIGHT hip prosthesis in place.       EC-ECHOCARDIOGRAM COMPLETE W/O CONT   Final Result       CT-HEAD W/O   Final Result           1.  Left thalamic and basal ganglia hemorrhage, overall appears stable since prior study.   2.  Nonspecific white matter changes, commonly associated with small vessel ischemic disease.  Associated mild cerebral atrophy is noted.   3.  Chronic appearing sphenoid sinusitis changes       DX-CHEST-PORTABLE (1 VIEW)   Final Result       1.  Mild central pulmonary vascular congestion.   2.  Stable mild enlargement of the cardiomediastinal silhouette.   3.  Bilateral basilar atelectasis and/or consolidation. Underlying infection is " possible.       CT-CTA NECK WITH & W/O-POST PROCESSING   Final Result       1.  CT angiogram of the neck within normal limits for age.   2.  Left internal carotid artery stenosis measuring greater than 50%.       CT-CTA HEAD WITH & W/O-POST PROCESS   Final Result       1.  CT angiogram of the [place] within normal limits.   2.  2.4 cm left thalamic intraparenchymal hematoma.       Based solely on CT findings, the brain injury guideline category is mBIG 3.       EDH   IVH   Displaced skull fx   SDH > 8mm   IPH > 8mm or multiple   SAH bi-hemispheric or > 3mm       The original BIG retrospective analysis found radiographic progression in 0% of BIG 1 patients and 2.6% BIG 2.       Findings were discussed with Dr. DANIEL PARKINSON on 3/15/2023 3:11 PM.           CT-HEAD W/O   Final Result       1.  Acute left thalamic intraparenchymal hematoma measuring 2.3 x 1.8 x 3.4 cm..   2.  Chronic microvascular ischemic changes and chronic right thalamic lacunar infarct.       PROCEDURES:  None     PMH:  Past Medical History:   Diagnosis Date    A-fib (HCC)     Acute pulmonary edema (HCC) 3/15/2023    BPH (benign prostatic hyperplasia)     Cancer (HCC)     Hypertension     Psychiatric problem 2022    Anxiety, depression. Did MRI to r/o biological cause. No tx provided.    Stroke (HCC) 2018    minor stroke, patient doesn't remember details       PSH:  Past Surgical History:   Procedure Laterality Date    HIP REPLACEMENT, TOTAL Right 2004       FHX:  Family History   Problem Relation Age of Onset    No Known Problems Mother     No Known Problems Father        Medications:  Current Facility-Administered Medications   Medication Dose    metoprolol SR (TOPROL XL) tablet 25 mg  25 mg    finasteride (PROSCAR) tablet 5 mg  5 mg    tamsulosin (FLOMAX) capsule 0.4 mg  0.4 mg    enoxaparin (Lovenox) inj 40 mg  40 mg    omeprazole (PRILOSEC) capsule 20 mg  20 mg    Respiratory Therapy Consult      LORazepam (ATIVAN) injection 2 mg  2  mg    acetaminophen (Tylenol) tablet 650 mg  650 mg    Or    acetaminophen (TYLENOL) suppository 650 mg  650 mg    ondansetron (ZOFRAN ODT) dispertab 4 mg  4 mg    Or    ondansetron (ZOFRAN) syringe/vial injection 4 mg  4 mg    losartan (COZAAR) tablet 75 mg  75 mg    simvastatin (ZOCOR) tablet 20 mg  20 mg    enalaprilat (Vasotec) injection 1.25 mg 1 mL  1.25 mg    hydrALAZINE (APRESOLINE) injection 10-20 mg  10-20 mg    labetalol (NORMODYNE/TRANDATE) injection 10-20 mg  10-20 mg       Allergies:  No Known Allergies    Physical Exam:  Vitals: /69   Pulse 86   Temp 36.1 °C (96.9 °F) (Temporal)   Resp 17   Wt 72 kg (158 lb 11.7 oz)   SpO2 97%   Gen: NAD, laying comfortably in bed, nursing in room   Head:  NC/AT  Eyes/ Nose/ Mouth: PERRLA, moist mucous membranes  Cardio: RRR, good distal perfusion, warm extremities  Pulm: normal respiratory effort, no cyanosis, breathing comfortably on RA   Abd: Soft NTND, negative borborygmi   Ext: No peripheral edema. No calf tenderness. No clubbing.    Mental status:  A&Ox4 (person, place, date, situation) answers questions appropriately follows commands  Speech: fluent, no aphasia or dysarthria    CRANIAL NERVES:  2,3: visual acuity grossly intact, PERRL  3,4,6: EOMI bilaterally, no nystagmus or diplopia  5: sensation intact to light touch bilaterally and symmetric  7: no facial asymmetry  8: hearing grossly intact  11: SCM/Trapezius strength 5/5 bilaterally      Motor:      Upper Extremity  Myotome R L   Shoulder flexion C5 1/5 5/5   Elbow flexion C5 1/5 5/5   Wrist extension C6 0/5 5/5   Elbow extension C7 1/5 5/5   Finger flexion C8 0/5 5/5   Finger abduction T1 0/5 5/5     Lower Extremity Myotome R L   Hip flexion L2 2/5 5/5   Knee extension L3 2/5 5/5   Ankle dorsiflexion L4 0/5 5/5   Toe extension L5 0/5 5/5   Ankle plantarflexion S1 0/5 5/5       Sensory:   intact to light touch through out left side, decreased sensation to right side     DTRs: 1+ in bilateral   biceps  No clonus at bilateral ankles  Negative Ball b/l     Tone: no spasticity noted, no cogwheeling noted    Coordination:   intact finger to nose LUE   intact fine motor with fingers LUE       Labs: Reviewed and significant for   Recent Labs     23  013   RBC 4.52* 4.54* 4.60*   HEMOGLOBIN 12.9* 13.3* 13.3*   HEMATOCRIT 40.2* 39.2* 40.1*   PLATELETCT 223 173 227       Recent Labs     23  013   SODIUM 142 142 141   POTASSIUM 4.0 3.6 3.8   CHLORIDE 109 111 108   CO2 22 21 22   GLUCOSE 109* 107* 108*   BUN 16 12 10   CREATININE 0.75 0.71 0.79   CALCIUM 7.7* 7.4* 7.6*       Recent Results (from the past 24 hour(s))   EKG    Collection Time: 23  8:45 PM   Result Value Ref Range    Report       [facility]    Test Date:  2023  Pt Name:    RODERICK MILLS                 Department: [facility]  MRN:        [id number]                      Room:       [facility]  Gender:     Male                         Technician: JOHNY  :        1935                   Requested By:SYLVIE JEFF  Order #:    [id number]                    Reading MD: Amie Booker MD    Measurements  Intervals                                Axis  Rate:       115                          P:  TN:                                      QRS:        -6  QRSD:       88                           T:          70  QT:         331  QTc:        458    Interpretive Statements  Atrial fibrillation  Minimal ST depression, lateral leads  Compared to ECG 03/15/2023 16:44:34  ST (T wave) deviation now present  Sinus rhythm no longer present  Short TN interval no longer present  Electronically Signed On 3- 10:04:59 PDT by Amie Booker MD     CBC WITHOUT DIFFERENTIAL    Collection Time: 23  1:34 AM   Result Value Ref Range    WBC 7.0 4.8 - 10.8 K/uL    RBC 4.60 (L) 4.70 - 6.10 M/uL    Hemoglobin 13.3 (L) 14.0 - 18.0 g/dL    Hematocrit 40.1 (L) 42.0 - 52.0 %    MCV 87.2  81.4 - 97.8 fL    MCH 28.9 27.0 - 33.0 pg    MCHC 33.2 (L) 33.7 - 35.3 g/dL    RDW 42.8 35.9 - 50.0 fL    Platelet Count 227 164 - 446 K/uL    MPV 10.4 9.0 - 12.9 fL   Basic Metabolic Panel    Collection Time: 03/19/23  1:34 AM   Result Value Ref Range    Sodium 141 135 - 145 mmol/L    Potassium 3.8 3.6 - 5.5 mmol/L    Chloride 108 96 - 112 mmol/L    Co2 22 20 - 33 mmol/L    Glucose 108 (H) 65 - 99 mg/dL    Bun 10 8 - 22 mg/dL    Creatinine 0.79 0.50 - 1.40 mg/dL    Calcium 7.6 (L) 8.5 - 10.5 mg/dL    Anion Gap 11.0 7.0 - 16.0   MAGNESIUM    Collection Time: 03/19/23  1:34 AM   Result Value Ref Range    Magnesium 2.1 1.5 - 2.5 mg/dL   PHOSPHORUS    Collection Time: 03/19/23  1:34 AM   Result Value Ref Range    Phosphorus 2.6 2.5 - 4.5 mg/dL   proBrain Natriuretic Peptide, NT    Collection Time: 03/19/23  1:34 AM   Result Value Ref Range    NT-proBNP 1829 (H) 0 - 125 pg/mL   ESTIMATED GFR    Collection Time: 03/19/23  1:34 AM   Result Value Ref Range    GFR (CKD-EPI) 86 >60 mL/min/1.73 m 2         ASSESSMENT:  Patient is a 87 y.o. male admitted with Right sided weakness     [facility] Code / Diagnosis to Support: 0001.2 - Stroke: Right Body Involvement (Left Brain)    Rehabilitation: Impaired ADLs and mobility  Patient is a fair candidate for inpatient rehab based on needs for PT, OT, and speech therapy, however patient lives alone is DC support is unclear at this time. Patient may have access to care giving services through his [facility] insurance, TBD. See dispo details below.     Barriers to transfer include: Insurance authorization, TCCs to verify disposition, medical clearance and bed availability     Additional Recommendations:  Left thalamic hemorrhagic stroke   - originally presented with R sided weakness and AMS   - CT head with evidence of acute left thalamic intraparenchymal hematoma measuring 2.3 x 1.8 x 3.4 cm  - neuro consulted, hemorrhage suspected secdonary to HTN   - MRI brain with evidence of left thalamic and  left corona radiata hemorrhage with mild surround vasogenic edema   - not cleared to start AC for 5-6 weks   - continue with PT/OT/SLP, will continue to be limited by right sided weakness, now with fatigue that may impair his tolerance for therapy, will continue to likely need caregiving at home, unclear level of help he will have at DC     Paroxysmal atrial fibrillation   - previously  on eliquis in outpatient setting   - eliquis held due to IPH   - rate controlled on metoprolol     HTN   - BP controlled, goal SBP < 140   - on home dose losartan   - continue metoprolol     Urinary retention   - history of BPH, now with neurogenic bladder at CVA   - tabares in place, required coude catheter   - continue with flomax     Dispo:  - patient is currently functioning below their level of baseline, recommend post acute rehab  - recommend IRF level therapy with 3hr of therapy 5 days per week  IF patient has DC support from sister AND/OR access to caregiving services through his [facility] insurance   - IF patient has no support at time of DC, will need SNF for prolonged rehab course, currently patient is unlikely to become MOD I in a 14 day intensive rehab course   - PM&R will continue to follow         Medical Complexity:  Left thalamic hemorrhagic stroke   Paroxysmal atrial fibrillation   HTN   Urinary retention   Impaired mobility and ADLs       DVT PPX: Lovenox       Thank you for allowing us to participate in the care of this patient.     Patient was seen for 27   minutes on unit/floor of which > 50% of time was spent on counseling and coordination of care regarding the above, including prognosis, risk reduction, benefits of treatment, and options for next stage of care.    Ladan Diaz D.O.   Physical Medicine and Rehabilitation     Please note that this dictation was created using voice recognition software. I have made every reasonable attempt to correct obvious errors, but there may be errors of grammar and possibly  content that I did not discover before finalizing the note.

## 2023-03-19 NOTE — DISCHARGE PLANNING
Received choice form @: [id number]  Agency/Facility name: [facility]  Sent referral per choice form @: [id number]  Referral sent to all [facility]

## 2023-03-19 NOTE — CARE PLAN
The patient is Stable - Low risk of patient condition declining or worsening    Shift Goals  Clinical Goals: stable neuro, adequate intake  Patient Goals: less BM  Family Goals: jay    Progress made toward(s) clinical / shift goals:  Patient ate 50% of breakfast and a afternoon snack.       Patient is not progressing towards the following goals:      Problem: Bowel Elimination  Goal: Establish and maintain regular bowel function  Outcome: Not Progressing  Flowsheets (Taken 3/18/2023 1400 by Chapincito Alfaro, Student)  Last BM: 03/18/23  Note: Patient experiencing diarrhea.

## 2023-03-19 NOTE — CARE PLAN
The patient is Stable - Low risk of patient condition declining or worsening      Neuro checks stable. SCD's placed. Q2 turns. Sepsis alert for tachycardia, MD aware, no new orders. Tele on; afib. Ate 50% of dinner, 1:1 supervision. Call light in reach. Calls appropriately.

## 2023-03-19 NOTE — PROGRESS NOTES
Hospital Medicine Daily Progress Note    Date of Service  3/19/2023    Chief Complaint  Alireza Hernandez is a 87 y.o. male admitted 3/15/2023 with right-sided weakness due to left thalamic hypertensive intraparenchymal hemorrhage.    Hospital Course  Alireza Hernandez is a 87-year-old [employer]  presented on 3/15/2023 with right-sided weakness.  This is a pleasant gentleman with a history of hypertension, chronic atrial fibrillation, chronic anticoagulation on apixaban, dyslipidemia, GERD.  Patient was brought to the emergency room after he was found on the floor next to his bed.  He was noted to have right-sided deficits.  He was alert and oriented x2 initially.  He was noted to be outside the window for alteplase.  He had a NIH score score of 14.  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  CTA of the head and neck showed no obvious vascular malformations or aneurysm.  He was subsequently admitted to the intensive care unit for close neurological monitoring.  He received Kcentra was also started on proton pump inhibitor due to concern for possible GI bleeding.  Neurology was consulted and felt that the stroke was most likely due to uncontrolled hypertension..  Recommendation was to hold anticoagulation for 5 to 6 weeks.  Patient underwent an MRI of the brain, which showed left thalamic and left corona radiata hemorrhage with mild surrounding vasogenic edema with slight mass effect on the left lateral ventricle we will follow-up midline shift.  No amyloid noted.  Echocardiogram showed ejection fraction of 65%.  Of note, patient has a sister but no children or wife.  He does have a caregiver.  He is connected with the [facility].  He is right-hand dominant.  His hospitalization was complicated by episode of hematemesis, which resolved.    Patient was subsequently transferred out of the intensive care unit to the neurological floor.  He was evaluated by physical medicine and  rehabilitation felt that the patient was a fair candidate for inpatient rehabilitation.      Interval Problem Update  3/19: No significant events overnight.  He has no new complaints.  On 2 LPM oxygen via nasal cannula.  Continues to have dense right hemiplegia with loss of sensation.  Alert and orient x3.  Physiatry feels patient is a fair candidate for inpatient rehabilitation.  Blood pressures remaining controlled.      I have discussed this patient's plan of care and discharge plan at IDT rounds today with Case Management, Nursing, Nursing leadership, and other members of the IDT team.    Consultants/Specialty  critical care, neurosurgery, and physiatry    Code Status  DNAR/DNI    Disposition  Patient is medically cleared for discharge.   Anticipate discharge to  [facility] vs SNF .  I have placed the appropriate orders for post-discharge needs.    Review of Systems  Review of Systems   Constitutional:  Negative for chills and fever.   Respiratory:  Negative for cough and shortness of breath.    Cardiovascular:  Negative for chest pain and palpitations.   Gastrointestinal:  Negative for abdominal pain, nausea and vomiting.   Genitourinary:  Negative for dysuria and hematuria.   Musculoskeletal:  Negative for joint pain and myalgias.   Neurological:  Positive for focal weakness. Negative for dizziness and headaches.   Psychiatric/Behavioral:  The patient has insomnia. The patient is not nervous/anxious.       Physical Exam  Temp:  [36.1 °C (96.9 °F)-36.8 °C (98.2 °F)] 36.5 °C (97.7 °F)  Pulse:  [71-95] 85  Resp:  [17-18] 18  BP: (114-134)/(62-73) 134/62  SpO2:  [95 %-98 %] 98 %    Physical Exam  Vitals and nursing note reviewed.   Constitutional:       General: He is not in acute distress.     Appearance: Normal appearance. He is not ill-appearing.   HENT:      Head: Normocephalic and atraumatic.      Mouth/Throat:      Mouth: Mucous membranes are moist.      Pharynx: Oropharynx is clear. No oropharyngeal exudate.    Eyes:      General: No scleral icterus.        Right eye: No discharge.         Left eye: No discharge.      Conjunctiva/sclera: Conjunctivae normal.   Cardiovascular:      Rate and Rhythm: Normal rate and regular rhythm.      Pulses: Normal pulses.      Heart sounds: Normal heart sounds. No murmur heard.  Pulmonary:      Effort: Pulmonary effort is normal. No respiratory distress.      Breath sounds: Normal breath sounds. No wheezing.   Abdominal:      General: Abdomen is flat. Bowel sounds are normal. There is no distension.      Palpations: Abdomen is soft.   Musculoskeletal:         General: No swelling.      Cervical back: Neck supple. No tenderness.      Right lower leg: No edema.      Left lower leg: No edema.   Skin:     General: Skin is warm and dry.      Coloration: Skin is not pale.   Neurological:      Mental Status: He is alert and oriented to person, place, and time. Mental status is at baseline.      Sensory: Sensory deficit present.      Motor: Weakness present.      Comments: Right-sided weakness 0/5 strength with loss of sensation   Psychiatric:         Behavior: Behavior normal.         Thought Content: Thought content normal.         Judgment: Judgment normal.       Fluids    Intake/Output Summary (Last 24 hours) at 3/19/2023 1936  Last data filed at 3/19/2023 1700  Gross per 24 hour   Intake 250 ml   Output 1600 ml   Net -1350 ml       Laboratory  Recent Labs     03/17/23  0405 03/18/23  0315 03/19/23  0134   WBC 8.6 9.2 7.0   RBC 4.52* 4.54* 4.60*   HEMOGLOBIN 12.9* 13.3* 13.3*   HEMATOCRIT 40.2* 39.2* 40.1*   MCV 88.9 86.3 87.2   MCH 28.5 29.3 28.9   MCHC 32.1* 33.9 33.2*   RDW 44.3 43.5 42.8   PLATELETCT 223 173 227   MPV 11.0 11.1 10.4     Recent Labs     03/17/23  0405 03/18/23  0315 03/19/23  0134   SODIUM 142 142 141   POTASSIUM 4.0 3.6 3.8   CHLORIDE 109 111 108   CO2 22 21 22   GLUCOSE 109* 107* 108*   BUN 16 12 10   CREATININE 0.75 0.71 0.79   CALCIUM 7.7* 7.4* 7.6*                    Imaging  MR-BRAIN-W/O   Final Result         Left thalamic and left corona radiata hemorrhage with mild surrounding vasogenic edema. There is slight mass effect upon the left lateral ventricle without midline shift.      Remote infarcts in the right cerebellum, left kylee and right thalamus.      [id number] VIEW   Final Result      1.  No electronic device to preclude MRI imaging.   2.  RIGHT hip prosthesis in place.      EC-ECHOCARDIOGRAM COMPLETE W/O CONT   Final Result      CT-HEAD W/O   Final Result         1.  Left thalamic and basal ganglia hemorrhage, overall appears stable since prior study.   2.  Nonspecific white matter changes, commonly associated with small vessel ischemic disease.  Associated mild cerebral atrophy is noted.   3.  Chronic appearing sphenoid sinusitis changes      DX-CHEST-PORTABLE (1 VIEW)   Final Result      1.  Mild central pulmonary vascular congestion.   2.  Stable mild enlargement of the cardiomediastinal silhouette.   3.  Bilateral basilar atelectasis and/or consolidation. Underlying infection is possible.      CT-CTA NECK WITH & W/O-POST PROCESSING   Final Result      1.  CT angiogram of the neck within normal limits for age.   2.  Left internal carotid artery stenosis measuring greater than 50%.      CT-CTA HEAD WITH & W/O-POST PROCESS   Final Result      1.  CT angiogram of the [place] of [place] within normal limits.   2.  2.4 cm left thalamic intraparenchymal hematoma.      Based solely on CT findings, the brain injury guideline category is mBIG 3.      EDH   IVH   Displaced skull fx   SDH > 8mm   IPH > 8mm or multiple   SAH bi-hemispheric or > 3mm      The original BIG retrospective analysis found radiographic progression in 0% of BIG 1 patients and 2.6% BIG 2.      Findings were discussed with Dr. DANIEL PARKINSON on 3/15/2023 3:11 PM.         CT-HEAD W/O   Final Result      1.  Acute left thalamic intraparenchymal hematoma measuring 2.3 x 1.8 x 3.4 cm..   2.  Chronic  microvascular ischemic changes and chronic right thalamic lacunar infarct.              I personally reviewed the head CT performed on 3/15/2023.  Per my read, left basal ganglia hemorrhage measuring 2 x 2 cm with surrounding vasogenic edema.  No significant midline shift.  Mildly enlarged third ventricle.  No intracranial masses.  No hydrocephalus.    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    Most likely due to uncontrolled hypertension.  Amyloid without evidence of amyloidosis.  Resume apixaban in 5 to 6 weeks as per neurology recommendations.  Blood pressure goal less than 130/80.    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    PT and OT recommending postacute placement.  Fair candidate for inpatient rehabilitation  Referrals to skilled nursing facilities have been sent  [facility]    Primary hypertension  Assessment & Plan  As per history.  Likely uncontrolled hypertension leading to hypertensive bleed.  Mildly elevated NT proBNP noted.    Continue metoprolol SR 25 mg daily  Losartan 75 mg daily  Can consider diuretic if persistently elevated  IV labetalol or IV enalaprilat as needed for SBP greater than 140      Elevated brain natriuretic peptide (BNP) level- (present on admission)  Assessment & Plan  NT proBNP mildly elevated at 1829.  Appears to be euvolemic.  No history of pulmonary hypertension or heart failure.    Continue to monitor for now  Can consider diuretic if patient is starting to become more hypertensive    Vasogenic edema (HCC)- (present on admission)  Assessment & Plan  Secondary to intracranial hemorrhage.  Mild.  No need for steroids at this time.    Normocytic anemia- (present on admission)  Assessment & Plan  Etiology  unclear.    Work-up with ferritin, iron panel, reticulocyte panel, B12, folate, LDH, TSH, stool occult blood x3.    Acute urinary retention  Assessment & Plan  In setting of BPH.  No current symptoms.    Continue home finasteride and tamsulosin    Electrolyte abnormality  Assessment & Plan   closely, replace as indicated    Hematemesis  Assessment & Plan  On presentation.  Resolved.  Hemoglobin remaining stable.    Continue to monitor for now  Continue omeprazole      Myocardial ischemia  Assessment & Plan  Likely type II injury, no complaints of chest pain or ischemic EKG changes      Paroxysmal atrial fibrillation (HCC)  Assessment & Plan  Currently in a sinus rhythm, continue beta-blockade  Currently off anticoagulation secondary to the patient's intra parenchymal hemorrhage  Monitor telemetry    DNR (do not resuscitate)- (present on admission)  Assessment & Plan  As per patient's request.         VTE prophylaxis: SCDs/TEDs and enoxaparin ppx    I have performed a physical exam and reviewed and updated ROS and Plan today (3/19/2023). In review of yesterday's note (3/18/2023), there are no changes except as documented above.

## 2023-03-19 NOTE — CARE PLAN
The patient is Stable - Low risk of patient condition declining or worsening    Shift Goals  Clinical Goals: monitor neuro  Patient Goals: less BM  Family Goals: jay    Progress made toward(s) clinical / shift goals:    Problem: Knowledge Deficit - Standard  Goal: Patient and family/care givers will demonstrate understanding of plan of care, disease process/condition, diagnostic tests and medications  Outcome: Progressing     Problem: Skin Integrity  Goal: Skin integrity is maintained or improved  Outcome: Progressing     Problem: Fall Risk  Goal: Patient will remain free from falls  Outcome: Progressing     Problem: Psychosocial  Goal: Patient's level of anxiety will decrease  Outcome: Progressing  Goal: Patient's ability to verbalize feelings about condition will improve  Outcome: Progressing  Goal: Patient's ability to re-evaluate and adapt role responsibilities will improve  Outcome: Progressing  Goal: Patient and family will demonstrate ability to cope with life altering diagnosis and/or procedure  Outcome: Progressing  Goal: Spiritual and cultural needs incorporated into hospitalization  Outcome: Progressing     Problem: Hemodynamics  Goal: Patient's hemodynamics, fluid balance and neurologic status will be stable or improve  Outcome: Progressing     Problem: Respiratory  Goal: Patient will achieve/maintain optimum respiratory ventilation and gas exchange  Outcome: Progressing     Problem: Risk for Aspiration  Goal: Patient's risk for aspiration will be absent or decrease  Outcome: Progressing     Problem: Nutrition  Goal: Patient's nutritional and fluid intake will be adequate or improve  Outcome: Progressing     Problem: Urinary Elimination  Goal: Establish and maintain regular urinary output  Outcome: Progressing     Problem: Bowel Elimination  Goal: Establish and maintain regular bowel function  Outcome: Progressing     Problem: Cardiac - Atrial Fibrillation  Goal: Patient will achieve & maintain adequate  cardiac output and rate control  Outcome: Progressing  Goal: Complications related to anticoagulation for unconverted atrial fibrillation will be avoided or minimized  Outcome: Progressing     Problem: Mobility  Goal: Patient's ability to tolerate increased activity will improve  Outcome: Progressing     Problem: Knowledge Deficit - Atrial Fibrillation  Goal: Patient and family/care givers will demonstrate understanding of atrial fibrillation condition and follow-up  Outcome: Progressing     Problem: Optimal Care of the Stroke Patient  Goal: Optimal emergency care for the stroke patient  Outcome: Progressing  Goal: Optimal acute care for the stroke patient  Outcome: Progressing     Problem: Knowledge Deficit - Stroke Education  Goal: Patient's knowledge of stroke and risk factors will improve  Outcome: Progressing     Problem: Discharge Planning - Stroke  Goal: Ensure Stroke Core Measures are met prior to discharge  Outcome: Progressing  Goal: Patient’s continuum of care needs will be met  Outcome: Progressing     Problem: Neuro Status  Goal: Neuro status will remain stable or improve  Outcome: Progressing     Problem: Dysphagia  Goal: Dysphagia will improve  Outcome: Progressing     Problem: Mobility - Stroke  Goal: Patient's capacity to carry out activities will improve  Outcome: Progressing     Problem: Self Care  Goal: Patient will have the ability to perform ADLs independently or with assistance (bathe, groom, dress, toilet and feed)  Outcome: Progressing     Problem: Pain - Standard  Goal: Alleviation of pain or a reduction in pain to the patient’s comfort goal  Outcome: Progressing       Patient is not progressing towards the following goals:

## 2023-03-19 NOTE — PROGRESS NOTES
Neurology Progress Note  Neurohospitalist Service, [facility]    Referring Physician: Krunal Lipscomb M.D.    Chief Complaint   Patient presents with    Possible Stroke     LKW last night at 2100, was found on the floor next to his bed, no trauma noted, R sided deficits, AOx2, follows commands.        HPI: Refer to initial documented Neurology H&P, as detailed in the patient's chart.    Interval History 3/19: No new issues overnight    Review of systems: In addition to what is detailed in the HPI and/or updated in the interval history, all other systems reviewed and are negative.    Past Medical History:    has a past medical history of A-fib (HCC), Acute pulmonary edema (HCC) (3/15/2023), BPH (benign prostatic hyperplasia), Cancer (HCC), Hypertension, Psychiatric problem (2022), and Stroke (HCC) (2018).    FHx:  family history includes No Known Problems in his father and mother.    SHx:   reports that he has never smoked. He has never used smokeless tobacco. He reports that he does not drink alcohol and does not use drugs.    Medications:    Current Facility-Administered Medications:     metoprolol SR (TOPROL XL) tablet 25 mg, 25 mg, Oral, DAILY, Andres Mcwilliams M.D., 25 mg at 03/19/23 0523    finasteride (PROSCAR) tablet 5 mg, 5 mg, Oral, DAILY, Andres Mcwilliams M.D., 5 mg at 03/19/23 0522    tamsulosin (FLOMAX) capsule 0.4 mg, 0.4 mg, Oral, AFTER BREAKFAST, Andres Mcwilliams M.D., 0.4 mg at 03/19/23 0807    enoxaparin (Lovenox) inj 40 mg, 40 mg, Subcutaneous, DAILY AT 1800, Cristal Caputo, A.P.R.N., 40 mg at 03/18/23 1740    omeprazole (PRILOSEC) capsule 20 mg, 20 mg, Oral, BID, Cristal Caputo A.P.R.N., 20 mg at 03/19/23 0522    Respiratory Therapy Consult, , Nebulization, Continuous RT, Jeremy M Gonda, M.D.    LORazepam (ATIVAN) injection 2 mg, 2 mg, Intravenous, Q5 MIN PRN, Jeremy M Gonda, M.D.    acetaminophen (Tylenol) tablet 650 mg, 650 mg, Oral, Q4HRS PRN **OR** acetaminophen  (TYLENOL) suppository 650 mg, 650 mg, Rectal, Q4HRS PRN, Jeremy M Gonda, M.D.    ondansetron (ZOFRAN ODT) dispertab 4 mg, 4 mg, Oral, Q4HRS PRN **OR** ondansetron (ZOFRAN) syringe/vial injection 4 mg, 4 mg, Intravenous, Q4HRS PRN, Jeremy M Gonda, M.D., 4 mg at 03/15/23 2115    losartan (COZAAR) tablet 75 mg, 75 mg, Oral, DAILY, Rigo Dillard M.D., 75 mg at 03/19/23 0523    simvastatin (ZOCOR) tablet 20 mg, 20 mg, Oral, Nightly, Rigo Dillard M.D., 20 mg at 03/18/23 2148    enalaprilat (Vasotec) injection 1.25 mg 1 mL, 1.25 mg, Intravenous, Q6HRS PRN, Jeremy M Gonda, M.D.    hydrALAZINE (APRESOLINE) injection 10-20 mg, 10-20 mg, Intravenous, Q4HRS PRN, Rehana L. Latona, 10 mg at 03/16/23 1814    labetalol (NORMODYNE/TRANDATE) injection 10-20 mg, 10-20 mg, Intravenous, Q4HRS PRN, Rehana LJuan Luis Latona, 10 mg at 03/15/23 2019    Physical Examination:     Vitals:    03/19/23 0024 03/19/23 0344 03/19/23 0523 03/19/23 0710   BP: 124/71 120/64 130/66 122/69   Pulse: 94 95 80 86   Resp: 17 17  17   Temp: 36.8 °C (98.2 °F) 36.8 °C (98.2 °F)  36.1 °C (96.9 °F)   TempSrc: Temporal Temporal  Temporal   SpO2: 98% 97%  97%   Weight:               NEUROLOGICAL EXAM:     Patient is awake and alert x4.  Cranials 2-12 are intact except for the right facial droop.  And dysarthria.  Not aphasic.  Motor examination he has sustained antigravity strength in the left arm and leg.  Minimal movement in the right arm and leg.  Decree sensation to the right compared to left.    Objective Data:    Labs:  Lab Results   Component Value Date/Time    PROTHROMBTM 15.7 (H) 03/15/2023 02:39 PM    INR 1.27 (H) 03/15/2023 02:39 PM      Lab Results   Component Value Date/Time    WBC 7.0 03/19/2023 01:34 AM    RBC 4.60 (L) 03/19/2023 01:34 AM    HEMOGLOBIN 13.3 (L) 03/19/2023 01:34 AM    HEMATOCRIT 40.1 (L) 03/19/2023 01:34 AM    MCV 87.2 03/19/2023 01:34 AM    MCH 28.9 03/19/2023 01:34 AM    MCHC 33.2 (L) 03/19/2023 01:34 AM    MPV 10.4 03/19/2023 01:34  AM    NEUTSPOLYS 72.10 (H) 03/18/2023 03:15 AM    LYMPHOCYTES 16.60 (L) 03/18/2023 03:15 AM    MONOCYTES 8.50 03/18/2023 03:15 AM    EOSINOPHILS 1.90 03/18/2023 03:15 AM    BASOPHILS 0.40 03/18/2023 03:15 AM      Lab Results   Component Value Date/Time    SODIUM 141 03/19/2023 01:34 AM    POTASSIUM 3.8 03/19/2023 01:34 AM    CHLORIDE 108 03/19/2023 01:34 AM    CO2 22 03/19/2023 01:34 AM    GLUCOSE 108 (H) 03/19/2023 01:34 AM    BUN 10 03/19/2023 01:34 AM    CREATININE 0.79 03/19/2023 01:34 AM      Lab Results   Component Value Date/Time    CHOLSTRLTOT 128 03/15/2023 02:39 PM    LDL 55 03/15/2023 02:39 PM    HDL 51 03/15/2023 02:39 PM    TRIGLYCERIDE 108 03/15/2023 02:39 PM       Lab Results   Component Value Date/Time    ALKPHOSPHAT 51 03/18/2023 03:15 AM    ASTSGOT 12 03/18/2023 03:15 AM    ALTSGPT 11 03/18/2023 03:15 AM    TBILIRUBIN 0.6 03/18/2023 03:15 AM        Imaging/Testing:  MR-BRAIN-W/O   Final Result         Left thalamic and left corona radiata hemorrhage with mild surrounding vasogenic edema. There is slight mass effect upon the left lateral ventricle without midline shift.      Remote infarcts in the right cerebellum, left kylee and right thalamus.      [id number] VIEW   Final Result      1.  No electronic device to preclude MRI imaging.   2.  RIGHT hip prosthesis in place.      EC-ECHOCARDIOGRAM COMPLETE W/O CONT   Final Result      CT-HEAD W/O   Final Result         1.  Left thalamic and basal ganglia hemorrhage, overall appears stable since prior study.   2.  Nonspecific white matter changes, commonly associated with small vessel ischemic disease.  Associated mild cerebral atrophy is noted.   3.  Chronic appearing sphenoid sinusitis changes      DX-CHEST-PORTABLE (1 VIEW)   Final Result      1.  Mild central pulmonary vascular congestion.   2.  Stable mild enlargement of the cardiomediastinal silhouette.   3.  Bilateral basilar atelectasis and/or consolidation. Underlying infection is possible.       CT-CTA NECK WITH & W/O-POST PROCESSING   Final Result      1.  CT angiogram of the neck within normal limits for age.   2.  Left internal carotid artery stenosis measuring greater than 50%.      CT-CTA HEAD WITH & W/O-POST PROCESS   Final Result      1.  CT angiogram of the [place] within normal limits.   2.  2.4 cm left thalamic intraparenchymal hematoma.      Based solely on CT findings, the brain injury guideline category is mBIG 3.      EDH   IVH   Displaced skull fx   SDH > 8mm   IPH > 8mm or multiple   SAH bi-hemispheric or > 3mm      The original BIG retrospective analysis found radiographic progression in 0% of BIG 1 patients and 2.6% BIG 2.      Findings were discussed with Dr. DANIEL PARKINSON on 3/15/2023 3:11 PM.         CT-HEAD W/O   Final Result      1.  Acute left thalamic intraparenchymal hematoma measuring 2.3 x 1.8 x 3.4 cm..   2.  Chronic microvascular ischemic changes and chronic right thalamic lacunar infarct.               Assessment and Plan:    85-year-old male with a past medical history of A-fib was on Eliquis.  Presenting with a left basal ganglia hemorrhagic infarct.  The etiology of the hemorrhage is unknown but presumed to be due to hypertension.  Patient has not been hypertension here.  The MRI brain did not show any signs of amyloid.  CTA head and neck was also unremarkable for aneurysm.  Plan to continue with blood pressure control.  I would recommend restarting the Eliquis and 5 to 6 weeks.  Okay to downgrade to the floors.    Update 3/19  No new events overnight.  Plan to continue with physical therapy and outpatient therapy.  Recommend restarting the Eliquis 5 weeks after the onset of the hemorrhage.  If repeat CT head imaging is unremarkable.  Neurology to sign off this time.  Patient can follow-up in stroke [facility].    Plan:  1.  Every 4 hours neurochecks  2.  No antiplatelets or anticoagulation  3.  Consider restarting Eliquis in 5 to 6 weeks if repeat imaging  is unremarkable.  4.  Maintain euvolemia, normothermia  5.  Maintain normal sodium levels.  6.  PT/OT/speech therapy  7.  Okay to start chemical DVT prophylaxis.  8.  Follow-up with stroke [facility]            This chart was partially generated using voice recognition technology and sound alike word replacement may be present, best efforts were made to make the chart accurate.    Efren Logan MD  Board Certified Neurology, ABPN  t) [phone]

## 2023-03-19 NOTE — DISCHARGE PLANNING
Case Management Discharge Planning    Admission Date: 3/15/2023  GMLOS: 4.5  ALOS: 4    6-Clicks ADL Score: 12  6-Clicks Mobility Score: 9  PT and/or OT Eval ordered: Yes  Post-acute Referrals Ordered: Yes  Post-acute Choice Obtained: Yes  Has referral(s) been sent to post-acute provider:  Yes      Anticipated Discharge Dispo: Discharge Disposition:   D/T to SNF with medicare cert w/planned hosp IP readmit (83)  Discharge Contact Phone Number: [phone]      Action(s) Taken:   RN MELISA spoke to patient at bedside.   Pt lives alone in a ground level apartment with 6 steps to enter.  He does not use DME at baseline.  His friend Nelson Tristan usually helps pt with transportation to appointments.  Pt reported he is unable to use his right side right now.  Pt has slurred speech.  He is a [place] and no service connected disability per patient.  He receives services at [facility] and cannot remember his PCP as it changes a lot.  Pt stated that his sister and niece live in [place].  They are unable to stay with patient or care for him.  Explained acute rehab and SNF.    Patient agreeable to sending referrals to SNFs.  Consent obtained to send referrals to [facility] SNFs.  Choice form faxed to [facility].    Medically Clear: No    Next Steps: Follow up on SNF referrals.    Barriers to Discharge: Medical clearance, placement acceptance    Care Transition Team Assessment    Information Source  Orientation Level: Oriented X4  Information Given By: Patient  Who is responsible for making decisions for patient? : Patient    Readmission Evaluation  Is this a readmission?: No    Elopement Risk  Legal Hold: No  Ambulatory or Self Mobile in Wheelchair: No-Not an Elopement Risk  Elopement Risk: Not at Risk for Elopement    Interdisciplinary Discharge Planning  Lives with - Patient's Self Care Capacity: Alone and Able to Care For Self  Patient or legal guardian wants to designate a caregiver: No  Support Systems: Friends / Neighbors, [employer] / Manuela  Community, Family Member(s)  Housing / Facility: 1 Story Apartment / Condo  Prior Services: Home-Independent, Housekeeping / Homemaker Services  Durable Medical Equipment: Not Applicable    Discharge Preparedness  What is your plan after discharge?: Skilled nursing facility  What are your discharge supports?: Sibling, Other (comment)  Prior Functional Level: Ambulatory, Independent with Activities of Daily Living, Independent with Medication Management  Difficulity with ADLs: Bathing, Brushing teeth, Dressing, Eating, Toileting, Walking  Difficulity with IADLs: Cooking, Driving, Keeping track of finances, Laundry, Managing medication, Shopping, Using the telephone or computer    Functional Assesment  Prior Functional Level: Ambulatory, Independent with Activities of Daily Living, Independent with Medication Management    Finances  Financial Barriers to Discharge: No  Prescription Coverage: Yes    Vision / Hearing Impairment  Vision Impairment : No  Hearing Impairment : No         Advance Directive  Advance Directive?: None    Domestic Abuse  Have you ever been the victim of abuse or violence?: No  Physical Abuse or Sexual Abuse: No  Verbal Abuse or Emotional Abuse: No  Possible Abuse/Neglect Reported to:: Not Applicable    Psychological Assessment  History of Substance Abuse: None  History of Psychiatric Problems: No  Non-compliant with Treatment: No  Newly Diagnosed Illness: Yes    Discharge Risks or Barriers  Discharge risks or barriers?: Uninsured / underinsured, Lives alone, no community support  Patient risk factors: Cognitive / sensory / physical deficit, Uninsured or underinsured    Anticipated Discharge Information  Discharge Disposition: D/T to SNF with medicare cert w/planned hosp IP readmit (83)  Discharge Contact Phone Number: [phone]

## 2023-03-20 PROBLEM — R09.02 HYPOXIA: Status: ACTIVE | Noted: 2023-01-01

## 2023-03-20 PROBLEM — R74.8 ELEVATED VITAMIN B12 LEVEL: Status: ACTIVE | Noted: 2023-01-01

## 2023-03-20 PROBLEM — R73.03 PREDIABETES: Status: ACTIVE | Noted: 2023-01-01

## 2023-03-20 NOTE — DISCHARGE PLANNING
Spoke with Cindy, Sister regarding D/C resources/support.  Alireza lives in a 1LV home with elevator access alone.  Cindy cares for her grand kids and is not able to assist and there are no other family members.  Recommendation is for SNF.  TCC will no longer follow.  Please reach out to myself with any interval changes/questions.

## 2023-03-20 NOTE — PROGRESS NOTES
Monitor summary: Afiib , [place] -, QRS 0.09, QT -, with rare PVCs and rare trig  per strip from monitor room.

## 2023-03-20 NOTE — DISCHARGE PLANNING
Case Management Discharge Planning    Admission Date: 3/15/2023  GMLOS: 4.5  ALOS: 5    6-Clicks ADL Score: 12  6-Clicks Mobility Score: 9  PT and/or OT Eval ordered: Yes  Post-acute Referrals Ordered: Yes  Post-acute Choice Obtained: Yes  Has referral(s) been sent to post-acute provider:  Yes    Anticipated Discharge Dispo: Discharge Disposition: D/T to SNF with medicare cert w/planned hosp IP readmit (93)  Discharge Contact Phone Number: [phone]    DME Needed: No    Action(s) Taken: Updated Provider/Nurse on Discharge Plan  SW met with patient at bedside, SW informed patient of all accepting facilities. Based on their location, patient gave choice of [facility]. No other needs at this time.     Escalations Completed: None    Medically Clear: No    Next Steps: Pending acceptance, insurance auth and bed availability.    Barriers to Discharge: Medical clearance and Pending Placement

## 2023-03-20 NOTE — PROGRESS NOTES
Monitor Summary: SR 70-88 converted to A.fib  [place] .-, QRS .07, QT .- with Frequent PAC, occasional PVCs,  per strip from monitor room.

## 2023-03-20 NOTE — HOSPITAL COURSE
Alireza Hernandez is a 87-year-old [employer]  presented on 3/15/2023 with right-sided weakness.  This is a pleasant gentleman with a history of hypertension, chronic atrial fibrillation, chronic anticoagulation on apixaban, dyslipidemia, GERD.  Patient was brought to the emergency room after he was found on the floor next to his bed.  He was noted to have right-sided deficits.  He was alert and oriented x2 initially.  He was noted to be outside the window for alteplase.  He had a NIH score score of 14.  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  CTA of the head and neck showed no obvious vascular malformations or aneurysm.  He was subsequently admitted to the intensive care unit for close neurological monitoring.  He received Kcentra was also started on proton pump inhibitor due to concern for possible GI bleeding.  Neurology was consulted and felt that the stroke was most likely due to uncontrolled hypertension..  Recommendation was to hold anticoagulation for 5 to 6 weeks.  Patient underwent an MRI of the brain, which showed left thalamic and left corona radiata hemorrhage with mild surrounding vasogenic edema with slight mass effect on the left lateral ventricle we will follow-up midline shift.  No amyloid noted.  Echocardiogram showed ejection fraction of 65%.  Of note, patient has a sister but no children or wife.  He does have a caregiver.  He is connected with the [facility].  He is right-hand dominant.  His hospitalization was complicated by episode of hematemesis, which resolved.    Patient was subsequently transferred out of the intensive care unit to the neurological floor.  He was evaluated by physical medicine and rehabilitation felt that the patient was a fair candidate for inpatient rehabilitation.

## 2023-03-20 NOTE — PROGRESS NOTES
Spiritual Care Note      Patient's Name: Alireza Hernandez   MRN: [id number]    YOB: 1935   Age and Gender: 87 y.o. 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   Yes      Nature of the Visit:   Initial, On shift    General Visit:   Yes    Referral from/Origin of Request:   Epic nursing    Visited with:   Patient    Observations/Symptoms:   Laying in bed, alert, pleasant    Interaction/Conversation:   Patient requested prayer.    Assessment:   Need    Need:   Seeking Spiritual Assistance and Support    Roman Catholic Needs:   Prayer    Intended Effects:   Demonstrate Caring and Concern, Establish Rapport and Connectedness, Manuela Affirmation    Interventions:   Compassionate Presence, Prayer    Outcomes:   Spiritual Comfort, Progress with Trust    Continue Visiting:   Yes    Total Time:   10 minutes      Spiritual Care Provider   Chaplain ARNEL Aguiar  [phone]   [email or web address]

## 2023-03-20 NOTE — CARE PLAN
The patient is Stable - Low risk of patient condition declining or worsening    Shift Goals  Clinical Goals: monitor neuro, monitor HR  Patient Goals: less BM  Family Goals: jay    Progress made toward(s) clinical / shift goals:    Problem: Knowledge Deficit - Standard  Goal: Patient and family/care givers will demonstrate understanding of plan of care, disease process/condition, diagnostic tests and medications  Outcome: Progressing     Problem: Skin Integrity  Goal: Skin integrity is maintained or improved  Outcome: Progressing     Problem: Fall Risk  Goal: Patient will remain free from falls  Outcome: Progressing     Problem: Psychosocial  Goal: Patient's level of anxiety will decrease  Outcome: Progressing  Goal: Patient's ability to verbalize feelings about condition will improve  Outcome: Progressing  Goal: Patient's ability to re-evaluate and adapt role responsibilities will improve  Outcome: Progressing  Goal: Patient and family will demonstrate ability to cope with life altering diagnosis and/or procedure  Outcome: Progressing  Goal: Spiritual and cultural needs incorporated into hospitalization  Outcome: Progressing     Problem: Hemodynamics  Goal: Patient's hemodynamics, fluid balance and neurologic status will be stable or improve  Outcome: Progressing     Problem: Respiratory  Goal: Patient will achieve/maintain optimum respiratory ventilation and gas exchange  Outcome: Progressing     Problem: Risk for Aspiration  Goal: Patient's risk for aspiration will be absent or decrease  Outcome: Progressing     Problem: Nutrition  Goal: Patient's nutritional and fluid intake will be adequate or improve  Outcome: Progressing     Problem: Urinary Elimination  Goal: Establish and maintain regular urinary output  Outcome: Progressing     Problem: Bowel Elimination  Goal: Establish and maintain regular bowel function  Outcome: Progressing     Problem: Cardiac - Atrial Fibrillation  Goal: Patient will achieve &  maintain adequate cardiac output and rate control  Outcome: Progressing  Goal: Complications related to anticoagulation for unconverted atrial fibrillation will be avoided or minimized  Outcome: Progressing     Problem: Mobility  Goal: Patient's ability to tolerate increased activity will improve  Outcome: Progressing     Problem: Knowledge Deficit - Atrial Fibrillation  Goal: Patient and family/care givers will demonstrate understanding of atrial fibrillation condition and follow-up  Outcome: Progressing     Problem: Optimal Care of the Stroke Patient  Goal: Optimal emergency care for the stroke patient  Outcome: Progressing  Goal: Optimal acute care for the stroke patient  Outcome: Progressing     Problem: Knowledge Deficit - Stroke Education  Goal: Patient's knowledge of stroke and risk factors will improve  Outcome: Progressing     Problem: Discharge Planning - Stroke  Goal: Ensure Stroke Core Measures are met prior to discharge  Outcome: Progressing  Goal: Patient’s continuum of care needs will be met  Outcome: Progressing     Problem: Neuro Status  Goal: Neuro status will remain stable or improve  Outcome: Progressing     Problem: Dysphagia  Goal: Dysphagia will improve  Outcome: Progressing     Problem: Mobility - Stroke  Goal: Patient's capacity to carry out activities will improve  Outcome: Progressing     Problem: Self Care  Goal: Patient will have the ability to perform ADLs independently or with assistance (bathe, groom, dress, toilet and feed)  Outcome: Progressing     Problem: Pain - Standard  Goal: Alleviation of pain or a reduction in pain to the patient’s comfort goal  Outcome: Progressing       Patient is not progressing towards the following goals:

## 2023-03-20 NOTE — ASSESSMENT & PLAN NOTE
Its ok to take the senna with the linzess. Ok to continue with these medications   Secondary to left basal ganglia hemorrhage.    PT and OT recommending postacute placement.  Patient has been accepted to [facility] nursing [facility].  He is pending insurance authorization.  Patient says he is [facility] connected  Physiatry is no longer interested in admitting the patient.

## 2023-03-20 NOTE — PROGRESS NOTES
Monitor Summary: SR 71-95, HI .23, QRS .10, QT .41 with rare PVC,frequent PAC per strip from monitor room

## 2023-03-20 NOTE — ASSESSMENT & PLAN NOTE
NT proBNP mildly elevated at 1829.  Appears to be euvolemic.  No history of pulmonary hypertension or heart failure.    Start furosemide 20 mg by mouth daily given he is on 2 LPM oxygen via nasal cannula.

## 2023-03-20 NOTE — CARE PLAN
"Shift Summary 0700 - 1930:    Neuro checks stable. Pt states \"I've felt depressed today\". Pt states he would appreciate a consult for his anxiety/depression. Skin integrity maintained. Q2 turns. Adequate appetite. Incontinent of bowel and bladder. Call light in reach. Bed alarm on.   "

## 2023-03-20 NOTE — ASSESSMENT & PLAN NOTE
Etiology unclear.    Work-up with ferritin, iron panel, reticulocyte panel, B12, folate, LDH, TSH, stool occult blood x3.

## 2023-03-21 NOTE — DISCHARGE INSTRUCTIONS
Stroke / CVA / TIA / Hemorrhagic Ischemia Discharge Instructions    You have had a stroke. Your risk factors have been identified as follows:  Age - Over 55  Gender - Men are at a higher risk than women  High blood pressure  Heart disease  Atrial Fibrillation  High Cholesterol and lipids  Overweight    It is important that you reduce your risk factors to avoid another stroke in the future. Here are some general guidelines to follow:    Eat healthy - avoid food high in fat  Maintain a healthy weight  Avoid alcohol and illegal drug use Get regular exercise  Avoid smoking  Take your medications as directed     For more information regarding risk factors, refer to pages 17-19 in your Stroke Patient Education Guide. Stroke Education Guide was given to patient.    Warning signs of a stroke include (which can also be found on page 3 of your Stroke Patient Education Guide):  Sudden numbness of weakness of the face, arm or leg (especially on one side of the body).  Sudden confusion, trouble speaking or understanding.  Sudden trouble seeing in one or both eyes.  Sudden trouble walking, dizziness, loss of balance or coordination.  Sudden severe headache with no known cause.  It is very important to get treatment quickly when a stroke occurs. If you experience any of the above warning signs, call 911 immediately.     Some patients who have had a stroke will be going home on a blood thinner medication called Warfarin (Coumadin).  This medication requires very close monitoring and follow up.  This follow up can be provided by either your Primary Care Physician or by [facility]'s Outpatient Anticoagulation Service.  The Outpatient Anticoagulation Service is located at the [facility] at [facility] ([facility]).  If you do not know when your follow up appointment is scheduled, call [phone] to verify your appointment time.

## 2023-03-21 NOTE — CARE PLAN
The patient is Stable - Low risk of patient condition declining or worsening    Shift Goals  Clinical Goals: safety, prevent skin breakdown  Patient Goals: move with therapy  Family Goals: MARGIE    Progress made toward(s) clinical / shift goals:  Alireza has stable neuro status. Good appetite. Heart rhythm converted back into SR. Discharing to [facility]OPE[facility] today.      Patient is not progressing towards the following goals: Expressed feelings of hopelessness.

## 2023-03-21 NOTE — ASSESSMENT & PLAN NOTE
Patient continues to be on 2 LPM oxygen via nasal cannula.  NT proBNP elevated.  Echocardiogram EF 65.  No signs of pulmonary hypertension.    Start furosemide 20 mg by mouth daily.

## 2023-03-21 NOTE — PROGRESS NOTES
Hospital Medicine Daily Progress Note    Date of Service  3/20/2023    Chief Complaint  Alireza Hernandez is a 87 y.o. male admitted 3/15/2023 with right-sided weakness due to left thalamic hypertensive intraparenchymal hemorrhage.    Hospital Course  Alireza Hernandez is a 87-year-old [employer]  presented on 3/15/2023 with right-sided weakness.  This is a pleasant gentleman with a history of hypertension, chronic atrial fibrillation, chronic anticoagulation on apixaban, dyslipidemia, GERD.  Patient was brought to the emergency room after he was found on the floor next to his bed.  He was noted to have right-sided deficits.  He was alert and oriented x2 initially.  He was noted to be outside the window for alteplase.  He had a NIH score score of 14.  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  CTA of the head and neck showed no obvious vascular malformations or aneurysm.  He was subsequently admitted to the intensive care unit for close neurological monitoring.  He received Kcentra was also started on proton pump inhibitor due to concern for possible GI bleeding.  Neurology was consulted and felt that the stroke was most likely due to uncontrolled hypertension..  Recommendation was to hold anticoagulation for 5 to 6 weeks.  Patient underwent an MRI of the brain, which showed left thalamic and left corona radiata hemorrhage with mild surrounding vasogenic edema with slight mass effect on the left lateral ventricle we will follow-up midline shift.  No amyloid noted.  Echocardiogram showed ejection fraction of 65%.  Of note, patient has a sister but no children or wife.  He does have a caregiver.  He is connected with the [facility].  He is right-hand dominant.  His hospitalization was complicated by episode of hematemesis, which resolved.    Patient was subsequently transferred out of the intensive care unit to the neurological floor.  He was evaluated by physical medicine and  rehabilitation felt that the patient was a fair candidate for inpatient rehabilitation.      Interval Problem Update  3/19: No significant events overnight.  He has no new complaints.  On 2 LPM oxygen via nasal cannula.  Continues to have dense right hemiplegia with loss of sensation.  Alert and orient x3.  Physiatry feels patient is a fair candidate for inpatient rehabilitation.  Blood pressures remaining controlled.    3/20: No significant events overnight.  Patient's morning dose of metoprolol was held for unknown reason, which I have resumed in the afternoon due to increasing heart rates.  Continues on 2 LPM oxygen via nasal cannula.  Starting furosemide 20 mg by mouth daily.  Patient has been accepted to [facility] nursing [facility].  He is pending insurance authorization.  Declined by inpatient rehabilitation.      I have discussed this patient's plan of care and discharge plan at IDT rounds today with Case Management, Nursing, Nursing leadership, and other members of the IDT team.    Consultants/Specialty  critical care, neurosurgery, and physiatry    Code Status  DNAR/DNI    Disposition  Patient is medically cleared for discharge.   Anticipate discharge to  [facility] vs SNF .  I have placed the appropriate orders for post-discharge needs.    Review of Systems  Review of Systems   Constitutional:  Negative for chills and fever.   Respiratory:  Negative for cough and shortness of breath.    Cardiovascular:  Negative for chest pain and palpitations.   Gastrointestinal:  Negative for abdominal pain, nausea and vomiting.   Genitourinary:  Negative for dysuria and hematuria.   Musculoskeletal:  Negative for joint pain and myalgias.   Neurological:  Positive for focal weakness. Negative for dizziness and headaches.   Psychiatric/Behavioral:  The patient has insomnia. The patient is not nervous/anxious.       Physical Exam  Temp:  [36.1 °C (96.9 °F)-36.6 °C (97.9 °F)] 36.1 °C (96.9 °F)  Pulse:  [] 116  Resp:   [18] 18  BP: (109-134)/(57-79) 109/68  SpO2:  [96 %-98 %] 96 %    Physical Exam  Vitals and nursing note reviewed.   Constitutional:       General: He is not in acute distress.     Appearance: Normal appearance. He is not ill-appearing.   HENT:      Head: Normocephalic and atraumatic.      Mouth/Throat:      Mouth: Mucous membranes are moist.      Pharynx: Oropharynx is clear. No oropharyngeal exudate.   Eyes:      General: No scleral icterus.        Right eye: No discharge.         Left eye: No discharge.      Conjunctiva/sclera: Conjunctivae normal.   Cardiovascular:      Rate and Rhythm: Normal rate and regular rhythm.      Pulses: Normal pulses.      Heart sounds: Normal heart sounds. No murmur heard.  Pulmonary:      Effort: Pulmonary effort is normal. No respiratory distress.      Breath sounds: Normal breath sounds. No wheezing.   Abdominal:      General: Abdomen is flat. Bowel sounds are normal. There is no distension.      Palpations: Abdomen is soft.   Musculoskeletal:         General: No swelling.      Cervical back: Neck supple. No tenderness.      Right lower leg: No edema.      Left lower leg: No edema.   Skin:     General: Skin is warm and dry.      Coloration: Skin is not pale.   Neurological:      Mental Status: He is alert and oriented to person, place, and time. Mental status is at baseline.      Sensory: Sensory deficit present.      Motor: Weakness present.      Comments: Right-sided weakness 0/5 strength with loss of sensation   Psychiatric:         Behavior: Behavior normal.         Thought Content: Thought content normal.         Judgment: Judgment normal.       Fluids    Intake/Output Summary (Last 24 hours) at 3/20/2023 1619  Last data filed at 3/20/2023 1600  Gross per 24 hour   Intake 400 ml   Output 600 ml   Net -200 ml       Laboratory  Recent Labs     03/18/23  0315 03/19/23  0134 03/20/23  0234   WBC 9.2 7.0 6.8   RBC 4.54* 4.60* 4.23*   HEMOGLOBIN 13.3* 13.3* 12.2*   HEMATOCRIT 39.2*  40.1* 37.0*   MCV 86.3 87.2 87.5   MCH 29.3 28.9 28.8   MCHC 33.9 33.2* 33.0*   RDW 43.5 42.8 44.0   PLATELETCT 173 227 201   MPV 11.1 10.4 10.7     Recent Labs     03/18/23  0315 03/19/23  0134 03/20/23  0234   SODIUM 142 141 137   POTASSIUM 3.6 3.8 3.4*   CHLORIDE 111 108 105   CO2 21 22 20   GLUCOSE 107* 108* 117*   BUN 12 10 12   CREATININE 0.71 0.79 0.60   CALCIUM 7.4* 7.6* 7.6*                   Imaging  MR-BRAIN-W/O   Final Result         Left thalamic and left corona radiata hemorrhage with mild surrounding vasogenic edema. There is slight mass effect upon the left lateral ventricle without midline shift.      Remote infarcts in the right cerebellum, left kylee and right thalamus.      SC-LUOCYOT-6 VIEW   Final Result      1.  No electronic device to preclude MRI imaging.   2.  RIGHT hip prosthesis in place.      EC-ECHOCARDIOGRAM COMPLETE W/O CONT   Final Result      CT-HEAD W/O   Final Result         1.  Left thalamic and basal ganglia hemorrhage, overall appears stable since prior study.   2.  Nonspecific white matter changes, commonly associated with small vessel ischemic disease.  Associated mild cerebral atrophy is noted.   3.  Chronic appearing sphenoid sinusitis changes      DX-CHEST-PORTABLE (1 VIEW)   Final Result      1.  Mild central pulmonary vascular congestion.   2.  Stable mild enlargement of the cardiomediastinal silhouette.   3.  Bilateral basilar atelectasis and/or consolidation. Underlying infection is possible.      CT-CTA NECK WITH & W/O-POST PROCESSING   Final Result      1.  CT angiogram of the neck within normal limits for age.   2.  Left internal carotid artery stenosis measuring greater than 50%.      CT-CTA HEAD WITH & W/O-POST PROCESS   Final Result      1.  CT angiogram of the [place] within normal limits.   2.  2.4 cm left thalamic intraparenchymal hematoma.      Based solely on CT findings, the brain injury guideline category is mBIG 3.      EDH   IVH   Displaced skull  fx   SDH > 8mm   IPH > 8mm or multiple   SAH bi-hemispheric or > 3mm      The original BIG retrospective analysis found radiographic progression in 0% of BIG 1 patients and 2.6% BIG 2.      Findings were discussed with Dr. DANIEL PARKINSON on 3/15/2023 3:11 PM.         CT-HEAD W/O   Final Result      1.  Acute left thalamic intraparenchymal hematoma measuring 2.3 x 1.8 x 3.4 cm..   2.  Chronic microvascular ischemic changes and chronic right thalamic lacunar infarct.              I personally reviewed the head CT performed on 3/15/2023.  Per my read, left basal ganglia hemorrhage measuring 2 x 2 cm with surrounding vasogenic edema.  No significant midline shift.  Mildly enlarged third ventricle.  No intracranial masses.  No hydrocephalus.    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    Most likely due to uncontrolled hypertension.  Amyloid without evidence of amyloidosis.  Resume apixaban in 5 to 6 weeks as per neurology recommendations.  Blood pressure goal less than 130/80.    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    PT and OT recommending postacute placement.  Patient has been accepted to [facility] skilled nursing [facility].  He is pending insurance authorization.  [facility]  Physiatry is no longer interested in admitting the patient.      Primary hypertension  Assessment & Plan  As per history.  Likely uncontrolled hypertension leading to hypertensive bleed.  Mildly elevated NT proBNP noted.    Continue metoprolol SR 25 mg daily  Losartan 75 mg daily  Can consider diuretic if persistently elevated  IV labetalol or IV enalaprilat as needed for SBP greater than 140      Elevated brain natriuretic peptide (BNP) level- (present on admission)  Assessment &  Plan  NT proBNP mildly elevated at 1829.  Appears to be euvolemic.  No history of pulmonary hypertension or heart failure.    Start furosemide 20 mg by mouth daily given he is on 2 LPM oxygen via nasal cannula.    Vasogenic edema (HCC)- (present on admission)  Assessment & Plan  Secondary to intracranial hemorrhage.  Mild.  No need for steroids at this time.    Hypoxia  Assessment & Plan  Patient continues to be on 2 LPM oxygen via nasal cannula.  NT proBNP elevated.  Echocardiogram EF 65.  No signs of pulmonary hypertension.    Start furosemide 20 mg by mouth daily.    Elevated vitamin B12 level- (present on admission)  Assessment & Plan  Etiology unclear.  Associated with higher mortality.    Avoid supplementation    Prediabetes- (present on admission)  Assessment & Plan  A1c of 5.9 consistent with prediabetes.    Carbohydrate consistent diet   healthy lifestyle    Normocytic anemia- (present on admission)  Assessment & Plan  Etiology unclear.    Work-up with ferritin, iron panel, reticulocyte panel, B12, folate, LDH, TSH, stool occult blood x3.    Acute urinary retention  Assessment & Plan  In setting of BPH.  No current symptoms.    Continue home finasteride and tamsulosin    Electrolyte abnormality  Assessment & Plan   closely, replace as indicated    Hematemesis  Assessment & Plan  On presentation.  Resolved.  Hemoglobin remaining stable.    Continue to monitor for now  Continue omeprazole      Myocardial ischemia  Assessment & Plan  Likely type II injury, no complaints of chest pain or ischemic EKG changes      Hypokalemia- (present on admission)  Assessment & Plan  Potassium down to 3.4.    I have ordered replacement goal greater than 4.    Paroxysmal atrial fibrillation (HCC)  Assessment & Plan  Currently in a sinus rhythm, continue beta-blockade  Increasing heart rates after metoprolol being held this morning for unclear reasons.  I have resumed patient's home metoprolol this  afternoon.  Currently off anticoagulation secondary to the patient's intra parenchymal hemorrhage  Continue monitor telemetry    DNR (do not resuscitate)- (present on admission)  Assessment & Plan  As per patient's request.         VTE prophylaxis: SCDs/TEDs and enoxaparin ppx    I have performed a physical exam and reviewed and updated ROS and Plan today (3/20/2023). In review of yesterday's note (3/19/2023), there are no changes except as documented above.

## 2023-03-21 NOTE — DISCHARGE PLANNING
Case Management Discharge Planning    Admission Date: 3/15/2023  GMLOS: 4.5  ALOS: 6    6-Clicks ADL Score: 12  6-Clicks Mobility Score: 6  PT and/or OT Eval ordered: Yes  Post-acute Referrals Ordered: Yes  Post-acute Choice Obtained: Yes  Has referral(s) been sent to post-acute provider:  Yes    Anticipated Discharge Dispo: Discharge Disposition: D/T to SNF with medicare cert w/planned hosp IP readmit (11)  Discharge Contact Phone Number: [phone]    DME Needed: No    Action(s) Taken: Updated Provider/Nurse on Discharge Plan and Transport Arranged   Per morning email, [facility] does not have beds today. SW spoke to patient regarding alternative option. Patient wants to stay in [place]. Other option in [place] was [facility], patient agreeable to [facility]. [facility] have bed availability and can accept patient today.     Transportation arranged via [facility] for 1300. Nurse report info given to bedside nurse.     SW updated patient about 1300 transport. RAJWINDER called patient sister Cindy [phone] to inform. No needs reported at this time.     Escalations Completed: None    Medically Clear: Yes    Next Steps: Transportation at 1300    Barriers to Discharge: None

## 2023-03-21 NOTE — PROGRESS NOTES
Monitor Summary: Afib 75-95, [place] --, QRS 0.10, QT 0.38, with rare PVCs per strip from monitor room.

## 2023-03-21 NOTE — DISCHARGE PLANNING
DC Transport Scheduled    Received request at: 0948    Transport Company Scheduled:  [employer]      Scheduled Date: 0321/2022  Scheduled Time: 1300    Destination: [facility]      Notified care team of scheduled transport via Vo[employer].     If there are any changes needed to the DC transportation scheduled, please contact [facility] at ext. [phone] between the hours of 2449-8095 Mon-Fri. If outside those hours, contact the ED Case Manager at ext. [phone].

## 2023-03-21 NOTE — DISCHARGE SUMMARY
Discharge Summary    CHIEF COMPLAINT ON ADMISSION  Chief Complaint   Patient presents with    Possible Stroke     LKW last night at 2100, was found on the floor next to his bed, no trauma noted, R sided deficits, AOx2, follows commands.        Reason for Admission  EMS    Admission Date  3/15/2023     CODE STATUS  DNAR/DNI    HPI & HOSPITAL COURSE  Alireza Hernandez is a 87-year-old [employer]  presented on 3/15/2023 with right-sided weakness.  This is a pleasant gentleman with a history of hypertension, chronic atrial fibrillation, chronic anticoagulation on apixaban, dyslipidemia, GERD.  Patient was brought to the emergency room after he was found on the floor next to his bed.  He was noted to have right-sided deficits.  He was alert and oriented x2 initially.  He was noted to be outside the window for alteplase.  He had a NIH score score of 14.  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  CTA of the head and neck showed no obvious vascular malformations or aneurysm.  He was subsequently admitted to the intensive care unit for close neurological monitoring.  He received Kcentra was also started on proton pump inhibitor due to concern for possible GI bleeding.  Neurology was consulted and felt that the stroke was most likely due to uncontrolled hypertension..  Recommendation was to hold anticoagulation for 5 to 6 weeks if repeat imaging is stable.  Patient underwent an MRI of the brain, which showed left thalamic and left corona radiata hemorrhage with mild surrounding vasogenic edema with slight mass effect on the left lateral ventricle we will follow-up midline shift.  No amyloid noted.  Echocardiogram showed ejection fraction of 65%.  His blood pressure has been well controlled. He has been in sinus rhythm. He has been feeling depressed about his medical condition and loss of independence, denies suicidal ideation. Consider setting him up with psychotherapy, psychiatry, or starting  anti-depressant if he remains depressed.    Therefore, he is discharged in good and stable condition to skilled nursing facility.    The patient met 2-midnight criteria for an inpatient stay at the time of discharge.      FOLLOW UP ITEMS POST DISCHARGE  Consider setting him up with psychotherapy, psychiatry, or starting anti-depressant if he remains depressed.  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  [place] [address]  [phone]          MEDICATIONS ON DISCHARGE     Medication List        START taking these medications        Instructions   omeprazole 20 MG delayed-release capsule  Commonly known as: PRILOSEC   Take 1 Capsule by mouth every day.  Dose: 20 mg     tamsulosin 0.4 MG capsule  Start taking on: March 22, 2023  Commonly known as: FLOMAX   Take 1 Capsule by mouth 1/2 hour after breakfast.  Dose: 0.4 mg            CHANGE how you take these medications        Instructions   metoprolol SR 25 MG Tb24  Start taking on: March 22, 2023  What changed:   medication strength  how much to take  additional instructions  Commonly  known as: TOPROL XL   Take 1 Tablet by mouth every day.  Dose: 25 mg            CONTINUE taking these medications        Instructions   bismuth subsalicylate 262 MG/15ML Susp  Commonly known as: PEPTO-BISMOL   Take 30 mL by mouth every 6 hours as needed.  Dose: 30 mL     calcium carbonate 500 MG Chew  Commonly known as: Tums   Chew 1,000 mg 2 times a day.  Dose: 1,000 mg     finasteride 5 MG Tabs  Commonly known as: PROSCAR   Take 5 mg by mouth every day.  Dose: 5 mg     loperamide 2 MG Caps  Commonly known as: IMODIUM   Take 2 mg by mouth 4 times a day as needed for Diarrhea.  Dose: 2 mg     losartan 50 MG Tabs  Commonly known as: COZAAR   Take 75 mg by mouth every day. 75 mg = 1.5 tablets daily  Dose: 75 mg     simvastatin 20 MG Tabs  Commonly known as: ZOCOR   Take 20 mg by mouth every evening.  Dose: 20 mg            STOP taking these medications      apixaban 5mg Tabs  Commonly known as: ELIQUIS     famotidine 20 MG Tabs  Commonly known as: PEPCID     nitrofurantoin 100 MG Caps  Commonly known as: MACROBID              Allergies  No Known Allergies    DIET  Orders Placed This Encounter   Procedures    Diet Order Diet: Level 6 - Soft and Bite Sized; Liquid level: Level 0 - Thin; Second Modifier: (optional): Consistent CHO (Diabetic); Tray Modifications (optional): SLP - Deliver to Nursing Station, SLP - 1:1 Supervision by Nursing     Standing Status:   Standing     Number of Occurrences:   1     Order Specific Question:   Diet:     Answer:   Level 6 - Soft and Bite Sized [23]     Order Specific Question:   Liquid level     Answer:   Level 0 - Thin     Order Specific Question:   Second Modifier: (optional)     Answer:   Consistent CHO (Diabetic) [4]     Order Specific Question:   Tray Modifications (optional)     Answer:   SLP - Deliver to Nursing Station     Order Specific Question:   Tray Modifications (optional)     Answer:   SLP - 1:1 Supervision by Nursing       ACTIVITY  As tolerated.    LINES, DRAINS, AND  WOUNDS  This is an automated list. Peripheral IVs will be removed prior to discharge.  Peripheral IV 03/15/23 20 G Anterior;Right Upper arm (Active)   Site Assessment Clean;Dry;Intact 03/21/23 0900   Dressing Type Transparent 03/21/23 0900   Line Status Saline locked 03/21/23 0900   Dressing Status Clean;Dry;Intact 03/21/23 0900   Dressing Intervention N/A 03/20/23 2000   Dressing Change Due 03/22/23 03/18/23 2000   Date IV Connector(s) Changed 03/15/23 03/15/23 2000   Infiltration Grading ([facility], [facility]) 0 03/20/23 2000   Phlebitis Scale ([facility] Only) 0 03/20/23 2000       Peripheral IV 03/15/23 18 G Distal;Left;Anterior Forearm (Active)   Site Assessment Clean;Dry;Intact 03/21/23 0900   Dressing Type Transparent 03/21/23 0900   Line Status Saline locked 03/21/23 0900   Dressing Status Clean;Dry;Intact 03/21/23 0900   Dressing Intervention N/A 03/20/23 2000   Dressing Change Due 03/22/23 03/20/23 0733   Date Primary Tubing Changed 03/15/23 03/15/23 2000   NEXT Primary Tubing Change  03/18/23 03/15/23 2000   Date IV Connector(s) Changed 03/15/23 03/15/23 2000   Infiltration Grading ([facility], [facility]) 0 03/20/23 2000   Phlebitis Scale ([facility] Only) 0 03/20/23 2000          Peripheral IV 03/15/23 20 G Anterior;Right Upper arm (Active)   Site Assessment Clean;Dry;Intact 03/21/23 0900   Dressing Type Transparent 03/21/23 0900   Line Status Saline locked 03/21/23 0900   Dressing Status Clean;Dry;Intact 03/21/23 0900   Dressing Intervention N/A 03/20/23 2000   Dressing Change Due 03/22/23 03/18/23 2000   Date IV Connector(s) Changed 03/15/23 03/15/23 2000   Infiltration Grading ([facility], [facility]) 0 03/20/23 2000   Phlebitis Scale ([facility] Only) 0 03/20/23 2000       Peripheral IV 03/15/23 18 G Distal;Left;Anterior Forearm (Active)   Site Assessment Clean;Dry;Intact 03/21/23 0900   Dressing Type Transparent 03/21/23 0900   Line Status Saline locked 03/21/23 0900   Dressing Status Clean;Dry;Intact 03/21/23 0900   Dressing  Intervention N/A 03/20/23 2000   Dressing Change Due 03/22/23 03/20/23 0733   Date Primary Tubing Changed 03/15/23 03/15/23 2000   NEXT Primary Tubing Change  03/18/23 03/15/23 2000   Date IV Connector(s) Changed 03/15/23 03/15/23 2000   Infiltration Grading ([facility], [facility]) 0 03/20/23 2000   [facility] 0 03/20/23 2000               MENTAL STATUS ON TRANSFER   AOx4          CONSULTATIONS  critical care, neurosurgery, and physiatry    PROCEDURES  MR-BRAIN-W/O   Final Result         Left thalamic and left corona radiata hemorrhage with mild surrounding vasogenic edema. There is slight mass effect upon the left lateral ventricle without midline shift.      Remote infarcts in the right cerebellum, left kylee and right thalamus.      MU-OHISVGN-3 VIEW   Final Result      1.  No electronic device to preclude MRI imaging.   2.  RIGHT hip prosthesis in place.      EC-ECHOCARDIOGRAM COMPLETE W/O CONT   Final Result      CT-HEAD W/O   Final Result         1.  Left thalamic and basal ganglia hemorrhage, overall appears stable since prior study.   2.  Nonspecific white matter changes, commonly associated with small vessel ischemic disease.  Associated mild cerebral atrophy is noted.   3.  Chronic appearing sphenoid sinusitis changes      DX-CHEST-PORTABLE (1 VIEW)   Final Result      1.  Mild central pulmonary vascular congestion.   2.  Stable mild enlargement of the cardiomediastinal silhouette.   3.  Bilateral basilar atelectasis and/or consolidation. Underlying infection is possible.      CT-CTA NECK WITH & W/O-POST PROCESSING   Final Result      1.  CT angiogram of the neck within normal limits for age.   2.  Left internal carotid artery stenosis measuring greater than 50%.      CT-CTA HEAD WITH & W/O-POST PROCESS   Final Result      1.  CT angiogram of the [place] within normal limits.   2.  2.4 cm left thalamic intraparenchymal hematoma.      Based solely on CT findings, the brain injury guideline  category is mBIG 3.      EDH   IVH   Displaced skull fx   SDH > 8mm   IPH > 8mm or multiple   SAH bi-hemispheric or > 3mm      The original BIG retrospective analysis found radiographic progression in 0% of BIG 1 patients and 2.6% BIG 2.      Findings were discussed with Dr. DANIEL PARKINSON on 3/15/2023 3:11 PM.         CT-HEAD W/O   Final Result      1.  Acute left thalamic intraparenchymal hematoma measuring 2.3 x 1.8 x 3.4 cm..   2.  Chronic microvascular ischemic changes and chronic right thalamic lacunar infarct.               LABORATORY  Lab Results   Component Value Date    SODIUM 139 03/21/2023    POTASSIUM 3.9 03/21/2023    CHLORIDE 106 03/21/2023    CO2 21 03/21/2023    GLUCOSE 102 (H) 03/21/2023    BUN 11 03/21/2023    CREATININE 0.65 03/21/2023        Lab Results   Component Value Date    WBC 6.8 03/20/2023    HEMOGLOBIN 12.2 (L) 03/20/2023    HEMATOCRIT 37.0 (L) 03/20/2023    PLATELETCT 201 03/20/2023        Total time of the discharge process exceeds 32 minutes.

## 2023-03-21 NOTE — CARE PLAN
The patient is Stable - Low risk of patient condition declining or worsening    Shift Goals  Clinical Goals: No complications related to neuro status; Skin integrity will be maintained/improved; No complications related to cardiac  Patient Goals: Sleep; comfort  Family Goals: jay    Progress made toward(s) clinical / shift goals:  Patient remains alert and oriented X 4. Neuro checks continued with NO NEW COMPLICATIONS observed. Patient assisted with ADLs, as needed. Preventative skin measures continued. No signs of distress or discomfort have been observed. Will continue to monitor.     Problem: Knowledge Deficit - Standard  Goal: Patient and family/care givers will demonstrate understanding of plan of care, disease process/condition, diagnostic tests and medications  Outcome: Progressing     Problem: Skin Integrity  Goal: Skin integrity is maintained or improved  Outcome: Progressing                 Patient turned and repositioned every 2 hours                 Containment devices utilized                 Topical treatments applied, as needed and with each incontinent episode     Problem: Fall Risk  Goal: Patient will remain free from falls  Outcome: Progressing     Problem: Psychosocial  Goal: Patient's level of anxiety will decrease  Outcome: Progressing  Flowsheets (Taken 3/21/2023 0315)  Decrease Anxiety Level: Implemented stimuli reduction, calming techniques  Goal: Patient's ability to verbalize feelings about condition will improve  Outcome: Progressing  Flowsheets (Taken 3/21/2023 0315)  Condition Will Improve:   Discussed coping with medical condition and its effects   Encouraged patient participation in care   Encouraged acknowledgement of body changes and emotions  Goal: Patient's ability to re-evaluate and adapt role responsibilities will improve  Outcome: Progressing  Goal: Patient and family will demonstrate ability to cope with life altering diagnosis and/or procedure  Outcome: Progressing  Goal:  Spiritual and cultural needs incorporated into hospitalization  Outcome: Progressing     Problem: Hemodynamics  Goal: Patient's hemodynamics, fluid balance and neurologic status will be stable or improve  Outcome: Progressing     Problem: Respiratory  Goal: Patient will achieve/maintain optimum respiratory ventilation and gas exchange  Outcome: Progressing  Flowsheets (Taken 3/21/2023 0315)  O2 Delivery Device: Silicone Nasal Cannula with oxygen administered at 2L/min via nasal cannula     Problem: Risk for Aspiration  Goal: Patient's risk for aspiration will be absent or decrease  Outcome: Progressing  Flowsheets (Taken 3/21/2023 0315)  Aspiration Prevention:   Assessed for signs and symptoms of aspiration   Implemented aspiration precautions   Encouraged small bites  Head of Bed Elevated: Greater or equal to 30 degrees     Problem: Nutrition  Goal: Patient's nutritional and fluid intake will be adequate or improve  Outcome: Progressing     Problem: Urinary Elimination  Goal: Establish and maintain regular urinary output  Outcome: Progressing     Problem: Bowel Elimination  Goal: Establish and maintain regular bowel function  Outcome: Progressing     Problem: Cardiac - Atrial Fibrillation  Goal: Patient will achieve & maintain adequate cardiac output and rate control  Outcome: Progressing  Goal: Complications related to anticoagulation for unconverted atrial fibrillation will be avoided or minimized  Outcome: Progressing     Problem: Mobility  Goal: Patient's ability to tolerate increased activity will improve  Outcome: Progressing     Problem: Knowledge Deficit - Atrial Fibrillation  Goal: Patient and family/care givers will demonstrate understanding of atrial fibrillation condition and follow-up  Outcome: Progressing     Problem: Optimal Care of the Stroke Patient  Goal: Optimal emergency care for the stroke patient  Outcome: Progressing  Goal: Optimal acute care for the stroke patient  Outcome: Progressing      Problem: Knowledge Deficit - Stroke Education  Goal: Patient's knowledge of stroke and risk factors will improve  Outcome: Progressing     Problem: Discharge Planning - Stroke  Goal: Ensure Stroke Core Measures are met prior to discharge  Outcome: Progressing  Goal: Patient’s continuum of care needs will be met  Outcome: Progressing     Problem: Neuro Status  Goal: Neuro status will remain stable or improve  Outcome: Progressing     Problem: Dysphagia  Goal: Dysphagia will improve  Outcome: Progressing  Flowsheets (Taken 3/21/2023 0315)  Head of Bed Elevated: Greater or equal to 30 degrees     Problem: Mobility - Stroke  Goal: Patient's capacity to carry out activities will improve  Outcome: Progressing  Flowsheets (Taken 3/21/2023 0315)  Mobility:   Monitored for signs of activity intolerance   Encouraged mobilization per interdisciplinary team recommendations     Problem: Self Care  Goal: Patient will have the ability to perform ADLs independently or with assistance (bathe, groom, dress, toilet and feed)  Outcome: Progressing     Problem: Pain - Standard  Goal: Alleviation of pain or a reduction in pain to the patient’s comfort goal  Outcome: Progressing       Patient is not progressing towards the following goals:

## 2023-03-21 NOTE — DISCHARGE PLANNING
Agency/Facility Name: [facility]  Spoke To: Tarun  Outcome: DPA called facility to check on bed availability. Per Tarun, pt has abed today   DPA to call back with confirmed transport time     LSW notified

## 2023-03-21 NOTE — THERAPY
Occupational Therapy  Daily Treatment     Patient Name: Alireza Hernandez  Age:  87 y.o., Sex:  male  Medical Record #: [id number]  Today's Date: 3/21/2023       Precautions: Fall Risk, Swallow Precautions  Comments: SBP <140    Assessment    Pt seen for OT tx. Continues to be limited by decreased activity tolerance, balance deficits, inattention and RUE weakness impacting ability to complete ADLs and ADL transfers independently. Max A supine > sit, once seated EOB pt able to maintain seated balance w/o assist from therapist. When fatigued starts to have a R lateral lean but able to self correct. Mod A sit > stand. RUE w/ no active or purposeful movement. Will continue to benefit from OT services while in house.     Plan    Treatment Plan Status: Continue Current Treatment Plan    DC Equipment Recommendations: Unable to determine at this time  Discharge Recommendations: Recommend post-acute placement for additional occupational therapy services prior to discharge home       03/21/23 1001   Active ROM Upper Body   Active ROM Upper Body  X   Comments RUE no active or purposeful movement   Strength Upper Body   Upper Body Strength  X   Comments RUE flaccid, LUE WFL   Balance   Sitting Balance (Static) Fair -   Sitting Balance (Dynamic) Fair -   Standing Balance (Static) Poor -   Standing Balance (Dynamic) Trace +   Weight Shift Sitting Poor   Weight Shift Standing Absent   Bed Mobility    Supine to Sit Maximal Assist   Sit to Supine Maximal Assist   Activities of Daily Living   Grooming Moderate Assist;Seated   Upper Body Dressing Moderate Assist   Lower Body Dressing Maximal Assist   Toileting Maximal Assist   Functional Mobility   Sit to Stand Moderate Assist   Short Term Goals   Short Term Goal # 1 Pt will sit EOB with CGA >5 min to participate in ADL task   Goal Outcome # 1 Progressing as expected   Short Term Goal # 2 Pt will don gown using christiano technique with min A   Goal Outcome # 2 Progressing as expected   Short  Term Goal # 3 Pt will complete seated oral care using christiano technique with supv   Goal Outcome # 3 Progressing as expected   Short Term Goal # 4 Pt will use RUE as functional stabilizer during ADL task   Goal Outcome # 4 Goal not met   Anticipated Discharge Equipment and Recommendations   DC Equipment Recommendations Unable to determine at this time   Discharge Recommendations Recommend post-acute placement for additional occupational therapy services prior to discharge home

## 2023-06-08 PROBLEM — D72.829 LEUKOCYTOSIS: Status: ACTIVE | Noted: 2023-01-01

## 2023-06-08 PROBLEM — Z85.828 HISTORY OF SKIN CANCER: Status: ACTIVE | Noted: 2023-01-01

## 2023-06-08 PROBLEM — E88.09 HYPOALBUMINEMIA: Status: ACTIVE | Noted: 2023-01-01

## 2023-06-08 PROBLEM — L89.151 PRESSURE INJURY OF SACRAL REGION, STAGE 1: Status: ACTIVE | Noted: 2023-01-01

## 2023-07-11 PROBLEM — N40.0 BPH (BENIGN PROSTATIC HYPERPLASIA): Status: RESOLVED | Noted: 2023-01-01 | Resolved: 2023-01-01

## 2023-07-11 PROBLEM — R05.9 COUGH: Status: ACTIVE | Noted: 2023-01-01

## 2023-07-11 PROBLEM — N40.0 BPH (BENIGN PROSTATIC HYPERPLASIA): Status: ACTIVE | Noted: 2023-01-01

## 2023-07-11 PROBLEM — E78.5 DYSLIPIDEMIA: Status: ACTIVE | Noted: 2023-01-01

## 2023-07-11 PROBLEM — I63.9 ACUTE CVA (CEREBROVASCULAR ACCIDENT) (HCC): Status: ACTIVE | Noted: 2023-01-01

## 2023-07-11 NOTE — ED PROVIDER NOTES
" ED PHYSICIAN NOTE    CHIEF COMPLAINT  Stroke assessment    EXTERNAL RECORDS REVIEWED  Inpatient Notes in March 2023 patient presented with weakness he was identified to have left thalamic intraparenchymal hemorrhage.  Plan was to hold anticoagulation for 5 to 6 weeks.    HPI/ROS    OUTSIDE HISTORIAN(S):  EMS bring patient in from [facility].  Patient was last seen normal at 6 AM.  He had increased slurred speech today when checked on by nurses and paramedics were called.  Vital signs have been stable.    Alireza Hernandez is a 87 y.o. male who presents with slurred speech.  He has a history of stroke with residual right-sided deficits and mild slurred speech.  Speech is worse now.  The patient agrees that he is having a harder time speaking then he has recently.  He denies having a headache.  No new weakness in his extremities.  Denies chest pain shortness of breath.    PAST MEDICAL HISTORY  Past Medical History:   Diagnosis Date    A-fib (HCC)     Acute pulmonary edema (HCC) 03/15/2023    BPH (benign prostatic hyperplasia)     Cancer ([facility])     Hypertension     Psychiatric problem 2022    Anxiety, depression. Did MRI to r/o biological cause. No tx provided.    Stroke (HCC) 2018    minor stroke, patient doesn't remember details       SOCIAL HISTORY  Social History     Tobacco Use    Smoking status: Never    Smokeless tobacco: Never   Vaping Use    Vaping Use: Never used   Substance Use Topics    Alcohol use: Never    Drug use: Never       CURRENT MEDICATIONS  Home Medications    **Home medications have not yet been reviewed for this encounter**         ALLERGIES  No Known Allergies    PHYSICAL EXAM  VITAL SIGNS: Ht 1.753 m (5' 9\")   Wt 72.6 kg (160 lb)   BMI 23.63 kg/m²    Constitutional: Awake and alert  HENT: Normal inspection  Eyes: Normal inspection  Neck: Grossly normal range of motion.  Cardiovascular: Normal heart rate, Normal rhythm.  Symmetric peripheral pulses.   Thorax & Lungs: No respiratory " distress, No wheezing, No rales, No rhonchi, No chest tenderness.   Abdomen: Bowel sounds normal, soft, non-distended, nontender, no mass  Skin: No obvious rash.  Back: No tenderness, No CVA tenderness.   Extremities: No clubbing, cyanosis, edema, no Homans or cords.  Neurologic: Grossly normal   Psychiatric: Normal for situation     DIAGNOSTIC STUDIES / PROCEDURES  LABS/EKG  Results for orders placed or performed during the hospital encounter of 07/11/23   CBC WITH DIFFERENTIAL   Result Value Ref Range    WBC 6.1 4.8 - 10.8 K/uL    RBC 3.81 (L) 4.70 - 6.10 M/uL    Hemoglobin 10.0 (L) 14.0 - 18.0 g/dL    Hematocrit 32.1 (L) 42.0 - 52.0 %    MCV 84.3 81.4 - 97.8 fL    MCH 26.2 (L) 27.0 - 33.0 pg    MCHC 31.2 (L) 32.3 - 36.5 g/dL    RDW 47.4 35.9 - 50.0 fL    Platelet Count 191 164 - 446 K/uL    MPV 10.0 9.0 - 12.9 fL    Neutrophils-Polys 41.90 (L) 44.00 - 72.00 %    Lymphocytes 44.40 (H) 22.00 - 41.00 %    Monocytes 1.70 0.00 - 13.40 %    Eosinophils 12.00 (H) 0.00 - 6.90 %    Basophils 0.00 0.00 - 1.80 %    Nucleated RBC 0.00 0.00 - 0.20 /100 WBC    Neutrophils (Absolute) 2.56 1.82 - 7.42 K/uL    Lymphs (Absolute) 2.71 1.00 - 4.80 K/uL    Monos (Absolute) 0.10 0.00 - 0.85 K/uL    Eos (Absolute) 0.73 (H) 0.00 - 0.51 K/uL    Baso (Absolute) 0.00 0.00 - 0.12 K/uL    NRBC (Absolute) 0.00 K/uL   COMP METABOLIC PANEL   Result Value Ref Range    Sodium 142 135 - 145 mmol/L    Potassium 3.4 (L) 3.6 - 5.5 mmol/L    Chloride 110 96 - 112 mmol/L    Co2 21 20 - 33 mmol/L    Anion Gap 11.0 7.0 - 16.0    Glucose 85 65 - 99 mg/dL    Bun 11 8 - 22 mg/dL    Creatinine 0.68 0.50 - 1.40 mg/dL    Calcium 7.4 (L) 8.5 - 10.5 mg/dL    AST(SGOT) 11 (L) 12 - 45 U/L    ALT(SGPT) 10 2 - 50 U/L    Alkaline Phosphatase 56 30 - 99 U/L    Total Bilirubin 0.5 0.1 - 1.5 mg/dL    Albumin 2.9 (L) 3.2 - 4.9 g/dL    Total Protein 5.6 (L) 6.0 - 8.2 g/dL    Globulin 2.7 1.9 - 3.5 g/dL    A-G Ratio 1.1 g/dL   PROTHROMBIN TIME   Result Value Ref Range     PT 15.3 (H) 12.0 - 14.6 sec    INR 1.22 (H) 0.87 - 1.13   APTT   Result Value Ref Range    APTT 25.6 24.7 - 36.0 sec   TROPONIN   Result Value Ref Range    Troponin T 97 (H) 6 - 19 ng/L   ESTIMATED GFR   Result Value Ref Range    GFR (CKD-EPI) 90 >60 mL/min/1.73 m 2   CORRECTED CALCIUM   Result Value Ref Range    Correct Calcium 8.3 (L) 8.5 - 10.5 mg/dL   Heparin Xa (Unfractionated)   Result Value Ref Range    Heparin Xa (UFH) <0.10 IU/mL   DIFFERENTIAL MANUAL   Result Value Ref Range    Manual Diff Status PERFORMED    PERIPHERAL SMEAR REVIEW   Result Value Ref Range    Peripheral Smear Review see below    PLATELET ESTIMATE   Result Value Ref Range    Plt Estimation Normal    MORPHOLOGY   Result Value Ref Range    RBC Morphology Present     Poikilocytosis 1+     Ovalocytes 1+     Schistocytes 1+     Echinocytes 1+     Acanthocytes 1+    EKG (NOW)   Result Value Ref Range    Report       [facility] Emergency Dept.    Test Date:  2023  Pt Name:    RODERICK MILLS                 Department: ER  MRN:        [id number]                      Room:       [facility]  Gender:     Male                         Technician: [id number]  :        1935                   Requested By:GENESIS JOYNER  Order #:    [id number]                    Reading MD: GENESIS JOYNER MD    Measurements  Intervals                                Axis  Rate:       80                           P:          0  TN:         0                            QRS:        44  QRSD:       94                           T:          43  QT:         388  QTc:        448    Interpretive Statements  Atrial fibrillation  Borderline low voltage, extremity leads  Artifact in lead(s) I,II,III,aVR,aVL,aVF,V1,V2  Compared to ECG 2023 20:45:40  ST (T wave) deviation no longer present  Electronically Signed On 2023 13:18:28 PDT by GENESIS JOYNER MD        I have independently interpreted this EKG      RADIOLOGY  I have independently interpreted  the diagnostic imaging associated with this visit and am waiting the final reading from the radiologist.   My preliminary interpretation is as follows: CT head without hemorrhage  Radiologist interpretation:   DX-CHEST-PORTABLE (1 VIEW)   Final Result      1.  Moderate LEFT pleural effusion   2.  LEFT lung atelectasis which could obscure an additional process   3.  Patchy opacity in the RIGHT perihilar lung could be atelectasis or airspace disease      CT-CEREBRAL PERFUSION ANALYSIS   Final Result      1.Cerebral blood flow less than 30% likely representing completed infarct = 0 mL   2.T Max more than 6 seconds likely representing combination of completed infarct and ischemia = 58 mL   3. Mismatched volume likely representing ischemic brain/penumbra= 58 mL   4.Please note that the cerebral perfusion was performed on the limited brain tissue around the basal ganglia region. Infarct/ischemia outside the CT perfusion sections may not be seen on this study.      CT-CTA NECK WITH & W/O-POST PROCESSING   Final Result      1.  Severe stenosis of the proximal LEFT internal carotid artery   2.  LEFT pleural effusion which appears to be at least partially loculated      CT-CTA HEAD WITH & W/O-POST PROCESS   Final Result      LEFT M2 branch occlusion      Findings were communicated with and acknowledged by GENESIS JOYNER via Voalte Me on 7/11/2023 11:37 AM.         CT-HEAD W/O   Final Result      1.  Atrophy and chronic microvascular ischemic type changes.   2.  Old bilateral thalamic lacunar infarcts.   3.  No acute intracranial abnormality.   4.  Bilateral sphenoid sinus disease         MR-BRAIN-WITH & W/O    (Results Pending)         COURSE & MEDICAL DECISION MAKING        INITIAL ASSESSMENT, COURSE AND PLAN  Care Narrative: Patient presents with slurred speech.  This is increased from baseline.  He has significant right-sided deficits that he reports are unchanged.  He was seen on arrival.  Has atrial fibrillation but is  not anticoagulated because of fairly recent intracranial hemorrhage.  He is at risk for CVA.  Patient out of time window for TNK also previous ICH.  May be thrombectomy candidate.  Work-up was initiated.  Multiple critical patients delay CT scan.  I spoke with technologist about this.  Will obtain ASAP.  Ordered laboratory data EKG.    CT head without hemorrhage.  CTA demonstrates m2 occlusion.  Contact IR, Dr Vega.  He reviewed images.  Patient also has a carotid clot which is free-floating complicating the interventional procedure.  I paged Dr. Vo.  He reviewed the case with IR as well.  Patient is not felt to be a candidate for intervention either thrombolysis or mechanical thrombectomy.  Dr. Franco advised initiation of heparin.  I ordered stroke heparin.  Patient will be admitted to hospitalist service.  Hospitalist service paged.    DISPOSITION AND DISCUSSIONS  I have discussed management of the patient with the following physicians and SILKE's: As noted above    Discussion of management with other QHP or appropriate source(s): Reviewed with pharmacy        FINAL IMPRESSION  1.  Acute ischemic cerebrovascular accident    CRITICAL CARE  The very real possibilty of a deterioration of this patient's condition required the highest level of my preparedness for sudden, emergent intervention.  I provided critical care services, which included medication orders, frequent reevaluations of the patient's condition and response to treatment, ordering and reviewing test results, and discussing the case with various consultants.  The critical care time associated with the care of the patient was 45 minutes. Review chart for interventions. This time is exclusive of any other billable procedures.       This dictation was created using voice recognition software. The accuracy of the dictation is limited to the abilities of the software. I expect there may be some errors of grammar and possibly content. The nursing  notes were reviewed and certain aspects of this information were incorporated into this note.    Electronically signed by: Wes Norman M.D., 7/11/2023

## 2023-07-11 NOTE — H&P
PATIENT ID:  NAME:  Alireza Hernandez  MRN:               [id number]  YOB: 1935    Date of Admission: 7/11/2023     Attending: Vero Armenta M.d.      Resident: Cristino Sharif M.D.    Primary Care Physician:  Jama ANDREWS    CC:  87-year-old male who presented with slurred speech and right-sided weakness who was admitted for a CVA.     HPI: Alireza Hernandez is a 87 y.o. male with past medical history of atrial fibrillation (not on anticoagulation due to hemorrhagic stroke in March 2023), HTN, dyslipidemia, who presented with worsening slurred speech and right-sided weakness. Patient lives in a nursing home and noticed his speech was slurred this morning when he woke up. Patient denies changes to his right sided hemiplegia, no numbness or tingling of the face or extremities. Nursing home workers also noticed a change in his baseline so ambulance was called. He has residual right sided LE and UE weakness from hemorrhagic stroke in March. Patient also reports a non-productive cough for the past couple of days, denied fever, chills, chest pain, nausea, vomiting.     ERCourse:  In the ED, patient had hypertension with SBP up to 160, Labs significant for Hb 10.0, Trop 97.   Imaging: Brain CT did not reveal acute abnormalities, CT angiogram of the head and neck revealed left ICA free-floating clot and CT angiogram of the head revealed left M2 occlusion.  Neuro IR was consulted and patient was found to not be a candidate for thombectomy given presence of free-floating thrombus with concern for distal embolization.    Patient was admitted for stroke workup. Patient is currently NPO given dysarthria until speech eval.     REVIEW OF SYSTEMS:   Ten systems reviewed and were negative except as noted in the HPI.                PAST MEDICAL HISTORY:  Past Medical History:   Diagnosis Date    A-fib (HCC)     Acute pulmonary edema (HCC) 03/15/2023    BPH (benign prostatic hyperplasia)     Cancer (HCC)      Hypertension     Psychiatric problem     Anxiety, depression. Did MRI to r/o biological cause. No tx provided.    Stroke (HCC) 2018    minor stroke, patient doesn't remember details       PAST SURGICAL HISTORY:  Past Surgical History:   Procedure Laterality Date    HIP REPLACEMENT, TOTAL Right        FAMILY HISTORY:  Family History   Problem Relation Age of Onset    No Known Problems Mother     No Known Problems Father        SOCIAL HISTORY:   Pt lives: Nursing home  Tobacco use: denies  Etoh use: denies  Drug use: denies      ALLERGIES:  No Known Allergies    OUTPATIENT MEDICATIONS:  No current facility-administered medications on file prior to encounter.     Current Outpatient Medications on File Prior to Encounter   Medication Sig Dispense Refill    Lactobacillus (FREEZE DRIED ACIDOPHILUS) Cap TAKE 1 CAPSULE BY MOUTH EVERY DAY FOR SUPPLEMENT 30 Capsule 10    simvastatin (ZOCOR) 20 MG Tab Take 1 Tablet by mouth every evening. 30 Tablet 11    metoprolol tartrate (LOPRESSOR) 50 MG Tab Take 1 Tablet by mouth 2 times a day. 60 Tablet 11    omeprazole (PRILOSEC) 20 MG delayed-release capsule Take 1 Capsule by mouth every day. 30 Capsule 11    tamsulosin (FLOMAX) 0.4 MG capsule Take 1 Capsule by mouth 1/2 hour after breakfast. 30 Capsule 11    losartan (COZAAR) 50 MG Tab Take 75 mg by mouth every day. 75 mg = 1.5 tablets daily      finasteride (PROSCAR) 5 MG Tab Take 5 mg by mouth every day.         PHYSICAL EXAM:  Vitals:    23 1303 23 1334 23 1413 23 1437   BP: (!) 153/73 138/63 134/61 126/82   Pulse: 75 76 84 89   Resp: 20 20 16 16   Temp:    36.4 °C (97.6 °F)   TempSrc:    Temporal   SpO2: 92% 91% 95% 90%   Weight:       Height:       , Temp (24hrs), Av.6 °C (97.9 °F), Min:36.4 °C (97.6 °F), Max:36.7 °C (98.1 °F)  , Pulse Oximetry: 90 %, O2 Delivery Device: None - Room Air    General: Pt resting in NAD, cooperative   Skin:  Pink, warm and dry.  No rashes  HEENT: NC/AT. PERRL.  EOMI. oropharynx nonerythematous without exudate/plaques  Neck:  Supple without lymphadenopathy or rigidity.  Lungs:  Symmetrical.  CTAB with no W/R/R.  Good air movement   Cardiovascular:  S1/S2 RRR without M/R/G.  Abdomen:  Abdomen is soft NT/ND. +BS. No masses noted.  Extremities:  2+ pulses in all extremities. Strength is 5/5 on the LUE and RUE and 2/5 RUE and RLE.    CNS:  Cranial nerves II-XII grossly intact. CN XI non-intact. Sensation is diminished on entire right side of body include face, UE and LE. Reflexes are 2+ throughout.       LAB TESTS:   Results for orders placed or performed during the hospital encounter of 07/11/23   CBC WITH DIFFERENTIAL   Result Value Ref Range    WBC 6.1 4.8 - 10.8 K/uL    RBC 3.81 (L) 4.70 - 6.10 M/uL    Hemoglobin 10.0 (L) 14.0 - 18.0 g/dL    Hematocrit 32.1 (L) 42.0 - 52.0 %    MCV 84.3 81.4 - 97.8 fL    MCH 26.2 (L) 27.0 - 33.0 pg    MCHC 31.2 (L) 32.3 - 36.5 g/dL    RDW 47.4 35.9 - 50.0 fL    Platelet Count 191 164 - 446 K/uL    MPV 10.0 9.0 - 12.9 fL    Neutrophils-Polys 41.90 (L) 44.00 - 72.00 %    Lymphocytes 44.40 (H) 22.00 - 41.00 %    Monocytes 1.70 0.00 - 13.40 %    Eosinophils 12.00 (H) 0.00 - 6.90 %    Basophils 0.00 0.00 - 1.80 %    Nucleated RBC 0.00 0.00 - 0.20 /100 WBC    Neutrophils (Absolute) 2.56 1.82 - 7.42 K/uL    Lymphs (Absolute) 2.71 1.00 - 4.80 K/uL    Monos (Absolute) 0.10 0.00 - 0.85 K/uL    Eos (Absolute) 0.73 (H) 0.00 - 0.51 K/uL    Baso (Absolute) 0.00 0.00 - 0.12 K/uL    NRBC (Absolute) 0.00 K/uL   COMP METABOLIC PANEL   Result Value Ref Range    Sodium 142 135 - 145 mmol/L    Potassium 3.4 (L) 3.6 - 5.5 mmol/L    Chloride 110 96 - 112 mmol/L    Co2 21 20 - 33 mmol/L    Anion Gap 11.0 7.0 - 16.0    Glucose 85 65 - 99 mg/dL    Bun 11 8 - 22 mg/dL    Creatinine 0.68 0.50 - 1.40 mg/dL    Calcium 7.4 (L) 8.5 - 10.5 mg/dL    AST(SGOT) 11 (L) 12 - 45 U/L    ALT(SGPT) 10 2 - 50 U/L    Alkaline Phosphatase 56 30 - 99 U/L    Total Bilirubin 0.5 0.1 -  1.5 mg/dL    Albumin 2.9 (L) 3.2 - 4.9 g/dL    Total Protein 5.6 (L) 6.0 - 8.2 g/dL    Globulin 2.7 1.9 - 3.5 g/dL    A-G Ratio 1.1 g/dL   PROTHROMBIN TIME   Result Value Ref Range    PT 15.3 (H) 12.0 - 14.6 sec    INR 1.22 (H) 0.87 - 1.13   APTT   Result Value Ref Range    APTT 25.6 24.7 - 36.0 sec   COD (ADULT)   Result Value Ref Range    ABO Grouping Only A     Rh Grouping Only POS     Antibody Screen-Cod NEG    TROPONIN   Result Value Ref Range    Troponin T 97 (H) 6 - 19 ng/L   ESTIMATED GFR   Result Value Ref Range    GFR (CKD-EPI) 90 >60 mL/min/1.73 m 2   CORRECTED CALCIUM   Result Value Ref Range    Correct Calcium 8.3 (L) 8.5 - 10.5 mg/dL   Heparin Xa (Unfractionated)   Result Value Ref Range    Heparin Xa (UFH) <0.10 IU/mL   DIFFERENTIAL MANUAL   Result Value Ref Range    Manual Diff Status PERFORMED    PERIPHERAL SMEAR REVIEW   Result Value Ref Range    Peripheral Smear Review see below    PLATELET ESTIMATE   Result Value Ref Range    Plt Estimation Normal    MORPHOLOGY   Result Value Ref Range    RBC Morphology Present     Poikilocytosis 1+     Ovalocytes 1+     Schistocytes 1+     Echinocytes 1+     Acanthocytes 1+    EKG (NOW)   Result Value Ref Range    Report       [facility]    Test Date:  2023  Pt Name:    RODERICK MILLS                 Department: ER  MRN:        [id number]                      Room:        10  Gender:     Male                         Technician: [id number]  :        1935                   Requested By:GENESIS JOYNER  Order #:    [id number]                    Reading MD: GENESIS JOYNER MD    Measurements  Intervals                                Axis  Rate:       80                           P:          0  OR:         0                            QRS:        44  QRSD:       94                           T:          43  QT:         388  QTc:        448    Interpretive Statements  Atrial fibrillation  Borderline low voltage, extremity  leads  Artifact in lead(s) I,II,III,aVR,aVL,aVF,V1,V2  Compared to ECG 03/18/2023 20:45:40  ST (T wave) deviation no longer present  Electronically Signed On 07- 13:18:28 PDT by GENESIS JOYNER MD         CULTURES:   Results       ** No results found for the last 168 hours. **            IMAGES:  DX-CHEST-PORTABLE (1 VIEW)   Final Result      1.  Moderate LEFT pleural effusion   2.  LEFT lung atelectasis which could obscure an additional process   3.  Patchy opacity in the RIGHT perihilar lung could be atelectasis or airspace disease      CT-CEREBRAL PERFUSION ANALYSIS   Final Result      1.Cerebral blood flow less than 30% likely representing completed infarct = 0 mL   2.T Max more than 6 seconds likely representing combination of completed infarct and ischemia = 58 mL   3. Mismatched volume likely representing ischemic brain/penumbra= 58 mL   4.Please note that the cerebral perfusion was performed on the limited brain tissue around the basal ganglia region. Infarct/ischemia outside the CT perfusion sections may not be seen on this study.      CT-CTA NECK WITH & W/O-POST PROCESSING   Final Result      1.  Severe stenosis of the proximal LEFT internal carotid artery   2.  LEFT pleural effusion which appears to be at least partially loculated      CT-CTA HEAD WITH & W/O-POST PROCESS   Final Result      LEFT M2 branch occlusion      Findings were communicated with and acknowledged by GENESIS JOYNER via Voalte Me on 7/11/2023 11:37 AM.         CT-HEAD W/O   Final Result      1.  Atrophy and chronic microvascular ischemic type changes.   2.  Old bilateral thalamic lacunar infarcts.   3.  No acute intracranial abnormality.   4.  Bilateral sphenoid sinus disease         MR-BRAIN-WITH & W/O    (Results Pending)       CONSULTS:   Neurology  Neuro IR    ASSESSMENT/PLAN: 87 y.o. male with past medical history of atrial fibrillation (not on anticoagulation due to hemorrhagic stroke in March 2023), HTN, dyslipidemia,  who is admitted for an Acute CVA.    * Acute CVA (cerebrovascular accident) (HCC)- (present on admission)  Assessment & Plan  Patient presented with worsening slurring speech starting when he woke up on day of admission. Has residual right-sided deficits due to hemorrhagic stroke in March 2023. CT Head showed no ICH. CT angiogram of the head and neck revealed left ICA free-floating clot and CT angiogram of the head revealed left M2 occlusion. Neuro IR was consulted and patient was found to not be a candidate for thombectomy given presence of free-floating thrombus with concern for distal embolization. Patient to get medical therapy for secondary stroke prevention.     -q4h and PRN neuro assessment.    -Acutely, blood pressure goal -180. Long term BP goal is normotension, 100-130/60-80.  -Obtain MRI Brain wo contrast   -Telemetry; Known history of atrial fibrillation. No need for TTE from Neuro perspective.  -Start heparin gtt, no bolus/rebolus protocol    - HgbA1c pending; BG goal .  -PT/OT/SLP eval and treat.           Paroxysmal atrial fibrillation (HCC)- (present on admission)  Assessment & Plan  EKG upon arrival shows A-fib. Patient was not on anti-coagulation due to recent ICH.    - Start Hep drip with transition to Eliquis in coming days for secondary stroke prevention    Primary hypertension- (present on admission)  Assessment & Plan  Patient arrive with -160. Allowing for permissive hypertension in the setting of ischemic stroke.     - Start Home Losartan and Metoprolol  - Per Neuro, Goal -180; Long term BP goal is normotension, 100-130/60-80.    Cough  Assessment & Plan  Patient reports a non-productive cough for past 2 days but denies fever, chills, sob, chest pain. CXR shows Moderate L pleural effusion L lung atelectasis and a patchy opacity in the RIGHT perihilar lung could be atelectasis or airspace disease.     - Consider thoracentesis for L pleural  effusion    Dyslipidemia  Assessment & Plan      - Continue home simvastatin  - Lipid panel pending  - Titrate to long term LDL goal < 70    Hypokalemia- (present on admission)  Assessment & Plan  K+ was 3.4 upon arrival.    - s/p 40mg K  - BMP in AM             Core Measures:  Fluids: none  Lines: Peripheral IV  Abx: None  Diet: NPO  PPX: SCDs    DISPO: Inpatient for at least 2 nights      CODE STATUS: DNR/DNI      Cristino Sharif M.D.  PGY-1  [facility]

## 2023-07-11 NOTE — CARE PLAN
The patient is Watcher - Medium risk of patient condition declining or worsening    Shift Goals  Clinical Goals: neuro exam, BP monitoring, monitor VSS  Patient Goals: feel better  Family Goals: jay    Progress made toward(s) clinical / shift goals:  monitoring neuro, monitoring lab / VSS trends  Problem: Optimal Care of the Stroke Patient  Goal: Optimal acute care for the stroke patient  Outcome: Progressing     Problem: Neuro Status  Goal: Neuro status will remain stable or improve  Outcome: Progressing     Problem: Hemodynamic Monitoring  Goal: Patient's hemodynamics, fluid balance and neurologic status will be stable or improve  Outcome: Progressing       Patient is not progressing towards the following goals:

## 2023-07-11 NOTE — H&P
Medical Student Note    Attending: Geovany OCHOA  Senior Resident: Christo (PGY-3)  Manuelito Resident: Kole (PGY-1)  Medical Student: Lennox Tee, MS3  PATIENT: Alireza Hernandez; [id number]; 1935    ID: 87 y.o. male with past medical history of hypertension, Afib, ICH, and previous stroke with right sided deficits with slurred speech was admitted on 7/11/2023 for worsening slurred speech      SUBJECTIVE:  Subjective:   CC: Worsening slurred speech    HISTORY OF THE PRESENT ILLNESS: Patient is a 87 y.o. 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 Patient states he was woken up by his PT in the nursing home he lives in at approximately 6:15 this morning, then went back to sleep until 8am. He states he felt well at 6:15, but noticed slurred speech at 8am which prompted him to come to the ED. He also reports a cough onset 3 days ago. He denies any loss of consciousness or hitting his head. He notes a history of a hemorrhagic stroke 4 month ago that caused residual right sided weakness, included slurred speech and decreased right visual field acuity. Patient takes simvastatin, metoprolol, omeprazole, tamsulosin, losartan, and finasteride at home. Discussed DNR/DNI status with patient.    Current Facility-Administered Medications Ordered in Epic   Medication Dose Route Frequency Provider Last Rate Last Admin    heparin infusion 25,000 units in 500 mL 0.45% NACL  0-30 Units/kg/hr Intravenous Continuous Wes Norman M.D.   Continue to Floor at 07/11/23 1418    senna-docusate (Pericolace Or Senokot S) 8.6-50 MG per tablet 2 Tablet  2 Tablet Oral BID Cristino Sharif M.D.        And    polyethylene glycol/lytes (Miralax) PACKET 1 Packet  1 Packet Oral QDAY PRN Cristino Sharif M.D.        And    magnesium hydroxide (Milk Of Magnesia) suspension 30 mL  30 mL  Oral QDAY PRN Cristino Sharif M.D.        And    bisacodyl (Dulcolax) suppository 10 mg  10 mg Rectal QDAY PRN Cristino Sharif M.D.        ondansetron (Zofran) syringe/vial injection 4 mg  4 mg Intravenous Q4HRS PRN Cristino Sharif M.D.        ondansetron (Zofran ODT) dispertab 4 mg  4 mg Oral Q4HRS PRN Cristino Sharif M.D.        finasteride (Proscar) tablet 5 mg  5 mg Oral DAILY Cristino Sharif M.D.        losartan (Cozaar) tablet 75 mg  75 mg Oral DAILY Cristino Sharif M.D.        metoprolol tartrate (Lopressor) tablet 50 mg  50 mg Oral BID Cristino Sharif M.D.        omeprazole (PriLOSEC) capsule 20 mg  20 mg Oral DAILY Cristino Sharif M.D.        simvastatin (Zocor) tablet 20 mg  20 mg Oral Nightly Cristino Sharif M.D.        tamsulosin (Flomax) capsule 0.4 mg  0.4 mg Oral AFTER BREAKFAST Cristino Sharif M.D.        Blood pressure control per admin instructions   Other PHARMACY TO DOSE Cristino Sharif M.D.        labetalol (Normodyne/Trandate) injection 10 mg  10 mg Intravenous Q10 MIN PRN Cristino Sharif M.D.        hydrALAZINE (Apresoline) injection 10 mg  10 mg Intravenous Q2HRS PRN Cristino Sharif M.D.        NS (Bolus) infusion 500 mL  500 mL Intravenous Once PRN Cristino Sharif M.D.        potassium chloride (Kcl) ivpb 10 mEq  10 mEq Intravenous Q HOUR Cristino Sharif M.D.         No current Epic-ordered outpatient medications on file.       ROS:   Gen: no fevers/chills  Eyes: no changes in vision  ENT: no changes in hearing  Pulm: Cough  CV: no chest pain, no palpitations  GI: no nausea/vomiting, no diarrhea  MSk: no myalgias  Skin: no rash  Neuro: Worsening slurred speech. no headaches, no changes in sensation in extremities      Objective:     Vitals:   Temp:  [36.4 °C (97.6 °F)-36.7 °C (98.1 °F)] 36.4 °C (97.6 °F)  Pulse:  [] 89  Resp:  [16-20] 16  BP: (126-160)/(53-83) 126/82  SpO2:  [90 %-95 %] 90 %  No intake or output data in the 24 hours ending 07/11/23  "1330    Exam: /82   Pulse 89   Temp 36.4 °C (97.6 °F) (Temporal)   Resp 16   Ht 1.753 m (5' 9\")   Wt 72.6 kg (160 lb)   SpO2 90%  Body mass index is 23.63 kg/m².    General: patient laying in bed in no acute distress.   HEENT: Normocephalic. Eyes conjunctiva clear lids without ptosis, pupils equal and reactive to light accommodation, ears normal shape and contour, oropharynx is without erythema, edema or exudates.   Neck: Supple without JVD. Thyroid is not enlarged.  Pulmonary: Clear to ausculation.  Normal effort. No rales, ronchi, or wheezing.  Cardiovascular: Regular rate and rhythm without murmur.   Abdomen: Soft, nontender, nondistended. Liver and spleen are not palpable  Neurologic: Decreased sensation to right cheek and chin. Face appears symmetric with no noticeable drooping. Normal sensation to bilateral forehead. Decreased sensation to touch to right upper and lower extremities. Right upper and lower extremity strength 3/5 (baseline). Left side strength is 5/5 with normal range of motion. Speech is dysarthric and difficult to understand at times.    Skin: Warm and dry.  No obvious lesions.  Musculoskeletal: No obvious abnormalities.  No extremity cyanosis, clubbing, or edema.  Psych: Normal mood and affect. Alert and oriented to person and place. Judgment and insight is normal.    Last NIH Stroke scale: 9 (7/11/2023 11:33 AM)     LABS:  Recent Labs     07/11/23  1057   WBC 6.1   RBC 3.81*   HEMOGLOBIN 10.0*   HEMATOCRIT 32.1*   MCV 84.3   MCH 26.2*   RDW 47.4   PLATELETCT 191   MPV 10.0   NEUTSPOLYS 41.90*   LYMPHOCYTES 44.40*   MONOCYTES 1.70   EOSINOPHILS 12.00*   BASOPHILS 0.00   RBCMORPHOLO Present     Recent Labs     07/11/23  1057   SODIUM 142   POTASSIUM 3.4*   CHLORIDE 110   CO2 21   BUN 11   CREATININE 0.68   CALCIUM 7.4*   ALBUMIN 2.9*     Estimated GFR/CRCL = Estimated Creatinine Clearance: 78 mL/min (by C-G formula based on SCr of 0.68 mg/dL).  Recent Labs     07/11/23  1057 "   GLUCOSE 85     Recent Labs     07/11/23  1057   ASTSGOT 11*   ALTSGPT 10   TBILIRUBIN 0.5   ALKPHOSPHAT 56   GLOBULIN 2.7   INR 1.22*             Recent Labs     07/11/23  1057   INR 1.22*   APTT 25.6       MICROBIOLOGY:   No results found for: BLOODCULTU, BLDCULT, BCHOLD     IMAGING:   DX-CHEST-PORTABLE (1 VIEW)   Final Result      1.  Moderate LEFT pleural effusion   2.  LEFT lung atelectasis which could obscure an additional process   3.  Patchy opacity in the RIGHT perihilar lung could be atelectasis or airspace disease      CT-CEREBRAL PERFUSION ANALYSIS   Final Result      1.Cerebral blood flow less than 30% likely representing completed infarct = 0 mL   2.T Max more than 6 seconds likely representing combination of completed infarct and ischemia = 58 mL   3. Mismatched volume likely representing ischemic brain/penumbra= 58 mL   4.Please note that the cerebral perfusion was performed on the limited brain tissue around the basal ganglia region. Infarct/ischemia outside the CT perfusion sections may not be seen on this study.      CT-CTA NECK WITH & W/O-POST PROCESSING   Final Result      1.  Severe stenosis of the proximal LEFT internal carotid artery   2.  LEFT pleural effusion which appears to be at least partially loculated      CT-CTA HEAD WITH & W/O-POST PROCESS   Final Result      LEFT M2 branch occlusion      Findings were communicated with and acknowledged by GENESIS JOYNER via [employer] on 7/11/2023 11:37 AM.         CT-HEAD W/O   Final Result      1.  Atrophy and chronic microvascular ischemic type changes.   2.  Old bilateral thalamic lacunar infarcts.   3.  No acute intracranial abnormality.   4.  Bilateral sphenoid sinus disease         MR-BRAIN-WITH & W/O    (Results Pending)       Assessment & Plan:   87 y.o. male with the following -    #Cerebrovascular Accident  Patient is presenting with worsening slurred speech, which he has at baseline secondary to a hemorrhagic stroke 4 months ago. He is  alert and oriented to person and place. Patient is hypertensive at 152/52 on arrival, but pressures have normalized during ED course. CT neck shows severe stenosis of the left internal carotid artery and free floating thrombus. CT Head shows left M2 branch occlusion and cerebral blood flow less than 30% representing likely completed infarct.  Blood pressure goal of -180 now, with long-term goal of normotensive pressures with the use of antihypertensive medication continuation.  Start heparin drip for anticoagulation therapy.  Order lipid panel and HbA1c  Consult with Speech for aspiration precautions    #Hemiplegia of right side  Patient had hemorrhagic stroke 4 months ago resulting in a right sided sensory and motor deficit.  Patient should follow with PT  Consider working with case management to get patient to acute rehab facility    #Hypertension  Patient has history of hypertension, which may have contributed to his first stroke.   Continue outpatient BP medication to achieve a normotensive range.     #Hypokalemia  Patient has a K of 3.4 on arrival to the ED. Unknown etiology.  Ordered for 4 doses IV K 10mEq with goal to raise K to 3.8.    #Normocytic anemia.  Patient has a history of iron deficiency anemia and has slight anemia currently.   Consider ferritin and iron studies to check current levels. Replacement with iron sulfate as necessary.     Core Measures:  Fluids: IV  Lines: PIV  Abx: None  Diet: NPO  PPX: SCDs      #DISPOSITION  - Patient to be admitted to hospital.      Lennox Tee, MS3  [employer]

## 2023-07-11 NOTE — DISCHARGE PLANNING
[facility] Transitional Care Coordination    Referral from: Dr Robledo  Insurance Provider on Facesheet: Medicare/VA  Potential Rehab Diagnosis: Stroke    Chart review indicates patient may have on going medical management and may have therapy needs to possibly meet inpatient rehab facility criteria with the goal of returning to community.    D/C support: TBD     Physiatry consultation pended per protocol.     Stroke, NIH 10 - physiatry consult pended, awaiting work up and therapy evals as appropriate.     Thank you for the referral.

## 2023-07-11 NOTE — THERAPY
"Speech Language Pathology   Clinical Swallow Evaluation     Patient Name: Alireza Hernandez  AGE:  87 y.o., SEX:  male  Medical Record #: [id number]  Date of Service: 7/11/2023      History of Present Illness  88 y/o male admitted 7/11 for worsening slurred speech. 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    Previously seen by SLP at [facility] in March 2023. FEES completed with silent aspiration noted. Recommended SB/TN with cough/reswallow for liquids.     CMHx: CVA  PMHx: ICH of basal ganglia, HTN, afib, myocardical ischemia, prediabetic, hx skin cancer    CT Cerebral Perfusion 7/11:  \"1.Cerebral blood flow less than 30% likely representing completed infarct = 0 mL  2.T Max more than 6 seconds likely representing combination of completed infarct and ischemia = 58 mL  3. Mismatched volume likely representing ischemic brain/penumbra= 58 mL\"    CT Head w/ and w/o 7/11:  \"LEFT M2 branch occlusion\"    CXR 7/11:  \"1.  Moderate LEFT pleural effusion  2.  LEFT lung atelectasis which could obscure an additional process  3.  Patchy opacity in the RIGHT perihilar lung could be atelectasis or airspace disease\"    General Information:  Vitals  O2 Delivery Device: None - Room Air  Level of Consciousness: Alert, Awake  Patient Behaviors:  (Calm, cooperative)  Orientation: Self, General place. Possibly limited assessment of orientation 2' to aphasia  Follows Directives: Yes - simple commands only      Prior Living Situation & Level of Function:  Communication: Impaired cognition; previous Cognistat at [facility]  Swallowing: Impaired - recent history of silent aspiration. Recommend SB/TN with cough/re-swallow       Oral Mechanism Evaluation:  Dentition: Fair, Natural dentition   Facial Symmetry: Equal  Facial Sensation: Equal (Somewhat confused by task; may be inaccurate)     Labial Observations: WFL   Lingual Observations: Xerostomia  Motor Speech: Dysarthria. Some impact on intelligibility         Laryngeal " "Function:  Secretion Management: Adequate  Voice Quality: Hoarse  Cough: Perceptually weak  Comments: Cough congested and not-productive prior to PO      Subjective  Pt agreeable and cooperative with SLP evaluation tasks. Does report that he remembers FEES happening but stated \"It went great\" and reports RG/TN diet without strategy use since that time. Aware of aphasia, reported \"Words are hard.\" Noted to have phonemic and semantic paraphasias.      Assessment  Current Method of Nutrition: NPO until cleared by speech pathology  Positioning: Macario's (60-90 degrees)  Bolus Administration: SLP  O2 Delivery Device: None - Room Air  Factor(s) Affecting Performance: Impaired mental status, Impaired command following  Tracheostomy : No       Swallowing Trials:  Thin Liquid (TN0): Impaired  Liquidised (LQ3): Impaired  Easy to Chew (EC7): Impaired      Comments: Pt required 1:1 feeding. Congested breath/cough sounds at baseline. Able to achieve labial seal with straws and appropriate bolus stripping from spoon. Presumed complete AP transit. Fair mastication of solids. Delayed and immediate congested cough with TN0 and LQ3, concerning for airway invasion. Pt agreeable to FEES, will need reinforcement.       Clinical Impressions  Pt presents with clinical indicators of and elevated risk for oropharyngeal dysphagia given history of dysphagia and silent aspiration, current CXR, new CVA and aphasia, and subacute ICH. Pt would benefit from further evaluation of swallow using FEES prior to meaningful initiation of PO. Pt may have ice chips with RN using precautions as outlined below to prevent further atrophy and xerostomia and to aid with secretion management. Swallow outcomes can be further maximized with use of frequent, thorough oral care and mobilization as pt is able.       Recommendations  NPO with meds crushed and ice  Instrumentation: FEES  Medication: Crush with applesauce, as appropriate, Non Oral  Oral Care: Q4h   " "  Swallowing Instructions & Precautions For Ice Chips with RN:  Oral Care PRIOR to ice chips: Q4h  Supervision: 1:1 with trained staff  Positioning: HOB at 90*  Strategies: One at a time via tsp, after oral care only !!      SLP Treatment Plan  Treatment Plan: Dysphagia Treatment, Patient/Family/Caregiver Training (per results of SLE, pending)  SLP Frequency: 3x Per Week  Estimated Duration: Until Therapy Goals Met      Anticipated Discharge Needs  Discharge Recommendations: Recommend post-acute placement for additional speech therapy services prior to discharge home   Therapy Recommendations Upon DC: Dysphagia Training, Patient / Family / Caregiver Education, Community Re-Integration (per results of SLE, pending)        Patient / Family Goals  Patient / Family Goal #1: \"Words are hard.\"  Short Term Goals  Short Term Goal # 1: Pt will complete FEES w SLP to further assess swallow function and inform POC.      Abril Carnes, SLP   "

## 2023-07-11 NOTE — CONSULTS
Chief Complaint   Patient presents with    Possible Stroke     Pt BIBA for slurred speech.  Pt's last known well was 0615. Later on, pt noted to have slurred speech.  Pt had prior stroke 4 months ago and has right sided deficits from prior stroke. Pt A&O x2 upon arrival.           Neurology Initial Consult H&P  Neurohospitalist Service, [facility]    History of present illness:  Alireza Hernandez is a 87 y.o. male with a PMHx of hypertension, atrial fibrillation, ICH, and previous stroke with right sided deficits and mild slurred speech who presents today for new worsening slurred speech. The patient reports noticing his speech worsening around 0615 today which prompted him to present to the ED. In the ED he is mildly hypertensives with systolics in the 150s. Non contrast CT head is negative for acute intracranial abnormalities. CTA head/neck demonstrate  a Left M2 occlusion as well as a left ICA free floating mural thrombus.  CT perfusion is positive for CBF defect in the left MCA region as well. Neurology has been consulted for further evaluation of the above.     Referring Provider: Wes Norman M.D. has consulted Neurology for further evaluation    Past medical history:   Past Medical History:   Diagnosis Date    A-fib (HCC)     Acute pulmonary edema (HCC) 03/15/2023    BPH (benign prostatic hyperplasia)     Cancer (HCC)     Hypertension     Psychiatric problem 2022    Anxiety, depression. Did MRI to r/o biological cause. No tx provided.    Stroke (HCC) 2018    minor stroke, patient doesn't remember details       Past surgical history:   Past Surgical History:   Procedure Laterality Date    HIP REPLACEMENT, TOTAL Right 2004       Family history:   Family History   Problem Relation Age of Onset    No Known Problems Mother     No Known Problems Father        Social history:   Social History     Socioeconomic History    Marital status: Single     Spouse name: Not on file    Number of  children: Not on file    Years of education: Not on file    Highest education level: Not on file   Occupational History    Not on file   Tobacco Use    Smoking status: Never    Smokeless tobacco: Never   Vaping Use    Vaping Use: Never used   Substance and Sexual Activity    Alcohol use: Never    Drug use: Never    Sexual activity: Not on file   Other Topics Concern    Not on file   Social History Narrative    Not on file     Social Determinants of Health     Financial Resource Strain: Not on file   Food Insecurity: Not on file   Transportation Needs: Not on file   Physical Activity: Not on file   Stress: Not on file   Social Connections: Not on file   Intimate Partner Violence: Not on file   Housing Stability: Not on file       Current medications:   Current Facility-Administered Medications   Medication Dose    heparin infusion 25,000 units in 500 mL 0.45% NACL  0-30 Units/kg/hr     Current Outpatient Medications   Medication    Lactobacillus (FREEZE DRIED ACIDOPHILUS) Cap    simvastatin (ZOCOR) 20 MG Tab    metoprolol tartrate (LOPRESSOR) 50 MG Tab    omeprazole (PRILOSEC) 20 MG delayed-release capsule    tamsulosin (FLOMAX) 0.4 MG capsule    losartan (COZAAR) 50 MG Tab    finasteride (PROSCAR) 5 MG Tab       Medication Allergy:  No Known Allergies    Review of systems:   Constitutional: denies fever, night sweats, weight loss.   Eyes: denies acute vision change, eye pain or secretion.   Ears, Nose, Mouth, Throat: denies nasal secretion, nasal bleeding, difficulty swallowing, hearing loss, tinnitus, vertigo, ear pain, acute dental problems, oral ulcers or lesions.   Endocrine: denies recent weight changes, heat or cold intolerance, polyuria, polydypsia, polyphagia,abnormal hair growth.  Cardiovascular: denies new onset of chest pain, palpitations, syncope, or dyspnea of exertion.  Pulmonary: denies shortness of breath, new onset of cough, hemoptysis, wheezing, chest pain or flu-like symptoms.   GI: denies nausea,  "vomiting, diarrhea, GI bleeding, change in appetite, abdominal pain, and change in bowel habits.  : denies dysuria, urinary incontinence, hematuria.  Heme/oncology: denies history of easy bruising or bleeding. No history of cancer, DVTor PE.  Allergy/immunology: denies hives/urticaria, or itching.   Dermatologic: denies new rash, or new skin lesions.  Musculoskeletal:denies joint swelling or pain, muscle pain, neck and back pain.   Neurologic: Reports worsening slurred speech, denies new focal or unilateral weakness.   Psychiatric: denies symptoms of depression, anxiety, hallucinations, mood swings or changes, suicidal or homicidal thoughts.     Physical examination:   Vitals:    07/11/23 1100 07/11/23 1129   BP: (!) 152/53 (!) 140/61   Pulse: (!) 101 84   Resp: 16 19   SpO2: 92% 92%   Weight: 72.6 kg (160 lb)    Height: 1.753 m (5' 9\")      General: Patient in no acute distress, pleasant and cooperative.  HEENT: Normocephalic, no signs of acute trauma.   Neck: supple, no meningeal signs or carotid bruits. There is normal range of motion. No tenderness on exam.   Chest: clear to auscultation. No cough.   CV: RRR, no murmurs.   Skin: no signs of acute rashes or trauma.   Musculoskeletal: joints exhibit full range of motion, without any pain to palpation. There are no signs of joint or muscle swelling. There is no tenderness to deep palpation of muscles.   Psychiatric: No hallucinatory behavior. Denies symptoms of depression or suicidal ideation. Mood and affect appear normal on exam.     NEUROLOGICAL EXAM:   Mental status, orientation: Awake, alert and oriented to self, and place, disoriented to time  Speech and language: speech is severely dysarthric, able to name objects but difficult to understand speech  Memory: There is intact recollection of recent and remote events.   Cranial nerve exam: Pupils are 3-4 mm bilaterally and equally reactive to light and accommodation. Visual fields are intact by confrontation. " There is no nystagmus on primary or secondary gaze. Intact full EOM in all directions of gaze. Face appears symmetric. Sensation in the face is intact to light touch. Uvula is midline. Tongue is midline. Shoulder shrug is intact bilaterally.   Motor exam: Strength is 5/5 in LUE and LLE. RUE and RLE 2/5 (baseline reportedly)    Sensory exam reveals normal sense of light touch, proprioception, vibration and pinprick in all extremities.   Deep tendon reflexes:  2+ throughout. Plantar responses are flexor. There is no clonus.   Coordination: No ataxia on left side, unable to assess right  Gait: Not assessed due to patient preference    NIH Stroke Scale    1a Level of Consciousness 0  1b Orientation Questions 1  1c Response to Commands 0  2 Gaze 0  3 Visual Fields 0  4 Facial Movement 0  5 Motor Function (arm)   a Left 0  b Right 3 - baseline  6 Motor Function (leg)   a Left 0  b Right 3 - baseline  7 Limb Ataxia 0  8 Sensory 0  9 Language 0  10 Articulation 3  11 Extinction/Inattention 0    Score: 10    ANCILLARY DATA REVIEWED:     Lab Data Review:  Recent Results (from the past 24 hour(s))   COMP METABOLIC PANEL    Collection Time: 07/11/23 10:57 AM   Result Value Ref Range    Sodium 142 135 - 145 mmol/L    Potassium 3.4 (L) 3.6 - 5.5 mmol/L    Chloride 110 96 - 112 mmol/L    Co2 21 20 - 33 mmol/L    Anion Gap 11.0 7.0 - 16.0    Glucose 85 65 - 99 mg/dL    Bun 11 8 - 22 mg/dL    Creatinine 0.68 0.50 - 1.40 mg/dL    Calcium 7.4 (L) 8.5 - 10.5 mg/dL    AST(SGOT) 11 (L) 12 - 45 U/L    ALT(SGPT) 10 2 - 50 U/L    Alkaline Phosphatase 56 30 - 99 U/L    Total Bilirubin 0.5 0.1 - 1.5 mg/dL    Albumin 2.9 (L) 3.2 - 4.9 g/dL    Total Protein 5.6 (L) 6.0 - 8.2 g/dL    Globulin 2.7 1.9 - 3.5 g/dL    A-G Ratio 1.1 g/dL   PROTHROMBIN TIME    Collection Time: 07/11/23 10:57 AM   Result Value Ref Range    PT 15.3 (H) 12.0 - 14.6 sec    INR 1.22 (H) 0.87 - 1.13   APTT    Collection Time: 07/11/23 10:57 AM   Result Value Ref Range     APTT 25.6 24.7 - 36.0 sec   TROPONIN    Collection Time: 23 10:57 AM   Result Value Ref Range    Troponin T 97 (H) 6 - 19 ng/L   ESTIMATED GFR    Collection Time: 23 10:57 AM   Result Value Ref Range    GFR (CKD-EPI) 90 >60 mL/min/1.73 m 2   CORRECTED CALCIUM    Collection Time: 23 10:57 AM   Result Value Ref Range    Correct Calcium 8.3 (L) 8.5 - 10.5 mg/dL   EKG (NOW)    Collection Time: 23 11:28 AM   Result Value Ref Range    Report       [facility]    Test Date:  2023  Pt Name:    RODERICK MILLS                 Department: ER  MRN:        [id number]                      Room:        10  Gender:     Male                         Technician: [id number]  :        [id number]                   Requested By:GENESIS JOYNER  Order #:    [id number]                    Reading MD:    Measurements  Intervals                                Axis  Rate:       80                           P:          0  WA:         0                            QRS:        44  QRSD:       94                           T:          43  QT:         388  QTc:        448    Interpretive Statements  Atrial fibrillation  Borderline low voltage, extremity leads  Artifact in lead(s) I,II,III,aVR,aVL,aVF,V1,V2  Compared to ECG 2023 20:45:40  ST (T wave) deviation no longer present         Labs reviewed by me.       Imaging reviewed by me:     DX-CHEST-PORTABLE (1 VIEW)   Final Result      1.  Moderate LEFT pleural effusion   2.  LEFT lung atelectasis which could obscure an additional process   3.  Patchy opacity in the RIGHT perihilar lung could be atelectasis or airspace disease      CT-CEREBRAL PERFUSION ANALYSIS   Final Result      1.Cerebral blood flow less than 30% likely representing completed infarct = 0 mL   2.T Max more than 6 seconds likely representing combination of completed infarct and ischemia = 58 mL   3. Mismatched volume likely representing ischemic brain/penumbra= 58 mL    4.Please note that the cerebral perfusion was performed on the limited brain tissue around the basal ganglia region. Infarct/ischemia outside the CT perfusion sections may not be seen on this study.      CT-CTA NECK WITH & W/O-POST PROCESSING   Final Result      1.  Severe stenosis of the proximal LEFT internal carotid artery   2.  LEFT pleural effusion which appears to be at least partially loculated      CT-CTA HEAD WITH & W/O-POST PROCESS   Final Result      LEFT M2 branch occlusion      Findings were communicated with and acknowledged by GENESIS JOYNER via [employer] on 7/11/2023 11:37 AM.         CT-HEAD W/O   Final Result      1.  Atrophy and chronic microvascular ischemic type changes.   2.  Old bilateral thalamic lacunar infarcts.   3.  No acute intracranial abnormality.   4.  Bilateral sphenoid sinus disease               Presumed mechanism by TOAST:  X_Large Artery Atherosclerosis  __Small Vessel (Lacunar)  __Cardioembolic  __Other (Sickle Cell, Vasculitis, Hypercoagulable)  __Unknown    Modified Hansford Scale (MRS): 4 = Moderately severe disability; unable to walk without assistance and unable to attend to own bodily needs without assistance      ASSESSMENT AND PLAN:    Assessment and Plan:     87 y.o. male with PMHx of A Fib, ICH, and CVA with residual right sided deficits and mild slurred speech who presented with worsening slurred speech. Stroke protocol CT head negative for acute findings. CTA head/neck demonstrates a Left M2 occlusion as well as a left ICA free floating mural thrombus.  CT perfusion is positive for CBF defect in the left MCA region as well. Case was discussed with Neuro Interventional Radiology who expressed concern over the free floating thrombus lysing and traversing distally causing new strokes. As such, the patient was determined to not be a candidate for thrombolytic therapy or mechanical thrombectomy. The patient will be started on a Heparin gtt.     Problem list:  Left MCA M2  occlusion      Recommendations:  -q4h and PRN neuro assessment. VS per nursing/unit protocol.   -Acutely, blood pressure goal -180. Long term BP goal is normotension, 100-130/60-80. Antihypertensives per primary team.   -Obtain MRI Brain wo contrast   -Telemetry; Known history of atrial fibrillation. No need for TTE from Neuro perspective.  -Start heparin gtt, no bolus/rebolus protocol    -Check Lipid Panel and HgbA1c  -Resume home simvastatin 20mg qhs. Titrate to long term LDL goal < 70  -BG management per primary team. BG goal .  -PT/OT/SLP eval and treat.   -DVT PPX: SCDs.      Case reviewed and plan created with Dr. Mireya Sauer, Acute Neurology. Please call with any questions        ERNA Zuluaga.  [place] [facility]

## 2023-07-11 NOTE — ED NOTES
Unable to complete med rec at this time   Waiting for MAR to be faxed from [facility] [place] [phone]

## 2023-07-11 NOTE — ED TRIAGE NOTES
"Chief Complaint   Patient presents with    Possible Stroke     Pt BIBA for slurred speech.  Pt's last known well was 0615. Later on, pt noted to have slurred speech.  Pt had prior stroke 4 months ago and has right sided deficits from prior stroke. Pt A&O x2 upon arrival.         BP (!) 152/53   Pulse (!) 101   Resp 16   Ht 1.753 m (5' 9\")   Wt 72.6 kg (160 lb)   SpO2 92%   BMI 23.63 kg/m²       Pt evaluated by ERP at charge desk.  Blood sugar 108 for EMS. ERP provided with NIH sheet to complete. Pt transported to CT on zoll. Report given to KRISTIN Hazel.   "

## 2023-07-11 NOTE — PROGRESS NOTES
Med rec completed per pt's med list   Unknown last doses of medications taken   No MAR was sent only a med list

## 2023-07-12 PROBLEM — Z71.89 GOALS OF CARE, COUNSELING/DISCUSSION: Status: ACTIVE | Noted: 2023-01-01

## 2023-07-12 NOTE — ASSESSMENT & PLAN NOTE
Per advanced directive, patient does not desire to have life-sustaining treatment if the expected risks of treatment outweight benefits; patient does not want artificial nutrition.     - Have been in contact with POA and CM regarding GOC and d/c planning

## 2023-07-12 NOTE — THERAPY
Physical Therapy Contact Note    Patient Name: Alireza Hernandez  Age:  87 y.o., Sex:  male  Medical Record #: [id number]  Today's Date: 7/12/2023    Per OT, fatigued after AM session and speech; will return when able for PT evaluation;     Urmila ALBA, PT,  [phone]

## 2023-07-12 NOTE — ASSESSMENT & PLAN NOTE
EKG upon arrival shows A-fib. Patient was not on anti-coagulation due to recent ICH.    - Tele shows A-fib rate 60-80  - Cont.  Eliquis 5 PO

## 2023-07-12 NOTE — WOUND TEAM
[facility]  Inpatient Services  Initial Wound and Skin Care Evaluation    Admission Date: 7/11/2023     Last order of IP CONSULT TO WOUND CARE was found on 7/11/2023 from Hospital Encounter on 7/11/2023     HPI, PMH, SH: Reviewed    Past Surgical History:   Procedure Laterality Date    HIP REPLACEMENT, TOTAL Right 2004     Social History     Tobacco Use    Smoking status: Never    Smokeless tobacco: Never   Substance Use Topics    Alcohol use: Never     Chief Complaint   Patient presents with    Possible Stroke     Pt BIBA for slurred speech.  Pt's last known well was 0615. Later on, pt noted to have slurred speech.  Pt had prior stroke 4 months ago and has right sided deficits from prior stroke. Pt A&O x2 upon arrival.       Diagnosis: Acute CVA (cerebrovascular accident) (HCC) [I63.9]    Unit where seen by Wound Team: S198/01     WOUND CONSULT RELATED TO:  sacrum    WOUND HISTORY:   Alireza Hernandez is a 87 y.o. male who presents with slurred speech.  He has a history of stroke with residual right-sided deficits and mild slurred speech.  Speech is worse now.  The patient agrees that he is having a harder time speaking then he has recently.  He denies having a headache.  No new weakness in his extremities.  Denies chest pain shortness of breath.    Per patient he has a pressure injury to his sacrum for awhile, he was unable to elaborate on how long or how it has been treated in the past.        WOUND ASSESSMENT/LDA  Wound 07/11/23 Pressure Injury Sacrum Midline POA DTI w/excoriation (Active)   Date First Assessed/Time First Assessed: 07/11/23 1445   Present on Original Admission: Yes  Hand Hygiene Completed: Yes  Primary Wound Type: Pressure Injury  Location: Sacrum  Wound Orientation: Midline  Wound Description (Comments): POA DTI w/excori...      Assessments 7/12/2023 12:00 PM   Wound Image     Site Assessment Pink;Red;Light Purple;Excoriated   Periwound Assessment Pink;Red;Purple;Non-blanchable  erythema;Bleeding;Excoriated   Margins Attached edges   Closure Secondary intention   Drainage Amount Scant   Drainage Description Sanguineous   Treatments Cleansed;Site care;Offloading;Nonselective debridement   Wound Cleansing Approved Wound Cleanser   Periwound Protectant Skin Protectant Wipes to Periwound   Dressing Status Clean;Dry;Intact   Dressing Changed New   Dressing Cleansing/Solutions Not Applicable   Dressing Options Hydrocolloid Thin   Dressing Change/Treatment Frequency Every 48 hrs, and As Needed   NEXT Dressing Change/Treatment Date 07/14/23   NEXT Weekly Photo (Inpatient Only) 07/19/23   Wound Team Following Weekly   Pressure Injury Stage Deep Tissue   Wound Length (cm) 10 cm   Wound Width (cm) 8 cm   Wound Depth (cm) 0.1 cm   Wound Surface Area (cm^2) 80 cm^2   Wound Volume (cm^3) 8 cm^3   Wound Bed Granulation (%) 5 %   Shape oval   Wound Odor None   Pulses N/A   Exposed Structures None   WOUND NURSE ONLY - Time Spent with Patient (mins) 30        Vascular: n/a    EDGAR:   No results found.    Lab Values:    Lab Results   Component Value Date/Time    WBC 6.2 07/11/2023 11:56 PM    RBC 4.28 (L) 07/11/2023 11:56 PM    HEMOGLOBIN 11.2 (L) 07/11/2023 11:56 PM    HEMATOCRIT 35.8 (L) 07/11/2023 11:56 PM    HBA1C 6.3 (H) 07/11/2023 05:57 PM         Culture Results show:  No results found for this or any previous visit (from the past 720 hour(s)).    Pain Level/Medicated:  None, Tolerated without pain medication       INTERVENTIONS BY WOUND TEAM:  Chart and images reviewed. Discussed with bedside RN. All areas of concern (based on picture review, LDA review and discussion with bedside RN) have been thoroughly assessed. Documentation of areas based on significant findings. This RN in to assess patient. Performed standard wound care which includes appropriate positioning, dressing removal and non-selective debridement. Pictures and measurements obtained weekly if/when required.    Wound:  SACRUM  Preparation  for Dressing removal: Open to air  Non-selectively/Non-Excisionally Debrided with:  Wound cleanser  [place] wound: Cleansed with Wound cleanser, Prepped with No Sting  Primary Dressing:  hydrocolloid thin over small open excoriated area    Advanced Wound Care Discharge Planning  Number of Clinicians necessary to complete wound care: 1  Is patient requiring IV pain medications for dressing changes:  No   Length of time for dressing change 30   min. (This does not include chart review, pre-medication time, set up, clean up or time spent charting.)    Interdisciplinary consultation: Patient, Bedside RN    EVALUATION / RATIONALE FOR TREATMENT:     Date:  07/12/23  Wound Status:  Initial evaluation    7/12/23: patient with purple red non-blanching discoloration to sacrum with small open excoriated area to the left of coccyx, scant sanguinous drainage present. Covered open area with hydrocolloid to allow rapid epithelialization at this phase of wound healing, maintain moist wound bed, to lower pH for wound healing and to facilitate autolytic debridement. Offloading measures in place.       Goals: Steady decrease in wound area and depth weekly.    WOUND TEAM PLAN OF CARE - Frequency of Follow-up:   Nursing to follow dressing orders written for wound care. Contact wound team if area fails to progress, deteriorates or with any questions/concerns if something comes up before next scheduled follow up (See below as to whether wound is following and frequency of wound follow up)   Weekly - 7/19/23    NURSING PLAN OF CARE ORDERS:  Dressing changes: See Dressing Care orders  Skin care: See Skin Care orders  RN Prevention Protocol    NUTRITION RECOMMENDATIONS   Wound Team Recommendations:  N/A    DIET ORDERS (From admission to next 24h)       Start     Ordered    07/12/23 1001  Diet Order Diet: Level 6 - Soft and Bite Sized (meds crushed. use of liquid wash); Liquid level: Level 2 - Mildly Thick; Tray Modifications (optional): SLP -  1:1 Supervision by Nursing  ALL MEALS        Question Answer Comment   Diet: Level 6 - Soft and Bite Sized meds crushed. use of liquid wash   Liquid level Level 2 - Mildly Thick    Tray Modifications (optional) SLP - 1:1 Supervision by Nursing        07/12/23 1000                    PREVENTATIVE INTERVENTIONS:    Q shift Issa - performed per nursing policy  Q shift pressure point assessments - performed per nursing policy    Surface/Positioning  Waffle overlay  - Currently in Place    Offloading/Redistribution  Heel offloading dressing (Silicone dressing) - Currently in Place  Float Heels off Bed with Pillows - Currently in Place           Containment/Moisture Prevention    Patiño Catheter - Currently in Place  Barrier paste - Ordered    Anticipated discharge plans:  TBD        Vac Discharge Needs:  Vac Discharge plan is purely a recommendation from wound team and not a requirement for discharge unless otherwise stated by physician.  Not Applicable Pt not on a wound vac

## 2023-07-12 NOTE — CARE PLAN
The patient is Stable - Low risk of patient condition declining or worsening    Shift Goals  Clinical Goals: monitorneuro status  Patient Goals: rest  Family Goals: jay    Progress made toward(s) clinical / shift goals:    Problem: Optimal Care of the Stroke Patient  Goal: Optimal acute care for the stroke patient  Outcome: Progressing     Problem: Neuro Status  Goal: Neuro status will remain stable or improve  Outcome: Progressing     Problem: Discharge Planning - Stroke  Goal: Ensure Stroke Core Measures are met prior to discharge  Outcome: Progressing     Problem: Hemodynamic Monitoring  Goal: Patient's hemodynamics, fluid balance and neurologic status will be stable or improve  Outcome: Progressing     Problem: Urinary Elimination  Goal: Establish and maintain regular urinary output  Outcome: Progressing     Problem: Mobility - Stroke  Goal: Patient's capacity to carry out activities will improve  Outcome: Progressing       Patient is not progressing towards the following goals:

## 2023-07-12 NOTE — DISCHARGE PLANNING
"Received voice message from Kimberly Rodriguez (#[phone]) stating that she is patient's \"Trustee\" and requesting callback for DCP discussion.      **[id number] Hrs - Chart reviewed; POA for finances/healthcare noted on file & Kimberly Rodriguez listed as alternate agent.   CM called Kimberly who stated that she is also patient's niece and updated her on DCP/Post-acute placement recommendations.   CM to discuss DCP with patient for IRF/SNF selection.  If patient unable to make decisions, Kimberly will select IRF/SNFs.  Copy of \"Resignation of Trustee & Designation of successor Trustee\" & new POA (dated 6/15/23) received via email from Kimberly & faxed to [facility].         CM also received [facility] informing CM that patient was active with agency prior to hospitalization.    **[id number] Hrs - CM met at bedside with patient to complete assessment & discuss DCP.  Patient dysarthric & oriented to person/place/month & year at time of encounter, agreeable to SNF/IRF referrals and deferred choice/selection to his niece/DPOA, Kimberly.    CM called Kimberly who requested for SNF referrals to be sent to all facilities in [place] and she will narrow down her selection from accepting facilities.  She also authorized CM to send referrals to both IRFs, [facility] (first choice due to proximity to home) and [facility].    Choice forms faxed to [facility] & referrals submitted via Epic.    CM informed Kimberly of MCR 3 IP midnight rule for SNF qualifying; Kimberly verbalized understanding.    Assessment completed based on chart review & information provided by Kimberly.  Prior to admission patient resided at [facility], required assistance with ADLs, and used a wheelchair for mobility.      Care Transition Team Assessment    Information Source  Orientation Level: Oriented to place, Oriented to time, Oriented to person  Information Given By: Relative, Other (Comments) (Chart review)  Who is responsible for making decisions " for patient? : Patient    Readmission Evaluation  Is this a readmission?: No    Elopement Risk  Legal Hold: No  Ambulatory or Self Mobile in Wheelchair: No-Not an Elopement Risk  Elopement Risk: Not at Risk for Elopement    Interdisciplinary Discharge Planning  Lives with - Patient's Self Care Capacity: Attendant / Paid Care Giver  Housing / Facility: Assisted Living Residence ([facility])  Prior Services: Intermittent Physical Support for ADL Per Service    Discharge Preparedness  What is your plan after discharge?: Other (comment) (IRF vs SNF)  What are your discharge supports?: Other (comment) ([facility] Staff, niece & sister)  Prior Functional Level: Needs Assist with Activities of Daily Living, Uses Wheelchair    Functional Assesment  Prior Functional Level: Needs Assist with Activities of Daily Living, Uses Wheelchair    Finances  Financial Barriers to Discharge: No  Prescription Coverage: Yes     Advance Directive  Advance Directive?: DPOA for Health Care  Durable Power of  Name and Contact : Kimberly Rodriguez #[phone]    Domestic Abuse  Have you ever been the victim of abuse or violence?: No  Physical Abuse or Sexual Abuse: No  Verbal Abuse or Emotional Abuse: No  Possible Abuse/Neglect Reported to:: Not Applicable    Psychological Assessment  History of Substance Abuse: None  History of Psychiatric Problems: Yes  Non-compliant with Treatment: No  Newly Diagnosed Illness: Yes    Discharge Risks or Barriers  Discharge risks or barriers?: Complex medical needs  Patient risk factors: Complex medical needs    Anticipated Discharge Information  Discharge Disposition: Disch to  rehab facility or distinct part unit (62)

## 2023-07-12 NOTE — THERAPY
Occupational Therapy   Initial Evaluation     Patient Name: Alireza Hernandez  Age:  87 y.o., Sex:  male  Medical Record #: [id number]  Today's Date: 7/12/2023       Precautions: Fall Risk, Swallow Precautions  Comments: aphasia    Assessment  Patient is 87 y.o. male admitted 7/11 for worsening slurred speech. 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 Prior to admission, pt reports living in a assisted living facility where they assist him with completing ADLs & IADLs. Pt RUE is limited due R sided weakness with contracture of digits and wrist. Pt has limited use of R elbow flexion & extension from increased flexor tone.  Pt required MaxA for bed mobility. While seated EOB, pt is able to maintain postural control without disturbances. Pt was able to sit<>stand ModA w/ 2x HHA. Recommend post-acute placement for additional OT services prior to d/c home.     Plan    Occupational Therapy Initial Treatment Plan   Treatment Interventions: Self Care / Activities of Daily Living, Adaptive Equipment, Cognitive Skill Development, Neuro Re-Education / Balance, Therapeutic Exercises, Therapeutic Activity  Treatment Frequency: 3 Times per Week  Duration: Until Therapy Goals Met    DC Equipment Recommendations: Unable to determine at this time  Discharge Recommendations: Recommend post-acute placement for additional occupational therapy services prior to discharge home          Objective       07/12/23 1110   Prior Living Situation   Prior Services Intermittent Physical Support for ADL Per Service   Housing / Facility Assisted Living Residence  ([facility])   Bathroom Set up Walk In Shower;Shower Chair   Equipment Owned Wheelchair   Lives with - Patient's Self Care Capacity Attendant / Paid Care Giver   Comments Pt lives in an assisted living facility & care givers assist with all ADLS & IADLs since first CVA in March 2023   Prior Level of ADL Function   Self Feeding Requires Assist   Grooming / Hygiene  Requires Assist   Bathing Requires Assist   Dressing Requires Assist   Toileting Requires Assist   Comments Pt reports care givers at the assisted living help with all ADLs & IADLs   Prior Level of IADL Function   Medication Management Dependent   Laundry Dependent   Kitchen Mobility Dependent   Finances Dependent   Home Management Dependent   Shopping Dependent   Prior Level Of Mobility Uses Wheel Chair for in Home Mobility  (Pt requires supervision & assistance for functional mobility in communinty & in home.)   Comments pt reports care givers at the assisted living help with all IADLs   Precautions   Precautions Fall Risk;Swallow Precautions   Vitals   O2 Delivery Device None - Room Air   Pain   Intervention Declines   Pain 0 - 10 Group   Therapist Pain Assessment Post Activity Pain Same as Prior to Activity;Nurse Notified;0   Cognition    Cognition / Consciousness X   Speech/ Communication Dysarthric;Expressive Aphasia;Nods Appropriately;Slurred   Level of Consciousness Alert   Comments Pt is able to communicate when asked yes/no questions. Limited by expressive aphasia.   Passive ROM Upper Body   Passive ROM Upper Body X   Comments R wrist & digits extension/flexion impairments from prior CVA & flexor tone. LUE WFL.   Active ROM Upper Body   Active ROM Upper Body  X   Dominant Hand Right   Comments Pt has some AROM in R elbow flexion/extension limited by flexor tone in R hand.   Strength Upper Body   Upper Body Strength  X   Rt Elbow Flexion Strength 3 (F)   Rt Elbow Extension Strength 3- (F-)   Comments R shoulder, wrist, & hand limited by flexor tone.   Upper Body Muscle Tone   Upper Body Muscle Tone  X   Rt Upper Extremity Muscle Tone Hypertonic;Contractures;Fluctuating   Comments Pt R scapula has very limited ROM from prior CVA & limits shoulder ROM.   Neurological Concerns   Neurological Concerns Yes   Rt Upper Extremity Gross Motor Control Impaired   Rt Upper Extremity Fine Motor Control Absent   Rt Upper  Extremity Functional Use Impaired   Right In Hand Manipulation Absent   Coordination Upper Body   Coordination X   Fine Motor Coordination Absent R hand function   Gross Motor Coordination Impaired RUE   Comments Pt has limited use of R elbow flexion & extension from increased flexor tone.   Balance Assessment   Sitting Balance (Static) Fair -   Sitting Balance (Dynamic) Poor -   Standing Balance (Static) Trace   Standing Balance (Dynamic) Trace   Weight Shift Sitting Poor   Weight Shift Standing Absent   Comments w/ 2x HHA   Bed Mobility    Supine to Sit Maximal Assist   Sit to Supine Maximal Assist   Scooting Moderate Assist   Rolling Maximal Assist to Rt.;Maximum Assist to Lt.   ADL Assessment   Eating Minimal Assist   Grooming Moderate Assist;Seated   Upper Body Dressing Maximal Assist   Lower Body Dressing Maximal Assist   Toileting Maximal Assist   How much help from another person does the patient currently need...   Putting on and taking off regular lower body clothing? 1   Bathing (including washing, rinsing, and drying)? 2   Toileting, which includes using a toilet, bedpan, or urinal? 1   Putting on and taking off regular upper body clothing? 2   Taking care of personal grooming such as brushing teeth? 2   Eating meals? 3   6 Clicks Daily Activity Score 11   mRS Prior to admission   Prior to admission mRS 4   Modified Simran (mRS)   Modified Beauregard Score 4   Functional Mobility   Sit to Stand Moderate Assist   Bed, Chair, Wheelchair Transfer Moderate Assist   Mobility bed mobility   Comments w/ 2xHHA   Visual Perception   Visual Perception  WDL   Activity Tolerance   Sitting Edge of Bed 5 min   Standing 30 sec   Comments w/ 2x HHA   Patient / Family Goals   Patient / Family Goal #1 Pt unable to state   Short Term Goals   Short Term Goal # 1 Pt will complete UB dress w/ ModA   Short Term Goal # 2 Pt will complete functional transfers with Chandan   Short Term Goal # 3 Pt will complete g/h seated EOB with Chandan    Education Group   Education Provided Role of Occupational Therapist   Role of Occupational Therapist Patient Response Patient;Acceptance;Explanation;Verbal Demonstration   Occupational Therapy Initial Treatment Plan    Treatment Interventions Self Care / Activities of Daily Living;Adaptive Equipment;Cognitive Skill Development;Neuro Re-Education / Balance;Therapeutic Exercises;Therapeutic Activity   Treatment Frequency 3 Times per Week   Duration Until Therapy Goals Met   Problem List   Problem List Decreased Active Daily Living Skills;Decreased Homemaking Skills;Decreased Activity Tolerance;Safety Awareness Deficits / Cognition;Impaired Posture / Trunk Alignment;Impaired Postural Control / Balance   Interdisciplinary Plan of Care Collaboration   IDT Collaboration with  Nursing   Patient Position at End of Therapy In Bed;Bed Alarm On;Call Light within Reach;Phone within Reach;Tray Table within Reach;Family / Friend in Room   Collaboration Comments RN updated   Session Information   Date / Session Number  7/12 #1, (1/3, 7/18)

## 2023-07-12 NOTE — CONSULTS
Physical Medicine and Rehabilitation Consultation          Date of initial consultation: 7/12/2023  Consulting provider: Cristino Sharif M.D.  Reason for consultation: assess for acute inpatient rehab appropriateness  LOS: 1 Day(s)    Chief complaint: Aphasia    HPI: The patient is a 87 y.o. 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  Work-up in the ED with CT head showed no acute intracranial abnormalities, CTA head/neck found left M2 occlusion as well as a left ICA free-floating mural thrombus.  CT perfusion found a 58 mm mismatch/penumbra.  Patient was seen by neurology and found to have NIH score of 10.  Case was discussed with neuro interventional radiology who was concerned regarding the free-floating thrombus lysing and traversing distally causing new strokes, therefore patient was not a candidate for thrombolytic therapy or mechanical thrombectomy.  Patient was started on heparin drip, with plans to transition to Eliquis in the coming 1 to 2 days.    Of note, patient was last seen by the PMR department March 2023 when he had his first stroke.  Patient had dense right hemiplegia at that time, and was not a candidate for IPR due to lack of discharge support at that time.    The patient currently reports expressive aphasia, patient also appears to have mild to moderate receptive aphasia.  Patient is able to answer yes/no questions with seemingly good accuracy.  He endorses spasticity of his right arm, near baseline strength, and worse speech.  Patient is interested in IPR    ROS  Pertinent positives are mentioned in the HPI, all others reviewed and are negative.    Social Hx:  Patient lives at [facility] living [facility] and receives assistance with ADLs and IADLs    THERAPY:  Restrictions: Fall risk, swallow precautions  PT:  Functional mobility   Pending    OT: ADLs  7/12: Max assist lower body dressing and toileting    SLP:   7/12: Mild to moderate oropharyngeal dysphagia    IMAGING:  CT perfusion 7/11/2023  1.Cerebral blood flow less than 30% likely representing completed infarct = 0 mL  2.T Max more than 6 seconds likely representing combination of completed infarct and ischemia = 58 mL  3. Mismatched volume likely representing ischemic brain/penumbra= 58 mL  4.Please note that the cerebral perfusion was performed on the limited brain tissue around the basal ganglia region. Infarct/ischemia outside the CT perfusion sections may not be seen on this study.    PROCEDURES:  None    PMH:  Past Medical History:   Diagnosis Date    A-fib (HCC)     Acute pulmonary edema (HCC) 03/15/2023    BPH (benign prostatic hyperplasia)     Cancer (HCC)     Hypertension     Psychiatric problem 2022    Anxiety, depression. Did MRI to r/o biological cause. No tx provided.    Stroke (HCC) 2018    minor stroke, patient doesn't remember details       PSH:  Past Surgical History:   Procedure Laterality Date    HIP REPLACEMENT, TOTAL Right 2004       FHX:  Family History   Problem Relation Age of Onset    No Known Problems Mother     No Known Problems Father        Medications:  Current Facility-Administered Medications   Medication Dose    heparin infusion 25,000 units in 500 mL 0.45% NACL  0-30 Units/kg/hr    senna-docusate (Pericolace Or Senokot S) 8.6-50 MG per tablet 2 Tablet  2 Tablet    And    polyethylene glycol/lytes (Miralax) PACKET 1 Packet  1 Packet    And    magnesium hydroxide (Milk Of Magnesia) suspension 30 mL  30 mL    And    bisacodyl (Dulcolax) suppository 10 mg  10 mg    ondansetron (Zofran) syringe/vial injection 4 mg  4 mg    ondansetron (Zofran ODT) dispertab 4 mg  4 mg    finasteride (Proscar) tablet 5 mg  5 mg    losartan (Cozaar) tablet 75 mg  75 mg    metoprolol tartrate (Lopressor) tablet 50 mg  50 mg    omeprazole (PriLOSEC) capsule  "20 mg  20 mg    simvastatin (Zocor) tablet 20 mg  20 mg    tamsulosin (Flomax) capsule 0.4 mg  0.4 mg    Blood pressure control per admin instructions      labetalol (Normodyne/Trandate) injection 10 mg  10 mg    hydrALAZINE (Apresoline) injection 10 mg  10 mg    NS (Bolus) infusion 500 mL  500 mL       Allergies:  No Known Allergies      Physical Exam:  Vitals: /82   Pulse 76   Temp 36.6 °C (97.8 °F) (Temporal)   Resp 17   Ht 1.753 m (5' 9\")   Wt 72.6 kg (160 lb)   SpO2 92%   Gen: NAD  Head: NC/AT  Eyes/ Nose/ Mouth: PERRLA, moist mucous membranes  Cardio: RRR, good distal perfusion, warm extremities  Pulm: normal respiratory effort, no cyanosis   Abd: Soft NTND, negative borborygmi   Ext: No peripheral edema. No calf tenderness. No clubbing.    Mental status: follows commands  Speech: Severe expressive aphasia, mild to moderate receptive aphasia    Motor:      Upper Extremity  Myotome R L   Shoulder flexion C5 3/5 4/5   Elbow flexion C5 2/5 4/5   Wrist extension C6 1/5 4/5   Elbow extension C7 2/5 4/5   Finger flexion C8 1/5 4/5   Finger abduction T1 1/5 4/5     Lower Extremity Myotome R L   Hip flexion L2 3/5 4/5   Knee extension L3 3/5 4/5   Ankle dorsiflexion L4 3/5 4/5   Toe extension L5 3/5 4/5   Ankle plantarflexion S1 3/5 4/5     Sensory:   intact to light touch through out    Tone: MAS 3 at right pec, MAS 2 right elbow and forearm flexors    Labs: Reviewed and significant for   Recent Labs     07/11/23  1057 07/11/23  2356   RBC 3.81* 4.28*   HEMOGLOBIN 10.0* 11.2*   HEMATOCRIT 32.1* 35.8*   PLATELETCT 191 212   PROTHROMBTM 15.3*  --    APTT 25.6  --    INR 1.22*  --      Recent Labs     07/11/23  1057 07/11/23  2356   SODIUM 142 141   POTASSIUM 3.4* 4.3   CHLORIDE 110 107   CO2 21 21   GLUCOSE 85 81   BUN 11 12   CREATININE 0.68 0.78   CALCIUM 7.4* 8.5     Recent Results (from the past 24 hour(s))   Hemoglobin A1C    Collection Time: 07/11/23  5:57 PM   Result Value Ref Range    " Glycohemoglobin 6.3 (H) 4.0 - 5.6 %    Est Avg Glucose 134 mg/dL   Heparin Anti-Xa    Collection Time: 07/11/23  5:57 PM   Result Value Ref Range    Heparin Xa (UFH) 0.14 IU/mL   CBC with Differential    Collection Time: 07/11/23 11:56 PM   Result Value Ref Range    WBC 6.2 4.8 - 10.8 K/uL    RBC 4.28 (L) 4.70 - 6.10 M/uL    Hemoglobin 11.2 (L) 14.0 - 18.0 g/dL    Hematocrit 35.8 (L) 42.0 - 52.0 %    MCV 83.6 81.4 - 97.8 fL    MCH 26.2 (L) 27.0 - 33.0 pg    MCHC 31.3 (L) 32.3 - 36.5 g/dL    RDW 47.5 35.9 - 50.0 fL    Platelet Count 212 164 - 446 K/uL    MPV 9.8 9.0 - 12.9 fL    Neutrophils-Polys 39.30 (L) 44.00 - 72.00 %    Lymphocytes 40.50 22.00 - 41.00 %    Monocytes 5.80 0.00 - 13.40 %    Eosinophils 13.70 (H) 0.00 - 6.90 %    Basophils 0.50 0.00 - 1.80 %    Immature Granulocytes 0.20 0.00 - 0.90 %    Nucleated RBC 0.00 0.00 - 0.20 /100 WBC    Neutrophils (Absolute) 2.43 1.82 - 7.42 K/uL    Lymphs (Absolute) 2.51 1.00 - 4.80 K/uL    Monos (Absolute) 0.36 0.00 - 0.85 K/uL    Eos (Absolute) 0.85 (H) 0.00 - 0.51 K/uL    Baso (Absolute) 0.03 0.00 - 0.12 K/uL    Immature Granulocytes (abs) 0.01 0.00 - 0.11 K/uL    NRBC (Absolute) 0.00 K/uL   Comp Metabolic Panel (CMP)    Collection Time: 07/11/23 11:56 PM   Result Value Ref Range    Sodium 141 135 - 145 mmol/L    Potassium 4.3 3.6 - 5.5 mmol/L    Chloride 107 96 - 112 mmol/L    Co2 21 20 - 33 mmol/L    Anion Gap 13.0 7.0 - 16.0    Glucose 81 65 - 99 mg/dL    Bun 12 8 - 22 mg/dL    Creatinine 0.78 0.50 - 1.40 mg/dL    Calcium 8.5 8.5 - 10.5 mg/dL    AST(SGOT) 15 12 - 45 U/L    ALT(SGPT) 15 2 - 50 U/L    Alkaline Phosphatase 62 30 - 99 U/L    Total Bilirubin 0.6 0.1 - 1.5 mg/dL    Albumin 3.1 (L) 3.2 - 4.9 g/dL    Total Protein 6.2 6.0 - 8.2 g/dL    Globulin 3.1 1.9 - 3.5 g/dL    A-G Ratio 1.0 g/dL   Lipid Profile    Collection Time: 07/11/23 11:56 PM   Result Value Ref Range    Cholesterol,Tot 116 100 - 199 mg/dL    Triglycerides 59 0 - 149 mg/dL    HDL 45 >=40 mg/dL  "   LDL 59 <100 mg/dL   CORRECTED CALCIUM    Collection Time: 07/11/23 11:56 PM   Result Value Ref Range    Correct Calcium 9.2 8.5 - 10.5 mg/dL   ESTIMATED GFR    Collection Time: 07/11/23 11:56 PM   Result Value Ref Range    GFR (CKD-EPI) 86 >60 mL/min/1.73 m 2   Heparin Xa (Unfractionated)    Collection Time: 07/12/23 12:50 AM   Result Value Ref Range    Heparin Xa (UFH) 0.58 IU/mL   Heparin Xa (Unfractionated)    Collection Time: 07/12/23  7:06 AM   Result Value Ref Range    Heparin Xa (UFH) 0.72 IU/mL         ASSESSMENT:  Patient is a 87 y.o. 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  Patient will also benefit from family training.  Patient has a good discharge situation which will be home with support from [facility] living [facility].     Barriers to transfer include: Insurance authorization, TCCs to verify disposition, medical clearance and bed availability     All cases are subject to administrative review and recommendations may change    Disposition recommendations:  -Good candidate for IPR.  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 Long term BP goal is normotension, 100-130/60-80. " "Antihypertensives per primary team.\"  -Awaiting MR brain  -Secondary stroke prevention with simvastatin 20 mg nightly  -Plan to continue heparin for 1 to 2 days then bridged to Eliquis  -Continue PT OT and SLP while in house  -Plan for IPR    R UE spasticity  -Starting baclofen 7.5 mg 3 times daily  -Dose will need to be increased when patient is tolerating side effects of lethargy  -Follow-up with Dr. Cee Shukla MD for Botox    Atrial fibrillation  -On heparin, transitioning to Eliquis  -Rate control with metoprolol 50 mg twice daily    Hypertension  -Losartan 75 mg daily    Urinary retention  -Flomax 0.4 mg every morning  -Finasteride 5 mg q. evening    DVT PPX: SCDs      Thank you for allowing us to participate in the care of this patient.     Patient was seen for >60 minutes on unit/floor of which > 50% of time was spent on counseling and coordination of care regarding the above, including prognosis, risk reduction, benefits of treatment, and options for next stage of care.    Isma German, DO   Physical Medicine and Rehabilitation     Please note that this dictation was created using voice recognition software. I have made every reasonable attempt to correct obvious errors, but there may be errors of grammar and possibly content that I did not discover before finalizing the note.        "

## 2023-07-12 NOTE — ASSESSMENT & PLAN NOTE
Patient reports a non-productive cough for past 2 days but denies fever, chills, sob, chest pain. CXR shows Moderate L pleural effusion L lung atelectasis and a patchy opacity in the RIGHT perihilar lung could be atelectasis or airspace disease.     - Consider thoracentesis for L pleural effusion

## 2023-07-12 NOTE — PROGRESS NOTES
[facility] PROGRESS NOTE   Medical Student Note    Attending: Geovany OCHOA  Senior Resident: Christo (PGY-3)  Manuelito Resident: Kole (PGY-1)  Medical Student: Lennox Tee MS3  PATIENT: Alireza Hernandez; [id number]; 1935    Hospital Day # Hospital Day: 2    ID: 87 y.o. male with past medical history of Afib, HTN, dyslipidemia, and previous hemorrhagic stroke with right sided deficits was admitted on 7/11/2023 for worsening slurred speech.    24 Hour Events: Patient was brought into the ED by ambulance when staff at nursing facility noticed worsening slurred speech. SBP was up to 160 in the ED. CT angiogram of the head and neck showed left ICA free-floating clot and CT angiogram revealed left M2 occlusion. Neuro was consulted, and patient was not a candidate for thrombectomy.    SUBJECTIVE: Alireza is an 87 year old male with past medical history of Afib, HTN, dyslipidemia, and previous hemorrhagic stroke with right sided deficits was admitted on 7/11/2023 for worsening slurred speech. He is speech is significantly more dysarthric and he demonstrates frustration with not being able to get his words out, although he is trying. Subjective exam is limited due to dysarthria.    OBJECTIVE:  Temp:  [36.4 °C (97.5 °F)-37 °C (98.6 °F)] 36.4 °C (97.5 °F)  Pulse:  [] 109  Resp:  [16-18] 18  BP: (106-126)/(49-82) 126/77  SpO2:  [91 %-96 %] 93 %    Intake/Output Summary (Last 24 hours) at 7/12/2023 0714  Last data filed at 7/11/2023 1942  Gross per 24 hour   Intake 211.45 ml   Output --   Net 211.45 ml       PE:  Gen: resting comfortably in bed. Tries to speak but is severely dysarthric and difficult to understand.  HEENT: normocephalic, atraumatic, EOMI.  Pulm: clear to auscultation bilaterally, no respiratory distress   Cardio: RRR, no M/R/G  Abdom: soft, nontender, nondistended, normoactive bowel sounds in all quadrants  Ext: No edema, 2+ pulses bilaterally  Neuro: No visible facial droop. Able to stick  tongue out and move it side to side. Baseline right sided deficits including decreased sensation to mid and lower face, right upper and lower extremities. Baseline decreased strength in the upper and lower extremities 2/5. Patient is able to wiggle toes bilaterally. Left upper and lower extremity strength 5/5.     LABS:  Recent Labs     07/11/23  1057 07/11/23  2356   WBC 6.1 6.2   RBC 3.81* 4.28*   HEMOGLOBIN 10.0* 11.2*   HEMATOCRIT 32.1* 35.8*   MCV 84.3 83.6   MCH 26.2* 26.2*   RDW 47.4 47.5   PLATELETCT 191 212   MPV 10.0 9.8   NEUTSPOLYS 41.90* 39.30*   LYMPHOCYTES 44.40* 40.50   MONOCYTES 1.70 5.80   EOSINOPHILS 12.00* 13.70*   BASOPHILS 0.00 0.50   RBCMORPHOLO Present  --      Recent Labs     07/11/23  1057 07/11/23  2356   SODIUM 142 141   POTASSIUM 3.4* 4.3   CHLORIDE 110 107   CO2 21 21   BUN 11 12   CREATININE 0.68 0.78   CALCIUM 7.4* 8.5   ALBUMIN 2.9* 3.1*     Estimated GFR/CRCL = Estimated Creatinine Clearance: 68 mL/min (by C-G formula based on SCr of 0.78 mg/dL).  Recent Labs     07/11/23  1057 07/11/23  2356   GLUCOSE 85 81     Recent Labs     07/11/23  1057 07/11/23  2356   ASTSGOT 11* 15   ALTSGPT 10 15   TBILIRUBIN 0.5 0.6   ALKPHOSPHAT 56 62   GLOBULIN 2.7 3.1   INR 1.22*  --              Recent Labs     07/11/23  1057   INR 1.22*   APTT 25.6       MICROBIOLOGY:   No results found for: BLOODCULTU, BLDCULT, BCHOLD     IMAGING:   DX-CHEST-PORTABLE (1 VIEW)   Final Result      1.  Moderate LEFT pleural effusion   2.  LEFT lung atelectasis which could obscure an additional process   3.  Patchy opacity in the RIGHT perihilar lung could be atelectasis or airspace disease      CT-CEREBRAL PERFUSION ANALYSIS   Final Result      1.Cerebral blood flow less than 30% likely representing completed infarct = 0 mL   2.T Max more than 6 seconds likely representing combination of completed infarct and ischemia = 58 mL   3. Mismatched volume likely representing ischemic brain/penumbra= 58 mL   4.Please note that  the cerebral perfusion was performed on the limited brain tissue around the basal ganglia region. Infarct/ischemia outside the CT perfusion sections may not be seen on this study.      CT-CTA NECK WITH & W/O-POST PROCESSING   Final Result      1.  Severe stenosis of the proximal LEFT internal carotid artery   2.  LEFT pleural effusion which appears to be at least partially loculated      CT-CTA HEAD WITH & W/O-POST PROCESS   Final Result      LEFT M2 branch occlusion      Findings were communicated with and acknowledged by GENESIS JOYNER via Voalte Me on 7/11/2023 11:37 AM.         CT-HEAD W/O   Final Result      1.  Atrophy and chronic microvascular ischemic type changes.   2.  Old bilateral thalamic lacunar infarcts.   3.  No acute intracranial abnormality.   4.  Bilateral sphenoid sinus disease         MR-BRAIN-W/O    (Results Pending)       MEDS:  Current Facility-Administered Medications   Medication Last Admin    baclofen (Lioresal) tablet 7.5 mg 7.5 mg at 07/12/23 1903    heparin infusion 25,000 units in 500 mL 0.45% NACL 12 Units/kg/hr at 07/12/23 1913    senna-docusate (Pericolace Or Senokot S) 8.6-50 MG per tablet 2 Tablet 2 Tablet at 07/12/23 1843    And    polyethylene glycol/lytes (Miralax) PACKET 1 Packet      And    magnesium hydroxide (Milk Of Magnesia) suspension 30 mL      And    bisacodyl (Dulcolax) suppository 10 mg      ondansetron (Zofran) syringe/vial injection 4 mg      ondansetron (Zofran ODT) dispertab 4 mg      finasteride (Proscar) tablet 5 mg 5 mg at 07/12/23 1843    losartan (Cozaar) tablet 75 mg      metoprolol tartrate (Lopressor) tablet 50 mg 50 mg at 07/12/23 2105    omeprazole (PriLOSEC) capsule 20 mg      simvastatin (Zocor) tablet 20 mg 20 mg at 07/12/23 2105    tamsulosin (Flomax) capsule 0.4 mg      Blood pressure control per admin instructions      labetalol (Normodyne/Trandate) injection 10 mg      hydrALAZINE (Apresoline) injection 10 mg      NS (Bolus) infusion 500 mL          ASSESSMENT/PLAN: 87 y.o. male with past medical history of Afib, HTN, dyslipidemia, and previous hemorrhagic stroke with right sided deficits was admitted for     #Acute CVA  Patient brought in from care facility by ambulance for worsening slurred speech when he woke up. He has residual right sided deficits from a hemorrhagic stroke 4 months ago. CT perfusion shows decreased blood flow indicating a completed infarct. CT neck revealed severely stenosed L carotid artery. CTA head revealed Left M2 ischemic stroke. Neuro IR consulted, and patient is not a candidate for thrombectomy due to the free floating thrombus in the left ICA.  Consult with neuro. Neuro recommends short time blood pressure goal of -180 in the context of acute ischemic stroke, with a long term SBP goal of normotensive 100-130.   Continue heparin drip.  NPO status per speech.  Consult with PT/OT for evaluation and care goals.    #Goals of care  Patient has established DNR/DNI which he was able to verbalize in the ED.  Speak with sister on file and POA for plans of continued care.    #Primary Hypertension  Patient had SBP in the 150s on arrival. Goals of getting patient to SBP of 120-180 following ischemic stroke per neuro.   Continue home BP medications of Losartan and Metoprolol.  Long term goal is normotensive SBP of 100-130.    #Atrial fibrillation  The patient has a history of paroxysmal Afib with EKG on arrival showing afib.   Continue heparin drip for anticoagulation. Transition to Eliquis for stroke prevention    #normocytic anemia  RBC and Hgb have improved since admission, however they are still in the anemic range.  Continue to monitor with CBC.  Could consider FOBT for further investigation, but likely inconsistent with care goals.    #hypokalemia  Patient had a hypokalemia of 3.4 on admission. Patient was treated with 10 mEq K IV 4 doses. His K has raised to 4.3.  BMP daily to monitor.      Core Measures:  Fluids: IV  Lines:  PIV  Abx: None  Diet: NPO  PPX: Darya/Ceferino Tee, MS3  [facility]

## 2023-07-12 NOTE — PROGRESS NOTES
4 Eyes Skin Assessment Completed by KRISTIN Segura and KRISTIN Field.    Head WDL  Ears WDL  Nose WDL  Mouth WDL  Neck WDL  Breast/Chest WDL  Shoulder Blades WDL  Spine WDL  (R) Arm/Elbow/Hand WDL  (L) Arm/Elbow/Hand WDL  Abdomen WDL  Groin WDL  Scrotum/Coccyx/Buttocks Redness and Non-Blanching  (R) Leg WDL  (L) Leg WDL  (R) Heel/Foot/Toe WDL  (L) Heel/Foot/Toe WDL          Devices In Places Tele Box, Blood Pressure Cuff, and Pulse Ox      Interventions In Place Pressure Redistribution Mattress    Possible Skin Injury Yes    Pictures Uploaded Into Epic Yes  Wound Consult Placed Yes  RN Wound Prevention Protocol Ordered Yes

## 2023-07-12 NOTE — PROGRESS NOTES
Attending:   Vero Armenta M.d.      Resident:   Cristino Sharif M.D.    PATIENT:   Alireza Hernandez; [id number]; 1935    ID:   87-year-old male who presented with slurred speech and right-sided weakness who was admitted for a CVA.     SUBJECTIVE:   No acute events overnight, patient had worsening dysarthria this morning. Denies pain.     OBJECTIVE:  Vitals:    07/11/23 1835 07/11/23 2059 07/12/23 0003 07/12/23 0449   BP: 136/59 134/65 122/50 119/49   Pulse: 72 74 70 70   Resp:  16 16 16   Temp:  36.5 °C (97.7 °F) 36.5 °C (97.7 °F) 36.8 °C (98.2 °F)   TempSrc:  Temporal Temporal Temporal   SpO2:  91% 91% 93%   Weight:       Height:           Intake/Output Summary (Last 24 hours) at 7/12/2023 0753  Last data filed at 7/11/2023 1942  Gross per 24 hour   Intake 211.45 ml   Output --   Net 211.45 ml       PHYSICAL EXAM:  General: No acute distress, afebrile, resting comfortably  HEENT: NC/AT. EOMI.   Cardiovascular: RRR without murmurs. Normal capillary refill   Respiratory: CTAB  Abdomen: soft, nontender, nondistended, no masses  EXT:  no edema. Strength is 5/5 on the L upper/lower extremities, 2/5 RUE and RLE, contracted RUE  Skin: No erythema/lesions   Neuro: CN II - XII grossly intact except CN XI non-intact. Sensation is diminished on entire right side of body include face, UE and LE.    LABS:  Recent Labs     07/11/23  1057 07/11/23  2356   WBC 6.1 6.2   RBC 3.81* 4.28*   HEMOGLOBIN 10.0* 11.2*   HEMATOCRIT 32.1* 35.8*   MCV 84.3 83.6   MCH 26.2* 26.2*   RDW 47.4 47.5   PLATELETCT 191 212   MPV 10.0 9.8   NEUTSPOLYS 41.90* 39.30*   LYMPHOCYTES 44.40* 40.50   MONOCYTES 1.70 5.80   EOSINOPHILS 12.00* 13.70*   BASOPHILS 0.00 0.50   RBCMORPHOLO Present  --      Recent Labs     07/11/23  1057 07/11/23  2356   SODIUM 142 141   POTASSIUM 3.4* 4.3   CHLORIDE 110 107   CO2 21 21   BUN 11 12   CREATININE 0.68 0.78   CALCIUM 7.4* 8.5   ALBUMIN 2.9* 3.1*     Estimated GFR/CRCL = Estimated Creatinine Clearance: 68 mL/min  (by C-G formula based on SCr of 0.78 mg/dL).  Recent Labs     07/11/23  1057 07/11/23  2356   GLUCOSE 85 81     Recent Labs     07/11/23  1057 07/11/23  2356   ASTSGOT 11* 15   ALTSGPT 10 15   TBILIRUBIN 0.5 0.6   ALKPHOSPHAT 56 62   GLOBULIN 2.7 3.1   INR 1.22*  --              Recent Labs     07/11/23  1057   INR 1.22*   APTT 25.6         IMAGING:  DX-CHEST-PORTABLE (1 VIEW)   Final Result      1.  Moderate LEFT pleural effusion   2.  LEFT lung atelectasis which could obscure an additional process   3.  Patchy opacity in the RIGHT perihilar lung could be atelectasis or airspace disease      CT-CEREBRAL PERFUSION ANALYSIS   Final Result      1.Cerebral blood flow less than 30% likely representing completed infarct = 0 mL   2.T Max more than 6 seconds likely representing combination of completed infarct and ischemia = 58 mL   3. Mismatched volume likely representing ischemic brain/penumbra= 58 mL   4.Please note that the cerebral perfusion was performed on the limited brain tissue around the basal ganglia region. Infarct/ischemia outside the CT perfusion sections may not be seen on this study.      CT-CTA NECK WITH & W/O-POST PROCESSING   Final Result      1.  Severe stenosis of the proximal LEFT internal carotid artery   2.  LEFT pleural effusion which appears to be at least partially loculated      CT-CTA HEAD WITH & W/O-POST PROCESS   Final Result      LEFT M2 branch occlusion      Findings were communicated with and acknowledged by GENESIS JOYNER via Voalte Me on 7/11/2023 11:37 AM.         CT-HEAD W/O   Final Result      1.  Atrophy and chronic microvascular ischemic type changes.   2.  Old bilateral thalamic lacunar infarcts.   3.  No acute intracranial abnormality.   4.  Bilateral sphenoid sinus disease         MR-BRAIN-W/O    (Results Pending)       MEDS:  Current Facility-Administered Medications   Medication Last Admin    heparin infusion 25,000 units in 500 mL 0.45% NACL 16 Units/kg/hr at 07/12/23 0125     senna-docusate (Pericolace Or Senokot S) 8.6-50 MG per tablet 2 Tablet      And    polyethylene glycol/lytes (Miralax) PACKET 1 Packet      And    magnesium hydroxide (Milk Of Magnesia) suspension 30 mL      And    bisacodyl (Dulcolax) suppository 10 mg      ondansetron (Zofran) syringe/vial injection 4 mg      ondansetron (Zofran ODT) dispertab 4 mg      finasteride (Proscar) tablet 5 mg      losartan (Cozaar) tablet 75 mg      metoprolol tartrate (Lopressor) tablet 50 mg 50 mg at 07/11/23 1835    omeprazole (PriLOSEC) capsule 20 mg      simvastatin (Zocor) tablet 20 mg 20 mg at 07/11/23 2049    tamsulosin (Flomax) capsule 0.4 mg      Blood pressure control per admin instructions      labetalol (Normodyne/Trandate) injection 10 mg      hydrALAZINE (Apresoline) injection 10 mg      NS (Bolus) infusion 500 mL         ASSESSMENT/PLAN: 87-year-old male who presented with slurred speech and admitted for CVA workup:      Problem   Acute Cva (Cerebrovascular Accident) (Hcc)   Primary Hypertension   Paroxysmal Atrial Fibrillation (Hcc)   Goals of Care, Counseling/Discussion   Dyslipidemia   Cough   Hypokalemia   Bph (Benign Prostatic Hyperplasia) (Resolved)       * Acute CVA (cerebrovascular accident) (HCC)- (present on admission)  Assessment & Plan  Patient presented with worsening slurring speech starting when he woke up on day of admission. Has residual right-sided deficits due to hemorrhagic stroke in March 2023. CT Head showed no ICH. CT angiogram of the head and neck revealed left ICA free-floating clot and CT angiogram of the head revealed left M2 occlusion. Neuro IR was consulted and patient was found to not be a candidate for thombectomy given presence of free-floating thrombus with concern for distal embolization. Patient to get medical therapy for secondary stroke prevention. HbA1C 6.3    -q4h and PRN neuro assessment.    -Acutely, blood pressure goal -180. Long term BP goal is normotension,  100-130/60-80.  - MRI Brain wo contrast pending  -Telemetry; Known history of atrial fibrillation. No need for TTE from Neuro perspective.  -Cont. heparin gtt  - SLP recommends NPO, pending further evaluation of swallow using FEES   -PT/OT/SLP eval and treat.           Paroxysmal atrial fibrillation (HCC)- (present on admission)  Assessment & Plan  EKG upon arrival shows A-fib. Patient was not on anti-coagulation due to recent ICH.    - Tele shows A-fib rate 60-80  - Cont. Hep drip with transition to Eliquis in coming days for secondary stroke prevention    Primary hypertension- (present on admission)  Assessment & Plan  Patient arrive with -160. Allowing for permissive hypertension in the setting of ischemic stroke.     - Cont Home Losartan and Metoprolol  - Per Neuro, Goal -180; Long term BP goal is normotension, 100-130/60-80.    Goals of care, counseling/discussion  Assessment & Plan  Per advanced directive, patient does not desire to have life-sustaining treatment if the expected risks of treatment outweight benefits; patient does not want artificial nutrition.     - Contact POA    Cough  Assessment & Plan  Patient reports a non-productive cough for past 2 days but denies fever, chills, sob, chest pain. CXR shows Moderate L pleural effusion L lung atelectasis and a patchy opacity in the RIGHT perihilar lung could be atelectasis or airspace disease.     - Consider thoracentesis for L pleural effusion    Dyslipidemia  Assessment & Plan  - Lipid panel wnl LDL 59    - Continue home simvastatin  - Titrate to long term LDL goal < 70    Hypokalemia- (present on admission)  Assessment & Plan  K+ was 3.4 upon arrival. Wnl post repletion    - s/p 40mg K  - BMP in the mornings           Cristino Sharif M.D.  PGY-1  [facility]

## 2023-07-12 NOTE — ASSESSMENT & PLAN NOTE
Patient presented with worsening slurring speech starting when he woke up on day of admission. Has residual right-sided deficits due to hemorrhagic stroke in March 2023. CT Head showed no ICH. CT angiogram of the head and neck revealed left ICA free-floating clot and CT angiogram of the head revealed left M2 occlusion. Neuro IR was consulted and patient was found to not be a candidate for thombectomy given presence of free-floating thrombus with concern for distal embolization. Patient to get medical therapy for secondary stroke prevention. HbA1C 6.3  MRI Brain shows acute infarct in the left insular region and left frontal region. Scattered foci of acute infarction in the left parietal region. Remote infarcts in the bilateral thalami, left kylee, right cerebellum. Remote left thalamic hemorrhage.    -q4h and PRN neuro assessment.    - BP goal is normotension, 100-130/60-80.  -Telemetry; Known history of atrial fibrillation. No need for TTE from Neuro perspective.  - Cont. Eliquis 5mg PO BID today  - Per Speech patient has Mild-moderate oropharyngeal dysphagia; rec soft and bite sized foods with 1:1 supervision  - PT/OT rec post-acute placement

## 2023-07-12 NOTE — CARE PLAN
The patient is Watcher - Medium risk of patient condition declining or worsening    Shift Goals  Clinical Goals: neuro exam, monitor BP  Patient Goals: rest  Family Goals: feel better    Progress made toward(s) clinical / shift goals:    Problem: Optimal Care of the Stroke Patient  Goal: Optimal acute care for the stroke patient  Outcome: Progressing     Problem: Neuro Status  Goal: Neuro status will remain stable or improve  Outcome: Progressing     Problem: Hemodynamic Monitoring  Goal: Patient's hemodynamics, fluid balance and neurologic status will be stable or improve  Outcome: Progressing       Patient is not progressing towards the following goals:

## 2023-07-12 NOTE — THERAPY
Speech Language Pathology   Flexible Endoscopic Evaluation of Swallowing (FEES)        Patient Name: Alireza Hernandez  AGE:  87 y.o., SEX:  male  Medical Record #: [id number]  Date of Service: 7/12/2023      History of Present Illness  88 y/o male admitted 7/11 for worsening slurred speech. 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    Pertinent Information  Current Method of Nutrition: NPO until cleared by speech pathology  Patient Behaviors:  (Calm, cooperative)   Dentition: Fair, Natural dentition   Feeding Tube: None   Tracheostomy: No   Factor(s) Affecting Performance: Impaired command following, Impaired mental status     Discussed the risks, benefits, and alternatives of the FEES procedure. Patient/family acknowledged and agreed to proceed.      Assessment  Flexible Endoscopic Evaluation of Swallowing (FEES) completed at bedside today. The endoscope was passed transnasally via Right nare to evaluate the anatomy and physiology of swallowing. Pt tolerated the procedure with no apparent distress.    Anatomic Findings: WFL  Vocal Fold Motion: Bilateral movement  Secretion Management:  Excess dry, thick, yellow secretions in pharynx at onset of study. Resistent to clear but did improve with ongoing PO trials  PO Trials: Ice Chips, Thin Liquid, Mildly Thick Liquid, Liquidised, Pudding, Soft & Bite Sized, Mixed      Consistency PAS Score Timing Residue Comments   Thin Liquid 8 During swallow Vallecular Residue: Mild (5%-25%)  Pyriform Sinus Residue: Mild (5%-25%) Tsp - PAS 8  Cup - PAS 3, PAS 8  Straw - PAS 5   Mildly Thick 1 N/A Vallecular Residue: Mild (5%-25%)  Pyriform Sinus Residue: Mild (5%-25%) Cup, straw, sequential straw   Liquidised 1 N/A Vallecular Residue: Moderate (25%-50%)  Pyriform Sinus Residue: Mild (5%-25%)    Pudding 1 N/A Vallecular Residue: Moderate (25%-50%)  Pyriform Sinus Residue: Mild (5%-25%)    Soft & Bite Sized 1 N/A Vallecular Residue: Moderate (25%-50%)  Pyriform  Sinus Residue: Mild (5%-25%)    Mixed 1 N/A Vallecular Residue: Mild (5%-25%)  Pyriform Sinus Residue: Mild (5%-25%)      Penetration-Aspiration Scale (PAS)  1     No contrast enters airway  2     Contrast enters the airway, remains above the vocal folds, and is ejected from the airway (not seen in the airway at the end of the swallow).  3     Contrast enters the airway, remains above the vocal folds, and is not ejected from the airway (is seen in the airway after the swallow).  4     Contrast enters the airway, contacts the vocal folds, and is ejected from the airway.  5     Contrast enters the airway, contacts the vocal folds, and is not ejected from the airway  6     Contrast enters the airway, crosses the plane of the vocal folds, and is ejected from the airway.  7     Contrast enters the airway, crosses the plane of the vocal folds, and is not ejected from the airway despite effort.  8     Contrast enters the airway, crosses the plane of the vocal folds, is not ejected from the airway and there is no response to aspiration.      Oral phase:  Fair bolus stripping and AP transit. Mastication of soft solids was somewhat prolonged but functional. Few instances of loss of bolus control with TN0 but this did not result in airway invasion before the swallow and was only in trace amounts.    Pharyngeal phase:  Pharyngeal phase characterized by impairments in pharyngeal sensation, epiglottic inversion, LVC, BOT retraction, and pharyngeal shortening/constriction which resulted in the following:  - Aspiration and deep penetration (to the vocal folds)of TN0 by tsp and cup during the swallow (inferred). Aspirate did not clear from the airway using cued cough. Pt was not sensate to airway invasion. Aspirate and penetrate was in trace amounts  - Moderate vallecular residue with solid consistencies and mild residue with liquids that did not clear spontaneously. Severity of residue possibly worsened by presence of secretions in  pharynx at onset of study  - Mild PPW and pyriform sinus residue with all consistencies trialed that did not clear spontaneously  - Pt was not sensate to airway invasion or residue. Also apparently not sensate to secretions.    Of note - pt did cough during study in the absence of airway invasion. This will not be a reliable bedside indicator.       Compensatory Strategies:  Effortful swallow - INEFFECTIVE; pt did not follow cues to compete  Cough - INEFFECTIVE to clear aspirate  Multiple Swallows - SOMEWHAT EFFECTIVE to reduce but not clear pharyngeal residue. Pt did not consistently complete with cues  Liquid wash - SOMEWHAT EFFECTIVE to reduce but not clear pharyngeal residue      Severity Rating:  DALTON: Mild-Moderate      Clinical Impressions  The pt presents with a mild-moderate oropharyngeal dysphagia, likely acute on chronic related to CVA, PNA, and subacute ICH. Swallow safety is impaired; pharyngeal efficiency is impaired. Would recommend SB6/MT2 diet at this time with use of 1:1 feeding and liquid wash. Pt will likely benefit from repeat diagnostic evaluation prior to upgrading diet given impaired sensation and silent aspiration. Pt appears to be a fair candidate for ongoing behavorial and exercise-based swallow rehabilitation. Aphasia may be a barrier to exercise-based rehabilitation. Risks for aspiration PNA do remain elevated given impaired sensation and difficulty with secretion management. Dysphagia outcomes can be maximized with use of mobility as pt is able and frequent, thorough oral care.         Recommendations  Soft and bite-sized with mildly thick liquids  Medication: Crush with applesauce, as appropriate, Crush with pudding/puree, as appropriate (Cue double swallow or wash as possible)  Supervision: 1:1 feeding with constant supervision  Positioning: Fully upright and midline during oral intake  Strategies: Small bites/sips, Alternate bites and sips, Slow rate of intake  Oral Care:  (TID after  "meals)  Additional Instrumentation: Repeat diagnostic study when clinically appropriate         SLP Treatment Plan  Treatment Plan: Dysphagia Treatment, Patient/Family/Caregiver Training (per results of SLE, pending)  SLP Frequency: 3x Per Week  Estimated Duration: Until Therapy Goals Met      Anticipated Discharge Needs  Discharge Recommendations: Recommend post-acute placement for additional speech therapy services prior to discharge home   Therapy Recommendations Upon DC: Dysphagia Training, Patient / Family / Caregiver Education, Community Re-Integration (per results of SLE, pending)       Patient / Family Goals  Patient / Family Goal #1: \"Words are hard.\"  Goal #1 Outcome: Progressing as expected  Short Term Goal # 1: Pt will complete FEES w SLP to further assess swallow function and inform POC.  Goal Outcome # 1: Goal met, new goal added  Short Term Goal # 1 B : Pt will consume diet of SB/TN given 1:1 spv for strategy use with no worsening of lung status.  Short Term Goal # 2: Pt will complete exercises targeting BOT retraction and pharyngal constriction/shortening given max cues from SLP x20 in a session with \"good\" accuracy.      Abril Carnes, SLP  "

## 2023-07-12 NOTE — PROGRESS NOTES
Chief Complaint   Patient presents with    Possible Stroke     Pt BIBA for slurred speech.  Pt's last known well was 0615. Later on, pt noted to have slurred speech.  Pt had prior stroke 4 months ago and has right sided deficits from prior stroke. Pt A&O x2 upon arrival.           Neurology Progress Note  Neurohospitalist Service, [facility]    History of present illness:  Alireza Hernandez is a 87 y.o. male with a PMHx of hypertension, atrial fibrillation, ICH, and previous stroke with right sided deficits and mild slurred speech who presents today for new worsening slurred speech. The patient reports noticing his speech worsening around 0615 today which prompted him to present to the ED. In the ED he is mildly hypertensives with systolics in the 150s. Non contrast CT head is negative for acute intracranial abnormalities. CTA head/neck demonstrate  a Left M2 occlusion as well as a left ICA free floating mural thrombus.  CT perfusion is positive for CBF defect in the left MCA region as well. Neurology has been consulted for further evaluation of the above.     Interval Update 07/12/2023: No acute events overnight. Dysarthria appears slightly worse today. Started on Heparin gtt for free-floating mural thrombus in the L ICA. Will transition to Eliquis in coming 1-2 days. MRI brain still pending.     Referring Provider: Wes Norman M.D. has consulted Neurology for further evaluation    Past medical history:   Past Medical History:   Diagnosis Date    A-fib (HCC)     Acute pulmonary edema (HCC) 03/15/2023    BPH (benign prostatic hyperplasia)     Cancer (HCC)     Hypertension     Psychiatric problem 2022    Anxiety, depression. Did MRI to r/o biological cause. No tx provided.    Stroke (HCC) 2018    minor stroke, patient doesn't remember details       Past surgical history:   Past Surgical History:   Procedure Laterality Date    HIP REPLACEMENT, TOTAL Right 2004       Family history:    Family History   Problem Relation Age of Onset    No Known Problems Mother     No Known Problems Father        Social history:   Social History     Socioeconomic History    Marital status: Single     Spouse name: Not on file    Number of children: Not on file    Years of education: Not on file    Highest education level: Not on file   Occupational History    Not on file   Tobacco Use    Smoking status: Never    Smokeless tobacco: Never   Vaping Use    Vaping Use: Never used   Substance and Sexual Activity    Alcohol use: Never    Drug use: Never    Sexual activity: Not on file   Other Topics Concern    Not on file   Social History Narrative    Not on file     Social Determinants of Health     Financial Resource Strain: Not on file   Food Insecurity: Not on file   Transportation Needs: Not on file   Physical Activity: Not on file   Stress: Not on file   Social Connections: Not on file   Intimate Partner Violence: Not on file   Housing Stability: Not on file       Current medications:   Current Facility-Administered Medications   Medication Dose    heparin infusion 25,000 units in 500 mL 0.45% NACL  0-30 Units/kg/hr    senna-docusate (Pericolace Or Senokot S) 8.6-50 MG per tablet 2 Tablet  2 Tablet    And    polyethylene glycol/lytes (Miralax) PACKET 1 Packet  1 Packet    And    magnesium hydroxide (Milk Of Magnesia) suspension 30 mL  30 mL    And    bisacodyl (Dulcolax) suppository 10 mg  10 mg    ondansetron (Zofran) syringe/vial injection 4 mg  4 mg    ondansetron (Zofran ODT) dispertab 4 mg  4 mg    finasteride (Proscar) tablet 5 mg  5 mg    losartan (Cozaar) tablet 75 mg  75 mg    metoprolol tartrate (Lopressor) tablet 50 mg  50 mg    omeprazole (PriLOSEC) capsule 20 mg  20 mg    simvastatin (Zocor) tablet 20 mg  20 mg    tamsulosin (Flomax) capsule 0.4 mg  0.4 mg    Blood pressure control per admin instructions      labetalol (Normodyne/Trandate) injection 10 mg  10 mg    hydrALAZINE (Apresoline) injection 10  mg  10 mg    NS (Bolus) infusion 500 mL  500 mL       Medication Allergy:  No Known Allergies    Review of systems:   Constitutional: denies fever, night sweats, weight loss.   Eyes: denies acute vision change, eye pain or secretion.   Ears, Nose, Mouth, Throat: denies nasal secretion, nasal bleeding, difficulty swallowing, hearing loss, tinnitus, vertigo, ear pain, acute dental problems, oral ulcers or lesions.   Endocrine: denies recent weight changes, heat or cold intolerance, polyuria, polydypsia, polyphagia,abnormal hair growth.  Cardiovascular: denies new onset of chest pain, palpitations, syncope, or dyspnea of exertion.  Pulmonary: denies shortness of breath, new onset of cough, hemoptysis, wheezing, chest pain or flu-like symptoms.   GI: denies nausea, vomiting, diarrhea, GI bleeding, change in appetite, abdominal pain, and change in bowel habits.  : denies dysuria, urinary incontinence, hematuria.  Heme/oncology: denies history of easy bruising or bleeding. No history of cancer, DVTor PE.  Allergy/immunology: denies hives/urticaria, or itching.   Dermatologic: denies new rash, or new skin lesions.  Musculoskeletal:denies joint swelling or pain, muscle pain, neck and back pain.   Neurologic: Reports worsening slurred speech, denies new focal or unilateral weakness.   Psychiatric: denies symptoms of depression, anxiety, hallucinations, mood swings or changes, suicidal or homicidal thoughts.     Physical examination:   Vitals:    07/11/23 2059 07/12/23 0003 07/12/23 0449 07/12/23 0800   BP: 134/65 122/50 119/49 120/51   Pulse: 74 70 70 64   Resp: 16 16 16 17   Temp: 36.5 °C (97.7 °F) 36.5 °C (97.7 °F) 36.8 °C (98.2 °F) 36.4 °C (97.6 °F)   TempSrc: Temporal Temporal Temporal Temporal   SpO2: 91% 91% 93% 93%   Weight:       Height:         General: Patient in no acute distress, pleasant and cooperative.  HEENT: Normocephalic, no signs of acute trauma.   Neck: supple, no meningeal signs or carotid bruits.  There is normal range of motion. No tenderness on exam.   Chest: clear to auscultation. No cough.   CV: RRR, no murmurs.   Skin: no signs of acute rashes or trauma.   Musculoskeletal: joints exhibit full range of motion, without any pain to palpation. There are no signs of joint or muscle swelling. There is no tenderness to deep palpation of muscles.   Psychiatric: No hallucinatory behavior. Denies symptoms of depression or suicidal ideation. Mood and affect appear normal on exam.     NEUROLOGICAL EXAM:   Mental status, orientation: Awake, alert and oriented to self, and place, disoriented to time  Speech and language: speech is severely dysarthric, able to name objects but difficult to understand speech  Memory: There is intact recollection of recent and remote events.   Cranial nerve exam: Pupils are 3-4 mm bilaterally and equally reactive to light and accommodation. Visual fields are intact by confrontation. There is no nystagmus on primary or secondary gaze. Intact full EOM in all directions of gaze. Face appears symmetric. Sensation in the face is intact to light touch. Uvula is midline. Tongue is midline. Shoulder shrug is intact bilaterally.   Motor exam: Strength is 5/5 in LUE and LLE. RUE and RLE 2/5 (baseline reportedly)    Sensory exam reveals normal sense of light touch, proprioception, vibration and pinprick in all extremities.   Deep tendon reflexes:  2+ throughout. Plantar responses are flexor. There is no clonus.   Coordination: No ataxia on left side, unable to assess right  Gait: Not assessed due to patient preference    NIH Stroke Scale    1a Level of Consciousness 0  1b Orientation Questions 1  1c Response to Commands 0  2 Gaze 0  3 Visual Fields 0  4 Facial Movement 0  5 Motor Function (arm)   a Left 0  b Right 3 - baseline  6 Motor Function (leg)   a Left 0  b Right 3 - baseline  7 Limb Ataxia 0  8 Sensory 0  9 Language 0  10 Articulation 3  11 Extinction/Inattention 0    Score: 10    ANCILLARY  DATA REVIEWED:     Lab Data Review:  Recent Results (from the past 24 hour(s))   CBC WITH DIFFERENTIAL    Collection Time: 07/11/23 10:57 AM   Result Value Ref Range    WBC 6.1 4.8 - 10.8 K/uL    RBC 3.81 (L) 4.70 - 6.10 M/uL    Hemoglobin 10.0 (L) 14.0 - 18.0 g/dL    Hematocrit 32.1 (L) 42.0 - 52.0 %    MCV 84.3 81.4 - 97.8 fL    MCH 26.2 (L) 27.0 - 33.0 pg    MCHC 31.2 (L) 32.3 - 36.5 g/dL    RDW 47.4 35.9 - 50.0 fL    Platelet Count 191 164 - 446 K/uL    MPV 10.0 9.0 - 12.9 fL    Neutrophils-Polys 41.90 (L) 44.00 - 72.00 %    Lymphocytes 44.40 (H) 22.00 - 41.00 %    Monocytes 1.70 0.00 - 13.40 %    Eosinophils 12.00 (H) 0.00 - 6.90 %    Basophils 0.00 0.00 - 1.80 %    Nucleated RBC 0.00 0.00 - 0.20 /100 WBC    Neutrophils (Absolute) 2.56 1.82 - 7.42 K/uL    Lymphs (Absolute) 2.71 1.00 - 4.80 K/uL    Monos (Absolute) 0.10 0.00 - 0.85 K/uL    Eos (Absolute) 0.73 (H) 0.00 - 0.51 K/uL    Baso (Absolute) 0.00 0.00 - 0.12 K/uL    NRBC (Absolute) 0.00 K/uL   COMP METABOLIC PANEL    Collection Time: 07/11/23 10:57 AM   Result Value Ref Range    Sodium 142 135 - 145 mmol/L    Potassium 3.4 (L) 3.6 - 5.5 mmol/L    Chloride 110 96 - 112 mmol/L    Co2 21 20 - 33 mmol/L    Anion Gap 11.0 7.0 - 16.0    Glucose 85 65 - 99 mg/dL    Bun 11 8 - 22 mg/dL    Creatinine 0.68 0.50 - 1.40 mg/dL    Calcium 7.4 (L) 8.5 - 10.5 mg/dL    AST(SGOT) 11 (L) 12 - 45 U/L    ALT(SGPT) 10 2 - 50 U/L    Alkaline Phosphatase 56 30 - 99 U/L    Total Bilirubin 0.5 0.1 - 1.5 mg/dL    Albumin 2.9 (L) 3.2 - 4.9 g/dL    Total Protein 5.6 (L) 6.0 - 8.2 g/dL    Globulin 2.7 1.9 - 3.5 g/dL    A-G Ratio 1.1 g/dL   PROTHROMBIN TIME    Collection Time: 07/11/23 10:57 AM   Result Value Ref Range    PT 15.3 (H) 12.0 - 14.6 sec    INR 1.22 (H) 0.87 - 1.13   APTT    Collection Time: 07/11/23 10:57 AM   Result Value Ref Range    APTT 25.6 24.7 - 36.0 sec   COD (ADULT)    Collection Time: 07/11/23 10:57 AM   Result Value Ref Range    ABO Grouping Only A     Rh  Grouping Only POS     Antibody Screen-Cod NEG    TROPONIN    Collection Time: 23 10:57 AM   Result Value Ref Range    Troponin T 97 (H) 6 - 19 ng/L   ESTIMATED GFR    Collection Time: 23 10:57 AM   Result Value Ref Range    GFR (CKD-EPI) 90 >60 mL/min/1.73 m 2   CORRECTED CALCIUM    Collection Time: 23 10:57 AM   Result Value Ref Range    Correct Calcium 8.3 (L) 8.5 - 10.5 mg/dL   Heparin Xa (Unfractionated)    Collection Time: 23 10:57 AM   Result Value Ref Range    Heparin Xa (UFH) <0.10 IU/mL   DIFFERENTIAL MANUAL    Collection Time: 23 10:57 AM   Result Value Ref Range    Manual Diff Status PERFORMED    PERIPHERAL SMEAR REVIEW    Collection Time: 23 10:57 AM   Result Value Ref Range    Peripheral Smear Review see below    PLATELET ESTIMATE    Collection Time: 23 10:57 AM   Result Value Ref Range    Plt Estimation Normal    MORPHOLOGY    Collection Time: 23 10:57 AM   Result Value Ref Range    RBC Morphology Present     Poikilocytosis 1+     Ovalocytes 1+     Schistocytes 1+     Echinocytes 1+     Acanthocytes 1+    EKG (NOW)    Collection Time: 23 11:28 AM   Result Value Ref Range    Report       [facility] Emergency Dept.    Test Date:  2023  Pt Name:    RODERICK MILLS                 Department: ER  MRN:        [id number]                      Room:       [facility]  Gender:     Male                         Technician: [id number]  :        [id number]                   Requested By:GENESIS JOYNER  Order #:    [id number]                    Reading MD: GENESIS JOYNER MD    Measurements  Intervals                                Axis  Rate:       80                           P:          0  WA:         0                            QRS:        44  QRSD:       94                           T:          43  QT:         388  QTc:        448    Interpretive Statements  Atrial fibrillation  Borderline low voltage, extremity leads  Artifact in lead(s)  I,II,III,aVR,aVL,aVF,V1,V2  Compared to ECG 03/18/2023 20:45:40  ST (T wave) deviation no longer present  Electronically Signed On 07- 13:18:28 PDT by GENESIS JOYNER MD     Hemoglobin A1C    Collection Time: 07/11/23  5:57 PM   Result Value Ref Range    Glycohemoglobin 6.3 (H) 4.0 - 5.6 %    Est Avg Glucose 134 mg/dL   Heparin Anti-Xa    Collection Time: 07/11/23  5:57 PM   Result Value Ref Range    Heparin Xa (UFH) 0.14 IU/mL   CBC with Differential    Collection Time: 07/11/23 11:56 PM   Result Value Ref Range    WBC 6.2 4.8 - 10.8 K/uL    RBC 4.28 (L) 4.70 - 6.10 M/uL    Hemoglobin 11.2 (L) 14.0 - 18.0 g/dL    Hematocrit 35.8 (L) 42.0 - 52.0 %    MCV 83.6 81.4 - 97.8 fL    MCH 26.2 (L) 27.0 - 33.0 pg    MCHC 31.3 (L) 32.3 - 36.5 g/dL    RDW 47.5 35.9 - 50.0 fL    Platelet Count 212 164 - 446 K/uL    MPV 9.8 9.0 - 12.9 fL    Neutrophils-Polys 39.30 (L) 44.00 - 72.00 %    Lymphocytes 40.50 22.00 - 41.00 %    Monocytes 5.80 0.00 - 13.40 %    Eosinophils 13.70 (H) 0.00 - 6.90 %    Basophils 0.50 0.00 - 1.80 %    Immature Granulocytes 0.20 0.00 - 0.90 %    Nucleated RBC 0.00 0.00 - 0.20 /100 WBC    Neutrophils (Absolute) 2.43 1.82 - 7.42 K/uL    Lymphs (Absolute) 2.51 1.00 - 4.80 K/uL    Monos (Absolute) 0.36 0.00 - 0.85 K/uL    Eos (Absolute) 0.85 (H) 0.00 - 0.51 K/uL    Baso (Absolute) 0.03 0.00 - 0.12 K/uL    Immature Granulocytes (abs) 0.01 0.00 - 0.11 K/uL    NRBC (Absolute) 0.00 K/uL   Comp Metabolic Panel (CMP)    Collection Time: 07/11/23 11:56 PM   Result Value Ref Range    Sodium 141 135 - 145 mmol/L    Potassium 4.3 3.6 - 5.5 mmol/L    Chloride 107 96 - 112 mmol/L    Co2 21 20 - 33 mmol/L    Anion Gap 13.0 7.0 - 16.0    Glucose 81 65 - 99 mg/dL    Bun 12 8 - 22 mg/dL    Creatinine 0.78 0.50 - 1.40 mg/dL    Calcium 8.5 8.5 - 10.5 mg/dL    AST(SGOT) 15 12 - 45 U/L    ALT(SGPT) 15 2 - 50 U/L    Alkaline Phosphatase 62 30 - 99 U/L    Total Bilirubin 0.6 0.1 - 1.5 mg/dL    Albumin 3.1 (L) 3.2 - 4.9 g/dL     Total Protein 6.2 6.0 - 8.2 g/dL    Globulin 3.1 1.9 - 3.5 g/dL    A-G Ratio 1.0 g/dL   Lipid Profile    Collection Time: 07/11/23 11:56 PM   Result Value Ref Range    Cholesterol,Tot 116 100 - 199 mg/dL    Triglycerides 59 0 - 149 mg/dL    HDL 45 >=40 mg/dL    LDL 59 <100 mg/dL   CORRECTED CALCIUM    Collection Time: 07/11/23 11:56 PM   Result Value Ref Range    Correct Calcium 9.2 8.5 - 10.5 mg/dL   ESTIMATED GFR    Collection Time: 07/11/23 11:56 PM   Result Value Ref Range    GFR (CKD-EPI) 86 >60 mL/min/1.73 m 2   Heparin Xa (Unfractionated)    Collection Time: 07/12/23 12:50 AM   Result Value Ref Range    Heparin Xa (UFH) 0.58 IU/mL   Heparin Xa (Unfractionated)    Collection Time: 07/12/23  7:06 AM   Result Value Ref Range    Heparin Xa (UFH) 0.72 IU/mL       Labs reviewed by me.       Imaging reviewed by me:     DX-CHEST-PORTABLE (1 VIEW)   Final Result      1.  Moderate LEFT pleural effusion   2.  LEFT lung atelectasis which could obscure an additional process   3.  Patchy opacity in the RIGHT perihilar lung could be atelectasis or airspace disease      CT-CEREBRAL PERFUSION ANALYSIS   Final Result      1.Cerebral blood flow less than 30% likely representing completed infarct = 0 mL   2.T Max more than 6 seconds likely representing combination of completed infarct and ischemia = 58 mL   3. Mismatched volume likely representing ischemic brain/penumbra= 58 mL   4.Please note that the cerebral perfusion was performed on the limited brain tissue around the basal ganglia region. Infarct/ischemia outside the CT perfusion sections may not be seen on this study.      CT-CTA NECK WITH & W/O-POST PROCESSING   Final Result      1.  Severe stenosis of the proximal LEFT internal carotid artery   2.  LEFT pleural effusion which appears to be at least partially loculated      CT-CTA HEAD WITH & W/O-POST PROCESS   Final Result      LEFT M2 branch occlusion      Findings were communicated with and acknowledged by GENESIS ALBA  AR via [employer] on 7/11/2023 11:37 AM.         CT-HEAD W/O   Final Result      1.  Atrophy and chronic microvascular ischemic type changes.   2.  Old bilateral thalamic lacunar infarcts.   3.  No acute intracranial abnormality.   4.  Bilateral sphenoid sinus disease         MR-BRAIN-W/O    (Results Pending)         Presumed mechanism by TOAST:  X_Large Artery Atherosclerosis  __Small Vessel (Lacunar)  __Cardioembolic  __Other (Sickle Cell, Vasculitis, Hypercoagulable)  __Unknown    Modified Simran Scale (MRS): 4 = Moderately severe disability; unable to walk without assistance and unable to attend to own bodily needs without assistance      ASSESSMENT AND PLAN:    Assessment and Plan:     87 y.o. male with PMHx of A Fib, ICH, and CVA with residual right sided deficits and mild slurred speech who presented with worsening slurred speech. Stroke protocol CT head negative for acute findings. CTA head/neck demonstrates a Left M2 occlusion as well as a left ICA free floating mural thrombus.  CT perfusion is positive for CBF defect in the left MCA region as well. Case was discussed with Neuro Interventional Radiology who expressed concern over the free floating thrombus lysing and traversing distally causing new strokes. As such, the patient was determined to not be a candidate for thrombolytic therapy or mechanical thrombectomy. Started on Heparin gtt, will transition to Eliquis in coming 1-2 days.     Problem list:  Left MCA M2 occlusion      Recommendations:  -q4h and PRN neuro assessment. VS per nursing/unit protocol.   -Acutely, blood pressure goal -180. Long term BP goal is normotension, 100-130/60-80. Antihypertensives per primary team.   -Obtain MRI Brain wo contrast   -Telemetry; Known history of atrial fibrillation. No need for TTE from Neuro perspective.  -Continue heparin gtt for 1-2 days, will bridge to Eliquis following that   -LDL 59 , HgbA1c 6.3  -Resume home simvastatin 20mg qhs. Titrate to  long term LDL goal < 70  -BG management per primary team. BG goal .  -PT/OT/SLP eval and treat.   -DVT PPX: SCDs.      Case reviewed and plan created with Dr. Efren Logan, Acute Neurology. Please call with any questions        ERNA Zuluaga.  [facility]

## 2023-07-12 NOTE — ASSESSMENT & PLAN NOTE
Patient arrive with -160. Allowed for permissive hypertension in the setting of ischemic stroke for first 48hrs    - Cont Home Losartan and Metoprolol  - BP goal is normotension, 100-130/60-80.

## 2023-07-12 NOTE — DIETARY
"Nutrition services: Day 1 of admit.  Alireza Hernandez is a 87 y.o. male with admitting DX of Acute CVA.     Consult received for MST 2 with reports weight loss and pressure injury to sacrum.     Pt seen at bedside and  friend arrived during visit as well. Pt with garbled speech, some can be understood such as reports good appetite.  was helpful noting pt previously at assisted living where pt did not like the food which lead to weight loss. Pt briefly stated usual weight of 365 lbs, but unable to specify timeline for weight loss as this would be ~200 lbs of weight loss. Per weight hx on file, pt weighed 158 lbs on 3/17/23, current weight is estimated. Pt with mild muscle and mild fat wasting of the shoulders, scapula, triceps, and temples. Per , pt previously tried Ensure shakes.     Assessment:  Height: 175.3 cm (5' 9\")  Weight: 72.6 kg (160 lb) - estimated, asked RN/CNA for measured weight  Body mass index is 23.63 kg/m²., BMI classification: normal  Diet/Intake: Soft and bite-sized with MTL and 1:1 feeds    Evaluation:   PMH of HTN, Afib, ICH, and previous stroke.   Pt passed FEES with SLP today, notes recommend soft and bite-sized diet with MTL.   Unable to assess weight loss at this time pending measured weight.   Skin: pressure injury to sacrum midline, no edema noted.   Labs: A1C 6.3, labs reviewed.  Meds: prilosec, zocor, anti-emetics prn, bowel protocol, heparin IV   Last BM: PTA    Malnutrition Risk: Moderate malnutrition in the context of chronic illness r/t multiple strokes as evidenced by mild muscle and mild fat wasting.     Recommendations/Plan:  Diet per SLP, no additional restrictions at this time. If blood sugars increase, may consider adding diabetic diet but not needed at this time. Will order Boost VHC (strawberry) with meals to promote protein intake.  Encourage intake of meals and supplements.   Document intake of all meals as % taken in ADL's to provide interdisciplinary " communication across all shifts.   Monitor weight.  Nutrition rep will continue to see patient for ongoing meal and snack preferences.     RD following.

## 2023-07-13 PROBLEM — N39.0 UTI (URINARY TRACT INFECTION), UNCOMPLICATED: Status: ACTIVE | Noted: 2023-01-01

## 2023-07-13 NOTE — ASSESSMENT & PLAN NOTE
Patient reports he has had chronic sacral ulcer, unsure how long its been there.     - Wound care following 1x/week  - Nurse to reposition patient q2 hours

## 2023-07-13 NOTE — PROGRESS NOTES
Chief Complaint   Patient presents with    Possible Stroke     Pt BIBA for slurred speech.  Pt's last known well was 0615. Later on, pt noted to have slurred speech.  Pt had prior stroke 4 months ago and has right sided deficits from prior stroke. Pt A&O x2 upon arrival.           Neurology Progress Note  Neurohospitalist Service, [facility]    History of present illness:  Alireza Hernandez is a 87 y.o. male with a PMHx of hypertension, atrial fibrillation, ICH, and previous stroke with right sided deficits and mild slurred speech who presents today for new worsening slurred speech. The patient reports noticing his speech worsening around 0615 today which prompted him to present to the ED. In the ED he is mildly hypertensives with systolics in the 150s. Non contrast CT head is negative for acute intracranial abnormalities. CTA head/neck demonstrate  a Left M2 occlusion as well as a left ICA free floating mural thrombus.  CT perfusion is positive for CBF defect in the left MCA region as well. Neurology has been consulted for further evaluation of the above.     Interval Update 07/12/2023: No acute events overnight. Dysarthria appears slightly worse today. Started on Heparin gtt for free-floating mural thrombus in the L ICA. Will transition to Eliquis in coming 1-2 days. MRI brain still pending.     Interval Update 07/13/2023: No acute events overnight. Neuro exam stable. Can transition patient off heparin gtt today to Eliquis 5mg BID.     Referring Provider: Wes Norman M.D. has consulted Neurology for further evaluation    Past medical history:   Past Medical History:   Diagnosis Date    A-fib (HCC)     Acute pulmonary edema (HCC) 03/15/2023    BPH (benign prostatic hyperplasia)     Cancer (HCC)     Hypertension     Psychiatric problem 2022    Anxiety, depression. Did MRI to r/o biological cause. No tx provided.    Stroke (HCC) 2018    minor stroke, patient doesn't remember details        Past surgical history:   Past Surgical History:   Procedure Laterality Date    HIP REPLACEMENT, TOTAL Right 2004       Family history:   Family History   Problem Relation Age of Onset    No Known Problems Mother     No Known Problems Father        Social history:   Social History     Socioeconomic History    Marital status: Single     Spouse name: Not on file    Number of children: Not on file    Years of education: Not on file    Highest education level: Not on file   Occupational History    Not on file   Tobacco Use    Smoking status: Never    Smokeless tobacco: Never   Vaping Use    Vaping Use: Never used   Substance and Sexual Activity    Alcohol use: Never    Drug use: Never    Sexual activity: Not on file   Other Topics Concern    Not on file   Social History Narrative    Not on file     Social Determinants of Health     Financial Resource Strain: Not on file   Food Insecurity: Not on file   Transportation Needs: Not on file   Physical Activity: Not on file   Stress: Not on file   Social Connections: Not on file   Intimate Partner Violence: Not on file   Housing Stability: Not on file       Current medications:   Current Facility-Administered Medications   Medication Dose    baclofen (Lioresal) tablet 7.5 mg  7.5 mg    heparin infusion 25,000 units in 500 mL 0.45% NACL  0-30 Units/kg/hr    senna-docusate (Pericolace Or Senokot S) 8.6-50 MG per tablet 2 Tablet  2 Tablet    And    polyethylene glycol/lytes (Miralax) PACKET 1 Packet  1 Packet    And    magnesium hydroxide (Milk Of Magnesia) suspension 30 mL  30 mL    And    bisacodyl (Dulcolax) suppository 10 mg  10 mg    ondansetron (Zofran) syringe/vial injection 4 mg  4 mg    ondansetron (Zofran ODT) dispertab 4 mg  4 mg    finasteride (Proscar) tablet 5 mg  5 mg    losartan (Cozaar) tablet 75 mg  75 mg    metoprolol tartrate (Lopressor) tablet 50 mg  50 mg    omeprazole (PriLOSEC) capsule 20 mg  20 mg    simvastatin (Zocor) tablet 20 mg  20 mg     tamsulosin (Flomax) capsule 0.4 mg  0.4 mg    Blood pressure control per admin instructions      labetalol (Normodyne/Trandate) injection 10 mg  10 mg    hydrALAZINE (Apresoline) injection 10 mg  10 mg    NS (Bolus) infusion 500 mL  500 mL       Medication Allergy:  No Known Allergies    Review of systems:   Constitutional: denies fever, night sweats, weight loss.   Eyes: denies acute vision change, eye pain or secretion.   Ears, Nose, Mouth, Throat: denies nasal secretion, nasal bleeding, difficulty swallowing, hearing loss, tinnitus, vertigo, ear pain, acute dental problems, oral ulcers or lesions.   Endocrine: denies recent weight changes, heat or cold intolerance, polyuria, polydypsia, polyphagia,abnormal hair growth.  Cardiovascular: denies new onset of chest pain, palpitations, syncope, or dyspnea of exertion.  Pulmonary: denies shortness of breath, new onset of cough, hemoptysis, wheezing, chest pain or flu-like symptoms.   GI: denies nausea, vomiting, diarrhea, GI bleeding, change in appetite, abdominal pain, and change in bowel habits.  : denies dysuria, urinary incontinence, hematuria.  Heme/oncology: denies history of easy bruising or bleeding. No history of cancer, DVTor PE.  Allergy/immunology: denies hives/urticaria, or itching.   Dermatologic: denies new rash, or new skin lesions.  Musculoskeletal:denies joint swelling or pain, muscle pain, neck and back pain.   Neurologic: Reports worsening slurred speech, denies new focal or unilateral weakness.   Psychiatric: denies symptoms of depression, anxiety, hallucinations, mood swings or changes, suicidal or homicidal thoughts.     Physical examination:   Vitals:    07/12/23 2001 07/12/23 2105 07/12/23 2340 07/13/23 0500   BP: 119/78 126/77 118/59 113/58   Pulse: 79 (!) 109 62 62   Resp: 18  18 18   Temp: 36.4 °C (97.5 °F)  36.1 °C (97 °F) 36.3 °C (97.4 °F)   TempSrc: Temporal  Temporal Temporal   SpO2: 93%  94% 93%   Weight:       Height:          General: Patient in no acute distress, pleasant and cooperative.  HEENT: Normocephalic, no signs of acute trauma.   Neck: supple, no meningeal signs or carotid bruits. There is normal range of motion. No tenderness on exam.   Chest: clear to auscultation. No cough.   CV: RRR, no murmurs.   Skin: no signs of acute rashes or trauma.   Musculoskeletal: joints exhibit full range of motion, without any pain to palpation. There are no signs of joint or muscle swelling. There is no tenderness to deep palpation of muscles.   Psychiatric: No hallucinatory behavior. Denies symptoms of depression or suicidal ideation. Mood and affect appear normal on exam.     NEUROLOGICAL EXAM:   Mental status, orientation: Awake, alert and oriented to self, and place, disoriented to time  Speech and language: speech is severely dysarthric, able to name objects but difficult to understand speech  Memory: There is intact recollection of recent and remote events.   Cranial nerve exam: Pupils are 3-4 mm bilaterally and equally reactive to light and accommodation. Visual fields are intact by confrontation. There is no nystagmus on primary or secondary gaze. Intact full EOM in all directions of gaze. Face appears symmetric. Sensation in the face is intact to light touch. Uvula is midline. Tongue is midline. Shoulder shrug is intact bilaterally.   Motor exam: Strength is 5/5 in LUE and LLE. RUE and RLE 2/5 (baseline reportedly)    Sensory exam reveals normal sense of light touch, proprioception, vibration and pinprick in all extremities.   Deep tendon reflexes:  2+ throughout. Plantar responses are flexor. There is no clonus.   Coordination: No ataxia on left side, unable to assess right  Gait: Not assessed due to patient preference    NIH Stroke Scale    1a Level of Consciousness 0  1b Orientation Questions 1  1c Response to Commands 0  2 Gaze 0  3 Visual Fields 0  4 Facial Movement 0  5 Motor Function (arm)   a Left 0  b Right 3 - baseline  6  Motor Function (leg)   a Left 0  b Right 3 - baseline  7 Limb Ataxia 0  8 Sensory 0  9 Language 0  10 Articulation 3  11 Extinction/Inattention 0    Score: 10    ANCILLARY DATA REVIEWED:     Lab Data Review:  Recent Results (from the past 24 hour(s))   Heparin Xa (Unfractionated)    Collection Time: 07/12/23  3:32 PM   Result Value Ref Range    Heparin Xa (UFH) 0.77 IU/mL   Heparin Xa (Unfractionated)    Collection Time: 07/13/23 12:22 AM   Result Value Ref Range    Heparin Xa (UFH) 0.47 IU/mL   URINALYSIS    Collection Time: 07/13/23  3:22 AM    Specimen: Urine, Clean Catch   Result Value Ref Range    Color Yellow     Character Cloudy (A)     Specific Gravity 1.019 <1.035    Ph 5.5 5.0 - 8.0    Glucose Negative Negative mg/dL    Ketones Trace (A) Negative mg/dL    Protein 30 (A) Negative mg/dL    Bilirubin Negative Negative    Urobilinogen, Urine 0.2 Negative    Nitrite Negative Negative    Leukocyte Esterase Moderate (A) Negative    Occult Blood Trace (A) Negative    Micro Urine Req Microscopic    URINE MICROSCOPIC (W/UA)    Collection Time: 07/13/23  3:22 AM   Result Value Ref Range    WBC  (A) /hpf    RBC 2-5 (A) /hpf    Bacteria Moderate (A) None /hpf    Epithelial Cells Negative /hpf    Hyaline Cast 0-2 /lpf   Heparin Xa (Unfractionated)    Collection Time: 07/13/23  6:35 AM   Result Value Ref Range    Heparin Xa (UFH) 0.27 IU/mL       Labs reviewed by me.       Imaging reviewed by me:     DX-CHEST-PORTABLE (1 VIEW)   Final Result      1.  Moderate LEFT pleural effusion   2.  LEFT lung atelectasis which could obscure an additional process   3.  Patchy opacity in the RIGHT perihilar lung could be atelectasis or airspace disease      CT-CEREBRAL PERFUSION ANALYSIS   Final Result      1.Cerebral blood flow less than 30% likely representing completed infarct = 0 mL   2.T Max more than 6 seconds likely representing combination of completed infarct and ischemia = 58 mL   3. Mismatched volume likely  representing ischemic brain/penumbra= 58 mL   4.Please note that the cerebral perfusion was performed on the limited brain tissue around the basal ganglia region. Infarct/ischemia outside the CT perfusion sections may not be seen on this study.      CT-CTA NECK WITH & W/O-POST PROCESSING   Final Result      1.  Severe stenosis of the proximal LEFT internal carotid artery   2.  LEFT pleural effusion which appears to be at least partially loculated      CT-CTA HEAD WITH & W/O-POST PROCESS   Final Result      LEFT M2 branch occlusion      Findings were communicated with and acknowledged by GENESIS JOYNER via Vo[employer] Me on 7/11/2023 11:37 AM.         CT-HEAD W/O   Final Result      1.  Atrophy and chronic microvascular ischemic type changes.   2.  Old bilateral thalamic lacunar infarcts.   3.  No acute intracranial abnormality.   4.  Bilateral sphenoid sinus disease         MR-BRAIN-W/O    (Results Pending)         Presumed mechanism by TOAST:  X_Large Artery Atherosclerosis  __Small Vessel (Lacunar)  __Cardioembolic  __Other (Sickle Cell, Vasculitis, Hypercoagulable)  __Unknown    Modified Simran Scale (MRS): 4 = Moderately severe disability; unable to walk without assistance and unable to attend to own bodily needs without assistance      ASSESSMENT AND PLAN:    Assessment and Plan:     87 y.o. male with PMHx of A Fib, ICH, and CVA with residual right sided deficits and mild slurred speech who presented with worsening slurred speech. Stroke protocol CT head negative for acute findings. CTA head/neck demonstrates a Left M2 occlusion as well as a left ICA free floating mural thrombus.  CT perfusion is positive for CBF defect in the left MCA region as well. Case was discussed with Neuro Interventional Radiology who expressed concern over the free floating thrombus lysing and traversing distally causing new strokes. As such, the patient was determined to not be a candidate for thrombolytic therapy or mechanical  thrombectomy. Currently therapeutic on heparin gtt, will transition to PO Eliquis 5mg BID today.     Problem list:  Left MCA M2 occlusion      Recommendations:  -q4h and PRN neuro assessment. VS per nursing/unit protocol.   -Acutely, blood pressure goal -180. Long term BP goal is normotension, 100-130/60-80. Antihypertensives per primary team.   -Obtain MRI Brain wo contrast   -Telemetry; Known history of atrial fibrillation. No need for TTE from Neuro perspective.  -Bridge from heparin gtt to Eliquis 5mg PO BID today  -LDL 59 , HgbA1c 6.3  -Continue simvastatin 20mg qhs. Titrate to long term LDL goal < 70  -BG management per primary team. BG goal .  -PT/OT/SLP eval and treat.   -DVT PPX: SCDs  -[facility] as outpatient upon discharge      Case reviewed and plan created with Dr. Efren Logan, Acute Neurology. Neurology will sign off at this time. Please reconsult with any acute developments.         JORGE ZuluagaRLAURIE.  [facility]

## 2023-07-13 NOTE — CARE PLAN
The patient is Watcher - Medium risk of patient condition declining or worsening    Shift Goals  Clinical Goals: monitor neuro status  Patient Goals: rest  Family Goals: jay    Progress made toward(s) clinical / shift goals:    Problem: Psychosocial - Patient Condition  Goal: Patient's ability to verbalize feelings about condition will improve  Description: Target End Date:  Prior to discharge or change in level of care    1.  Discuss coping with medical condition and its effects  2.  Encourage patient participation in care  3.  Encourage acknowledgement of body changes and accompanying emotions  4.  Perform depression screening  Outcome: Progressing       Patient is not progressing towards the following goals:

## 2023-07-13 NOTE — PROGRESS NOTES
Attending:   Vero Armenta M.d.      Resident:   Cristino Sharif M.D.    PATIENT:   Alireza Hernandez; [id number]; 1935    ID:   87-year-old male who presented with slurred speech and right-sided weakness who was admitted for a CVA.     SUBJECTIVE:   No acute events overnight, patient dysarthria improved this morning when I talked to him. Denies pain.     OBJECTIVE:  Vitals:    07/12/23 2340 07/13/23 0500 07/13/23 0730 07/13/23 1303   BP: 118/59 113/58 116/74    Pulse: 62 62 78    Resp: 18 18 18    Temp: 36.1 °C (97 °F) 36.3 °C (97.4 °F) 36.6 °C (97.8 °F) 36.9 °C (98.5 °F)   TempSrc: Temporal Temporal Temporal Temporal   SpO2: 94% 93% 95%    Weight:       Height:           Intake/Output Summary (Last 24 hours) at 7/12/2023 0753  Last data filed at 7/11/2023 1942  Gross per 24 hour   Intake 211.45 ml   Output --   Net 211.45 ml       PHYSICAL EXAM:  General: No acute distress, afebrile, resting comfortably  HEENT: NC/AT. EOMI.   Cardiovascular: RRR without murmurs. Normal capillary refill   Respiratory: CTAB  Abdomen: soft, nontender, nondistended, no masses  EXT:  no edema. Strength is 5/5 on the L upper/lower extremities, 2/5 RUE and RLE, contracted RUE  Skin: No erythema/lesions   Neuro: CN II - XII grossly intact except CN XI non-intact. Sensation is diminished on entire right side of body include face, UE and LE.    LABS:  Recent Labs     07/11/23  1057 07/11/23  2356 07/13/23  0919   WBC 6.1 6.2 6.7   RBC 3.81* 4.28* 4.66*   HEMOGLOBIN 10.0* 11.2* 12.3*   HEMATOCRIT 32.1* 35.8* 39.1*   MCV 84.3 83.6 83.9   MCH 26.2* 26.2* 26.4*   RDW 47.4 47.5 47.3   PLATELETCT 191 212 215   MPV 10.0 9.8 10.5   NEUTSPOLYS 41.90* 39.30* 46.10   LYMPHOCYTES 44.40* 40.50 36.80   MONOCYTES 1.70 5.80 5.60   EOSINOPHILS 12.00* 13.70* 11.10*   BASOPHILS 0.00 0.50 0.10   RBCMORPHOLO Present  --   --      Recent Labs     07/11/23  1057 07/11/23  2356   SODIUM 142 141   POTASSIUM 3.4* 4.3   CHLORIDE 110 107   CO2 21 21   BUN 11 12    CREATININE 0.68 0.78   CALCIUM 7.4* 8.5   ALBUMIN 2.9* 3.1*     Estimated GFR/CRCL = Estimated Creatinine Clearance: 68 mL/min (by C-G formula based on SCr of 0.78 mg/dL).  Recent Labs     07/11/23  1057 07/11/23  2356   GLUCOSE 85 81     Recent Labs     07/11/23  1057 07/11/23  2356   ASTSGOT 11* 15   ALTSGPT 10 15   TBILIRUBIN 0.5 0.6   ALKPHOSPHAT 56 62   GLOBULIN 2.7 3.1   INR 1.22*  --              Recent Labs     07/11/23  1057   INR 1.22*   APTT 25.6         IMAGING:  DX-CHEST-PORTABLE (1 VIEW)   Final Result      1.  Moderate LEFT pleural effusion   2.  LEFT lung atelectasis which could obscure an additional process   3.  Patchy opacity in the RIGHT perihilar lung could be atelectasis or airspace disease      CT-CEREBRAL PERFUSION ANALYSIS   Final Result      1.Cerebral blood flow less than 30% likely representing completed infarct = 0 mL   2.T Max more than 6 seconds likely representing combination of completed infarct and ischemia = 58 mL   3. Mismatched volume likely representing ischemic brain/penumbra= 58 mL   4.Please note that the cerebral perfusion was performed on the limited brain tissue around the basal ganglia region. Infarct/ischemia outside the CT perfusion sections may not be seen on this study.      CT-CTA NECK WITH & W/O-POST PROCESSING   Final Result      1.  Severe stenosis of the proximal LEFT internal carotid artery   2.  LEFT pleural effusion which appears to be at least partially loculated      CT-CTA HEAD WITH & W/O-POST PROCESS   Final Result      LEFT M2 branch occlusion      Findings were communicated with and acknowledged by GENESIS JOYNER via Vo[employer] Me on 7/11/2023 11:37 AM.         CT-HEAD W/O   Final Result      1.  Atrophy and chronic microvascular ischemic type changes.   2.  Old bilateral thalamic lacunar infarcts.   3.  No acute intracranial abnormality.   4.  Bilateral sphenoid sinus disease         MR-BRAIN-W/O    (Results Pending)       MEDS:  Current  Facility-Administered Medications   Medication Last Admin    cefTRIAXone (Rocephin) syringe 1,000 mg 1,000 mg at 07/13/23 1124    vancomycin 50 mg/mL oral soln 125 mg 125 mg at 07/13/23 1123    apixaban (Eliquis) tablet 5 mg      baclofen (Lioresal) tablet 7.5 mg 7.5 mg at 07/13/23 1123    senna-docusate (Pericolace Or Senokot S) 8.6-50 MG per tablet 2 Tablet 2 Tablet at 07/13/23 0535    And    polyethylene glycol/lytes (Miralax) PACKET 1 Packet      And    magnesium hydroxide (Milk Of Magnesia) suspension 30 mL      And    bisacodyl (Dulcolax) suppository 10 mg      ondansetron (Zofran) syringe/vial injection 4 mg      ondansetron (Zofran ODT) dispertab 4 mg      finasteride (Proscar) tablet 5 mg 5 mg at 07/12/23 1843    losartan (Cozaar) tablet 75 mg      metoprolol tartrate (Lopressor) tablet 50 mg 50 mg at 07/12/23 2105    omeprazole (PriLOSEC) capsule 20 mg 20 mg at 07/13/23 0535    simvastatin (Zocor) tablet 20 mg 20 mg at 07/12/23 2105    tamsulosin (Flomax) capsule 0.4 mg 0.4 mg at 07/13/23 0845    Blood pressure control per admin instructions      labetalol (Normodyne/Trandate) injection 10 mg      hydrALAZINE (Apresoline) injection 10 mg      NS (Bolus) infusion 500 mL         ASSESSMENT/PLAN: 87-year-old male who presented with slurred speech and admitted for CVA workup:      Problem   Acute Cva (Cerebrovascular Accident) (Hcc)   Primary Hypertension   Paroxysmal Atrial Fibrillation (Hcc)   Uti (Urinary Tract Infection), Uncomplicated   Goals of Care, Counseling/Discussion   Dyslipidemia   Cough   Pressure Injury of Sacral Region, Stage 1   Hypokalemia   Bph (Benign Prostatic Hyperplasia) (Resolved)       * Acute CVA (cerebrovascular accident) (HCC)- (present on admission)  Assessment & Plan  Patient presented with worsening slurring speech starting when he woke up on day of admission. Has residual right-sided deficits due to hemorrhagic stroke in March 2023. CT Head showed no ICH. CT angiogram of the  head and neck revealed left ICA free-floating clot and CT angiogram of the head revealed left M2 occlusion. Neuro IR was consulted and patient was found to not be a candidate for thombectomy given presence of free-floating thrombus with concern for distal embolization. Patient to get medical therapy for secondary stroke prevention. HbA1C 6.3    -q4h and PRN neuro assessment.    - BP goal is normotension, 100-130/60-80.  - MRI Brain wo contrast pending  -Telemetry; Known history of atrial fibrillation. No need for TTE from Neuro perspective.  - Bridge from heparin gtt to Eliquis 5mg PO BID today  - Per Speech patient has Mild-moderate oropharyngeal dysphagia; rec soft and bite sized foods with 1:1 supervision  -PT/OT rec post-acute placement  - PM&R recommends IPR, awaiting Brain MRI         Paroxysmal atrial fibrillation (HCC)- (present on admission)  Assessment & Plan  EKG upon arrival shows A-fib. Patient was not on anti-coagulation due to recent ICH.    - Tele shows A-fib rate 60-80  - Cont. Hep drip with transition to Eliquis in coming days for secondary stroke prevention    Primary hypertension- (present on admission)  Assessment & Plan  Patient arrive with -160. Allowed for permissive hypertension in the setting of ischemic stroke for first 48hrs    - Cont Home Losartan and Metoprolol  - BP goal is normotension, 100-130/60-80.    UTI (urinary tract infection), uncomplicated  Assessment & Plan  Per his sister, patient has recent hx of urinary incontinence which is new for him. UA+ bacteria, maria est, pyuria. Patient also had a recent episode of CDiff in April, Pharm recommeds to treat prophylactically with Vanco for 5-7 after last dose of abx.     - Rocephin 1g 3 days  - Vanco 125mg qD for 8 days for CDiff prophylaxis     Goals of care, counseling/discussion  Assessment & Plan  Per advanced directive, patient does not desire to have life-sustaining treatment if the expected risks of treatment outweight  benefits; patient does not want artificial nutrition.     - Contact POA    Cough  Assessment & Plan  Patient reports a non-productive cough for past 2 days but denies fever, chills, sob, chest pain. CXR shows Moderate L pleural effusion L lung atelectasis and a patchy opacity in the RIGHT perihilar lung could be atelectasis or airspace disease.     - Consider thoracentesis for L pleural effusion    Dyslipidemia  Assessment & Plan  - Lipid panel wnl LDL 59    - Continue home simvastatin  - Titrate to long term LDL goal < 70    Pressure injury of sacral region, stage 1- (present on admission)  Assessment & Plan  Patient reports he has had chronic sacral ulcer, unsure how long its been there.     - Wound care following 1x/week  - Nurse to reposition patient q2 hours    Hypokalemia- (present on admission)  Assessment & Plan  K+ was 3.4 upon arrival. Wnl post repletion    - s/p 40mg K  - BMP in the mornings           Cristino Sharif M.D.  PGY-1  [facility]

## 2023-07-13 NOTE — PROGRESS NOTES
[facility] [place] PROGRESS NOTE   Medical Student Note    Attending: Geovany OCHOA  Senior Resident: Christo OCHOA (PGY-3)  Manuelito Resident: Kole OCHOA (PGY-1)  Medical Student: Micheal Bella, MS3  PATIENT: Alireza Hernandez; [id number]; 1935    Hospital Day # Hospital Day: 3    ID: 87 y.o. male with past medical history of HTN, Afib, dyslipidemia, and prior ICH with baseline right sided deficits was admitted on 7/11/2023 for worsening slurred speech.     24 Hour Events: No acute events overnight. Patient had worsening dysarthria when evaluated in the morning, but seemed to improve slightly a few hours after he had woken up. PT/OT treatment plan includes working on self care and ADLs, therapeutic exercises, and neuro re-education 3x weekly. Per case management, he is agreeable to SNF/IRF referral and deferred choice to niece and alternate DPOA Kimberly. Dietary does not have any dietary restrictions. No new labs or imaging.    SUBJECTIVE: 87 y.o. male with past medical history of HTN, Afib, dyslipidemia, and prior ICH with baseline right sided deficits was admitted on 7/11/2023 for worsening slurred speech. Patient states he is feeling better today. He is eating soft foods well. He notes some fatigue following PT and speech therapy. He states agreement with plan for acute care rehab.     OBJECTIVE:  Temp:  [36.1 °C (97 °F)-37 °C (98.6 °F)] 36.6 °C (97.8 °F)  Pulse:  [] 78  Resp:  [17-18] 18  BP: (106-126)/(58-81) 116/74  SpO2:  [92 %-96 %] 95 %    Intake/Output Summary (Last 24 hours) at 7/13/2023 0600  Last data filed at 7/12/2023 2001  Gross per 24 hour   Intake --   Output 1200 ml   Net -1200 ml       PE:  Gen: No acute distress, resting comfortably in bed. Alert and follows commands.  HEENT: normocephalic, atraumatic, EOMI  Pulm: clear to auscultation bilaterally, no respiratory distress   Cardio: RRR, no M/R/G  Abdom: soft, nontender, nondistended, normoactive bowel sounds in all quadrants  Ext: No  edema, 2+ pulses bilaterally  Neuro: Dysarthric, but is understandable. baseline right sided sensory and motor deficits. Patient is able to lift right arm and leg off the bed without assistance.     LABS:  Recent Labs     07/11/23  1057 07/11/23  2356 07/13/23  0919   WBC 6.1 6.2 6.7   RBC 3.81* 4.28* 4.66*   HEMOGLOBIN 10.0* 11.2* 12.3*   HEMATOCRIT 32.1* 35.8* 39.1*   MCV 84.3 83.6 83.9   MCH 26.2* 26.2* 26.4*   RDW 47.4 47.5 47.3   PLATELETCT 191 212 215   MPV 10.0 9.8 10.5   NEUTSPOLYS 41.90* 39.30* 46.10   LYMPHOCYTES 44.40* 40.50 36.80   MONOCYTES 1.70 5.80 5.60   EOSINOPHILS 12.00* 13.70* 11.10*   BASOPHILS 0.00 0.50 0.10   RBCMORPHOLO Present  --   --      Recent Labs     07/11/23  1057 07/11/23  2356   SODIUM 142 141   POTASSIUM 3.4* 4.3   CHLORIDE 110 107   CO2 21 21   BUN 11 12   CREATININE 0.68 0.78   CALCIUM 7.4* 8.5   ALBUMIN 2.9* 3.1*     Estimated GFR/CRCL = Estimated Creatinine Clearance: 68 mL/min (by C-G formula based on SCr of 0.78 mg/dL).  Recent Labs     07/11/23  1057 07/11/23  2356   GLUCOSE 85 81     Recent Labs     07/11/23  1057 07/11/23  2356   ASTSGOT 11* 15   ALTSGPT 10 15   TBILIRUBIN 0.5 0.6   ALKPHOSPHAT 56 62   GLOBULIN 2.7 3.1   INR 1.22*  --              Recent Labs     07/11/23  1057   INR 1.22*   APTT 25.6       MICROBIOLOGY:   No results found for: BLOODCULTU, BLDCULT, BCHOLD     IMAGING:   DX-CHEST-PORTABLE (1 VIEW)   Final Result      1.  Moderate LEFT pleural effusion   2.  LEFT lung atelectasis which could obscure an additional process   3.  Patchy opacity in the RIGHT perihilar lung could be atelectasis or airspace disease      CT-CEREBRAL PERFUSION ANALYSIS   Final Result      1.Cerebral blood flow less than 30% likely representing completed infarct = 0 mL   2.T Max more than 6 seconds likely representing combination of completed infarct and ischemia = 58 mL   3. Mismatched volume likely representing ischemic brain/penumbra= 58 mL   4.Please note that the cerebral  perfusion was performed on the limited brain tissue around the basal ganglia region. Infarct/ischemia outside the CT perfusion sections may not be seen on this study.      CT-CTA NECK WITH & W/O-POST PROCESSING   Final Result      1.  Severe stenosis of the proximal LEFT internal carotid artery   2.  LEFT pleural effusion which appears to be at least partially loculated      CT-CTA HEAD WITH & W/O-POST PROCESS   Final Result      LEFT M2 branch occlusion      Findings were communicated with and acknowledged by GENESIS JOYNER via Voalte Me on 7/11/2023 11:37 AM.         CT-HEAD W/O   Final Result      1.  Atrophy and chronic microvascular ischemic type changes.   2.  Old bilateral thalamic lacunar infarcts.   3.  No acute intracranial abnormality.   4.  Bilateral sphenoid sinus disease         MR-BRAIN-W/O    (Results Pending)       MEDS:  Current Facility-Administered Medications   Medication Last Admin    cefTRIAXone (Rocephin) syringe 1,000 mg 1,000 mg at 07/13/23 1124    vancomycin 50 mg/mL oral soln 125 mg 125 mg at 07/13/23 1123    baclofen (Lioresal) tablet 7.5 mg 7.5 mg at 07/13/23 1123    heparin infusion 25,000 units in 500 mL 0.45% NACL 14 Units/kg/hr at 07/13/23 0924    senna-docusate (Pericolace Or Senokot S) 8.6-50 MG per tablet 2 Tablet 2 Tablet at 07/13/23 0535    And    polyethylene glycol/lytes (Miralax) PACKET 1 Packet      And    magnesium hydroxide (Milk Of Magnesia) suspension 30 mL      And    bisacodyl (Dulcolax) suppository 10 mg      ondansetron (Zofran) syringe/vial injection 4 mg      ondansetron (Zofran ODT) dispertab 4 mg      finasteride (Proscar) tablet 5 mg 5 mg at 07/12/23 1843    losartan (Cozaar) tablet 75 mg      metoprolol tartrate (Lopressor) tablet 50 mg 50 mg at 07/12/23 2105    omeprazole (PriLOSEC) capsule 20 mg 20 mg at 07/13/23 0535    simvastatin (Zocor) tablet 20 mg 20 mg at 07/12/23 2105    tamsulosin (Flomax) capsule 0.4 mg 0.4 mg at 07/13/23 0845    Blood pressure  control per admin instructions      labetalol (Normodyne/Trandate) injection 10 mg      hydrALAZINE (Apresoline) injection 10 mg      NS (Bolus) infusion 500 mL         ASSESSMENT/PLAN: 87 y.o. male with past medical history of HTN, Afib, dyslipidemia, and prior ICH with baseline right sided deficits was admitted for     #CVA  Patient presented to ED with worsening slurred speech, which he has at baseline secondary to a hemorrhagic stroke 4 months ago. ED Course - CT neck showed severe stenosis of the left internal carotid artery and free floating thrombus. CT Head showed left M2 branch occlusion and cerebral blood flow less than 30% representing likely completed infarct. PMR has agreed to take this patient for rehabilitation.  Work with case management and DPOA on acute rehab facility placement per PMR  Ordered MR Brain pending discharge per Neuro  Follow PT/OT and Speech recommendations  Per Neuro - longterm normotensive blood pressure goals, with SBP in 100-130s.    #Paroxysmal Afib  EKG on arrival showed Afib. He was not on anticoagulants previously due to prior ICH.  Patient has been on Heparin drip  Transition patient to [facility] for discharge    #Primary HTN  Patient presented with SBP in 150-160s. Neuro was allowing for acute hypertensive pressures for ischemic stroke.  Plan for long-term normotensive pressures of -130s.  Continue home Losartan and Metoprolol regimen.    #Right Side Hemiplegia  Patient has right sided sensory and motor deficits at baseline from a prior ICH 4 months ago.  Follow PT/OT recommendations  Follow Speech Therapy recommendations    #Abnormal UA  Patient shows cloudy urine with trace blood, 30 protein, moderate Leukocyte esterase,  WBC, and 2-5 RBC with moderate bacteria. WBC is 6.7  Patient started on Rocephin 1g IV.  Patient will continue this antibiotic treatment in acute care rehab.      Core Measures:  Fluids: PO  Lines: PIV  Abx: None  Diet: Regular  PPX: SCDs,  Heparin      #DISPOSITION  - Patient will be discharged to acute rehab facility.      Lennox Tee, MS3  [employer] [facility] [facility] [employer]

## 2023-07-13 NOTE — ASSESSMENT & PLAN NOTE
Per his sister, patient has recent hx of urinary incontinence which is new for him. UA+ bacteria, maria est, pyuria. Patient also had a recent episode of CDiff in April, Pharm recommeds to treat prophylactically with Vanco for 5-7 after last dose of abx.   UCx shows E. Coli  > finished 3-day course of antibiotics    - Vanco 125mg qD for 5 days after last does of abx (until 7/20) for CDiff prophylaxis

## 2023-07-13 NOTE — CARE PLAN
The patient is Stable - Low risk of patient condition declining or worsening    Shift Goals  Clinical Goals: monitor neuro status  Patient Goals: rest  Family Goals: feel better    Progress made toward(s) clinical / shift goals:    Problem: Neuro Status  Goal: Neuro status will remain stable or improve  Outcome: Progressing     Problem: Hemodynamic Monitoring  Goal: Patient's hemodynamics, fluid balance and neurologic status will be stable or improve  Outcome: Progressing     Problem: Mobility - Stroke  Goal: Patient's capacity to carry out activities will improve  Outcome: Progressing     Problem: Bowel Elimination  Goal: Establish and maintain regular bowel function  Outcome: Progressing     Problem: Fall Risk  Goal: Patient will remain free from falls  Outcome: Progressing     Problem: Skin Integrity  Goal: Skin integrity is maintained or improved  Outcome: Progressing       Patient is not progressing towards the following goals:

## 2023-07-13 NOTE — THERAPY
"Speech Language Pathology   Communication Evaluation     Patient Name: Alireza Hernandez  AGE:  87 y.o., SEX:  male  Medical Record #: [id number]  Date of Service: 7/13/2023    General Information  Vitals  O2 Delivery Device: None - Room Air  Level of Consciousness: Alert, Awake  Orientation: Self, General place, Situation  Follows Directives: Yes - simple commands only    Prior Living Situation & Level of Function  Prior Services: Intermittent Physical Support for ADL Per Service  Housing / Facility: Assisted Living Residence  Lives with - Patient's Self Care Capacity: Attendant / Paid Care Giver  Comments: Per OT notes, \"Pt lives in an assisted living facility & care givers assist with all ADLS & IADLs since first CVA in March 2023.\"  Education: High School Graduate or GED  Communication: Impaired cognition; previous Cognistat at [facility] after stroke in March 2023  Swallowing: Impaired after recent CVA in March 2023    Subjective  RN cleared patient for aphasia evaluation. Patient received in bed, sleeping, but roused easily w/ verbal and tactile cues. Patient pleasant and cooperative throughout evaluation.    Communication Domain(s)  Expressive Language: Severe  Receptive Language: Moderate  Reading: Profound  Social/Pragmatic: WFL    Assessment  Western Aphasia Battery Revised Beside Version:   The Western Aphasia Battery-Revised (WAB-R) Beside Version was administered. The “bedside” WAB-R is a shortened version of the Western Aphasia Battery--Revised.  The battery is used to assess a patient’s language function following stroke, dementia, or other acquired neurologic disorder.  The results of the battery provide diagnostic information as to the presence, severity, and type of aphasia. It is designed to be administered at a patient’s bedside, when there are time constraints, issues of cooperation, or comorbidities that prevent more comprehensive testing.  The following results were obtained:     Receptive " "Language:  Patient was able to follow 1-step commands with 80% accuracy. Accuracy decreased to ~25% when commands were increased to 2-3 steps and more complex. He was able to answer yes/no questions with 90% accuracy. He did have significant difficulty naming or pointing to pictures on a page. Repetition appeared to be a strength, as he was able to repeat words, phrases, and sentences with minimal difficulty.      Expressive Language:   Patient's verbal output is characterized by dysarthria, as well as halting, word-finding difficulty, and semantic and phonemic paraphasias. Patient had significant perseveration on previous utterances (Ex. \"One has a dress. She has a dress.\") when asked to describe Cookie Theft Picture. Patient completed confrontational naming task with 80% accuracy and was limited most by perseveration on previous object. Patient was unable to functionally read or write (also limited by weakness of RUE) and was able to draw a [place] when asked, but continued to perseverate and draw circles when asked to draw and write other shapes and letters. Patient was unable to trace letters as well. When asked to read a word or letter or point to a letter, patient was unable to do so despite large print used.     Bedside Western Aphasia Battery (WAB)  Bedside Aphasia Score: 55  Bedside Language Score: 41  Apraxia Score: 6  Bedside Aphasia Classification: Transcortical Motor      Clinical Impressions  Pt is presenting with a transcortical motor aphasia as evidenced by word-finding deficits, halting, paraphasias, and strength in repetition. Skilled ST is indicated at this time to improve receptive/expressive language abilities in order to improve the pt's QOL, ability to participate in iADLs, and maximize linguistic outcomes. SLP to follow for aphasia tx and continued dysphagia tx.     NOTE: It is not within the scope of practice of Speech-Language Pathologists to determine patient capacity. Please defer to the " "physician or psych to complete this assessment.     Recommendations  Supervision Needs Upons Discharge: Direct assistance with IADLs (see below)  IADLs: Medication management, Financial management, Appointment management, Household chores, Cooking    SLP Treatment Plan  Treatment Plan: Speech-Language Treatment  SLP Frequency: 4x Per Week  Estimated Duration: Until Therapy Goals Met    Anticipated Discharge Needs  Discharge Recommendations: Recommend post-acute placement for additional speech therapy services prior to discharge home  Therapy Recommendations Upon DC: Dysphagia Training, Comprehension Training, Expression Training, Reading Training, Writing Training, Cognitive-Linguistic Training, Community Re-Integration, Patient / Family / Caregiver Education    Patient / Family Goals  Patient / Family Goal #1: \"Words are hard.\"  Short Term Goal # 1: Pt will complete FEES w SLP to further assess swallow function and inform POC.  Goal Outcome # 1: Goal met, new goal added  Short Term Goal # 1 B : Pt will consume diet of SB/TN given 1:1 spv for strategy use with no worsening of lung status.  Short Term Goal # 2: Pt will complete exercises targeting LVC, BOT retraction and pharyngal constriction/shortening given max cues from SLP x20 in a session with \"good\" accuracy.  Short Term Goal # 3: Patient will follow 2-3 step commands with 80% accuracy with moderate verbal cues.  Short Term Goal # 4: Patient will identify shapes or single letters with 50% accuracy with moderate verbal cues.  Short Term Goal # 5: Patient will trace or write single letters with 50% accuracy with moderate verbal cues.    Jeanine Flower, SLP  "

## 2023-07-14 NOTE — PROGRESS NOTES
Attending:   Shelli Escamilla M.d.      Resident:   Cristino Sharif M.D.    PATIENT:   Alireza Hernandez; [id number]; 1935    ID:   87-year-old male who presented with slurred speech and right-sided weakness who was admitted for a CVA.     SUBJECTIVE:   No acute events overnight, patient dysarthria improved this morning when I talked to him. Denies pain.     OBJECTIVE:  Vitals:    07/13/23 2316 07/14/23 0427 07/14/23 0600 07/14/23 0800   BP: 124/69 124/68 125/71 112/71   Pulse: 72 65 67 63   Resp: 18 18  17   Temp: 36.7 °C (98 °F) 36.1 °C (96.9 °F)  36.8 °C (98.3 °F)   TempSrc: Temporal Temporal  Temporal   SpO2: 95% 92%  94%   Weight:       Height:           Intake/Output Summary (Last 24 hours) at 7/12/2023 0753  Last data filed at 7/11/2023 1942  Gross per 24 hour   Intake 211.45 ml   Output --   Net 211.45 ml       PHYSICAL EXAM:  General: No acute distress, afebrile, resting comfortably  HEENT: NC/AT. EOMI.   Cardiovascular: RRR without murmurs. Normal capillary refill   Respiratory: CTAB  Abdomen: soft, nontender, nondistended, no masses  EXT:  no edema. Strength is 5/5 on the L upper/lower extremities, 2/5 RUE and RLE, contracted RUE  Skin: No erythema/lesions   Neuro: CN II - XII grossly intact except CN XI non-intact. Sensation is diminished on entire right side of body include face, UE and LE.    LABS:  Recent Labs     07/11/23  1057 07/11/23  2356 07/13/23  0919 07/14/23  0015   WBC 6.1 6.2 6.7 6.5   RBC 3.81* 4.28* 4.66* 4.65*   HEMOGLOBIN 10.0* 11.2* 12.3* 12.2*   HEMATOCRIT 32.1* 35.8* 39.1* 38.4*   MCV 84.3 83.6 83.9 82.6   MCH 26.2* 26.2* 26.4* 26.2*   RDW 47.4 47.5 47.3 46.8   PLATELETCT 191 212 215 222   MPV 10.0 9.8 10.5 10.8   NEUTSPOLYS 41.90* 39.30* 46.10  --    LYMPHOCYTES 44.40* 40.50 36.80  --    MONOCYTES 1.70 5.80 5.60  --    EOSINOPHILS 12.00* 13.70* 11.10*  --    BASOPHILS 0.00 0.50 0.10  --    RBCMORPHOLO Present  --   --   --      Recent Labs     07/11/23  1057 07/11/23  0903  07/14/23  0015   SODIUM 142 141 140   POTASSIUM 3.4* 4.3 3.7   CHLORIDE 110 107 106   CO2 21 21 20   BUN 11 12 11   CREATININE 0.68 0.78 0.67   CALCIUM 7.4* 8.5 8.3*   ALBUMIN 2.9* 3.1*  --      Estimated GFR/CRCL = Estimated Creatinine Clearance: 79.1 mL/min (by C-G formula based on SCr of 0.67 mg/dL).  Recent Labs     07/11/23  1057 07/11/23 2356 07/14/23  0015   GLUCOSE 85 81 110*     Recent Labs     07/11/23  1057 07/11/23  2356   ASTSGOT 11* 15   ALTSGPT 10 15   TBILIRUBIN 0.5 0.6   ALKPHOSPHAT 56 62   GLOBULIN 2.7 3.1   INR 1.22*  --              Recent Labs     07/11/23  1057   INR 1.22*   APTT 25.6         IMAGING:  DX-CHEST-PORTABLE (1 VIEW)   Final Result      1.  Moderate LEFT pleural effusion   2.  LEFT lung atelectasis which could obscure an additional process   3.  Patchy opacity in the RIGHT perihilar lung could be atelectasis or airspace disease      CT-CEREBRAL PERFUSION ANALYSIS   Final Result      1.Cerebral blood flow less than 30% likely representing completed infarct = 0 mL   2.T Max more than 6 seconds likely representing combination of completed infarct and ischemia = 58 mL   3. Mismatched volume likely representing ischemic brain/penumbra= 58 mL   4.Please note that the cerebral perfusion was performed on the limited brain tissue around the basal ganglia region. Infarct/ischemia outside the CT perfusion sections may not be seen on this study.      CT-CTA NECK WITH & W/O-POST PROCESSING   Final Result      1.  Severe stenosis of the proximal LEFT internal carotid artery   2.  LEFT pleural effusion which appears to be at least partially loculated      CT-CTA HEAD WITH & W/O-POST PROCESS   Final Result      LEFT M2 branch occlusion      Findings were communicated with and acknowledged by GENESIS JOYNER via Voalte Me on 7/11/2023 11:37 AM.         CT-HEAD W/O   Final Result      1.  Atrophy and chronic microvascular ischemic type changes.   2.  Old bilateral thalamic lacunar infarcts.   3.  No  acute intracranial abnormality.   4.  Bilateral sphenoid sinus disease         MR-BRAIN-W/O    (Results Pending)       MEDS:  Current Facility-Administered Medications   Medication Last Admin    cefTRIAXone (Rocephin) syringe 1,000 mg 1,000 mg at 07/13/23 1124    vancomycin 50 mg/mL oral soln 125 mg 125 mg at 07/14/23 0618    apixaban (Eliquis) tablet 5 mg 5 mg at 07/14/23 0619    baclofen (Lioresal) tablet 7.5 mg 7.5 mg at 07/14/23 0619    senna-docusate (Pericolace Or Senokot S) 8.6-50 MG per tablet 2 Tablet 2 Tablet at 07/14/23 0619    And    polyethylene glycol/lytes (Miralax) PACKET 1 Packet      And    magnesium hydroxide (Milk Of Magnesia) suspension 30 mL      And    bisacodyl (Dulcolax) suppository 10 mg      ondansetron (Zofran) syringe/vial injection 4 mg      ondansetron (Zofran ODT) dispertab 4 mg      finasteride (Proscar) tablet 5 mg 5 mg at 07/13/23 1835    losartan (Cozaar) tablet 75 mg 75 mg at 07/14/23 0618    metoprolol tartrate (Lopressor) tablet 50 mg 50 mg at 07/14/23 0619    omeprazole (PriLOSEC) capsule 20 mg 20 mg at 07/14/23 0619    simvastatin (Zocor) tablet 20 mg 20 mg at 07/13/23 2045    tamsulosin (Flomax) capsule 0.4 mg 0.4 mg at 07/13/23 0845    labetalol (Normodyne/Trandate) injection 10 mg      hydrALAZINE (Apresoline) injection 10 mg      NS (Bolus) infusion 500 mL         ASSESSMENT/PLAN: 87-year-old male who presented with slurred speech and admitted for CVA workup:      Problem   Acute Cva (Cerebrovascular Accident) (Hcc)   Primary Hypertension   Paroxysmal Atrial Fibrillation (Hcc)   Uti (Urinary Tract Infection), Uncomplicated   Goals of Care, Counseling/Discussion   Dyslipidemia   Cough   Pressure Injury of Sacral Region, Stage 1   Hypokalemia       * Acute CVA (cerebrovascular accident) (HCC)- (present on admission)  Assessment & Plan  Patient presented with worsening slurring speech starting when he woke up on day of admission. Has residual right-sided deficits due to  hemorrhagic stroke in March 2023. CT Head showed no ICH. CT angiogram of the head and neck revealed left ICA free-floating clot and CT angiogram of the head revealed left M2 occlusion. Neuro IR was consulted and patient was found to not be a candidate for thombectomy given presence of free-floating thrombus with concern for distal embolization. Patient to get medical therapy for secondary stroke prevention. HbA1C 6.3    -q4h and PRN neuro assessment.    - BP goal is normotension, 100-130/60-80.  - MRI Brain wo contrast pending  -Telemetry; Known history of atrial fibrillation. No need for TTE from Neuro perspective.  - Cont. Eliquis 5mg PO BID today  - Per Speech patient has Mild-moderate oropharyngeal dysphagia; rec soft and bite sized foods with 1:1 supervision  - OT rec post-acute placement  - PM&R recommends IPR, awaiting Brain MRI and PT Eval        Paroxysmal atrial fibrillation (HCC)- (present on admission)  Assessment & Plan  EKG upon arrival shows A-fib. Patient was not on anti-coagulation due to recent ICH.    - Tele shows A-fib rate 60-80  - Cont. Hep drip with transition to Eliquis in coming days for secondary stroke prevention    Primary hypertension- (present on admission)  Assessment & Plan  Patient arrive with -160. Allowed for permissive hypertension in the setting of ischemic stroke for first 48hrs    - Cont Home Losartan and Metoprolol  - BP goal is normotension, 100-130/60-80.    UTI (urinary tract infection), uncomplicated  Assessment & Plan  Per his sister, patient has recent hx of urinary incontinence which is new for him. UA+ bacteria, maria est, pyuria. Patient also had a recent episode of CDiff in April, Pharm recommeds to treat prophylactically with Vanco for 5-7 after last dose of abx.     - Rocephin 1g 3 days  - Vanco 125mg qD for 8 days for CDiff prophylaxis     Goals of care, counseling/discussion  Assessment & Plan  Per advanced directive, patient does not desire to have  life-sustaining treatment if the expected risks of treatment outweight benefits; patient does not want artificial nutrition.     - Contact POA    Cough  Assessment & Plan  Patient reports a non-productive cough for past 2 days but denies fever, chills, sob, chest pain. CXR shows Moderate L pleural effusion L lung atelectasis and a patchy opacity in the RIGHT perihilar lung could be atelectasis or airspace disease.     - Consider thoracentesis for L pleural effusion    Dyslipidemia  Assessment & Plan  - Lipid panel wnl LDL 59    - Continue home simvastatin  - Titrate to long term LDL goal < 70    Pressure injury of sacral region, stage 1- (present on admission)  Assessment & Plan  Patient reports he has had chronic sacral ulcer, unsure how long its been there.     - Wound care following 1x/week  - Nurse to reposition patient q2 hours    Hypokalemia- (present on admission)  Assessment & Plan  K+ was 3.4 upon arrival. Wnl post repletion    - s/p 40mg K  - BMP in the mornings           Cristino Sharif M.D.  PGY-1  [facility]

## 2023-07-14 NOTE — THERAPY
"Occupational Therapy  Daily Treatment     Patient Name: Alireza Hernandez  Age:  87 y.o., Sex:  male  Medical Record #: [id number]  Today's Date: 7/14/2023     Precautions  Precautions: Fall Risk, Swallow Precautions  Comments: aphasia    Assessment    Pt seen for follow up OT tx session, pt presenting at similar functional level as evaluation. Pt expressed wanting to discharge back to [facility] as he has full support for ADLs and functional mobility following initial CVA and DC to SNF. Will continue to see for skilled therapy while admitted and recommend return to [facility] per patients wishes.      2nd Session:    Pt seen for self-feeding activity as patient required assistance due to chronic deficits, pt supervised throughout entire meal by this therapist, pt appreciative.    Plan    Treatment Plan Status: (P) Continue Current Treatment Plan  Type of Treatment: Self Care / Activities of Daily Living, Adaptive Equipment, Cognitive Skill Development, Neuro Re-Education / Balance, Therapeutic Exercises, Therapeutic Activity  Treatment Frequency: 3 Times per Week  Treatment Duration: Until Therapy Goals Met    DC Equipment Recommendations: (P) None  Discharge Recommendations: (P) Other - (Return to [facility], full support from staff for ADLs/mobility at baseline)    Subjective    \"[facility], back to [facility]\"     Objective       07/14/23 1140   Precautions   Precautions Fall Risk;Swallow Precautions   Cognition    Cognition / Consciousness X   Speech/ Communication Dysarthric;Expressive Aphasia;Nods Appropriately;Slurred   Level of Consciousness Alert   Ability To Follow Commands 1 Step   New Learning Impaired   Attention Impaired   Comments Very pleasant, cooperative, wishing to discharge back to [facility]   Balance   Sitting Balance (Static) Fair -   Sitting Balance (Dynamic) Poor +   Standing Balance (Static) Trace +   Standing Balance (Dynamic) Trace +   Skilled Intervention Verbal Cuing;Facilitation   Bed Mobility  "   Supine to Sit Maximal Assist   Sit to Supine Maximal Assist   Skilled Intervention Verbal Cuing   Activities of Daily Living   Grooming Moderate Assist;Seated   Upper Body Dressing Maximal Assist   Lower Body Dressing Maximal Assist   Toileting Maximal Assist   Skilled Intervention Verbal Cuing;Facilitation   How much help from another person does the patient currently need...   Putting on and taking off regular lower body clothing? 1   Bathing (including washing, rinsing, and drying)? 1   Toileting, which includes using a toilet, bedpan, or urinal? 1   Putting on and taking off regular upper body clothing? 1   Taking care of personal grooming such as brushing teeth? 1   Eating meals? 2   6 Clicks Daily Activity Score 7   Modified Simran (mRS)   Modified Simran Score 4   Functional Mobility   Sit to Stand Moderate Assist   Mobility bed mobility, STS at EOB, returned to supine   Skilled Intervention Verbal Cuing;Facilitation   Comments w/ HHA   Activity Tolerance   Sitting Edge of Bed 10 min   Standing 30 seconds   Patient / Family Goals   Patient / Family Goal #1 Go back to [facility]   Goal #1 Outcome Progressing as expected   Short Term Goals   Short Term Goal # 1 Pt will complete UB dress w/ ModA   Goal Outcome # 1 Progressing slower than expected   Short Term Goal # 2 Pt will complete functional transfers with Chandan   Goal Outcome # 2 Progressing slower than expected   Short Term Goal # 3 Pt will complete g/h seated EOB with Chandan   Goal Outcome # 3 Progressing slower than expected   Education Group   Education Provided Role of Occupational Therapist   Role of Occupational Therapist Patient Response Patient;Acceptance;Explanation;Verbal Demonstration   Occupational Therapy Treatment Plan    O.T. Treatment Plan Continue Current Treatment Plan   Anticipated Discharge Equipment and Recommendations   DC Equipment Recommendations None   Discharge Recommendations Other -  (Return to [facility] nursing home, full support from  staff for ADLs/mobility at baseline)   Interdisciplinary Plan of Care Collaboration   IDT Collaboration with  Nursing   Patient Position at End of Therapy In Bed;Bed Alarm On;Call Light within Reach;Tray Table within Reach;Phone within Reach   Collaboration Comments RN updated

## 2023-07-14 NOTE — PROGRESS NOTES
Monitor summary: Afib/Aflutter 68-88, IA --, QRS 0.07, QT --, with rare couplets per strip from monitor room.

## 2023-07-14 NOTE — DISCHARGE PLANNING
Patient medically cleared for DC, as per Dr. Sharif.  PT recommending return to [facility] with [facility].   CM called [facility] (#[phone]) and spoke to Cee who stated that patient can return to [facility] today.  CM updated patient & PORBEANNA/Kimberly Joyce #[phone], on DCP.   Both patient & Kimberly agreeable to [facility]; Kimberly selected, via phone, following HH agencies in order of preference:  1-[facility], 2-[facility].   Choice form faxed to [facility].  HH order & F2F requested from Dr. Sharif.   [facility] transport requested for 1700 Hrs; Awaiting confirmation from [facility].      **1535 Hrs - MELISA received T/C from Cee @ [facility] stating that per her clinical director, patient will need to be re-assessed prior to returning to facility and someone will come to hospital on Monday (7/17/23) to complete assessment.   Dr. Sharif informed of the latter.  CM spoke to Varun @ [email or web address] and requested cancellation of [facility] transport.   Patient & nilen/POBREANNA/Kimberly (via voice message) updated on DCP.

## 2023-07-14 NOTE — CARE PLAN
The patient is Watcher - Medium risk of patient condition declining or worsening    Shift Goals  Clinical Goals: q4H neurocheck  Patient Goals: rest  Family Goals: not present    Progress made toward(s) clinical / shift goals:    Problem: Discharge Planning - Stroke  Goal: Ensure Stroke Core Measures are met prior to discharge  Outcome: Not Progressing  Note: MRI without contrast still pending.     Problem: Optimal Care of the Stroke Patient  Goal: Optimal acute care for the stroke patient  7/14/2023 0210 by Luisa Matute R.N.  Note: Stroke education provided, pt answered yes when asked if he understands the information given.  7/14/2023 0149 by Luisa Matute R.N.  Outcome: Progressing  Note: Stroke education provided to latient. Pt has severe expressive aphasia but appears to understand the provided education.     Problem: Knowledge Deficit - Stroke Education  Goal: Patient's knowledge of stroke and risk factors will improve  7/14/2023 0210 by Luisa Matute R.N.  Outcome: Progressing  Note: Pt verbalized understanding of the disease process and POC by answering yes. Pt has expressive aphasia from stroke.  7/14/2023 0149 by Luisa Matute R.N.  Outcome: Progressing     Problem: Psychosocial - Patient Condition  Goal: Patient's ability to verbalize feelings about condition will improve  7/14/2023 0210 by Luisa Matute R.N.  Outcome: Progressing  Note: Pt is appropriate and compliant with turning and reposition.  7/14/2023 0149 by Luisa Matute R.N.  Outcome: Progressing  Note: Pt is trying to verbalized feelings by asking questions to pt answerable by yes or no.     Problem: Neuro Status  Goal: Neuro status will remain stable or improve  7/14/2023 0210 by Luisa Matute R.N.  Outcome: Progressing  Note: Pt is alert and oriented and in q4H neuro check and NIH every shift  7/14/2023 0149 by Luisa Matute R.N.  Note: Q4H neuro check with NIH every shift     Problem: Risk for  Aspiration  Goal: Patient's risk for aspiration will be absent or decrease  Outcome: Progressing  Note: Pt's head of bead more than 30 degrees, level VI diet, no s/sx of aspiration.       Patient is not progressing towards the following goals:      Problem: Discharge Planning - Stroke  Goal: Ensure Stroke Core Measures are met prior to discharge  Outcome: Not Progressing  Note: MRI without contrast still pending.      Skyrizi Counseling: I discussed with the patient the risks of risankizumab-rzaa including but not limited to immunosuppression, and serious infections.  The patient understands that monitoring is required including a PPD at baseline and must alert us or the primary physician if symptoms of infection or other concerning signs are noted.

## 2023-07-14 NOTE — THERAPY
Physical Therapy   Initial Evaluation     Patient Name: Alireza Hernandez  Age:  87 y.o., Sex:  male  Medical Record #: [id number]  Today's Date: 7/14/2023     Precautions  Precautions: Fall Risk;Swallow Precautions  Comments: aphasia    Assessment  Pt presents with impaired activity tolerance, strength and balance associated with acute on chronic right weakness and dysarthria., MRI pending. Pt is most limited by chronic right sided deficits, pt known to this therapist; pt reports assist with all mobility and ADLs; today able to mobilize wihti max for bed and mod for stand with good participation; pt likes his care there; recommend dc back to [facility] with home health as appropriate to reduce caregiver burden. Will follow.     Plan    Physical Therapy Initial Treatment Plan   Treatment Plan : Bed Mobility, Equipment, Manual Therapy, Gait Training, Neuro Re-Education / Balance, Self Care / Home Evaluation, Stair Training, Therapeutic Activities, Therapeutic Exercise  Treatment Frequency: 3 Times per Week  Duration: Until Therapy Goals Met    DC Equipment Recommendations: none  Discharge Recommendations: Back to [facility] per pt request, subjective reports he is at baseline aside from speech; 24/7 support there and home health PT.        Abridged Subjective/Objective   07/14/23 1145   Prior Living Situation   Prior Services Continuous (24 Hour) Care Giving Per Service   Housing / Facility Assisted Living Residence   Bathroom Set up Walk In Shower;Shower Chair   Equipment Owned Wheelchair   Lives with - Patient's Self Care Capacity Attendant / Paid Care Giver   Comments ADLs and IADLs since first CVA   Prior Level of Functional Mobility   Bed Mobility Required Assist   Transfer Status Required Assist   Ambulation Required Assist   Assistive Devices Used Wheelchair   Wheelchair Dependent   Cognition    Cognition / Consciousness X   Speech/ Communication Dysarthric   Level of Consciousness Alert   Ability To Follow  Commands 1 Step   New Learning Impaired   Attention Impaired   Comments pleasant and cooperative; yes and no answers   Passive ROM Lower Body   Passive ROM Lower Body WDL   Strength Lower Body   Lower Body Strength  X   Comments right LE no palpable muscle contraction   Sensation Lower Body   Lower Extremity Sensation   X   Comments insensate   Balance Assessment   Sitting Balance (Static) Fair -   Sitting Balance (Dynamic) Poor +   Standing Balance (Static) Trace +   Standing Balance (Dynamic) Trace +   Bed Mobility    Supine to Sit Maximal Assist   Sit to Supine Maximal Assist   Functional Mobility   Sit to Stand Moderate Assist   Bed, Chair, Wheelchair Transfer Moderate Assist   Edema / Skin Assessment   Edema / Skin  X   Comments right nails not into hand   Patient / Family Goals    Patient / Family Goal #1 to improve activity tolerance   Short Term Goals    Short Term Goal # 1 Pt will perform supine<>sit and min A within 6 visits to reduce.   Short Term Goal # 2 Pt perform squat pivot with moderate assist to increased OOB time within 6 visits.   Education Group   Role of Physical Therapist Patient Response Patient;Acceptance;Explanation;Demonstration;Verbal Demonstration;Action Demonstration

## 2023-07-14 NOTE — PROGRESS NOTES
[facility] MEDICINE PROGRESS NOTE   Medical Student Note    Attending: Misha OCHOA  Senior Resident: Christo OCHOA (PGY-3)  Manuelito Resident: Kole OCHOA (PGY-1)  Medical Student: Lennox Tee, MS3  PATIENT: Alireza Hernandez; [id number]; 1935    Hospital Day # Hospital Day: 4    ID: 87 y.o. male with past medical history of  HTN, Afib, dyslipidemia, and prior ICH with baseline right sided deficits  was admitted on 7/11/2023 for worsening slurred speech.    24 Hour Events: Patient started on 1st dose of Rocephin 1g IV yesterday. He was switched over from Heparin drip to Eliquis 5mg tab. Patient noted to be in Afib/Aflutter last night and this morning.    SUBJECTIVE: 87 y.o. male with past medical history of  HTN, Afib, dyslipidemia, and prior ICH with baseline right sided deficits  was admitted on 7/11/2023 for worsening slurred speech. Today patient denies any pain. He denies any changes in his symptoms since yesterday. He states he is eating well with assistance and does not have any difficulty swallowing. He has been able to urinate and have bowel movements without difficulty. He denies any fevers.    OBJECTIVE:  Temp:  [36.1 °C (96.9 °F)-36.8 °C (98.3 °F)] 36.2 °C (97.1 °F)  Pulse:  [63-84] 84  Resp:  [17-18] 17  BP: (112-127)/(59-78) 127/59  SpO2:  [92 %-95 %] 94 %    Intake/Output Summary (Last 24 hours) at 7/14/2023 0603  Last data filed at 7/13/2023 2050  Gross per 24 hour   Intake 120 ml   Output --   Net 120 ml       PE:  Gen: No acute distress, resting comfortably in bed.   HEENT: normocephalic, atraumatic, EOMI  Pulm: Cough present on exam. clear to auscultation bilaterally, no respiratory distress   Cardio: RRR, no M/R/G  Abdom: soft, nontender, nondistended, normoactive bowel sounds in all quadrants  Ext: right hand contracted with rolled ace wrap placed in palm. No edema, 2+ pulses bilaterally  Neuro: Unchanged from baseline and previous exam with right sided neuro and sensory deficits.      LABS:  Recent Labs     07/11/23 2356 07/13/23  0919 07/14/23  0015   WBC 6.2 6.7 6.5   RBC 4.28* 4.66* 4.65*   HEMOGLOBIN 11.2* 12.3* 12.2*   HEMATOCRIT 35.8* 39.1* 38.4*   MCV 83.6 83.9 82.6   MCH 26.2* 26.4* 26.2*   RDW 47.5 47.3 46.8   PLATELETCT 212 215 222   MPV 9.8 10.5 10.8   NEUTSPOLYS 39.30* 46.10  --    LYMPHOCYTES 40.50 36.80  --    MONOCYTES 5.80 5.60  --    EOSINOPHILS 13.70* 11.10*  --    BASOPHILS 0.50 0.10  --      Recent Labs     07/11/23 2356 07/14/23  0015   SODIUM 141 140   POTASSIUM 4.3 3.7   CHLORIDE 107 106   CO2 21 20   BUN 12 11   CREATININE 0.78 0.67   CALCIUM 8.5 8.3*   ALBUMIN 3.1*  --      Estimated GFR/CRCL = Estimated Creatinine Clearance: 79.1 mL/min (by C-G formula based on SCr of 0.67 mg/dL).  Recent Labs     07/11/23 2356 07/14/23  0015   GLUCOSE 81 110*     Recent Labs     07/11/23 2356   ASTSGOT 15   ALTSGPT 15   TBILIRUBIN 0.6   ALKPHOSPHAT 62   GLOBULIN 3.1         IMAGING:   DX-CHEST-PORTABLE (1 VIEW)   Final Result      1.  Moderate LEFT pleural effusion   2.  LEFT lung atelectasis which could obscure an additional process   3.  Patchy opacity in the RIGHT perihilar lung could be atelectasis or airspace disease      CT-CEREBRAL PERFUSION ANALYSIS   Final Result      1.Cerebral blood flow less than 30% likely representing completed infarct = 0 mL   2.T Max more than 6 seconds likely representing combination of completed infarct and ischemia = 58 mL   3. Mismatched volume likely representing ischemic brain/penumbra= 58 mL   4.Please note that the cerebral perfusion was performed on the limited brain tissue around the basal ganglia region. Infarct/ischemia outside the CT perfusion sections may not be seen on this study.      CT-CTA NECK WITH & W/O-POST PROCESSING   Final Result      1.  Severe stenosis of the proximal LEFT internal carotid artery   2.  LEFT pleural effusion which appears to be at least partially loculated      CT-CTA HEAD WITH & W/O-POST PROCESS   Final  Result      LEFT M2 branch occlusion      Findings were communicated with and acknowledged by GENESIS JOYNER via Vo[employer] on 7/11/2023 11:37 AM.         CT-HEAD W/O   Final Result      1.  Atrophy and chronic microvascular ischemic type changes.   2.  Old bilateral thalamic lacunar infarcts.   3.  No acute intracranial abnormality.   4.  Bilateral sphenoid sinus disease         MR-BRAIN-W/O    (Results Pending)       MEDS:  Current Facility-Administered Medications   Medication Last Admin    [START ON 7/15/2023] sulfamethoxazole-trimethoprim (Bactrim DS) 800-160 MG tablet 1 Tablet      hydrALAZINE (Apresoline) injection 10 mg      labetalol (Normodyne/Trandate) injection 10 mg      vancomycin 50 mg/mL oral soln 125 mg 125 mg at 07/14/23 0618    apixaban (Eliquis) tablet 5 mg 5 mg at 07/14/23 0619    baclofen (Lioresal) tablet 7.5 mg 7.5 mg at 07/14/23 1156    senna-docusate (Pericolace Or Senokot S) 8.6-50 MG per tablet 2 Tablet 2 Tablet at 07/14/23 0619    And    polyethylene glycol/lytes (Miralax) PACKET 1 Packet      And    magnesium hydroxide (Milk Of Magnesia) suspension 30 mL      And    bisacodyl (Dulcolax) suppository 10 mg      ondansetron (Zofran) syringe/vial injection 4 mg      ondansetron (Zofran ODT) dispertab 4 mg      finasteride (Proscar) tablet 5 mg 5 mg at 07/13/23 1835    losartan (Cozaar) tablet 75 mg 75 mg at 07/14/23 0618    metoprolol tartrate (Lopressor) tablet 50 mg 50 mg at 07/14/23 0619    omeprazole (PriLOSEC) capsule 20 mg 20 mg at 07/14/23 0619    simvastatin (Zocor) tablet 20 mg 20 mg at 07/13/23 2045    tamsulosin (Flomax) capsule 0.4 mg 0.4 mg at 07/14/23 1033    NS (Bolus) infusion 500 mL         ASSESSMENT/PLAN: 87 y.o. male with past medical history of HTN, Afib, dyslipidemia, and prior ICH with baseline right sided deficits was admitted for     #Acute CVA  Patient presented to ED with worsening slurred speech, which he has at baseline secondary to a hemorrhagic stroke 4 months  ago. ED Course - CT neck showed severe stenosis of the left internal carotid artery and free floating thrombus. CT Head showed left M2 branch occlusion and cerebral blood flow less than 30% representing likely completed infarct. PMR has agreed to take this patient for rehabilitation pending MR Brain.  Transfer patient to acute rehab facility  Follow PT/OT and Speech recommendations  Long-term normotensive -130s goal per Neuro.    #Afib  Patient was found to be in and out of Afib and A flutter. He has known history of Afib and was in Afib upon arrival to ED. He has not been on anticoagulation since his hemorrhagic stroke 4 months ago.  Continue Eliquis for secondary stroke prevention.    #Care Goals  Patient is waiting transfer to acute care with PMR pending MRI Brain. Patient has clearly stated DNR/DNI and denies artificial nutrition  Continue conversation with DPOA.  Patient to be transferred to PMR acute care to [facility] approval    #UTI  Patient had cloudy urine with positive bacteria, leukocyte esterase, WBC, and RBC. Sister notes he has had recent urinary incontinence at home. No fevers, abdominal pain, or flank pain.  Started on Rocephin 1g for 3 days.  Pharmacy notes patient had C Diff in April and recommend prophylactic Vancomycin PO for 5-7 days after finishing antibiotics.    #Hemiplegia  Patient has right sided hemiplegia at baseline since his hemorrhagic stroke 4 months ago. Patient is noted to have contracture of the right hand at rest.  Continue to follow PT/OT and speech recommendations.  Right hand splint for right hand contracture  Placement in acute rehab facility with PMR.      Core Measures:  Fluids: PO  Lines: PIV  Abx: Rocephin IV, Vancomycin PO  Diet: Regular, soft food  PPX: SCDs/TEDs, Eliquis      #DISPOSITION  - Patient to be discharged to acute care facility.      Lennox Tee, MS3  [employer]

## 2023-07-15 NOTE — PROGRESS NOTES
Attending:   Shelli Escamilla M.d.      Resident:   Cristino Sharif M.D.    PATIENT:   Alireza Hernandez; [id number]; 1935    ID:   87-year-old male who presented with slurred speech and right-sided weakness who was admitted for a CVA.     SUBJECTIVE:   No acute events overnight, patients dysarthria the same as yesterday. Denies pain. Pending d/c to [facility]  but they want to evaluate him on Monday.    OBJECTIVE:  Vitals:    07/14/23 1927 07/14/23 2316 07/15/23 0402 07/15/23 0757   BP: 125/70 136/65 133/64 (!) 146/68   Pulse: 79 70 63 81   Resp: 18 18 18 18   Temp: 36.1 °C (97 °F) 37 °C (98.6 °F) 36.5 °C (97.7 °F) 36.5 °C (97.7 °F)   TempSrc: Temporal Temporal Temporal Temporal   SpO2: 93% 93% 93% 93%   Weight:       Height:           Intake/Output Summary (Last 24 hours) at 7/12/2023 0753  Last data filed at 7/11/2023 1942  Gross per 24 hour   Intake 211.45 ml   Output --   Net 211.45 ml       PHYSICAL EXAM:  General: A&Ox3. No acute distress, afebrile, resting comfortably  HEENT: NC/AT. EOMI.   Cardiovascular: RRR without murmurs. Normal capillary refill   Respiratory: CTAB  Abdomen: soft, nontender, nondistended, no masses  EXT:  no edema. Strength is 5/5 on the L upper/lower extremities, 2/5 RUE and RLE, contracted RUE  Skin: No erythema/lesions   Neuro: CN II - XII grossly intact except CN XI non-intact. Sensation is diminished on entire right side of body include face, UE and LE.    LABS:  Recent Labs     07/13/23  0919 07/14/23  0015 07/15/23  0449   WBC 6.7 6.5 7.1   RBC 4.66* 4.65* 4.49*   HEMOGLOBIN 12.3* 12.2* 11.8*   HEMATOCRIT 39.1* 38.4* 37.5*   MCV 83.9 82.6 83.5   MCH 26.4* 26.2* 26.3*   RDW 47.3 46.8 47.4   PLATELETCT 215 222 257   MPV 10.5 10.8 10.6   NEUTSPOLYS 46.10  --  48.40   LYMPHOCYTES 36.80  --  28.80   MONOCYTES 5.60  --  7.40   EOSINOPHILS 11.10*  --  14.70*   BASOPHILS 0.10  --  0.30     Recent Labs     07/14/23  0015   SODIUM 140   POTASSIUM 3.7   CHLORIDE 106   CO2 20   BUN  11   CREATININE 0.67   CALCIUM 8.3*     Estimated GFR/CRCL = Estimated Creatinine Clearance: 79.1 mL/min (by C-G formula based on SCr of 0.67 mg/dL).  Recent Labs     07/14/23  0015   GLUCOSE 110*                   No results for input(s): INR, APTT, FIBRINOGEN in the last 72 hours.    Invalid input(s): DIMER        IMAGING:  MR-BRAIN-W/O   Final Result         Acute infarct in the left insular region and left frontal region. Scattered foci of acute infarction in the left parietal region.      Remote infarcts in the bilateral thalami, left kylee, right cerebellum. Remote left thalamic hemorrhage.      Nonspecific T2 hyperintensities are noted in the periventricular and deep white matter, most likely related to chronic microvascular ischemia.      DX-CHEST-PORTABLE (1 VIEW)   Final Result      1.  Moderate LEFT pleural effusion   2.  LEFT lung atelectasis which could obscure an additional process   3.  Patchy opacity in the RIGHT perihilar lung could be atelectasis or airspace disease      CT-CEREBRAL PERFUSION ANALYSIS   Final Result      1.Cerebral blood flow less than 30% likely representing completed infarct = 0 mL   2.T Max more than 6 seconds likely representing combination of completed infarct and ischemia = 58 mL   3. Mismatched volume likely representing ischemic brain/penumbra= 58 mL   4.Please note that the cerebral perfusion was performed on the limited brain tissue around the basal ganglia region. Infarct/ischemia outside the CT perfusion sections may not be seen on this study.      CT-CTA NECK WITH & W/O-POST PROCESSING   Final Result      1.  Severe stenosis of the proximal LEFT internal carotid artery   2.  LEFT pleural effusion which appears to be at least partially loculated      CT-CTA HEAD WITH & W/O-POST PROCESS   Final Result      LEFT M2 branch occlusion      Findings were communicated with and acknowledged by GENESIS JOYNER via Voalte Me on 7/11/2023 11:37 AM.         CT-HEAD W/O   Final  Result      1.  Atrophy and chronic microvascular ischemic type changes.   2.  Old bilateral thalamic lacunar infarcts.   3.  No acute intracranial abnormality.   4.  Bilateral sphenoid sinus disease             MEDS:  Current Facility-Administered Medications   Medication Last Admin    sulfamethoxazole-trimethoprim (Bactrim DS) 800-160 MG tablet 1 Tablet 1 Tablet at 07/15/23 0754    hydrALAZINE (Apresoline) injection 10 mg      labetalol (Normodyne/Trandate) injection 10 mg      vancomycin 50 mg/mL oral soln 125 mg 125 mg at 07/15/23 0433    apixaban (Eliquis) tablet 5 mg 5 mg at 07/15/23 0434    baclofen (Lioresal) tablet 7.5 mg 7.5 mg at 07/15/23 0433    senna-docusate (Pericolace Or Senokot S) 8.6-50 MG per tablet 2 Tablet 2 Tablet at 07/15/23 0600    And    polyethylene glycol/lytes (Miralax) PACKET 1 Packet      And    magnesium hydroxide (Milk Of Magnesia) suspension 30 mL      And    bisacodyl (Dulcolax) suppository 10 mg      ondansetron (Zofran) syringe/vial injection 4 mg      ondansetron (Zofran ODT) dispertab 4 mg      finasteride (Proscar) tablet 5 mg 5 mg at 07/14/23 1749    losartan (Cozaar) tablet 75 mg 75 mg at 07/15/23 0433    metoprolol tartrate (Lopressor) tablet 50 mg 50 mg at 07/15/23 0924    omeprazole (PriLOSEC) capsule 20 mg 20 mg at 07/15/23 0434    simvastatin (Zocor) tablet 20 mg 20 mg at 07/14/23 2100    tamsulosin (Flomax) capsule 0.4 mg 0.4 mg at 07/15/23 0753    NS (Bolus) infusion 500 mL         ASSESSMENT/PLAN: 87-year-old male who presented with slurred speech and admitted for CVA workup:      Problem   Acute Cva (Cerebrovascular Accident) (Hcc)   Primary Hypertension   Paroxysmal Atrial Fibrillation (Hcc)   Uti (Urinary Tract Infection), Uncomplicated   Goals of Care, Counseling/Discussion   Dyslipidemia   Cough   Bph (Benign Prostatic Hyperplasia)   Pressure Injury of Sacral Region, Stage 1   Hypokalemia       * Acute CVA (cerebrovascular accident) (HCC)- (present on  admission)  Assessment & Plan  Patient presented with worsening slurring speech starting when he woke up on day of admission. Has residual right-sided deficits due to hemorrhagic stroke in March 2023. CT Head showed no ICH. CT angiogram of the head and neck revealed left ICA free-floating clot and CT angiogram of the head revealed left M2 occlusion. Neuro IR was consulted and patient was found to not be a candidate for thombectomy given presence of free-floating thrombus with concern for distal embolization. Patient to get medical therapy for secondary stroke prevention. HbA1C 6.3  MRI Brain shows acute infarct in the left insular region and left frontal region. Scattered foci of acute infarction in the left parietal region. Remote infarcts in the bilateral thalami, left kylee, right cerebellum. Remote left thalamic hemorrhage.    -q4h and PRN neuro assessment.    - BP goal is normotension, 100-130/60-80.  -Telemetry; Known history of atrial fibrillation. No need for TTE from Neuro perspective.  - Cont. Eliquis 5mg PO BID today  - Per Speech patient has Mild-moderate oropharyngeal dysphagia; rec soft and bite sized foods with 1:1 supervision  - PT/OT rec post-acute placement          Paroxysmal atrial fibrillation (HCC)- (present on admission)  Assessment & Plan  EKG upon arrival shows A-fib. Patient was not on anti-coagulation due to recent ICH.    - Tele shows A-fib rate 60-80  - Cont.  Eliquis 5 PO    Primary hypertension- (present on admission)  Assessment & Plan  Patient arrive with -160. Allowed for permissive hypertension in the setting of ischemic stroke for first 48hrs    - Cont Home Losartan and Metoprolol  - BP goal is normotension, 100-130/60-80.    UTI (urinary tract infection), uncomplicated  Assessment & Plan  Per his sister, patient has recent hx of urinary incontinence which is new for him. UA+ bacteria, maria est, pyuria. Patient also had a recent episode of CDiff in April, Pharm recommeds to  treat prophylactically with Vanco for 5-7 after last dose of abx.   UCx shows E. Coli    - Switch to Bactrim given UCx, last dose will be today   - Vanco 125mg qD for 8 days for CDiff prophylaxis     Goals of care, counseling/discussion  Assessment & Plan  Per advanced directive, patient does not desire to have life-sustaining treatment if the expected risks of treatment outweight benefits; patient does not want artificial nutrition.     - Have been in contact with POA and CM regarding GOC and d/c planning    BPH (benign prostatic hyperplasia)- (present on admission)  Assessment & Plan  Chronic. No acute changes.    - Cont. Home finasteride and Flomax    Cough  Assessment & Plan  Patient reports a non-productive cough for past 2 days but denies fever, chills, sob, chest pain. CXR shows Moderate L pleural effusion L lung atelectasis and a patchy opacity in the RIGHT perihilar lung could be atelectasis or airspace disease.     - Consider thoracentesis for L pleural effusion    Dyslipidemia  Assessment & Plan  - Lipid panel wnl LDL 59    - Continue home simvastatin  - Titrate to long term LDL goal < 70    Pressure injury of sacral region, stage 1- (present on admission)  Assessment & Plan  Patient reports he has had chronic sacral ulcer, unsure how long its been there.     - Wound care following 1x/week  - Nurse to reposition patient q2 hours    Hypokalemia- (present on admission)  Assessment & Plan  K+ was 3.4 upon arrival. Wnl post repletion    - s/p 40mg K  - BMP in the mornings           Cristino Sharif M.D.  PGY-1  [facility]

## 2023-07-15 NOTE — DISCHARGE PLANNING
Follow up for post acute services. Per chart review choice to return to [facility] with HH. [facility] will evaluate Monday. [facility] no longer following. If [facility] would require IRF prior to accepting. Please reach back to [facility].

## 2023-07-15 NOTE — CARE PLAN
Patient A/O x 2-3; confused at times; with slurred speech and delayed responses; V/S stable; afebrile; turn every 2 to 3 hours; safety measures in placed; advised to use call light    Metropolol; and Bactrim DS not available on [employer]; Messaged Pharmacy.    The patient is Stable - Low risk of patient condition declining or worsening    Shift Goals  Clinical Goals: monitor neuro status  Patient Goals: comfort/ safety  Family Goals: not present    Progress made toward(s) clinical / shift goals:    Problem: Optimal Care of the Stroke Patient  Goal: Optimal emergency care for the stroke patient  Outcome: Progressing  Goal: Optimal acute care for the stroke patient  Outcome: Progressing     Problem: Knowledge Deficit - Stroke Education  Goal: Patient's knowledge of stroke and risk factors will improve  Outcome: Progressing     Problem: Psychosocial - Patient Condition  Goal: Patient's ability to verbalize feelings about condition will improve  Outcome: Progressing  Goal: Patient's ability to re-evaluate and adapt role responsibilities will improve  Outcome: Progressing     Problem: Discharge Planning - Stroke  Goal: Ensure Stroke Core Measures are met prior to discharge  Outcome: Progressing  Goal: Patient’s continuum of care needs will be met  Outcome: Progressing     Problem: Neuro Status  Goal: Neuro status will remain stable or improve  Outcome: Progressing     Problem: Hemodynamic Monitoring  Goal: Patient's hemodynamics, fluid balance and neurologic status will be stable or improve  Outcome: Progressing     Problem: Respiratory - Stroke Patient  Goal: Patient will achieve/maintain optimum respiratory rate/effort  Outcome: Progressing     Problem: Dysphagia  Goal: Dysphagia will improve  Outcome: Progressing     Problem: Risk for Aspiration  Goal: Patient's risk for aspiration will be absent or decrease  Outcome: Progressing     Problem: Urinary Elimination  Goal: Establish and maintain regular urinary  output  Outcome: Progressing     Problem: Bowel Elimination  Goal: Establish and maintain regular bowel function  Outcome: Progressing     Problem: Mobility - Stroke  Goal: Patient's capacity to carry out activities will improve  Outcome: Progressing  Goal: Spasticity will be prevented or improved  Outcome: Progressing  Goal: Subluxation will be prevented or improved  Outcome: Progressing     Problem: Self Care  Goal: Patient will have the ability to perform ADLs independently or with assistance (bathe, groom, dress, toilet and feed)  Outcome: Progressing     Problem: Knowledge Deficit - Standard  Goal: Patient and family/care givers will demonstrate understanding of plan of care, disease process/condition, diagnostic tests and medications  Outcome: Progressing     Problem: Skin Integrity  Goal: Skin integrity is maintained or improved  Outcome: Progressing     Problem: Fall Risk  Goal: Patient will remain free from falls  Outcome: Progressing       Patient is not progressing towards the following goals:

## 2023-07-15 NOTE — PROGRESS NOTES
[facility] [place] FAMILY MEDICINE PROGRESS NOTE   Medical Student Note    Attending: Misha OCHOA  Senior Resident: Humberto OCHOA (PGY-2)  Manuelito Resident: Kole OCHOA (PGY-1)  Medical Student: Lennox Tee, MS3  PATIENT: Alireza Hernandez; [id number]; 1935    Hospital Day # Hospital Day: 5    ID: 87 y.o. male with past medical history of HTN, Afib, dyslipidemia, and prior ICH with baseline right sided deficits  was admitted on 7/11/2023 for worsening slurred speech    24 Hour Events: No acute events overnight. Brain MR returned revealing acute infarcts in multiple regions as well hyper intensities likely related to chronic microvascular ischemia. Urine culture returned positive for E coli, pharmacy recommends discontinuing Rocephin, starting Bactrim and continuing on Vanco PO for 5 more days. Patient medically cleared for discharge to acute care facility ([facility]). [facility] stating that patient needs to be reassessed on 7/17 prior to returning to their facility.    SUBJECTIVE: 87 y.o. male with past medical history of HTN, Afib, dyslipidemia, and prior ICH with baseline right sided deficits  was admitted on 7/11/2023 for worsening slurred speech. Patient has a cough and states that it has remained the same without worsening or improvement. Patient denies any chest pain, shortness of breath, or new changes in his baseline symptoms. He does report some right hand discomfort. He states he is eating and drinking with assistance without any problems.     OBJECTIVE:  Temp:  [36.1 °C (97 °F)-37 °C (98.6 °F)] 36.5 °C (97.7 °F)  Pulse:  [63-94] 81  Resp:  [17-18] 18  BP: (125-146)/(59-70) 146/68  SpO2:  [93 %-94 %] 93 %    Intake/Output Summary (Last 24 hours) at 7/15/2023 0559  Last data filed at 7/14/2023 0600  Gross per 24 hour   Intake 100 ml   Output --   Net 100 ml       PE:   Gen: No acute distress, resting comfortably in bed. Patient has difficulty articulating what he wants to say and visibly expresses frustration with  "being unable to finish his sentence.   HEENT: normocephalic, atraumatic, EOMI  Skin: Some scattered excoriations to the upper chest bilaterally.  Pulm: Wet cough present during exam.   Cardio: RRR, no M/R/G  Abdom: soft, nontender, nondistended, normoactive bowel sounds in all quadrants  Ext: Right arm is adducted and resting on his body. Right hand and fingers are contracted. No peripheral edema, 2+ pulses bilaterally  Neuro: Dysarthria. Slight droop of the right corner of the mouth. Baseline right sided sensory and motor deficits. R upper and lower extremities 2/5 strength. He is unable to write on paper with his left hand to help with communication. He is able to shake head \"yes\" or \"no\" to direct questions and follows directions appropriately. Normal strength and sensation in the left face, upper and lower extremities.     LABS:  Recent Labs     07/13/23  0919 07/14/23  0015 07/15/23  0449   WBC 6.7 6.5 7.1   RBC 4.66* 4.65* 4.49*   HEMOGLOBIN 12.3* 12.2* 11.8*   HEMATOCRIT 39.1* 38.4* 37.5*   MCV 83.9 82.6 83.5   MCH 26.4* 26.2* 26.3*   RDW 47.3 46.8 47.4   PLATELETCT 215 222 257   MPV 10.5 10.8 10.6   NEUTSPOLYS 46.10  --  48.40   LYMPHOCYTES 36.80  --  28.80   MONOCYTES 5.60  --  7.40   EOSINOPHILS 11.10*  --  14.70*   BASOPHILS 0.10  --  0.30     Recent Labs     07/14/23  0015   SODIUM 140   POTASSIUM 3.7   CHLORIDE 106   CO2 20   BUN 11   CREATININE 0.67   CALCIUM 8.3*     Estimated GFR/CRCL = Estimated Creatinine Clearance: 79.1 mL/min (by C-G formula based on SCr of 0.67 mg/dL).  Recent Labs     07/14/23  0015   GLUCOSE 110*         IMAGING:   MR-BRAIN-W/O   Final Result         Acute infarct in the left insular region and left frontal region. Scattered foci of acute infarction in the left parietal region.      Remote infarcts in the bilateral thalami, left kylee, right cerebellum. Remote left thalamic hemorrhage.      Nonspecific T2 hyperintensities are noted in the periventricular and deep white matter, " most likely related to chronic microvascular ischemia.      DX-CHEST-PORTABLE (1 VIEW)   Final Result      1.  Moderate LEFT pleural effusion   2.  LEFT lung atelectasis which could obscure an additional process   3.  Patchy opacity in the RIGHT perihilar lung could be atelectasis or airspace disease      CT-CEREBRAL PERFUSION ANALYSIS   Final Result      1.Cerebral blood flow less than 30% likely representing completed infarct = 0 mL   2.T Max more than 6 seconds likely representing combination of completed infarct and ischemia = 58 mL   3. Mismatched volume likely representing ischemic brain/penumbra= 58 mL   4.Please note that the cerebral perfusion was performed on the limited brain tissue around the basal ganglia region. Infarct/ischemia outside the CT perfusion sections may not be seen on this study.      CT-CTA NECK WITH & W/O-POST PROCESSING   Final Result      1.  Severe stenosis of the proximal LEFT internal carotid artery   2.  LEFT pleural effusion which appears to be at least partially loculated      CT-CTA HEAD WITH & W/O-POST PROCESS   Final Result      LEFT M2 branch occlusion      Findings were communicated with and acknowledged by GENESIS JOYNER via Vo[employer] Me on 7/11/2023 11:37 AM.         CT-HEAD W/O   Final Result      1.  Atrophy and chronic microvascular ischemic type changes.   2.  Old bilateral thalamic lacunar infarcts.   3.  No acute intracranial abnormality.   4.  Bilateral sphenoid sinus disease             MEDS:  Current Facility-Administered Medications   Medication Last Admin    sulfamethoxazole-trimethoprim (Bactrim DS) 800-160 MG tablet 1 Tablet 1 Tablet at 07/15/23 0754    hydrALAZINE (Apresoline) injection 10 mg      labetalol (Normodyne/Trandate) injection 10 mg      vancomycin 50 mg/mL oral soln 125 mg 125 mg at 07/15/23 0433    apixaban (Eliquis) tablet 5 mg 5 mg at 07/15/23 0434    baclofen (Lioresal) tablet 7.5 mg 7.5 mg at 07/15/23 0433    senna-docusate (Pericolace Or  Senokot S) 8.6-50 MG per tablet 2 Tablet 2 Tablet at 07/15/23 0600    And    polyethylene glycol/lytes (Miralax) PACKET 1 Packet      And    magnesium hydroxide (Milk Of Magnesia) suspension 30 mL      And    bisacodyl (Dulcolax) suppository 10 mg      ondansetron (Zofran) syringe/vial injection 4 mg      ondansetron (Zofran ODT) dispertab 4 mg      finasteride (Proscar) tablet 5 mg 5 mg at 07/14/23 1749    losartan (Cozaar) tablet 75 mg 75 mg at 07/15/23 0433    metoprolol tartrate (Lopressor) tablet 50 mg 50 mg at 07/15/23 0924    omeprazole (PriLOSEC) capsule 20 mg 20 mg at 07/15/23 0434    simvastatin (Zocor) tablet 20 mg 20 mg at 07/14/23 2100    tamsulosin (Flomax) capsule 0.4 mg 0.4 mg at 07/15/23 0753    NS (Bolus) infusion 500 mL         ASSESSMENT/PLAN: 87 y.o. male with past medical history of HTN, Afib, dyslipidemia, and prior ICH with baseline right sided deficits was admitted for    #Acute CVA  Patient presented to ED with worsening slurred speech, which he has at baseline secondary to a hemorrhagic stroke 4 months ago. ED Course - CT neck showed severe stenosis of the left internal carotid artery and free floating thrombus. CT Head showed left M2 branch occlusion and cerebral blood flow less than 30% representing likely completed infarct. PMR has agreed to take this patient for rehabilitation pending MR Brain.  Patient will be transferred to [facility] pending assessment from their team on 7/17.  Follow PT/OT and Speech recommendations  Long-term normotensive -130s goal per Neuro.     #Afib  Patient was found to be in and out of Afib and A flutter. He has known history of Afib and was in Afib upon arrival to ED. He has not been on anticoagulation since his hemorrhagic stroke 4 months ago.  Continue Eliquis for secondary stroke prevention.     #Care Goals  Patient is waiting transfer to acute care with [facility] pending MRI Brain. Patient has clearly stated DNR/DNI and denies artificial  nutrition  Continue conversations with [facility]A about care goals.  Patient to be transferred to [facility] acute care to [facility] with assessment and approval on 7/17.    #Cough  Patient has had a dry cough. denies any sputum production, fevers, chills, chest pain. Chest Xray revealed left pleural effusion.  Continue to monitor for signs of infection.  Consider thoracocentesis for pleural effusion if cough worsening.    #UTI  Patient had cloudy urine with positive bacteria, leukocyte esterase, WBC, and RBC. Urine cultures positive for E coli. WBC has remained WNL. Sister notes he has had recent urinary incontinence at home. No fevers, abdominal pain, dysuria, or flank pain.  Started on Rocephin 1g for 3 days. Pharmacy recommends deescalating to Bactrim.  Pharmacy notes patient had C Diff in April and recommend prophylactic Vancomycin PO for 5 more days.     #Hemiplegia  Patient has right sided hemiplegia at baseline since his hemorrhagic stroke 4 months ago. Patient is noted to have contracture of the right hand at rest.  Continue to follow PT/OT and speech recommendations.  Patient to be placed in right hand splint for right hand contracture  Placement in acute rehab facility with [facility].      Core Measures:  Fluids: PO  Lines: PIV  Abx: Rocephin, Bactrim, Vancomycin  Diet: small soft food  PPX: SCDs/TEDs, Eliquis      #DISPOSITION  - Patient to be discharged to acute care following their evaluation.      Lennox Tee, MS3  [employer]

## 2023-07-15 NOTE — DISCHARGE PLANNING
Received choice form @: Taken from [employer]  Agency/Facility name: [facility]  Sent referral per choice form @: No referral sent. Pending orders,

## 2023-07-15 NOTE — CARE PLAN
Problem: Knowledge Deficit - Stroke Education  Goal: Patient's knowledge of stroke and risk factors will improve  Description: Target End Date:  1-3 days or as soon as patient condition allows    Document in Patient Education    1.  Stroke education booklet provided  2.  Education regarding EMS activation, need for follow up, medication prescribed at discharge, risk factors for stroke/lifestyle modifications, warning signs and symptoms of stroke provided  Outcome: Progressing   The patient is Watcher - Medium risk of patient condition declining or worsening    Shift Goals  Clinical Goals: monitor neuro status  Patient Goals: rest listen to music  Family Goals: not present    Progress made toward(s) clinical / shift goals:      Patient is not progressing towards the following goals:

## 2023-07-16 NOTE — CARE PLAN
The patient is Stable - Low risk of patient condition declining or worsening    Shift Goals  Clinical Goals: monitor neuro status, comfort  Patient Goals: comfort, rest  Family Goals: jay    Progress made toward(s) clinical / shift goals:       Problem: Neuro Status  Goal: Neuro status will remain stable or improve  Outcome: Progressing      Problem: Skin Integrity  Goal: Skin integrity is maintained or improved  Outcome: Progressing     Problem: Fall Risk  Goal: Patient will remain free from falls  Outcome: Progressing          Patient is not progressing towards the following goals: n/a

## 2023-07-16 NOTE — PROGRESS NOTES
Monitor Summary: Afib 46-89 , NC --, QRS 0.09, QT --, with occasional PVCs per strip from monitor room.

## 2023-07-16 NOTE — PROGRESS NOTES
Attending:   Shelli Escamilla M.d.      Resident:   Cristino Sharif M.D.    PATIENT:   Alireza Hernandez; [id number]; 1935    ID:   87-year-old male who presented with slurred speech and right-sided weakness who was admitted for a CVA.     SUBJECTIVE:   No acute events overnight, patients dysarthria unchanged. Denies pain. Pending d/c to care [facility]  but they want to evaluate him on Monday.    OBJECTIVE:  Vitals:    07/15/23 2025 07/16/23 0019 07/16/23 0434 07/16/23 0800   BP: 131/63 133/64 110/67 137/59   Pulse: 67 74 71 67   Resp: 18 18 18 17   Temp: 36.2 °C (97.1 °F) 36.4 °C (97.5 °F) 36.4 °C (97.6 °F) 36.5 °C (97.7 °F)   TempSrc: Temporal Temporal Temporal Temporal   SpO2: 93% 93% 97% 94%   Weight:       Height:           Intake/Output Summary (Last 24 hours) at 7/12/2023 0753  Last data filed at 7/11/2023 1942  Gross per 24 hour   Intake 211.45 ml   Output --   Net 211.45 ml       PHYSICAL EXAM:  General: A&Ox3. No acute distress, afebrile, resting comfortably  HEENT: NC/AT. EOMI.   Cardiovascular: RRR without murmurs. Normal capillary refill   Respiratory: CTAB  Abdomen: soft, nontender, nondistended, no masses  EXT:  no edema. Strength is 5/5 on the L upper/lower extremities, 2/5 RUE and RLE, contracted RUE  Skin: No erythema/lesions   Neuro: CN II - XII grossly intact except CN XI non-intact. Sensation is diminished on entire right side of body include face, UE and LE.    LABS:  Recent Labs     07/14/23  0015 07/15/23  0449   WBC 6.5 7.1   RBC 4.65* 4.49*   HEMOGLOBIN 12.2* 11.8*   HEMATOCRIT 38.4* 37.5*   MCV 82.6 83.5   MCH 26.2* 26.3*   RDW 46.8 47.4   PLATELETCT 222 257   MPV 10.8 10.6   NEUTSPOLYS  --  48.40   LYMPHOCYTES  --  28.80   MONOCYTES  --  7.40   EOSINOPHILS  --  14.70*   BASOPHILS  --  0.30     Recent Labs     07/14/23  0015   SODIUM 140   POTASSIUM 3.7   CHLORIDE 106   CO2 20   BUN 11   CREATININE 0.67   CALCIUM 8.3*     Estimated GFR/CRCL = Estimated Creatinine Clearance: 79.1  mL/min (by C-G formula based on SCr of 0.67 mg/dL).  Recent Labs     07/14/23  0015   GLUCOSE 110*                   No results for input(s): INR, APTT, FIBRINOGEN in the last 72 hours.    Invalid input(s): DIMER        IMAGING:  MR-BRAIN-W/O   Final Result         Acute infarct in the left insular region and left frontal region. Scattered foci of acute infarction in the left parietal region.      Remote infarcts in the bilateral thalami, left kylee, right cerebellum. Remote left thalamic hemorrhage.      Nonspecific T2 hyperintensities are noted in the periventricular and deep white matter, most likely related to chronic microvascular ischemia.      DX-CHEST-PORTABLE (1 VIEW)   Final Result      1.  Moderate LEFT pleural effusion   2.  LEFT lung atelectasis which could obscure an additional process   3.  Patchy opacity in the RIGHT perihilar lung could be atelectasis or airspace disease      CT-CEREBRAL PERFUSION ANALYSIS   Final Result      1.Cerebral blood flow less than 30% likely representing completed infarct = 0 mL   2.T Max more than 6 seconds likely representing combination of completed infarct and ischemia = 58 mL   3. Mismatched volume likely representing ischemic brain/penumbra= 58 mL   4.Please note that the cerebral perfusion was performed on the limited brain tissue around the basal ganglia region. Infarct/ischemia outside the CT perfusion sections may not be seen on this study.      CT-CTA NECK WITH & W/O-POST PROCESSING   Final Result      1.  Severe stenosis of the proximal LEFT internal carotid artery   2.  LEFT pleural effusion which appears to be at least partially loculated      CT-CTA HEAD WITH & W/O-POST PROCESS   Final Result      LEFT M2 branch occlusion      Findings were communicated with and acknowledged by GENESIS JOYNER via [employer] on 7/11/2023 11:37 AM.         CT-HEAD W/O   Final Result      1.  Atrophy and chronic microvascular ischemic type changes.   2.  Old bilateral thalamic  lacunar infarcts.   3.  No acute intracranial abnormality.   4.  Bilateral sphenoid sinus disease             MEDS:  Current Facility-Administered Medications   Medication Last Admin    hydrALAZINE (Apresoline) injection 10 mg      labetalol (Normodyne/Trandate) injection 10 mg      vancomycin 50 mg/mL oral soln 125 mg 125 mg at 07/16/23 0905    apixaban (Eliquis) tablet 5 mg 5 mg at 07/16/23 0449    baclofen (Lioresal) tablet 7.5 mg 7.5 mg at 07/16/23 0449    senna-docusate (Pericolace Or Senokot S) 8.6-50 MG per tablet 2 Tablet 2 Tablet at 07/16/23 0450    And    polyethylene glycol/lytes (Miralax) PACKET 1 Packet      And    magnesium hydroxide (Milk Of Magnesia) suspension 30 mL      And    bisacodyl (Dulcolax) suppository 10 mg      ondansetron (Zofran) syringe/vial injection 4 mg      ondansetron (Zofran ODT) dispertab 4 mg      finasteride (Proscar) tablet 5 mg 5 mg at 07/15/23 1805    losartan (Cozaar) tablet 75 mg 75 mg at 07/15/23 0433    metoprolol tartrate (Lopressor) tablet 50 mg 50 mg at 07/15/23 1806    omeprazole (PriLOSEC) capsule 20 mg 20 mg at 07/16/23 0450    simvastatin (Zocor) tablet 20 mg 20 mg at 07/15/23 2100    tamsulosin (Flomax) capsule 0.4 mg 0.4 mg at 07/16/23 0905    NS (Bolus) infusion 500 mL         ASSESSMENT/PLAN: 87-year-old male who presented with slurred speech and admitted for CVA workup:      Problem   Acute Cva (Cerebrovascular Accident) (Hcc)   Primary Hypertension   Paroxysmal Atrial Fibrillation (Hcc)   Uti (Urinary Tract Infection), Uncomplicated   Goals of Care, Counseling/Discussion   Dyslipidemia   Cough   Bph (Benign Prostatic Hyperplasia)   Pressure Injury of Sacral Region, Stage 1   Hypokalemia       * Acute CVA (cerebrovascular accident) (HCC)- (present on admission)  Assessment & Plan  Patient presented with worsening slurring speech starting when he woke up on day of admission. Has residual right-sided deficits due to hemorrhagic stroke in March 2023. CT Head  showed no ICH. CT angiogram of the head and neck revealed left ICA free-floating clot and CT angiogram of the head revealed left M2 occlusion. Neuro IR was consulted and patient was found to not be a candidate for thombectomy given presence of free-floating thrombus with concern for distal embolization. Patient to get medical therapy for secondary stroke prevention. HbA1C 6.3  MRI Brain shows acute infarct in the left insular region and left frontal region. Scattered foci of acute infarction in the left parietal region. Remote infarcts in the bilateral thalami, left kylee, right cerebellum. Remote left thalamic hemorrhage.    -q4h and PRN neuro assessment.    - BP goal is normotension, 100-130/60-80.  -Telemetry; Known history of atrial fibrillation. No need for TTE from Neuro perspective.  - Cont. Eliquis 5mg PO BID today  - Per Speech patient has Mild-moderate oropharyngeal dysphagia; rec soft and bite sized foods with 1:1 supervision  - PT/OT rec post-acute placement          Paroxysmal atrial fibrillation (HCC)- (present on admission)  Assessment & Plan  EKG upon arrival shows A-fib. Patient was not on anti-coagulation due to recent ICH.    - Tele shows A-fib rate 60-80  - Cont.  Eliquis 5 PO    Primary hypertension- (present on admission)  Assessment & Plan  Patient arrive with -160. Allowed for permissive hypertension in the setting of ischemic stroke for first 48hrs    - Cont Home Losartan and Metoprolol  - BP goal is normotension, 100-130/60-80.    UTI (urinary tract infection), uncomplicated  Assessment & Plan  Per his sister, patient has recent hx of urinary incontinence which is new for him. UA+ bacteria, maria est, pyuria. Patient also had a recent episode of CDiff in April, Pharm recommeds to treat prophylactically with Vanco for 5-7 after last dose of abx.   UCx shows E. Coli  > finished 3-day course of antibiotics    - Vanco 125mg qD for 5 days after last does of abx (until 7/20) for CDiff  prophylaxis     Goals of care, counseling/discussion  Assessment & Plan  Per advanced directive, patient does not desire to have life-sustaining treatment if the expected risks of treatment outweight benefits; patient does not want artificial nutrition.     - Have been in contact with POA and CM regarding GOC and d/c planning    BPH (benign prostatic hyperplasia)- (present on admission)  Assessment & Plan  Chronic. No acute changes.    - Cont. Home finasteride and Flomax    Cough  Assessment & Plan  Patient reports a non-productive cough for past 2 days but denies fever, chills, sob, chest pain. CXR shows Moderate L pleural effusion L lung atelectasis and a patchy opacity in the RIGHT perihilar lung could be atelectasis or airspace disease.     - Consider thoracentesis for L pleural effusion    Dyslipidemia  Assessment & Plan  - Lipid panel wnl LDL 59    - Continue home simvastatin  - Titrate to long term LDL goal < 70    Pressure injury of sacral region, stage 1- (present on admission)  Assessment & Plan  Patient reports he has had chronic sacral ulcer, unsure how long its been there.     - Wound care following 1x/week  - Nurse to reposition patient q2 hours    Hypokalemia- (present on admission)  Assessment & Plan  K+ was 3.4 upon arrival. Wnl post repletion    - s/p 40mg K  - BMP in the mornings       Core Measures:  Fluids: none      Lines: PIV  Abx: Vanco for Cdiff prophylaxis   Diet: Soft Bite sized with 1:1 supervision  PPX: Eliquis 5 BID     DISPO: pending GARY evaluation on Monday     Cristino Sharif M.D.  PGY-1  [facility]

## 2023-07-17 PROBLEM — E78.5 DYSLIPIDEMIA: Status: RESOLVED | Noted: 2023-01-01 | Resolved: 2023-01-01

## 2023-07-17 PROBLEM — N39.0 UTI (URINARY TRACT INFECTION), UNCOMPLICATED: Status: RESOLVED | Noted: 2023-01-01 | Resolved: 2023-01-01

## 2023-07-17 PROBLEM — R05.9 COUGH: Status: RESOLVED | Noted: 2023-01-01 | Resolved: 2023-01-01

## 2023-07-17 PROBLEM — Z71.89 GOALS OF CARE, COUNSELING/DISCUSSION: Status: RESOLVED | Noted: 2023-01-01 | Resolved: 2023-01-01

## 2023-07-17 PROBLEM — E87.6 HYPOKALEMIA: Status: RESOLVED | Noted: 2023-01-01 | Resolved: 2023-01-01

## 2023-07-17 NOTE — FACE TO FACE
Face to Face Supporting Documentation - Home Health    The encounter with this patient was in whole or in part the primary reason for home health admission.    Date of encounter:   Patient:                    MRN:                       YOB: 2022  Alireza Hernandez  [id number]  1935     Home health to see patient for:  Skilled Nursing care for assessment, interventions & education, Wound Care, Physical Therapy evaluation and treatment, Occupational therapy evaluation and treatment, and Speech Language Pathology evaluation and treatment    Skilled need for:  Exacerbation of Chronic Disease State Dysarthria, hemiplegia  and Recent Deterioration of Health Status sacral ulcer    Skilled nursing interventions to include:  Wound Care    Homebound status evidenced by:  Need the aid of supportive devices such as crutches, canes, wheelchairs or walkers, Require the use of special transportation, or Needs the assistance of another person in order to leave the home. Leaving home requires a considerable and taxing effort. There is a normal inability to leave the home.    Community Physician to provide follow up care: GEOVANI [place]ton     Optional Interventions? No      I certify the face to face encounter for this home health care referral meets the CMS requirements and the encounter/clinical assessment with the patient was, in whole, or in part, for the medical condition(s) listed above, which is the primary reason for home health care. Based on my clinical findings: the service(s) are medically necessary, support the need for home health care, and the homebound criteria are met.  I certify that this patient has had a face to face encounter by myself.  Barry Bellamy M.D. - NPI: [id number]

## 2023-07-17 NOTE — DISCHARGE INSTR - SLP
"  Recommendations  Soft and bite-sized with mildly thick liquids    Instrumentation: Instrumental swallow study pending clinical progress  Medication: Crush with applesauce, as appropriate, Crush with pudding/puree, as appropriate (Cue double swallow or wash as possible)  Supervision: 1:1 feeding with constant supervision  Positioning: Fully upright and midline during oral intake  Risk Management : Small bites/sips, Slow rate of intake, Alternate bites and sips (Swallow x2)  Oral Care:  (TID after meals)      Supervision Needs Upons Discharge: Direct assistance with IADLs (see below)  IADLs: Medication management, Financial management, Appointment management, Household chores, Cooking      Patient / Family Goals  Patient / Family Goal #1: \"Words are hard.\"    Short Term Goal # 1: Pt will complete FEES w SLP to further assess swallow function and inform POC.  Goal Outcome # 1: Goal met, new goal added  Short Term Goal # 1 B : Pt will consume diet of SB/MT given 1:1 spv for strategy use with no worsening of lung status.    Short Term Goal # 2: Pt will complete exercises targeting LVC, BOT retraction and pharyngal constriction/shortening given max cues from SLP x20 in a session with \"good\" accuracy.    Short Term Goal # 3: Patient will follow 2-3 step commands with 80% accuracy with moderate verbal cues.    Short Term Goal # 4: Patient will identify shapes or single letters with 50% accuracy with moderate verbal cues.    Short Term Goal # 5: Patient will trace or write single letters with 50% accuracy with moderate verbal cues.  "

## 2023-07-17 NOTE — PROGRESS NOTES
Monitor Summary: Afib 54 SB/SR 49-85, MA .24, QRS .05, QT .39, in and out of a.fib with couplets of PVC per strip from monitor room.

## 2023-07-17 NOTE — PROGRESS NOTES
Monitor summary: a. Fib, SB-SR ***-***, VT ***, QRS ***, QT ***, ***with frequent PVCs and *** sec pause per strip from monitor room.

## 2023-07-17 NOTE — PROGRESS NOTES
Patient discharged with [facility] back to [place]. Gathered all belongings. IV removed. Kimberly (boece) notified. All questions answered.

## 2023-07-17 NOTE — THERAPY
"Speech Language Pathology   Daily Treatment     Patient Name: Alireza Hernandez  AGE:  87 y.o., SEX:  male  Medical Record #: [id number]  Date of Service: 7/17/2023      Precautions:  Precautions: Fall Risk, Swallow Precautions         Subjective  Pt agreeable and cooperative with SLP tx tasks. \"Juice.\" Is concerned about discharge, stated he does not feel ready.      Assessment  Pt seen this date for dysphagia management. Also informally addressed aphasia goals (following directions, total communication). Oral care provided during session. Pt provided with trials of MT2 liquids via cup. Able to effectively self-feed, continues to have anterior spillage on the R to which he does not consistently demonstrate sensation. Intermittent congested cough throughout, which, per FEES, is not a reliable bedside indicator of swallow safety. Single instance of cough immediately following large sip of MT2, which was most concerning for airway invasion of MT2 that was not noted on FEES. Pt attempted swallow strengthening exercises - able to complete effortful swallow with use of MT2 x10. Unable to complete without PO (dry swallows) despite cues. Provided education on results of FEES including silent aspiration of TN0 liquids. Pt able to provide verbal readback of this education - \"Wrong pipe.\"      Clinical Impressions  Per FEES, pt presents with a mild-moderate oropharyngeal dysphagia with ongoing need for SLP service for dysphagia management. Pt appears to be a fair candidate for ongoing behavorial and exercise-based swallow rehabilitation. Aphasia may be a barrier to exercise-based rehabilitation, as pt also continues to demonstrate both expressive and receptive aphasia. Dysphagia outcomes can be maximized with use of mobility as pt is able and frequent, thorough oral care.       Recommendations  Soft and bite-sized with mildly thick liquids    Instrumentation: Instrumental swallow study pending clinical progress  Medication: " "Crush with applesauce, as appropriate, Crush with pudding/puree, as appropriate (Cue double swallow or wash as possible)  Supervision: 1:1 feeding with constant supervision  Positioning: Fully upright and midline during oral intake  Risk Management : Small bites/sips, Slow rate of intake, Alternate bites and sips (Swallow x2)  Oral Care: TID after meals      Supervision Needs Upons Discharge: Direct assistance with IADLs (see below)  IADLs: Medication management, Financial management, Appointment management, Household chores, Cooking           SLP Treatment Plan  Treatment Plan: Speech-Language Treatment, Dysphagia Treatment, Patient/Family/Caregiver Training  SLP Frequency: 4x Per Week  Estimated Duration: Until Therapy Goals Met      Anticipated Discharge Needs  Discharge Recommendations: Recommend post-acute placement for additional speech therapy services prior to discharge home  Therapy Recommendations Upon DC: Dysphagia Training, Comprehension Training, Reading Training, Expression Training, Writing Training, Cognitive-Linguistic Training, AAC Training / Development, Community Re-Integration, Patient / Family / Caregiver Education      Patient / Family Goals  Patient / Family Goal #1: \"Words are hard.\"  Goal #1 Outcome: Progressing as expected  Short Term Goals  Short Term Goal # 1: Pt will complete FEES w SLP to further assess swallow function and inform POC.  Goal Outcome # 1: Goal met, new goal added  Short Term Goal # 1 B : Pt will consume diet of SB/MT given 1:1 spv for strategy use with no worsening of lung status.  Goal Outcome  # 1 B: Progressing as expected  Short Term Goal # 2: Pt will complete exercises targeting LVC, BOT retraction and pharyngal constriction/shortening given max cues from SLP x20 in a session with \"good\" accuracy.  Goal Outcome # 2 : Progressing slower than expected  Short Term Goal # 3: Patient will follow 2-3 step commands with 80% accuracy with moderate verbal cues.  Goal " Outcome  # 3: Progressing slower than expected  Short Term Goal # 4: Patient will identify shapes or single letters with 50% accuracy with moderate verbal cues.  Goal Outcome  # 4:  (not targeted this session)  Short Term Goal # 5: Patient will trace or write single letters with 50% accuracy with moderate verbal cues.  Goal Outcome  # 5:  (not targeted this session)      Abril Canres, SLP

## 2023-07-17 NOTE — DISCHARGE PLANNING
CM called [facility] #[phone] to f/u on assessment prior to patient returning to facility and spoke to Sebastián who stated that assessment will be completed today however, he cannot confirm time.    **[phone] Hrs -  @ [facility], Safia, stopped by CM's office and informed her of acceptance for return to facility with C.  Per Safia, patient was active with [facility]  prior to admission.  HH choices previously obtained and uploaded in Media; HH order & F2F requested from physician.   [facility] transport requested for 1 PM.  IM presented to patient.  CM updated patient's niece/POAKimberly  #[phone], on DCP.

## 2023-07-17 NOTE — DISCHARGE SUMMARY
[facility] Discharge Summary    Attending: Dr. Arie Rosales M.D.  Senior Resident: Dr. Barry Bellamy  Intern:  Dr. Cristino Sharif  Contact Number: [phone]    CHIEF COMPLAINT ON ADMISSION  Chief Complaint   Patient presents with    Possible Stroke     Pt BIBA for slurred speech.  Pt's last known well was 0615. Later on, pt noted to have slurred speech.  Pt had prior stroke 4 months ago and has right sided deficits from prior stroke. Pt A&O x2 upon arrival.         Reason for Admission  Stroke     Admission Date  7/11/2023    CODE STATUS  DNAR/DNI    HPI & HOSPITAL COURSE  Alireza Hernandez is a 87 y.o. male with past medical history of atrial fibrillation (not on anticoagulation due to hemorrhagic stroke in March 2023), HTN, dyslipidemia, who presented with worsening slurred speech and right-sided weakness. Patient lives in a nursing home and noticed his speech was slurred in the morning when he woke up. Patient denies changes to his right sided hemiplegia, no numbness or tingling of the face or extremities. Nursing home workers also noticed a change in his baseline so ambulance was called. He has residual right sided LE and UE weakness from hemorrhagic stroke in March. Patient denied fever, chills, chest pain, nausea, vomiting.  Brain CT did not reveal acute abnormalities, CT angiogram of the head and neck revealed left ICA free-floating clot and CT angiogram of the head revealed left M2 occlusion.  Neuro IR was consulted and patient was found to not be a candidate for thrombolytic therapy or thombectomy given presence of free-floating thrombus with concern for distal embolization. Patient was admitted on telemetry for stroke workup.  MRI brain showed acute infarct in the left insular and frontal regions with scattered foci of acute infarction in the left parietal region. patient was initially NPO but speech recommended soft bite-size diet with one-to-one supervision.  Patient was started on heparin  drip and transition to Eliquis 5 mg twice daily.  He was started on his home metoprolol and losartan dose and his blood pressures were within goal range throughout admission.  Patient was also continued on his home dose of simvastatin.  Patient was found to have a UTI, completed 3-day course of antibiotics.  Due to a recent C. difficile infection within 3 months, patient was started on prophylactic vancomycin and is to continue with that for 5 days after last antibiotic.  Patient has a chronic sacral ulcer stage I, wound care saw him while admitted. Patient was evaluated by [facility] living [facility] and discharged back there with home health for PT, OT, wound care and speech.     * Acute CVA (cerebrovascular accident) (HCC)- (present on admission)  - BP goal is normotension, 100-130/60-80.  - Cont. Eliquis 5mg PO BID today  - Cont. Simvastatin 20mg    Paroxysmal atrial fibrillation (HCC)- (present on admission)  EKG upon arrival shows A-fib, patient was in A-fib without RVR throughout admission. Patient was not on anti-coagulation due to recent ICH.  - Cont.  Eliquis 5 PO    Primary hypertension- (present on admission)  - BP goal is normotension, 100-130/60-80  - Cont Home Losartan and Metoprolol    BPH (benign prostatic hyperplasia)- (present on admission)  - Cont. Home finasteride and Flomax    Dyslipidemia  - Lipid panel shows LDL 59  - Continue home simvastatin    Pressure injury of sacral region, stage 1- (present on admission)  Patient reports he has had chronic sacral ulcer, unsure how long its been there.   - f/u with Wound care outpatient       Therefore, he is discharged in good and stable condition to home with organized home healthcare and close outpatient follow-up.    The patient met 2-midnight criteria for an inpatient stay at the time of discharge.    Discharge Date  7/17/2023    Physical Exam on Day of Discharge  Physical Exam  Constitutional:       General: He is not in acute  distress.  HENT:      Head: Normocephalic and atraumatic.      Mouth/Throat:      Mouth: Mucous membranes are moist.      Pharynx: Oropharynx is clear.   Eyes:      Extraocular Movements: Extraocular movements intact.      Pupils: Pupils are equal, round, and reactive to light.   Cardiovascular:      Rate and Rhythm: Normal rate. Rhythm irregular.      Heart sounds: No murmur heard.  Pulmonary:      Effort: Pulmonary effort is normal.      Breath sounds: Normal breath sounds.   Abdominal:      General: Abdomen is flat.      Palpations: Abdomen is soft.   Neurological:      Mental Status: He is alert and oriented to person, place, and time. Mental status is at baseline.      Sensory: Sensory deficit present.      Motor: Weakness present.      Gait: Gait abnormal.      Comments: Strength is 5/5 on the L upper/lower extremities, 2/5 RUE and RLE, contracted RUE  Sensation is diminished on entire right side         FOLLOW UP ITEMS POST DISCHARGE  PT/OT   Speech  PCP    DISCHARGE DIAGNOSES  Principal Problem:    Acute CVA (cerebrovascular accident) (HCC) (POA: Yes)  Active Problems:    Primary hypertension (POA: Yes)    Paroxysmal atrial fibrillation (HCC) (POA: Yes)    Pressure injury of sacral region, stage 1 (POA: Yes)    Dyslipidemia (POA: Unknown)    BPH (benign prostatic hyperplasia) (POA: Yes)  Resolved Problems:    Hypokalemia (POA: Yes)    Cough (POA: Unknown)    Goals of care, counseling/discussion (POA: Unknown)    UTI (urinary tract infection), uncomplicated (POA: Unknown)      FOLLOW UP  Future Appointments   Date Time Provider Department Center   8/17/2023  2:40 PM Saad Ma M.D. [facility] None     [facility]  [address] [id number]  [phone]    Follow up  Left message with [facility] office. The  will call you to schedule your follow up appointment. If you do not hear in 1 week please call the office to schedule.      MEDICATIONS ON DISCHARGE     Medication List         START taking these medications        Instructions   Eliquis 5mg Tabs  Generic drug: apixaban   Take 1 Tablet by mouth 2 times a day. Indications: Thromboembolism secondary to Atrial Fibrillation  Dose: 5 mg     Firvanq 50 MG/ML oral solution  Start taking on: July 18, 2023  Generic drug: vancomycin   Take 2.5 mL by mouth every day for 3 doses. Discard remainder.  Dose: 125 mg     sore throat spray 1.4 % Liqd  Commonly known as: Chloraseptic   Use 1 Spray in the mouth or throat every 2 hours as needed (as needed for sore throat).  Dose: 1 Spray            CONTINUE taking these medications        Instructions   acetaminophen 325 MG Tabs  Commonly known as: Tylenol   Take 650 mg by mouth every four hours as needed. Indications: Pain  Dose: 650 mg     Calcium Antacid Ultra Strength 1000 MG Chew  Generic drug: Calcium Carbonate Antacid   Chew 1,000 mg 2 times a day.  Dose: 1,000 mg     finasteride 5 MG Tabs  Commonly known as: Proscar   Take 5 mg by mouth every day.  Dose: 5 mg     losartan 50 MG Tabs  Commonly known as: Cozaar   Take 75 mg by mouth every day. 75 mg = 1.5 tablets daily  Dose: 75 mg     metoprolol tartrate 50 MG Tabs  Commonly known as: Lopressor   Take 1 Tablet by mouth 2 times a day.  Dose: 50 mg     omeprazole 20 MG delayed-release capsule  Commonly known as: PriLOSEC   Take 1 Capsule by mouth every day.  Dose: 20 mg     simvastatin 20 MG Tabs  Commonly known as: Zocor   Take 1 Tablet by mouth every evening.  Dose: 20 mg     tamsulosin 0.4 MG capsule  Commonly known as: Flomax   Take 1 Capsule by mouth 1/2 hour after breakfast.  Dose: 0.4 mg            STOP taking these medications      bismuth subsalicylate 262 MG/15ML Susp  Commonly known as: Pepto-Bismol     Freeze Dried Acidophilus Caps     loperamide 2 MG Caps  Commonly known as: Imodium              Allergies  No Known Allergies    DIET  Orders Placed This Encounter   Procedures    Diet Order Diet: Level 6 - Soft and Bite Sized (meds  crushed. use of liquid wash); Liquid level: Level 2 - Mildly Thick; Tray Modifications (optional): SLP - 1:1 Supervision by Nursing     Standing Status:   Standing     Number of Occurrences:   1     Order Specific Question:   Diet:     Answer:   Level 6 - Soft and Bite Sized [23]     Comments:   meds crushed. use of liquid wash     Order Specific Question:   Liquid level     Answer:   Level 2 - Mildly Thick     Order Specific Question:   Tray Modifications (optional)     Answer:   SLP - 1:1 Supervision by Nursing       ACTIVITY  As tolerated.  Weight bearing as tolerated    LINES, DRAINS, AND WOUNDS  This is an automated list. Peripheral IVs will be removed prior to discharge.       Wound 07/11/23 Pressure Injury Sacrum Midline POA DTI w/excoriation (Active)   Wound Image   07/14/23 0322   Site Assessment Red;Excoriated 07/15/23 2000   Periwound Assessment Red 07/15/23 2000   Margins Attached edges 07/16/23 0800   Closure Adhesive bandage 07/15/23 2000   Drainage Amount None 07/15/23 2000   Drainage Description Serosanguineous 07/14/23 0322   Treatments Site care;Offloading;Cleansed 07/15/23 2000   Wound Cleansing Foam Cleanser/Washcloth 07/15/23 2000   Periwound Protectant Skin Protectant Wipes to Periwound 07/12/23 1200   Dressing Status Clean;Dry;Intact 07/14/23 1954   Dressing Changed Changed 07/15/23 2200   Dressing Cleansing/Solutions Not Applicable 07/12/23 1200   Dressing Options Hydrocolloid Thin 07/15/23 2200   Dressing Change/Treatment Frequency Every 48 hrs, and As Needed 07/15/23 2200   NEXT Dressing Change/Treatment Date 07/16/23 07/15/23 2200   NEXT Weekly Photo (Inpatient Only) 07/19/23 07/15/23 2200   Wound Team Following Weekly 07/13/23 2050   WOUND NURSE ONLY - Pressure Injury Stage DTPI 07/12/23 1200   Wound Length (cm) 10 cm 07/12/23 1200   Wound Width (cm) 8 cm 07/12/23 1200   Wound Depth (cm) 0.1 cm 07/12/23 1200   Wound Surface Area (cm^2) 80 cm^2 07/12/23 1200   Wound Volume (cm^3) 8 cm^3  07/12/23 1200   Wound Bed Granulation (%) 5 % 07/12/23 1200   Shape oval 07/12/23 1200   Wound Odor None 07/12/23 1200   Pulses N/A 07/12/23 1200   Exposed Structures None 07/12/23 1200   WOUND NURSE ONLY - Time Spent with Patient (mins) 30 07/12/23 1200                  MENTAL STATUS ON TRANSFER             CONSULTATIONS  Neurology  Neurosurgery    PROCEDURES  None    LABORATORY  Lab Results   Component Value Date    SODIUM 143 07/17/2023    POTASSIUM 4.2 07/17/2023    CHLORIDE 109 07/17/2023    CO2 22 07/17/2023    GLUCOSE 99 07/17/2023    BUN 14 07/17/2023    CREATININE 0.74 07/17/2023        Lab Results   Component Value Date    WBC 7.9 07/17/2023    HEMOGLOBIN 12.1 (L) 07/17/2023    HEMATOCRIT 39.1 (L) 07/17/2023    PLATELETCT 316 07/17/2023        Total time of the discharge process exceeds 60 minutes.

## 2023-07-17 NOTE — DISCHARGE INSTRUCTIONS
"Ischemic Stroke  Discharge Instructions    You experienced an Ischemic Stroke.  Ischemic stroke is the most common type of stroke and happens when an artery in the brain becomes blocked by a plaque fragment or blood clot. Typically, these blockages travel from the heart or larger arteries that supply the brain.  The brain needs a constant supply of blood, which carries oxygen and nutrients it needs to function.  A stroke occurs when one of these arteries to the brain is either blocked or bursts. As a result, part of the brain does not get the blood it needs, so it starts to die.         Stroke Risk Factors    You are at increased risk of having another stroke event.  See your Patient Stroke Guide to help reduce your stroke risk. These are your specific risk factors:  Age - Over 55  Atrial Fibrillation  High blood pressure  Previous TIAs or \"mini strokes\"     Get help right away if you have any signs of a stroke.  \"BE FAST\" is an easy way to remember the main warning signs of a stroke:  B - Balance. Dizziness, sudden trouble walking, or loss of balance.  E - Eyes. Trouble seeing or a change in how you see.  F - Face. Sudden weakness or loss of feeling in the face. The face or eyelid may droop on one side.  A - Arms. Weakness or loss of feeling in an arm. This happens all of a sudden and most often on one side of the body.  S - Speech. Sudden trouble speaking, slurred speech, or trouble understanding what people say.  T - Time. Time to call emergency services. Write down what time symptoms started.    "

## 2023-07-17 NOTE — DIETARY
Nutrition Update:    Day 6 of admit.  Alireza Hernandez is a 87 y.o. male with admitting DX of Acute CVA (cerebrovascular accident) (HCC) [I63.9].  Patient being followed to optimize nutrition.    Current Diet: Level 6 soft and bite sized with level 2 mildly thick liquids and supplements. No documentation of supplements. Overall recorded intake variable but adequate. Intake from <25%-100% with most recorded meals >50%. RD continues to encourage intake of all meals and supplements.     Problem: Nutritional:  Goal: Achieve adequate nutritional intake  Description: Patient will consume 25-50% of meals  Outcome: Progressing     RD following

## 2023-07-17 NOTE — DISCHARGE PLANNING
DC Transport Scheduled    Received request at: 7/17/2023 at 1136    Transport Company Scheduled:  [employer]  Spoke with Rogelio at [facility] to schedule transport.    Scheduled Date: 7/17/2023  Scheduled Time: 1430    Destination: [facility] at [address] [place]     Notified care team of scheduled transport via [employer].     If there are any changes needed to the DC transportation scheduled, please contact [facility] at ext. [phone] between the hours of 3137-5649 Mon-Fri. If outside those hours, contact the ED Case Manager at ext. [phone].

## 2023-07-17 NOTE — DISCHARGE PLANNING
Received Choice Form @: 1528 7/14/23  Agency/ Facility Name: [facility]  Referral Sent per Choice Form @: 1239 7/17/23

## 2023-07-18 NOTE — WOUND TEAM
[facility]  Inpatient Services  Wound and Skin Care Brief Evaluation    Admission Date: 7/11/2023     Last order of IP CONSULT TO WOUND CARE was found on 7/14/2023 from Hospital Encounter on 7/11/2023     HPI, PMH, SH: Reviewed    Chief Complaint   Patient presents with    Possible Stroke     Pt BIBA for slurred speech.  Pt's last known well was 0615. Later on, pt noted to have slurred speech.  Pt had prior stroke 4 months ago and has right sided deficits from prior stroke. Pt A&O x2 upon arrival.       Diagnosis: Acute CVA (cerebrovascular accident) (HCC) [I63.9]    Unit where seen by Wound Team: S198/01     Wound consult placed regarding Penis. Chart and images reviewed. . This RN in to assess patient. Pt pleasant and agreeable. Penis. Non-selectively debrided with Moist warm washcloth.   Skin is intact, continue moisture management interventions.     No pressure injuries or advanced wound care needs identified. Wound consult completed. No further follow up unless indicated and consulted.          PREVENTATIVE INTERVENTIONS:    Q shift Issa - performed per nursing policy  Q shift pressure point assessments - performed per nursing policy    Surface/Positioning  Standard/trauma mattress - Currently in Place  Waffle overlay  - Currently in Place    Offloading/Redistribution  Heel offloading dressing (Silicone dressing) - Ordered      Containment/Moisture Prevention    Barrier wipes - Currently in Place  Barrier paste - Currently in Place    Mobilization      Unable to assess

## 2023-07-21 PROBLEM — I95.9 HYPOTENSION: Status: ACTIVE | Noted: 2023-01-01

## 2023-07-21 PROBLEM — Z86.19 HISTORY OF CLOSTRIDIOIDES DIFFICILE INFECTION: Status: ACTIVE | Noted: 2023-01-01

## 2023-08-02 PROBLEM — R55 SYNCOPE AND COLLAPSE: Status: ACTIVE | Noted: 2023-01-01

## 2023-08-02 PROBLEM — L98.429 SACRAL ULCER (HCC): Status: ACTIVE | Noted: 2023-01-01

## 2023-08-02 PROBLEM — I48.20 CHRONIC ATRIAL FIBRILLATION (HCC): Status: ACTIVE | Noted: 2023-01-01

## 2023-08-02 PROBLEM — J90 PLEURAL EFFUSION: Status: ACTIVE | Noted: 2023-01-01

## 2023-08-02 NOTE — PROGRESS NOTES
"Chief Complaint   Patient presents with    New Patient     Nontraumatic intracerebral hemorrhage       History of present illness:  lAireza Hernandez 87 y.o. male with history of A-fib, prev. On Eliquis, presented 3/2023 for L basal ganglia ICH. Etiology was presumed hypertensive by Dr. Logan.   In 7/2023, he presented for slurred speech and R sided weakness, CT angiogram of the neck revealed left ICA free-floating clot and CT angiogram of the head revealed left M2 occlusion. Neuro IR was consulted and patient was found to not be a candidate for thrombolytic therapy or thombectomy given presence of free-floating thrombus with concern for distal embolization      MRI brain showed acute infarct in the left insular and frontal regions with scattered foci of acute infarction in the left parietal region. Patient was restarted on Eliquis 5 mg twice daily.  The patient was dropped off by his assisted living facility.  In the waiting room, the patient said that he was \"not feeling well\".  He is very lethargic in the office today and is not able to provide a reliable history.    Past medical history:   Past Medical History:   Diagnosis Date    A-fib (HCC)     Acute pulmonary edema (HCC) 03/15/2023    BPH (benign prostatic hyperplasia)     Cancer (HCC)     Hypertension     Psychiatric problem 2022    Anxiety, depression. Did MRI to r/o biological cause. No tx provided.    Stroke (HCC) 2018    minor stroke, patient doesn't remember details       Past surgical history:   Past Surgical History:   Procedure Laterality Date    HIP REPLACEMENT, TOTAL Right 2004       Family history:   Family History   Problem Relation Age of Onset    No Known Problems Mother     No Known Problems Father        Social history:   Social History     Socioeconomic History    Marital status: Single     Spouse name: Not on file    Number of children: Not on file    Years of education: Not on file    Highest education level: Not on file   Occupational " "History    Not on file   Tobacco Use    Smoking status: Never    Smokeless tobacco: Never   Vaping Use    Vaping Use: Never used   Substance and Sexual Activity    Alcohol use: Never    Drug use: Never    Sexual activity: Not on file   Other Topics Concern    Not on file   Social History Narrative    Not on file     Social Determinants of Health     Financial Resource Strain: Not on file   Food Insecurity: Not on file   Transportation Needs: Not on file   Physical Activity: Not on file   Stress: Not on file   Social Connections: Not on file   Intimate Partner Violence: Not on file   Housing Stability: Not on file       Current medications:   Current Outpatient Medications   Medication    bismuth subsalicylate (PEPTO-BISMOL) 262 MG/15ML Suspension    loperamide (IMODIUM) 2 MG Cap    magnesium hydroxide (MILK OF MAGNESIA) 400 MG/5ML Suspension    Lactobacillus (ACIDOPHILUS PO)    apixaban (ELIQUIS) 5mg Tab    sore throat spray (CHLORASEPTIC) 1.4 % Liquid    acetaminophen (TYLENOL) 325 MG Tab    Calcium Carbonate Antacid (CALCIUM ANTACID ULTRA STRENGTH) 1000 MG Chew Tab    simvastatin (ZOCOR) 20 MG Tab    metoprolol tartrate (LOPRESSOR) 50 MG Tab    omeprazole (PRILOSEC) 20 MG delayed-release capsule    tamsulosin (FLOMAX) 0.4 MG capsule    losartan (COZAAR) 50 MG Tab    finasteride (PROSCAR) 5 MG Tab     No current facility-administered medications for this visit.       Medication Allergy:  No Known Allergies    Physical examination:   Vitals:    08/02/23 1048   BP: (P) 92/58   BP Location: (P) Left arm   Patient Position: (P) Sitting   BP Cuff Size: (P) Adult   Pulse: (!) (P) 114   Resp: (P) 16   Temp: (P) 36.1 °C (96.9 °F)   TempSrc: (P) Temporal   SpO2: (P) 97%   Weight: (P) 72.6 kg (160 lb)   Height: (P) 1.753 m (5' 9\")     Neurological Exam  Mental Status  Drowsy. Severe dysarthria present. Language is fluent with no aphasia.    Cranial Nerves  CN III, IV, VI: Extraocular movements intact bilaterally. Normal " smooth pursuit.  CN VII:  Right: There is central facial weakness.  Left: There is no facial weakness.    Motor   Right hemiparesis.  Flaccid hemiplegia of the right upper extremity.  He has weak antigravity strength of the right lower extremity..    Gait    Unable to ambulate.      Labs:  I reviewed the following labs personally:  None     Imagin23 MR BRAIN w/o  I reviewed the images personally and agree with the following read:     IMPRESSION:        Acute infarct in the left insular region and left frontal region. Scattered foci of acute infarction in the left parietal region.     Remote infarcts in the bilateral thalami, left kylee, right cerebellum. Remote left thalamic hemorrhage.     Nonspecific T2 hyperintensities are noted in the periventricular and deep white matter, most likely related to chronic microvascular ischemia.    23 CTA head  FINDINGS:  Aortic arch: conventional branching pattern.     There is atherosclerotic plaque of the aorta.     Right common carotid artery: Patent     Right internal carotid artery: Atherosclerotic plaque without significant stenosis (less than 50%).     Left common carotid artery is patent.     Left internal carotid artery: Atherosclerotic plaque with severe (greater than 70%) stenosis.     The right vertebral artery is patent without dissection or stenosis.     The left vertebral artery is patent without dissection or stenosis.     Vertebrobasilar confluence: The vertebrobasilar confluence appears normal.     There is a LEFT pleural effusion which appears to be at least partially loculated.     The soft tissues of the neck are within normal limits.     3D angiographic/MIP images of the vasculature confirm the vascular findings as described above.     IMPRESSION:     1.  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 Lethargy    2. 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 Paroxysmal atrial fibrillation (HCC)    On arrival to the clinic today, the patient was extremely lethargic.  His blood pressure was hypotensive and he was tachycardic.  His examination demonstrates marked lethargy, and inability to keep his eyes open.  He does respond to questions with short answers.  He has residual right hemiplegia from his prior left MCA stroke and is in a wheelchair.  I have contacted EMS for transport to the emergency department.  The concern is that he may have a infection or may be septic causing the lethargy, hypotension and tachycardia.   There were no family members accompanying the patient today, and he was transported and left here by his assisted living facility.  EMS arrived at 11:20am and transported the patient to the emergency department.   Of note, the patient does have a left ICA severe stenosis.  This was likely the etiology of his left MCA stroke in July.  I have discussed his case with Dr. Vega and the patient would be candidate for left ICA stenting.  Once the patient is stabilized, this should be performed.    FOLLOW-UP:   No follow-ups on file.    Total time spent for the day for this patient unrelated to procedure time is: 45 minutes. I spent 15 minutes in face to face time and I spent 30 minutes pre-charting and 15 minutes in post-visit documentation.      Dr. Juan Miguel Joyner D.O.  [facility]

## 2023-08-02 NOTE — ED NOTES
Unable to obtain med rec at this time  Patient arrived without paperwork  [facility] contacted at [phone]  They state they will be faxing a MAR to us

## 2023-08-02 NOTE — SENIOR ADMIT NOTE
[facility] Senior Admission Note      HPI  Alireza Hernandez is a 87 y.o. male with a past medical history that includes atrial fibrillation who presented on 8/2/2023 with hypotension (58/40) while being seen by neurology. Patient reports that he remembers the event as him going to sleep and then waking up. He reports a previous episode of  that occurred more than 1 year ago. Patient's blood pressure improved with trendelenburg positioning and fluids.     ED Course:  T97.1F, HR 86, RR 18, /52, 94% on room air. Labs and evaluation were pertinent for UA was unremarkable, CMP revealed glucose 135, and globulin 3.6, troponin 39,  magnesium 1.7, lactic acid 2.4, WBC 16.5, PMNs 4.28, and CXR with layering  left pleural effusion with left basilar opacity.       DX-CHEST-PORTABLE (1 VIEW)   Final Result         Layering left pleural effusion with left basilar opacity.          HEMATOLOGY/ ONCOLOGY/ID:            Recent Labs     08/02/23  1207   WBC 16.5*   RBC 4.97   HEMOGLOBIN 13.3*   HEMATOCRIT 42.6   MCV 85.7   MCH 26.8*   RDW 51.7*   PLATELETCT 244   MPV 10.4   NEUTSPOLYS 86.60*   LYMPHOCYTES 5.10*   MONOCYTES 6.90   EOSINOPHILS 0.60   BASOPHILS 0.10     Lab Results   Component Value Date    NFPJPTII97 1514 (H) 03/19/2023    FOLATE 10.4 03/19/2023    FERRITIN 153.0 03/19/2023    IRON 39 (L) 03/19/2023    TOTIRONBC 160 (L) 03/19/2023       RENAL:        Estimated GFR/CRCL = Estimated Creatinine Clearance: 52 mL/min (by C-G formula based on SCr of 1.02 mg/dL).  Recent Labs     08/02/23  1207   SODIUM 138   POTASSIUM 4.4   CHLORIDE 102   CO2 21   GLUCOSE 135*   BUN 19   CREATININE 1.02   CALCIUM 9.4   MAGNESIUM 1.7   ALBUMIN 3.8       GASTROINTESTINAL/ HEPATIC:          Recent Labs     08/02/23  1207   ALTSGPT 18   ASTSGOT 14   ALKPHOSPHAT 83   TBILIRUBIN 1.0   ALBUMIN 3.8   GLOBULIN 3.6*     No results found for: AMMONIA    ENDOCRINE:              Recent Labs     08/02/23  1207   GLUCOSE 135*     Lab Results    Component Value Date    HBA1C 6.3 (H) 07/11/2023    HBA1C 5.9 (H) 03/16/2023         Assessment/Plan:  Syncope  -Evaluate for cardiogenic, neurogenic, and orthostatic causes of syncope      Please see H&P for full details    Discussed with my attending physician, Nabil Navarro M.D..  ...    Caroline Connell MD MPH  PGY-2   [facility]

## 2023-08-02 NOTE — ED PROVIDER NOTES
ED Provider Note    Primary care provider: GEOVANI Layton    CHIEF COMPLAINT  Chief Complaint   Patient presents with    Syncope     Pt was at doctors office for neuro checkup when MD noticed pt was not responsive. BP for fire was 58/40. Pt was placed in gurney, placed in trendelenburg, BP went to 90/50.     HPI  Alireza Hernandez is a 87 y.o. male who presents to the Emergency Department with lethargy.  The patient went to his outpatient follow-up with neurology, was unresponsive there and hypotensive.  He was given a small amount of IV fluids prior to transfer.  When I see him he is much improved, he denies any pain at this time.  He denies any nausea, vomiting, diarrhea, abdominal pain or chest pain.  He does state that he would like something to drink, he also would like me to turn the lights off.  He did have breakfast this morning.  He denies any prolonged fasting.      External Record Review: Patient was hospitalized at our facility on 7/11 for acute CVA.  He was deemed not to be a thrombectomy candidate due to free-floating clot and concern of embolization.  He was discharged to rehab facility, had follow-up today in the neurology clinic.  Saw by Dr. Joyner, noted the patient to be hypotensive, tachycardic and lethargic, sent here for further evaluation.  EMS found the patient be hypotensive, received some fluids prior to arrival.    REVIEW OF SYSTEMS  See HPI.     PAST MEDICAL HISTORY   has a past medical history of A-fib (HCC), Acute pulmonary edema (HCC) (03/15/2023), BPH (benign prostatic hyperplasia), Cancer (HCC), Hypertension, Psychiatric problem (2022), and Stroke ([facility]) (2018).    SURGICAL HISTORY   has a past surgical history that includes hip replacement, total (Right, 2004).    SOCIAL HISTORY  Social History     Tobacco Use    Smoking status: Never    Smokeless tobacco: Never   Vaping Use    Vaping Use: Never used   Substance Use Topics    Alcohol use: Never    Drug use: Never      Social  "History     Substance and Sexual Activity   Drug Use Never       FAMILY HISTORY  Family History   Problem Relation Age of Onset    No Known Problems Mother     No Known Problems Father        CURRENT MEDICATIONS  Reviewed.  See Encounter Summary.     ALLERGIES  No Known Allergies    PHYSICAL EXAM  VITAL SIGNS: /55   Pulse 77   Temp 36.2 °C (97.1 °F)   Resp 18   Ht 1.753 m (5' 9\")   Wt 72.6 kg (160 lb)   SpO2 95%   BMI 23.63 kg/m²   Constitutional: Awake, alert in no apparent distress.  HENT: Normocephalic, Bilateral external ears normal. Nose normal.  Dry mucosal membranes.  Eyes: Conjunctiva normal, non-icteric, EOMI.    Thorax & Lungs: Easy unlabored respirations, Clear to ascultation bilaterally.  Cardiovascular: Regular rate, Regular rhythm, No murmurs, rubs or gallops. Bilateral pulses symmetrical.   Abdomen:  Soft, nontender, nondistended, normal active bowel sounds.   :    Skin: Visualized skin is  Dry, No erythema, No rash.   Musculoskeletal:   No cyanosis, clubbing or edema. No leg asymmetry.   Neurologic: Alert, Grossly non-focal.   Psychiatric: Normal affect, Normal mood  Lymphatic:      EKG   12 lead Interpreted by me  Rhythm: Atrial fibrillation  Rate: 89  Axis: normal  Ectopy: none  Conduction: normal  ST Segments: no acute change  T Waves: no acute change  Clinical Impression: Atrial fibrillation, otherwise unremarkable EKG without acute changes       RADIOLOGY  I have independently interpreted the diagnostic imaging associated with this visit and am waiting the final reading from the radiologist.   My preliminary interpretation is as follows: No infiltrates    Radiologist interpretation:   DX-CHEST-PORTABLE (1 VIEW)   Final Result         Layering left pleural effusion with left basilar opacity.          COURSE & MEDICAL DECISION MAKING  Pertinent Labs & Imaging studies reviewed. (See chart for details)    COURSE & MEDICAL DECISION MAKING  Pertinent Labs & Imaging studies reviewed. " (See chart for details)    Differential diagnoses include but are not limited to: Sepsis, orthostatic syncope, dehydration, MATTEO    12:06 PM - Nursing notes reviewed, patient seen and examined at bedside.    2:22 PM: Patient still alert and cooperative, denies any pain, no acute medical complaints at this time.  Blood pressure normal.    Discussion of management with other medical personnel: Hospitalist    Decision tools and prescription drugs considered including, but not limited to: Heart score 2    Decision Making:  This is a pleasant 87 y.o. year old male who presents with syncope, lethargy, hypotension prior to arrival.  The patient got just a few 100 cc prior to transport here, we completed the remainder of the 1 L bolus.  He has not had any episodes of hypotension here in our emergency department.  EKG without acute ischemic changes.  Troponin is elevated at 39 but this is consistent with prior results.  Does not appear to be a cardiogenic cause for the patient's low blood pressure.    He does have a slightly elevated lactate of 2.4 which is nonspecific, could be infectious, could be dehydration.  Clinically he did appear dehydrated, received 1 L of fluids with resolution of his hypotension.  He does have a white count of 16.5 which is new, also has neutrophil predominance.  Urinalysis without any evidence of infection, chest x-ray normal as well.  The patient does not note any recent nidus of infection, he denies any chest pain, abdominal pain, nausea or vomiting.  Denies any headache.    Overall I think he looks great and potentially could be discharged, however if this is the start of a serious infection, I would be remiss to discharge 87-year-old male who presents with leukocytosis and hypotension.      FINAL IMPRESSION  1. Hypotension, unspecified hypotension type    2. Leukocytosis, unspecified type    3. Syncope, unspecified syncope type

## 2023-08-02 NOTE — ED TRIAGE NOTES
"Chief Complaint   Patient presents with    Syncope     Pt was at doctors office for neuro checkup when MD noticed pt was not responsive. BP for fire was 58/40. Pt was placed in gurney, placed in trendelenburg, BP went to 90/50.     Pt BIB EMS for above. Pt given 100cc of IVF en route. Pt lives at [facility]. . Pt at baseline mentation aox2      ./52   Pulse 86   Temp 36.2 °C (97.1 °F)   Resp 18   Ht 1.753 m (5' 9\")   Wt 72.6 kg (160 lb)   SpO2 94%   BMI 23.63 kg/m²     "

## 2023-08-03 NOTE — CARE PLAN
The patient is Stable - Low risk of patient condition declining or worsening    Shift Goals  Clinical Goals: monitor BP  Patient Goals: rest  Family Goals: jay    Progress made toward(s) clinical / shift goals:    Problem: Knowledge Deficit - Standard  Goal: Patient and family/care givers will demonstrate understanding of plan of care, disease process/condition, diagnostic tests and medications  Description: Target End Date:  1-3 days or as soon as patient condition allows    Document in Patient Education    1.  Patient and family/caregiver oriented to unit, equipment, visitation policy and means for communicating concern  2.  Complete/review Learning Assessment  3.  Assess knowledge level of disease process/condition, treatment plan, diagnostic tests and medications  4.  Explain disease process/condition, treatment plan, diagnostic tests and medications  Outcome: Progressing  Note: Pt and family educated on POC, all questions answered in regards to disease process, treatment and diet. Pt and family verbalize understanding and voice no further questions at this time.      Problem: Fall Risk  Goal: Patient will remain free from falls  Description: Target End Date:  Prior to discharge or change in level of care    Document interventions on the Татьяна Camargo Fall Risk Assessment    1.  Assess for fall risk factors  2.  Implement fall precautions  Outcome: Progressing  Note: Fall risk assessed, fall precautions in place, bed alarm on, pt verbalizes understanding of fall risk.        Patient is not progressing towards the following goals:

## 2023-08-03 NOTE — PROCEDURES
INPATIENT ROUTINE VIDEO ELECTROENCEPHALOGRAM REPORT    REFERRING PROVIDER: MD To.  Patient Name:Alireza Hernandez   MRN:  [id number]   DOS: 08/03/2023  ROOM: [place] [address]  TOTAL RECORDING TIME:  24 minutes of total recording time    INDICATION:  Alireza Hernandez 87 y.o. gentleman with history of recent ischemic stroke and residual right-sided hemiparesis who presented to the emergency room for loss of consciousness episode on 8/2/2023 .  EEG ordered to rule out seizure activity     RELEVANT TREATMENTS/MEDICATIONS:    Current Facility-Administered Medications:     apixaban    simvastatin    [DISCONTINUED] senna-docusate **AND** polyethylene glycol/lytes **AND** magnesium hydroxide **AND** bisacodyl    NS    omeprazole     TECHNIQUE:   Routine VEEG was set up by a Neurodiagnostic technologist who performed education to the patient and staff. A minimum of 23 electrodes and 23 channel recording was setup and performed by Neurodiagnostic technologist, in accordance with the international 10-20 system. The study was reviewed in bipolar and referential montages. The recording examined the patient in the awake and drowsy state.    DESCRIPTION OF THE RECORD:  The best background activity consisted of poorly sustained but symmetric 7-8 Hz, less than 15 uV activity without a posterior dominant rhythm.  Features of stage II sleep were not seen..      ICTAL AND INTERICTAL FINDINGS:   No focal or generalized epileptiform activity noted.   No regional slowing or persistent focal asymmetries were seen.  No definite seizures.     ACTIVATION PROCEDURES:   Hyperventilation was not attempted. Photic stimulation showed a good driving response    EKG: Irregular rhythm from a single lead tracing    EVENTS:  None    INTERPRETATION:  Normal EEG in awake and drowsy state. There were no distinct epileptiform abnormalities, lateralizing changes or organized electrographic seizure activity noted during this recording.    Clinical correlation  is advised.   Single lead EKG showed irregularly irregular rhythm    Ankush Rosado MD, MHS  Department of Neurology at [facility]  Phone: [phone]  Fax: [phone]

## 2023-08-03 NOTE — CARE PLAN
The patient is Watcher - Medium risk of patient condition declining or worsening    Shift Goals  Clinical Goals: Monitor BP, Q4 neuro checks, Q4 glucose checks  Patient Goals: Rest  Family Goals: MARGIE    Progress made toward(s) clinical / shift goals:      Problem: Skin Integrity  Goal: Skin integrity is maintained or improved  Description: Target End Date:  Prior to discharge or change in level of care    Document interventions on Skin Risk/Issa flowsheet groups and corresponding LDA    1.  Assess and monitor skin integrity, appearance and/or temperature  2.  Assess risk factors for impaired skin integrity and/or pressures ulcers  3.  Implement precautions to protect skin integrity in collaboration with interdisciplinary team  4.  Implement pressure ulcer prevention protocol if at risk for skin breakdown  5.  Confirm wound care consult if at risk for skin breakdown  6.  Ensure patient use of pressure relieving devices  (Low air loss bed, waffle overlay, heel protectors, ROHO cushion, etc)  Outcome: Progressing  Note: Patient is incontinent of both. A condom cath was placed to help patient with his urine elimination. Patient had two bowel movements today and was unable to let staff know that he went. Had to check on him often to make sure he was dry. Applied thick layer of barrier waste as patient's sacrum is red. Waffle mattress in place.     Problem: Fall Risk  Goal: Patient will remain free from falls  Description: Target End Date:  Prior to discharge or change in level of care    Document interventions on the Татьяна Camargo Fall Risk Assessment    1.  Assess for fall risk factors  2.  Implement fall precautions  Outcome: Progressing  Note: A bed alarm was placed and activated. Patient is bedbound and has difficulty moving the right side of his body. Bed set to lowest position and locked. Bed side table and call light placed within reach. Explained to patient the importance of placing his bed alarm especially  since he has been hypotensive this shift.

## 2023-08-03 NOTE — ASSESSMENT & PLAN NOTE
The patient has a previous history of stroke, and after the fall is not well determined if he traumatized his head, the patient has hemiparesis from previous CVA.  -CT head

## 2023-08-03 NOTE — THERAPY
Occupational Therapy   Initial Evaluation     Patient Name: Alireza Hernandez  Age:  87 y.o., Sex:  male  Medical Record #: [id number]  Today's Date: 8/3/2023    Precautions: Fall Risk, Swallow Precautions  Comments: h/o CVA, expressive aphasia    Assessment  Patient is 87 y.o. male with history of recent ischemic stroke and residual right-sided hemiparesis who presented to the emergency room for loss of consciousness episode on 8/2/2023. Prior to admission, pt reports living in SNF & required assistance from caregivers to complete all ADLs & IADLs. Pt has RUE flexor tone limiting independence in BADLs. Pt has expressive aphasia at baseline but is able to communicate when given extra time. Pt was incontinent upon arrival and required total assist to clean. Pt completed rolling to R ModA HOB flat & required max assist for supine<>sit. While seated EOB, pt was able to maintain upright position with CGA. Pt BP throughout session: 99/ 44 supine & 112/56 seated EOB. Recommend post-acute placement for additional OT services prior to d/c home.     Plan    Occupational Therapy Initial Treatment Plan   Treatment Interventions: Self Care / Activities of Daily Living, Adaptive Equipment, Neuro Re-Education / Balance, Therapeutic Exercises, Therapeutic Activity  Treatment Frequency: 3 Times per Week  Duration: Until Therapy Goals Met    DC Equipment Recommendations: Unable to determine at this time  Discharge Recommendations: Recommend post-acute placement for additional occupational therapy services prior to discharge home      Objective     08/03/23 1202   Prior Living Situation   Prior Services Continuous (24 Hour) Care Giving Per Service   Housing / Facility Skilled Nursing Facility   Lives with - Patient's Self Care Capacity Attendant / Paid Care Giver   Comments Pt reports living in skilled nursing facility   Prior Level of ADL Function   Self Feeding Requires Assist   Grooming / Hygiene Dependent   Bathing Dependent    Dressing Requires Assist   Toileting Dependent   Comments Pt reports living in a SNF & caregivers help with all ADLs.   Prior Level of IADL Function   Comments Pt reports living in SNF & caregivers assist with IADLs   Precautions   Precautions Fall Risk;Swallow Precautions   Comments h/o CVA, expressive aphasia   Vitals   Pulse 65   Patient BP Position Supine   Blood Pressure  99/43   O2 Delivery Device None - Room Air   Vitals Comments Seated EOB /56   Pain   Pain Scales 0 to 10 Scale    Intervention Declines   Pain 0 - 10 Group   Therapist Pain Assessment Post Activity Pain Same as Prior to Activity;Nurse Notified;0   Cognition    Cognition / Consciousness X   Speech/ Communication Expressive Aphasia   Level of Consciousness Alert   Comments Pt was pleasant & cooperative   Passive ROM Upper Body   Passive ROM Upper Body X   Comments flexor tone present RUE since recent CVA in July, able to fully extend with facilitation and verbal cues. LUE WFL   Active ROM Upper Body   Active ROM Upper Body  X   Dominant Hand Right   Comments RUE resting in flexon at baseline, pt unable to actively extend elbow without facilitation. LUE WFL   Strength Upper Body   Comments RUE impaired 2/2 recent CVA   Upper Body Muscle Tone   Upper Body Muscle Tone  X   Rt Upper Extremity Muscle Tone Hypertonic;Non Functional   Coordination Upper Body   Comments LUE WDL, RUE flexor tone   Balance Assessment   Sitting Balance (Static) Fair -   Sitting Balance (Dynamic) Trace +   Weight Shift Sitting Absent   Weight Shift Standing Absent   Comments Pt limited by weakness in BLE.   Bed Mobility    Supine to Sit Maximal Assist   Sit to Supine Maximal Assist   Scooting Total Assist   Rolling Moderate Assist to Rt.   Comments w/HOB flat   ADL Assessment   Eating Minimal Assist   Upper Body Dressing Maximal Assist   Lower Body Dressing Maximal Assist   Toileting Total Assist   How much help from another person does the patient currently need...    Putting on and taking off regular lower body clothing? 2   Bathing (including washing, rinsing, and drying)? 2   Toileting, which includes using a toilet, bedpan, or urinal? 1   Putting on and taking off regular upper body clothing? 2   Taking care of personal grooming such as brushing teeth? 2   Eating meals? 3   6 Clicks Daily Activity Score 12   Functional Mobility   Sit to Stand Unable to Participate   Bed, Chair, Wheelchair Transfer Unable to Participate   Toilet Transfers Unable to Participate   Mobility bed mobility   Comments EOB only, session limited by x-ray at bedside   Activity Tolerance   Sitting Edge of Bed 8min   Comments Pt limited due to RLE weakness & hypotension   Patient / Family Goals   Patient / Family Goal #1 to be able to complete ADL transfers   Short Term Goals   Short Term Goal # 1 pt will complete g/h seated EOB with set up with Chandan for sitting balance   Short Term Goal # 2 Pt will complete ADL transfers safely with ModA   Short Term Goal # 3 Pt will complete UB dressing with ModA   Education Group   Education Provided Role of Occupational Therapist   Role of Occupational Therapist Patient Response Patient;Acceptance;Explanation;Verbal Demonstration   Occupational Therapy Initial Treatment Plan    Treatment Interventions Self Care / Activities of Daily Living;Adaptive Equipment;Neuro Re-Education / Balance;Therapeutic Exercises;Therapeutic Activity   Treatment Frequency 3 Times per Week   Duration Until Therapy Goals Met   Problem List   Problem List Decreased Active Daily Living Skills;Decreased Homemaking Skills;Decreased Functional Mobility;Decreased Activity Tolerance;Decreased Upper Extremity AROM Right;Decreased Upper Extremity PROM Right   Anticipated Discharge Equipment and Recommendations   DC Equipment Recommendations Unable to determine at this time   Discharge Recommendations Recommend post-acute placement for additional occupational therapy services prior to discharge home

## 2023-08-03 NOTE — ASSESSMENT & PLAN NOTE
The patient had a chest xray in the ER and showed left pleural effusion, labs done including CBC are witching normal limits at the moment.   The patient is asymptomatic at the moment tolerating room air, no chest pain.

## 2023-08-03 NOTE — DISCHARGE PLANNING
Case Management Discharge Planning    Admission Date: 8/2/2023  GMLOS:    ALOS: 0    6-Clicks ADL Score: 12  6-Clicks Mobility Score:    PT and/or OT Eval ordered: Yes  Post-acute Referrals Ordered: No  Post-acute Choice Obtained: NA  Has referral(s) been sent to post-acute provider:  HARISH      Anticipated Discharge Dispo: Discharge Disposition: Discharged to home/self care (01)    DME Needed: No    Action(s) Taken: Patient discussed at ADT rounds with medical team, will follow for further discharge needs. Chart reviewed. No further needs at this time.     Escalations Completed: None    Medically Clear: No    Next Steps: Follow care.    Barriers to Discharge: Medical clearance    Is the patient up for discharge tomorrow: No

## 2023-08-03 NOTE — ASSESSMENT & PLAN NOTE
The patient has been having normal BP and recently started having high blood pressures (patient has a previous Hx of hypertension) the patient is asymptomatic at the moment, no longer in fluids.   -resolved

## 2023-08-03 NOTE — PROGRESS NOTES
4 Eyes Skin Assessment Completed by KRISTIN Root and KRISTIN Taylor.    Head WDL  Ears WDL  Nose WDL  Mouth WDL  Neck WDL  Breast/Chest WDL  Shoulder Blades WDL  Spine WDL  (R) Arm/Elbow/Hand WDL  (L) Arm/Elbow/Hand WDL  Abdomen WDL  Groin Redness  Scrotum/Coccyx/Buttocks Redness and Non-Blanching pressure injury  (R) Leg WDL  (L) Leg WDL  (R) Heel/Foot/Toe Scab  (L) Heel/Foot/Toe WDL          Devices In Places Tele Box and Condom Cath      Interventions In Place Waffle Overlay, Pillows, and Heels Loaded W/Pillows    Possible Skin Injury Yes    Pictures Uploaded Into Epic Yes  Wound Consult Placed Yes  RN Wound Prevention Protocol Ordered Yes

## 2023-08-03 NOTE — PROGRESS NOTES
Notified provider that his BP is 88/54 and patient is asymptomatic. Provider order a bolus of 500ml.

## 2023-08-03 NOTE — PROGRESS NOTES
[facility] Internal Medicine Daily Progress Note    Date of Service  8/3/2023    [facility] Team: R IM Purple Team   Attending: Nabil Navarro M.d.  Senior Resident: Dr. Connell   Intern:  Dr. Fuller  Contact Number: [phone]    Chief Complaint  Alireza Hernandez 87 y.o. male with history of A-fib, stroke, hypertension. The patient presented to the ED today after loosing consciousness at the neurologist office while having follow/up. He was given a small amount of IV fluids before coming to the ED, improving the patient condition.      Denies: fever, chills, chest pain, headache, SOB, palpitations.     Hospital Course  The patient was admitted with IV fluids.   It had a xray and shoed a left pleural effusion.   It was send a CT of head showing no intracanal hemorrhage, or stroke.   EEG was normal.   Echocardiogram is pending.      Interval Problem Update  The patient was evaluated today, and he oriented on time, place and person. The patient was stable and communicating efficiently.  Her sister was called to ask about major procedures for which she said she will prefer the patient not having major procedures.   Echocardiogram is pending  EEG had today:     Normal EEG in awake and drowsy state. There were no distinct epileptiform abnormalities, lateralizing changes or organized electrographic seizure activity noted during this recording.   Blood cultures are still pending.     I have discussed this patient's plan of care and discharge plan at IDT rounds today with Case Management, Nursing, Nursing leadership, and other members of the IDT team.      Code Status  DNAR/DNI    Disposition  The patient is not medically cleared for discharge to home or a post-acute facility.      I have placed the appropriate orders for post-discharge needs.    Review of Systems  Review of Systems   Constitutional:  Negative for chills and fever.   HENT:  Negative for hearing loss.    Eyes:  Negative for blurred vision and double vision.    Respiratory:  Negative for cough.    Cardiovascular:  Negative for chest pain, palpitations and orthopnea.   Gastrointestinal:  Negative for nausea and vomiting.   Genitourinary:  Positive for dysuria.   Musculoskeletal:  Negative for myalgias.   Skin:  Positive for rash.   Neurological:  Negative for dizziness and headaches.        Physical Exam  Temp:  [35.8 °C (96.5 °F)-36.5 °C (97.7 °F)] 36.3 °C (97.4 °F)  Pulse:  [] 88  Resp:  [16-20] 18  BP: ()/(43-91) 110/74  SpO2:  [92 %-96 %] 94 %    Physical Exam  HENT:      Head: Normocephalic.      Mouth/Throat:      Mouth: Mucous membranes are moist.   Eyes:      General: No scleral icterus.  Cardiovascular:      Rate and Rhythm: Normal rate.      Heart sounds: No murmur heard.  Pulmonary:      Effort: Pulmonary effort is normal.      Breath sounds: Rales present.   Abdominal:      General: There is no distension.   Musculoskeletal:         General: No swelling.      Cervical back: No rigidity.   Skin:     Capillary Refill: Capillary refill takes less than 2 seconds.   Neurological:      General: No focal deficit present.      Mental Status: He is alert and oriented to person, place, and time.         Fluids    Intake/Output Summary (Last 24 hours) at 8/3/2023 1623  Last data filed at 8/3/2023 1603  Gross per 24 hour   Intake 360 ml   Output 500 ml   Net -140 ml       Laboratory  Recent Labs     08/02/23  1207 08/03/23  0013   WBC 16.5* 12.4*   RBC 4.97 4.12*   HEMOGLOBIN 13.3* 10.9*   HEMATOCRIT 42.6 35.3*   MCV 85.7 85.7   MCH 26.8* 26.5*   MCHC 31.2* 30.9*   RDW 51.7* 52.8*   PLATELETCT 244 192   MPV 10.4 10.6     Recent Labs     08/02/23  1207 08/03/23  0815   SODIUM 138 141   POTASSIUM 4.4 3.6   CHLORIDE 102 109   CO2 21 23   GLUCOSE 135* 88   BUN 19 15   CREATININE 1.02 0.65   CALCIUM 9.4 8.0*                   Imaging  DX-CHEST-LIMITED (1 VIEW)   Final Result      Stable appearance of the chest.      CT-HEAD W/O   Final Result      1. No CT evidence  of acute infarct, hemorrhage or mass.   2. Moderate global parenchymal atrophy. Chronic small vessel ischemic changes.   3. Old left frontal infarcts.   4. Old left pontine infarct.   5. Chronic sphenoid sinus disease.      DX-CHEST-PORTABLE (1 VIEW)   Final Result         Layering left pleural effusion with left basilar opacity.      EC-ECHOCARDIOGRAM COMPLETE W/O CONT    (Results Pending)        Assessment/Plan  Problem Representation:    * Hypotension- (present on admission)  Assessment & Plan  The patient had an hypotensive event while he was in neurology clinic, the patient had small amount of fluids improving the patient condition.   -start with bolus 500cc NS.  -continue 75cc/hr after the bolus.   -hold antihypertensive medications       Pleural effusion- (present on admission)  Assessment & Plan  The patient had a chest xray in the ER and showed left pleural effusion, the patient mentioned he haven't had any aspiration of food. CBC showed elevated WBC: >16, with left shift, the patient does not present any fever, chills or altered mental status.   -NPO at midnight until speech therapist evaluates   -speech therapist evaluation  -procalcitonin stat and then every 4 hours  -lactid acid every 4 hours   -Blood culture X2 one during admission   -finger stick blood sugar every 4 hours  -CBC with differential AM          Syncope and collapse- (present on admission)  Assessment & Plan  The patient mentioned he did not remember losing consciousness and mentioned he had a similar episode a year ago. Will evaluate for cardiogenic syncope or neurologic syncope.   -EKG: had one at ED which resulted on Atrial fibrillation, otherwise normal.  -continue telemetry   -evaluate for orthostatic changes, is >20/10 in change  -Echocardiogram   -EEC  -CT of the head      Chronic atrial fibrillation (HCC)  Assessment & Plan  -continue telemetry  -hold apixaban until ruling out intracranial hemorrhage in CT head  -hold metoprolol  due to hypotension       Sacral ulcer (HCC)  Assessment & Plan  The patient presented in physical examination a pressure ulcer in the sacral area.   -pressure ulcer protocol          VTE prophylaxis: Eliquis tablet 5 mg BID    I have performed a physical exam and reviewed and updated ROS and Plan today (8/3/2023). In review of yesterday's note (8/2/2023), there are no changes except as documented above.

## 2023-08-03 NOTE — DISCHARGE PLANNING
Ongoing medical management as well as therapy need. Anticipate post acute services to facilitate a successful transition to community home with support. Please consider a PM&R referral to assist with discharge planning. [facility] insurance provider.

## 2023-08-03 NOTE — ED NOTES
Med rec completed per med list Faxed from [facility]. They did not send a MAR with last doses. Unknown last doses.

## 2023-08-03 NOTE — ASSESSMENT & PLAN NOTE
The patient had a syncope, evaluation for neurologic and cardiogenic was done and test and images where negative.   -telemetry  -resolved

## 2023-08-03 NOTE — H&P
[facility] Internal Medicine History & Physical Note    Date of Service  8/2/2023    [facility] Team: [facility] Purple Team   Attending: Nabil Navarro M.d.  Senior Resident: Dr. Castano  Intern:  Dr. Fuller  Contact Number: [phone]    Primary Care Physician  GEOVANI Layton    Consultants  neurology    Specialist Names: Dr. Fuller    Code Status  DNAR/DNI    Chief Complaint  Chief Complaint   Patient presents with    Syncope     Pt was at doctors office for neuro checkup when MD noticed pt was not responsive. BP for fire was 58/40. Pt was placed in gurney, placed in trendelenburg, BP went to 90/50.       History of Presenting Illness (HPI): (you need ?4 components of the chief complaint such as onset, location, duration, character, alleviating / exacerbating factors, radiation, timing , severity)  Alireza Hernandez 87 y.o. male with history of A-fib, stroke, hypertension. The patient presented to the ED today after loosing consciousness at the neurologist office while having follow/up. He was given a small amount of IV fluids before coming to the ED, improving the patient condition.     Denies: fever, chills, chest pain, headache, SOB, palpitations.      Review of Systems  Review of Systems   Constitutional:  Positive for malaise/fatigue and weight loss. Negative for chills, diaphoresis and fever.   HENT:  Negative for nosebleeds.    Eyes:  Negative for blurred vision and double vision.   Respiratory:  Negative for cough, hemoptysis, shortness of breath and wheezing.    Cardiovascular:  Negative for chest pain, palpitations, orthopnea and leg swelling.   Gastrointestinal:  Negative for abdominal pain, constipation, diarrhea, nausea and vomiting.   Genitourinary:  Negative for dysuria.   Musculoskeletal:  Negative for back pain, joint pain and myalgias.   Skin:  Positive for itching.   Neurological:  Positive for tingling, focal weakness and loss of consciousness. Negative for dizziness, tremors and headaches.    Psychiatric/Behavioral:  Negative for substance abuse.        Past Medical History   has a past medical history of A-fib (HCC), Acute pulmonary edema (HCC) (03/15/2023), BPH (benign prostatic hyperplasia), Cancer (HCC), Hypertension, Psychiatric problem (2022), and Stroke (HCC) (2018).    Surgical History   has a past surgical history that includes hip replacement, total (Right, 2004).     Family History  family history includes No Known Problems in his father and mother.   Family history reviewed with patient.     Social History  Tobacco: No smoker   Alcohol: No alcohol   Recreational drugs (illegal or prescription): No drugs  Employment: retired  Living Situation: assisting facility   Recent Travel: No  Other (stressors, spirituality, exposures): No    Allergies  No Known Allergies    Medications  Prior to Admission Medications   Prescriptions Last Dose Informant Patient Reported? Taking?   Calcium Carbonate Antacid (CALCIUM ANTACID ULTRA STRENGTH) 1000 MG Chew Tab  Other Facility Yes No   Sig: Chew 1,000 mg 2 times a day.   Lactobacillus (ACIDOPHILUS PO)   Yes No   Sig: Take  by mouth every day.   acetaminophen (TYLENOL) 325 MG Tab  Other Facility Yes No   Sig: Take 650 mg by mouth every four hours as needed. Indications: Pain   apixaban (ELIQUIS) 5mg Tab   No No   Sig: Take 1 Tablet by mouth 2 times a day. Indications: Thromboembolism secondary to Atrial Fibrillation   bismuth subsalicylate (PEPTO-BISMOL) 262 MG/15ML Suspension   Yes No   Sig: Take 30 mL by mouth every 6 hours as needed (diarrhea).   finasteride (PROSCAR) 5 MG Tab  Other Facility Yes No   Sig: Take 5 mg by mouth every day.   loperamide (IMODIUM) 2 MG Cap   Yes No   Sig: Take 2 mg by mouth 4 times a day as needed for Diarrhea.   losartan (COZAAR) 50 MG Tab  Other Facility Yes No   Sig: Take 75 mg by mouth every day. 75 mg = 1.5 tablets daily   magnesium hydroxide (MILK OF MAGNESIA) 400 MG/5ML Suspension   Yes No   Sig: Take 30 mL by mouth 1  time a day as needed.   metoprolol tartrate (LOPRESSOR) 50 MG Tab  Other Facility No No   Sig: Take 1 Tablet by mouth 2 times a day.   omeprazole (PRILOSEC) 20 MG delayed-release capsule  Other Facility No No   Sig: Take 1 Capsule by mouth every day.   simvastatin (ZOCOR) 20 MG Tab  Other Facility No No   Sig: Take 1 Tablet by mouth every evening.   sore throat spray (CHLORASEPTIC) 1.4 % Liquid   No No   Sig: Use 1 Spray in the mouth or throat every 2 hours as needed (as needed for sore throat).   tamsulosin (FLOMAX) 0.4 MG capsule  Other Facility No No   Sig: Take 1 Capsule by mouth 1/2 hour after breakfast.      Facility-Administered Medications: None       Physical Exam  Temp:  [36.1 °C (96.9 °F)-36.2 °C (97.1 °F)] 36.2 °C (97.1 °F)  Pulse:  [] 80  Resp:  [16-20] 16  BP: ()/(50-55) 107/51  SpO2:  [90 %-97 %] 93 %  Blood Pressure : 94/53   Temperature: 36.2 °C (97.1 °F)   Pulse: 89   Respiration: 18   Pulse Oximetry: 94 %       Physical Exam  HENT:      Head: Normocephalic and atraumatic.      Nose: No congestion.      Mouth/Throat:      Mouth: Mucous membranes are dry.   Eyes:      General: No scleral icterus.  Neck:      Vascular: No carotid bruit.   Cardiovascular:      Rate and Rhythm: Normal rate and regular rhythm.      Heart sounds:      No friction rub. No gallop.   Pulmonary:      Breath sounds: Rales present.   Abdominal:      General: There is no distension.      Tenderness: There is no abdominal tenderness.      Hernia: No hernia is present.   Musculoskeletal:         General: No swelling or deformity.      Cervical back: No tenderness.      Right lower leg: No edema.      Left lower leg: No edema.   Skin:     Capillary Refill: Capillary refill takes less than 2 seconds.      Findings: Rash present.   Neurological:      General: No focal deficit present.      Mental Status: He is alert.         Laboratory:  Recent Labs     08/02/23  1207   WBC 16.5*   RBC 4.97   HEMOGLOBIN 13.3*    HEMATOCRIT 42.6   MCV 85.7   MCH 26.8*   MCHC 31.2*   RDW 51.7*   PLATELETCT 244   MPV 10.4     Recent Labs     08/02/23  1207   SODIUM 138   POTASSIUM 4.4   CHLORIDE 102   CO2 21   GLUCOSE 135*   BUN 19   CREATININE 1.02   CALCIUM 9.4     Recent Labs     08/02/23  1207   ALTSGPT 18   ASTSGOT 14   ALKPHOSPHAT 83   TBILIRUBIN 1.0   GLUCOSE 135*         No results for input(s): NTPROBNP in the last 72 hours.      Recent Labs     08/02/23  1207   TROPONINT 39*       Imaging:  CT-HEAD W/O   Final Result      1. No CT evidence of acute infarct, hemorrhage or mass.   2. Moderate global parenchymal atrophy. Chronic small vessel ischemic changes.   3. Old left frontal infarcts.   4. Old left pontine infarct.   5. Chronic sphenoid sinus disease.      DX-CHEST-PORTABLE (1 VIEW)   Final Result         Layering left pleural effusion with left basilar opacity.      EC-ECHOCARDIOGRAM COMPLETE W/O CONT    (Results Pending)           Assessment/Plan:  Problem Representation:   I anticipate this patient is appropriate for observation status at this time because the patient needs further evaluation because he has Afib and a previous history of ACV.        Patient will need a Telemetry bed on MEDICAL service .  The need is secondary to Afib.    * Hypotension- (present on admission)  Assessment & Plan  The patient had an hypotensive event while he was in neurology clinic, the patient had small amount of fluids improving the patient condition.   -start with bolus 500cc NS.  -continue 75cc/hr after the bolus.   -hold antihypertensive medications       Pleural effusion- (present on admission)  Assessment & Plan  The patient had a chest xray in the ER and showed left pleural effusion, the patient mentioned he haven't had any aspiration of food. CBC showed elevated WBC: >16, with left shift, the patient does not present any fever, chills or altered mental status.   -NPO at midnight until speech therapist evaluates   -speech therapist  evaluation  -procalcitonin stat and then every 4 hours  -lactid acid every 4 hours   -Blood culture X2 one during admission   -finger stick blood sugar every 4 hours  -CBC with differential AM          Syncope and collapse- (present on admission)  Assessment & Plan  The patient mentioned he did not remember losing consciousness and mentioned he had a similar episode a year ago. Will evaluate for cardiogenic syncope or neurologic syncope.   -EKG: had one at ED which resulted on Atrial fibrillation, otherwise normal.  -continue telemetry   -evaluate for orthostatic changes, is >20/10 in change  -Echocardiogram   -EEC  -CT of the head      Chronic atrial fibrillation (HCC)  Assessment & Plan  -continue telemetry  -hold apixaban until ruling out intracranial hemorrhage in CT head  -hold metoprolol due to hypotension       Sacral ulcer (HCC)  Assessment & Plan  The patient presented in physical examination a pressure ulcer in the sacral area.   -pressure ulcer protocol         VTE prophylaxis: on hold until CT of head rules out intracanal hemorrhage

## 2023-08-03 NOTE — ASSESSMENT & PLAN NOTE
The patient presented in physical examination a pressure ulcer in the sacral area.   -pressure ulcer protocol

## 2023-08-03 NOTE — ED NOTES
Report to Ivett FOSTER. Pt being transported to [facility] in stable condition with ACLS RN on zoll. All belongings with pt

## 2023-08-03 NOTE — ED NOTES
Pts briefs changed. Pt denies any further needs. [place] Equal chest rise and fall. When changing pt, this RN noticed pt had a pressure ulcer to his sacral area. Wound cleaned and dried

## 2023-08-03 NOTE — THERAPY
"Speech Language Pathology   Clinical Swallow Evaluation     Patient Name: Alireza Hernandez  AGE:  87 y.o., SEX:  male  Medical Record #: [id number]  Date of Service: 8/3/2023      History of Present Illness  Patient is a 87 y.o. male who presented on 2023 with hypotension (58/40) while being seen by neurology. 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    PMHx Afib, Acute pulmonary edema, BPH, HTN, Cancer, CVA    Last seen by service:  2023 - SB6/MT2  2023 - Communication Eval; \"transcortical motor aphasia as evidenced by word-finding deficits, halting, paraphasias, and strength in repetition\"  2023 - FEES; \"mild-moderate oropharyngeal dysphagia\" PAS 8 w/ TN0, rec SB6/MT2    CMHx Hypotension, Pleural effusion, Syncope & collapse, chronic Afib, Sacral ulcer    CXR 2023  Layering left pleural effusion with left basilar opacity.    CT-Head 2023  1. No CT evidence of acute infarct, hemorrhage or mass.  2. Moderate global parenchymal atrophy. Chronic small vessel ischemic changes.  3. Old left frontal infarcts.  4. Old left pontine infarct.  5. Chronic sphenoid sinus disease.      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 Pt received awake, receptive/expressive language deficits (baseline), eager for PO intake.      Assessment  Current Method of Nutrition: NPO until cleared by speech pathology  Positioning: Macario's (60-90 degrees)  Bolus Administration: Patient    O2 Delivery Device: None - Room Air  Factor(s) Affecting Performance: Other (see comments)  Tracheostomy : No         Swallowing Trials:  Swallowing Trials  Ice: WFL  Thin Liquid (TN0): Impaired  Mildly Thick Liquid (MT2): WFL  Liquidised (LQ3): WFL  Regular (RG7): WFL      Comments: Patient needing 1:1 feeding A. Adequate oral bolus acceptance and containment. Adequate labial seal w/ straw and appropriate bolus stripping from utensil. Prolonged but functional mastication of solid consistencies. Pharyngeal swallow response appeared timely. Mild lingual residue with dry solids, cleared with mildly thick liquid rinse. Wet/gurgly vocal quality appreciated with trials of thin liquids, concerning for airway invasion.       Clinical Impressions  Patient presents with clinical signs of oropharyngeal dysphagia suspect acute on chronic r/t h/o of CVA and generalized weakness. FEES study completed during recent hospital admission resulting in \"Mild-moderate oropharyngeal dysphagia.\"  At that short-term diet was recommended d/t silent aspiration of thin liquids. Recommend SB6/MT2 diet at this time and repeat diagnostic evaluation prior to diet advancement. SLP will continue to follow and complete study as appropriate/able.       Recommendations  Diet Consistency: Soft/bite sized solids and mildly thick liquids  Instrumentation: Instrumental swallow study pending clinical progress  Medication: Crush with applesauce, as appropriate, Crush with pudding/puree, as appropriate, As tolerated  Supervision: " "1:1 feeding with constant supervision, Assist with meal tray set up, Encourage self-feeding  Positioning: Fully upright and midline during oral intake  Risk Management : Small bites/sips, Slow rate of intake  Oral Care: BID         SLP Treatment Plan  Treatment Plan: Dysphagia Treatment, Patient/Family/Caregiver Training  SLP Frequency: 3x Per Week  Estimated Duration: Until Therapy Goals Met      Anticipated Discharge Needs  Discharge Recommendations: Recommend post-acute placement for additional speech therapy services prior to discharge home   Therapy Recommendations Upon DC: Dysphagia Training, Patient / Family / Caregiver Education        Patient / Family Goals  Patient / Family Goal #1: \"I need water\"  Short Term Goals  Short Term Goal # 1: Patient will consume a diet of soft/bite sized solids and mildly thick liquids with no overt s/sx of aspiration given 1:1 feeding DAMON Finch MS,CCC-SLP    "

## 2023-08-04 PROBLEM — R19.7 FREQUENT LOOSE STOOLS: Status: ACTIVE | Noted: 2023-01-01

## 2023-08-04 NOTE — PROGRESS NOTES
Daily Progress Note:     Date of Service: 8/4/2023  Primary Team: [facility] IM Purple Team   Attending: Nabil Navarro M.D.   Senior Resident: Freddy Elkins D.O.  Contact:  [phone]    Patient Identifier:   Mr. Hernandez is an 87-year-old male with past medical history of atrial fibrillation, CVA with right-sided deficits, hypertension who was initially seen on 8/2 after being sent to the ED by his neurologist after having an episode of hypotension and syncope while in the neurology office.  Patient did improve with fluid resuscitation, imaging findings have been unremarkable.  Of note, patient started to have loose, mucousy bowel movements with a known recent history of C. difficile from 4/2023.    Subjective:  Patient states that he is doing well, is concerned that he has not gotten food yet.  Has been able to urinate without issues, states that he has been able to have multiple bowel movements and states that he does have issues with incontinence given his debility.    Interval Update:  -No significant events overnight.  - Patient did start to have more frequent watery and mucousy bowel movements.  Per nursing, concern for C. difficile, chart review does indicate that the patient does have a recent history of active C. difficile from 4/2023.  No other significant risk factors however cannot absolutely rule out therefore will be started on contact precautions.  - Expected to DC back to [facility], however C. difficile rule out may complicate discharge planning.    Consultants/Specialty:  N/A    Review of Systems:  Review of Systems   Constitutional:  Positive for malaise/fatigue. Negative for chills and fever.   HENT:  Negative for hearing loss.    Eyes:  Negative for blurred vision and double vision.   Respiratory:  Negative for cough.    Cardiovascular:  Negative for chest pain, palpitations and orthopnea.   Gastrointestinal:  Positive for diarrhea. Negative for nausea and vomiting.   Genitourinary:  Negative for  dysuria and frequency.   Musculoskeletal:  Negative for myalgias.   Neurological:  Positive for focal weakness. Negative for dizziness and headaches.   Psychiatric/Behavioral:  Negative for depression and suicidal ideas. The patient is not nervous/anxious.      Objective:   Vitals:   Temp:  [35.9 °C (96.6 °F)-36.3 °C (97.4 °F)] 36.1 °C (97 °F)  Pulse:  [78-94] 78  Resp:  [18-20] 18  BP: (106-152)/(49-84) 152/83  SpO2:  [90 %-95 %] 90 %    Physical Exam  Constitutional:       General: He is not in acute distress.     Appearance: He is normal weight. He is not diaphoretic.   HENT:      Head: Normocephalic and atraumatic.      Right Ear: External ear normal.      Left Ear: External ear normal.      Nose: Nose normal.      Mouth/Throat:      Mouth: Mucous membranes are moist.      Pharynx: Oropharynx is clear.   Eyes:      General: No scleral icterus.     Extraocular Movements: Extraocular movements intact.      Pupils: Pupils are equal, round, and reactive to light.   Cardiovascular:      Rate and Rhythm: Normal rate. Rhythm irregular.      Pulses: Normal pulses.      Heart sounds: Normal heart sounds. No murmur heard.  Pulmonary:      Effort: Pulmonary effort is normal. No respiratory distress.      Breath sounds: Normal breath sounds. No wheezing, rhonchi or rales.   Abdominal:      General: Abdomen is flat. Bowel sounds are normal. There is no distension.      Palpations: Abdomen is soft.      Tenderness: There is no abdominal tenderness. There is no right CVA tenderness, left CVA tenderness or rebound.   Musculoskeletal:         General: No tenderness. Normal range of motion.      Cervical back: Normal range of motion and neck supple. No rigidity.      Right lower leg: No edema.      Left lower leg: No edema.      Comments: Right-sided muscle strength of 3-4/5, 5/5 on the left side.   Skin:     General: Skin is warm and dry.      Capillary Refill: Capillary refill takes less than 2 seconds.      Coloration: Skin is  not jaundiced.      Findings: No rash.   Neurological:      Mental Status: He is alert and oriented to person, place, and time. Mental status is at baseline.      Cranial Nerves: No cranial nerve deficit.      Deep Tendon Reflexes: Reflexes normal.   Psychiatric:         Mood and Affect: Mood normal.         Behavior: Behavior normal.     Labs:   Lab Results   Component Value Date/Time    WBC 7.6 08/04/2023 03:14 AM    RBC 4.03 (L) 08/04/2023 03:14 AM    HEMOGLOBIN 10.6 (L) 08/04/2023 03:14 AM    HEMATOCRIT 34.8 (L) 08/04/2023 03:14 AM    MCV 86.4 08/04/2023 03:14 AM    MCH 26.3 (L) 08/04/2023 03:14 AM    MCHC 30.5 (L) 08/04/2023 03:14 AM    MPV 11.6 08/04/2023 03:14 AM    NEUTSPOLYS 71.40 08/03/2023 12:13 AM    LYMPHOCYTES 17.20 (L) 08/03/2023 12:13 AM    MONOCYTES 8.00 08/03/2023 12:13 AM    EOSINOPHILS 2.70 08/03/2023 12:13 AM    BASOPHILS 0.20 08/03/2023 12:13 AM        Lab Results   Component Value Date/Time    SODIUM 141 08/04/2023 03:14 AM    POTASSIUM 3.8 08/04/2023 03:14 AM    CHLORIDE 112 08/04/2023 03:14 AM    CO2 18 (L) 08/04/2023 03:14 AM    GLUCOSE 78 08/04/2023 03:14 AM    BUN 12 08/04/2023 03:14 AM    CREATININE 0.58 08/04/2023 03:14 AM        Lab Results   Component Value Date/Time    PROTHROMBTM 15.3 (H) 07/11/2023 10:57 AM    INR 1.22 (H) 07/11/2023 10:57 AM      Imaging:   Echocardiogram performed on 8/4, pending official read.    Assessment and Plan:    * Hypotension- (present on admission)  Assessment & Plan  Patient was being seen in the neurology clinic, had an episode of severe hypotension and unresponsiveness.  Received a small amount of fluids with improvement, and was sent to the ED for further evaluation.  -Continue to encourage p.o. intake.  -Can utilize small fluid boluses with 500 cc of normal saline at a time.  -Can discontinue maintenance IV fluids.  -Continue to hold antihypertensive medications at this time.    Frequent loose stools  Assessment & Plan  Per nursing, started  yesterday.  Patient does have a history of C. difficile in April 2023.  Low suspicion as patient does not have any recent antibiotic use however given recent history of C. difficile, cannot absolutely rule out infection given presentation of watery, mucousy bowel movements.  - We will initiate contact plus precautions.  - C. difficile assay is pending.    Chronic atrial fibrillation (HCC)  Assessment & Plan  Known history.  Imaging has been unremarkable.  - Continue home medications.  - Continue to monitor.    Sacral ulcer (HCC)  Assessment & Plan  The patient presented in physical examination a pressure ulcer in the sacral area.   - Continue to monitor.  - Pressure ulcer protocol with repositioning.    Pleural effusion- (present on admission)  Assessment & Plan  Small pleural effusion noted, patient did have an elevated CBC on admission.  Possibly reactive leukocytosis.  Of note, chart review indicates that patient did have a previous pleural effusion which seems to have decreased in size.  - We will continue to monitor.  - Will likely require repeat chest x-ray in 1 to 4 weeks to monitor for changes.  - If worsening shortness of breath, increasing pleural effusion, signs of systemic infection, can consider repeat chest x-ray and possible thoracentesis.    Syncope and collapse- (present on admission)  Assessment & Plan  Likely in the setting of dehydration.  - Echocardiogram pending.  - EEG, head CT unremarkable.  - Continue to monitor.      Code Status: DNR/DNI  DVT prophylaxis: Eliquis 5 mg twice daily  Diet: Regular diet  GI: Bowel protocol placed  Disposition: Pending C. difficile rule out, likely discharge back to [facility] ([place]    Freddy Elkins DO, MPH  PGY-3 Internal Medicine

## 2023-08-04 NOTE — CARE PLAN
The patient is Stable - Low risk of patient condition declining or worsening    Shift Goals  Clinical Goals:  (Q4 neuro checks, Q4 glucose checks, monitor BP, tele monitoring)  Patient Goals: adequate rest and hydration  Family Goals: MARGIE    Progress made toward(s) clinical / shift goals:    Problem: Skin Integrity  Goal: Skin integrity is maintained or improved  Outcome: Progressing  Note: Assessed pt's coccyx, left hip, and heels. Appearance is the same, no changes to note at this time. The left hip is covered and packed. Implemented foot drop boots, heel protectors, Q2 turns, waffle overlay, and frequently changing the pt if incontinent. Educated the pt to use the call light if they need help with repositioning.      Problem: Fall Risk  Goal: Patient will remain free from falls  Outcome: Progressing  Note: Implemented fall precautions such as bed locked, lowered, bed alarm is activated and call light within reach. Pt is a high fall risk but very cooperative. Pt follows commands and states his understanding of the fall precautions education.

## 2023-08-04 NOTE — PROGRESS NOTES
Report received from night shift RN. Pt resting in bed. Introduced self to pt. Pt reports no pain at this time and all other needs are met. No changes to report at this time, will continue to monitor I/O's. Bed alarm on and call light within reach.

## 2023-08-04 NOTE — THERAPY
Physical Therapy   Education     Patient Name: Alireza Hernandez  Age:  87 y.o., Sex:  male  Medical Record #: [id number]  Today's Date: 8/4/2023     Precautions  Precautions: Fall Risk;Swallow Precautions  Comments: h/o CVA, expressive aphasia    Assessment  Pt is an 87 year old male who presents due to hypotension and lethargy. Pt with recent acute CVA and was not a thrombectomy candidate. PMH significant for a-fib, stroke, HTN. Pt declined EOB mobility and explained in detail his current living situation at [facility]. Pt states he receives assistance for all mobility and transfers and spends his day in a wheelchair. Pt was educated on skin integrity strategies to prevent pressure ulcers. He states he does have HH therapies services who come see him 1-2 times a week. Pt appears and states he is at his new functional baseline. At this time acute PT will not follow due to this.     Plan    Physical Therapy Initial Treatment Plan   Duration: Other (See Comments) (education only)    DC Equipment Recommendations: None (pt has WC at [facility])  Discharge Recommendations: Recommend home health for continued physical therapy services (pt states he has HH services already at [facility])       Objective     08/04/23 0950   Initial Contact Note    Initial Contact Note Order Received and Verified, Evaluation Only - Patient Does Not Require Further Acute Physical Therapy at this Time.  However, May Benefit from Post Acute Therapy for Higher Level Functional Deficits.  (only charged SC)   Precautions   Precautions Fall Risk;Swallow Precautions   Pain 0 - 10 Group   Therapist Pain Assessment Post Activity Pain Same as Prior to Activity;Nurse Notified  (no c/o)   Prior Living Situation   Prior Services Continuous (24 Hour) Care Giving Per Service   Housing / Facility Long Term Care Facility   Lives with - Patient's Self Care Capacity Attendant / Paid Care Giver   Comments Pt lives at [facility] in [place]. Pt states  that he gets HH therapies 1-2 times a week and staff at [facility] perform all mobility tasks for him.   Prior Level of Functional Mobility   Bed Mobility Dependent   Transfer Status Dependent   Wheelchair Dependent   Cognition    Speech/ Communication Expressive Aphasia   Comments Pt able to communicate with a few cues for word finding and increased time.   Other Treatments   Other Treatments Provided Discussed mobility levels with patient. Pt declined sitting at EOB at this time. Pt states that [facility] staff perfrom all transfers for him and he spends the majority of his day in a wheelchair. Pt states that he does have HH services at this time. Pt appears to be at or very near his new functional baseline. Pt was educated on weight shifting while up in chair every 30 minutes to prevent pressure wounds. Pt educated to return to bed for a 1-2 hour period mid day to prevent pressure sores. Pt stated understanding. Due to being near or at functional baseline, acute PT will not follow at this time.   Balance Assessment   Comments declined   Bed Mobility    Comments declined   Functional Mobility   Mobility politely declined   How much difficulty does the patient currently have...   Turning over in bed (including adjusting bedclothes, sheets and blankets)? 1   Sitting down on and standing up from a chair with arms (e.g., wheelchair, bedside commode, etc.) 1   Moving from lying on back to sitting on the side of the bed? 1   How much help from another person does the patient currently need...   Moving to and from a bed to a chair (including a wheelchair)? 1   Need to walk in a hospital room? 1   Climbing 3-5 steps with a railing? 1   6 clicks Mobility Score 6   Education Group   Education Provided Role of Physical Therapist  (skin integrity)   Physical Therapy Initial Treatment Plan    Duration Other (See Comments)  (education only)   Anticipated Discharge Equipment and Recommendations   DC Equipment Recommendations  None  (pt has WC at [facility])   Discharge Recommendations Recommend home health for continued physical therapy services  (pt has HH services already at [facility])   Interdisciplinary Plan of Care Collaboration   IDT Collaboration with  Nursing   Patient Position at End of Therapy In Bed;Call Light within Reach;Bed Alarm On;Tray Table within Reach   Collaboration Comments Edu only- declined EOB   Session Information   Date / Session Number  8/4- no needs

## 2023-08-04 NOTE — CARE PLAN
The patient is Stable - Low risk of patient condition declining or worsening    Shift Goals  Clinical Goals: Monitor BP, Q4 neuro checks.  Patient Goals: Adequate rest  Family Goals: MARGIE    Progress made toward(s) clinical / shift goals:      Problem: Knowledge Deficit - Standard  Goal: Patient and family/care givers will demonstrate understanding of plan of care, disease process/condition, diagnostic tests and medications  Description: Target End Date:  1-3 days or as soon as patient condition allows    Document in Patient Education    1.  Patient and family/caregiver oriented to unit, equipment, visitation policy and means for communicating concern  2.  Complete/review Learning Assessment  3.  Assess knowledge level of disease process/condition, treatment plan, diagnostic tests and medications  4.  Explain disease process/condition, treatment plan, diagnostic tests and medications    Problem: Skin Integrity  Goal: Skin integrity is maintained or improved  Description: Target End Date:  Prior to discharge or change in level of care    Document interventions on Skin Risk/Issa flowsheet groups and corresponding LDA    1.  Assess and monitor skin integrity, appearance and/or temperature  2.  Assess risk factors for impaired skin integrity and/or pressures ulcers  3.  Implement precautions to protect skin integrity in collaboration with interdisciplinary team  4.  Implement pressure ulcer prevention protocol if at risk for skin breakdown  5.  Confirm wound care consult if at risk for skin breakdown  6.  Ensure patient use of pressure relieving devices  (Low air loss bed, waffle overlay, heel protectors, ROHO cushion, etc)  Outcome: Progressing

## 2023-08-04 NOTE — DISCHARGE PLANNING
Case Management Discharge Planning    Admission Date: 8/2/2023  GMLOS:    ALOS: 0    6-Clicks ADL Score: 12  6-Clicks Mobility Score: 6  PT and/or OT Eval ordered: Yes  Post-acute Referrals Ordered: No  Post-acute Choice Obtained: NA  Has referral(s) been sent to post-acute provider:  HARISH      Anticipated Discharge Dispo: Discharge Disposition: Discharged to home/self care (01)    DME Needed: No    Action(s) Taken: 9560 Provider states may return to [facility], called [facility], spoke with states Maia we sent him to you three times in the last few weeks, how do I know he is at his baseline. Explained MD has medically cleared him to return and he has no concern. He is at his baseline. States she will have their nurse call me. No call received. Called back at 4225 to speak to arcenio Carvalho voice message    Escalations Completed: None    Medically Clear: Yes    Next Steps: Contact [facility]    Barriers to Discharge: Pending Placement    Is the patient up for discharge tomorrow: No

## 2023-08-04 NOTE — PROGRESS NOTES
Talked to patient's sister Kimberly Rodriguez who confirmed that she is patient's POA. They have previously discussed with Neurologist Dr. Joyner about findings of left ICA stenosis. She confirmed that they would not want to purse any invasive management for now which includes stent placement.

## 2023-08-05 PROBLEM — R19.5 LOOSE STOOLS: Status: ACTIVE | Noted: 2023-01-01

## 2023-08-05 NOTE — CARE PLAN
The patient is Stable - Low risk of patient condition declining or worsening    Shift Goals  Clinical Goals: Neuro Checks, VSS  Patient Goals: Go home  Family Goals: MARGIE    Progress made toward(s) clinical / shift goals:    Problem: Knowledge Deficit - Standard  Goal: Patient and family/care givers will demonstrate understanding of plan of care, disease process/condition, diagnostic tests and medications  Outcome: Progressing  Note: Discussed plan of care with patient, discussed call light and importance of calling when in pain or need of assistance.      Problem: Skin Integrity  Goal: Skin integrity is maintained or improved  Outcome: Progressing  Note: Patient turned every 2 hours and as needed, TAPS system in place. Dressing monitored for signs of deterioration.

## 2023-08-05 NOTE — ASSESSMENT & PLAN NOTE
Small pleural effusion noted, patient did have an elevated CBC on admission.  Possibly reactive leukocytosis.  Of note, chart review indicates that patient did have a previous pleural effusion which seems to have decreased in size.  - We will continue to monitor.  - Will likely require repeat chest x-ray in 1 to 4 weeks to monitor for changes.  - If worsening shortness of breath, increasing pleural effusion, signs of systemic infection, can consider repeat chest x-ray and possible thoracentesis.

## 2023-08-05 NOTE — ASSESSMENT & PLAN NOTE
Likely in the setting of dehydration.  - Echocardiogram pending.  - EEG, head CT unremarkable.  - Continue to monitor.

## 2023-08-05 NOTE — ASSESSMENT & PLAN NOTE
The patient presented in physical examination a pressure ulcer in the sacral area.   - Continue to monitor.  - Pressure ulcer protocol with repositioning.

## 2023-08-05 NOTE — PROGRESS NOTES
Report received from night shift RN. Introduced self to pt. Pt is resting in bed, all other needs are met at this time. Bed locked/lowered and call light within reach.

## 2023-08-05 NOTE — PROGRESS NOTES
Daily Progress Note:     Date of Service: 8/5/2023  Primary Team: [facility] Purple Team   Attending: Nabil Navarro M.D.   Senior Resident: Freddy Elkins D.O.  Intern: Jonny BENNETT  Contact:  [phone]    Patient Identifier:   Mr. Hernandez is an 87-year-old male with past medical history of atrial fibrillation, CVA with right-sided deficits, hypertension who was initially seen on 8/2 after being sent to the ED by his neurologist after having an episode of hypotension and syncope while in the neurology office.  Patient did improve with fluid resuscitation, imaging findings have been unremarkable.  Of note, patient started to have loose, mucousy bowel movements with a known recent history of C. difficile from 4/2023.    Subjective:  Patient states that he is doing well, the patient is stable. The patient is oriented a does respond to every question. It was shared that the C. Difficile test was negative. The patient explained he has been having incontinence.     Interval Update:  -No significant events overnight.  - Patient did start to have more frequent watery and mucousy bowel movements.  Per nursing, concern for C. difficile, chart review does indicate that the patient does have a recent history of active C. difficile from 4/2023.    -C. Difficile test was negative. 8/5/23  -PT is following up  - Expected to DC back to [place].    Consultants/Specialty:  N/A    Review of Systems:  Review of Systems   Constitutional:  Positive for malaise/fatigue. Negative for chills and fever.   HENT:  Negative for hearing loss.    Eyes:  Negative for blurred vision and double vision.   Respiratory:  Negative for cough.    Cardiovascular:  Negative for chest pain, palpitations and orthopnea.   Gastrointestinal:  Positive for diarrhea. Negative for nausea and vomiting.   Genitourinary:  Negative for dysuria and frequency.   Musculoskeletal:  Negative for myalgias.   Neurological:  Positive for focal weakness. Negative for dizziness and  headaches.   Psychiatric/Behavioral:  Negative for depression and suicidal ideas. The patient is not nervous/anxious.      Objective:   Vitals:   Temp:  [36 °C (96.8 °F)-36.7 °C (98 °F)] 36.6 °C (97.8 °F)  Pulse:  [] 88  Resp:  [16-20] 18  BP: ()/(53-78) 112/54  SpO2:  [89 %-98 %] 89 %    Physical Exam  Constitutional:       General: He is not in acute distress.     Appearance: He is normal weight. He is not diaphoretic.   HENT:      Head: Normocephalic and atraumatic.      Right Ear: External ear normal.      Left Ear: External ear normal.      Nose: Nose normal.      Mouth/Throat:      Mouth: Mucous membranes are moist.      Pharynx: Oropharynx is clear.   Eyes:      General: No scleral icterus.     Extraocular Movements: Extraocular movements intact.      Pupils: Pupils are equal, round, and reactive to light.   Cardiovascular:      Rate and Rhythm: Normal rate. Rhythm irregular.      Pulses: Normal pulses.      Heart sounds: Normal heart sounds. No murmur heard.  Pulmonary:      Effort: Pulmonary effort is normal. No respiratory distress.      Breath sounds: Normal breath sounds. No wheezing, rhonchi or rales.   Abdominal:      General: Abdomen is flat. Bowel sounds are normal. There is no distension.      Palpations: Abdomen is soft.      Tenderness: There is no abdominal tenderness. There is no right CVA tenderness, left CVA tenderness or rebound.   Musculoskeletal:         General: No tenderness. Normal range of motion.      Cervical back: Normal range of motion and neck supple. No rigidity.      Right lower leg: No edema.      Left lower leg: No edema.      Comments: Right-sided muscle strength of 3-4/5, 5/5 on the left side.   Skin:     General: Skin is warm and dry.      Capillary Refill: Capillary refill takes less than 2 seconds.      Coloration: Skin is not jaundiced.      Findings: No rash.   Neurological:      Mental Status: He is alert and oriented to person, place, and time. Mental  status is at baseline.      Cranial Nerves: No cranial nerve deficit.      Deep Tendon Reflexes: Reflexes normal.   Psychiatric:         Mood and Affect: Mood normal.         Behavior: Behavior normal.     Labs:   Lab Results   Component Value Date/Time    WBC 7.0 08/05/2023 12:32 AM    RBC 4.13 (L) 08/05/2023 12:32 AM    HEMOGLOBIN 10.8 (L) 08/05/2023 12:32 AM    HEMATOCRIT 34.8 (L) 08/05/2023 12:32 AM    MCV 84.3 08/05/2023 12:32 AM    MCH 26.2 (L) 08/05/2023 12:32 AM    MCHC 31.0 (L) 08/05/2023 12:32 AM    MPV 10.6 08/05/2023 12:32 AM    NEUTSPOLYS 57.80 08/05/2023 12:32 AM    LYMPHOCYTES 20.50 (L) 08/05/2023 12:32 AM    MONOCYTES 7.80 08/05/2023 12:32 AM    EOSINOPHILS 13.10 (H) 08/05/2023 12:32 AM    BASOPHILS 0.40 08/05/2023 12:32 AM        Lab Results   Component Value Date/Time    SODIUM 143 08/05/2023 12:32 AM    POTASSIUM 3.7 08/05/2023 12:32 AM    CHLORIDE 111 08/05/2023 12:32 AM    CO2 22 08/05/2023 12:32 AM    GLUCOSE 90 08/05/2023 12:32 AM    BUN 8 08/05/2023 12:32 AM    CREATININE 0.72 08/05/2023 12:32 AM        Lab Results   Component Value Date/Time    PROTHROMBTM 15.3 (H) 07/11/2023 10:57 AM    INR 1.22 (H) 07/11/2023 10:57 AM      Imaging:   Echocardiogram performed on 8/4, pending official read.    Assessment and Plan:    * Hypotension- (present on admission)  Assessment & Plan  The patient had an hypotensive event while he was in neurology clinic, the patient had small amount of fluids improving the patient condition.   -start with bolus 500cc NS.  -continue 75cc/hr after the bolus.   -hold antihypertensive medications       Pleural effusion- (present on admission)  Assessment & Plan  The patient had a chest xray in the ER and showed left pleural effusion, the patient mentioned he haven't had any aspiration of food. CBC showed elevated WBC: >16, with left shift, the patient does not present any fever, chills or altered mental status.   -NPO at midnight until speech therapist evaluates   -speech  therapist evaluation  -procalcitonin stat and then every 4 hours  -lactid acid every 4 hours   -Blood culture X2 one during admission   -finger stick blood sugar every 4 hours  -CBC with differential AM          Syncope and collapse- (present on admission)  Assessment & Plan  The patient mentioned he did not remember losing consciousness and mentioned he had a similar episode a year ago. Will evaluate for cardiogenic syncope or neurologic syncope.   -EKG: had one at ED which resulted on Atrial fibrillation, otherwise normal.  -continue telemetry   -evaluate for orthostatic changes, is >20/10 in change  -Echocardiogram   -EEC  -CT of the head      Frequent loose stools  Assessment & Plan  The patient started presenting frequent loose stools.   -C. difficile (test was negative)       Chronic atrial fibrillation (HCC)  Assessment & Plan  -continue telemetry  -hold apixaban until ruling out intracranial hemorrhage in CT head  -hold metoprolol due to hypotension       Sacral ulcer (HCC)  Assessment & Plan  The patient presented in physical examination a pressure ulcer in the sacral area.   -pressure ulcer protocol     Code Status: DNR/DNI  DVT prophylaxis: Eliquis 5 mg twice daily  Diet: Regular diet  GI: Bowel protocol placed  Disposition: Pending C. difficile rule out, likely discharge back to intermediate ([place]).    Freddy Elkins DO, MPH  PGY-3 Internal Medicine

## 2023-08-05 NOTE — ASSESSMENT & PLAN NOTE
The patient started presenting frequent loose stools.   -C. difficile (test was negative)   -resolved

## 2023-08-05 NOTE — WOUND TEAM
Anesthesia ROS/Med Hx        Pulmonary Review:    Pt. positive for asthma    Cardiovascular Review:    Pt. positive for hypertension - well controlled    End/Other Review:    Pt. positive for diabetes - type 2 - poorly controlled    Anesthesia Plan  ASA Status: 3  Anesthesia Type: General  Induction: Intravenous  Preferred Airway Type: ETT  Reviewed: Lab Results, Nursing Notes, Pre-Induction Reassessment, Beta Blocker Status, NPO Status, Medications, Past Med History, Problem List, Allergies, Patient Summary, Consultations and EKG  The proposed anesthetic plan, including its risks and benefits, have been discussed with the Patient - along with the risks and benefits of alternatives.  Questions were encouraged and answered and the patient and/or representative agrees to proceed.  Blood Products: Not Anticipated      Physical Exam  Mallampati: II  TM Distance: >3 FB  Neck ROM: Full  Cardio Rhythm: Regular  Cardio Rate: Normal  pulmonary exam normal  abdominal exam normal  dental exam normal       [facility]  Inpatient Services  Initial Wound and Skin Care Evaluation    Admission Date: 8/2/2023     Last order of IP CONSULT TO WOUND CARE was found on 8/2/2023 from Hospital Encounter on 8/2/2023     HPI, PMH, SH: Reviewed    Past Surgical History:   Procedure Laterality Date    HIP REPLACEMENT, TOTAL Right 2004     Social History     Tobacco Use    Smoking status: Never    Smokeless tobacco: Never   Substance Use Topics    Alcohol use: Never     Chief Complaint   Patient presents with    Syncope     Pt was at doctors office for neuro checkup when MD noticed pt was not responsive. BP for fire was 58/40. Pt was placed in gurney, placed in trendelenburg, BP went to 90/50.     Diagnosis: Hypotension [I95.9]  Syncope and collapse [R55]    Unit where seen by Wound Team: S172/02     WOUND CONSULT RELATED TO:  sacrum    WOUND HISTORY:    Pt is an 88 yo male who presented to the ED after losing consciousness in his neurologist's office.  Pt admitted for hypotension, frequent loose stools, a-fib, sacral ulcer, pleural effusion, syncope and collapse.  Pt had a recent hx of c-diff.  He resides at an [facility].         WOUND ASSESSMENT/LDA  Wound 07/11/23 Pressure Injury Sacrum Midline POA DTI w/excoriation (Active)   Date First Assessed/Time First Assessed: 07/11/23 1445   Present on Original Admission: Yes  Hand Hygiene Completed: Yes  Primary Wound Type: Pressure Injury  Location: Sacrum  Wound Orientation: Midline  Wound Description (Comments): POA DTI w/excori...      Assessments 8/4/2023  5:00 PM   Wound Image     Periwound Assessment Pink   Drainage Amount Scant   Drainage Description Serosanguineous   Periwound Protectant Barrier Paste   Dressing Change/Treatment Frequency Every Shift, and As Needed   NEXT Dressing Change/Treatment Date 08/04/23   NEXT Weekly Photo (Inpatient Only) 08/11/23   Pressure Injury Stage Stage 2   WOUND NURSE ONLY - Time Spent with Patient (mins) 60         Vascular:    EDGAR:   No results found.    Lab Values:    Lab Results   Component Value Date/Time    WBC 7.6 08/04/2023 03:14 AM    RBC 4.03 (L) 08/04/2023 03:14 AM    HEMOGLOBIN 10.6 (L) 08/04/2023 03:14 AM    HEMATOCRIT 34.8 (L) 08/04/2023 03:14 AM    HBA1C 6.3 (H) 07/11/2023 05:57 PM         Culture Results show:  No results found for this or any previous visit (from the past 720 hour(s)).    Pain Level/Medicated:  None, Tolerated without pain medication       INTERVENTIONS BY WOUND TEAM:  . Performed standard wound care which includes appropriate positioning, dressing removal and non-selective debridement. Pictures and measurements obtained weekly if/when required.    Wound:  Sacrum  Cleansed/Non-selectively Debrided with:  Wound cleanser  Sara wound: Cleansed with Wound cleanser, Prepped with Barrier paste  Primary Dressing:  barrier paste    Advanced Wound Care Discharge Planning  Number of Clinicians necessary to complete wound care: 1  Is patient requiring IV pain medications for dressing changes:  No   Length of time for dressing change 30 min. (This does not include chart review, pre-medication time, set up, clean up or time spent charting.)    Interdisciplinary consultation: Patient, Bedside RN (), .      EVALUATION / RATIONALE FOR TREATMENT:     Date:  08/04/23  Wound Status:  Initial evaluation pt has a pressure injury on the sacrum due to immobility and likely complicated from recent bout of c-diff.  Appearance of sara wound indicates wound is improved with new epithelial and scar tissue present.  Will use barrier paste and off loading measures to allow wound to fully resolve           Goals: Steady decrease in wound area weekly.    WOUND TEAM PLAN OF CARE - Frequency of Follow-up:   Nursing to follow dressing orders written for wound care. Contact wound team if area fails to progress, deteriorates or with any questions/concerns if something comes up before next scheduled follow up (See below as to  whether wound is following and frequency of wound follow up)   Bi-Monthly -      NURSING PLAN OF CARE ORDERS:  Dressing changes: See Dressing Care orders      PREVENTATIVE INTERVENTIONS:    Q shift Issa - performed per nursing policy  Q shift pressure point assessments - performed per nursing policy    Surface/Positioning  Waffle overlay  - Currently in Place  TAPS wedges placed    Offloading/Redistribution  Heel offloading dressing (Silicone dressing) - Currently in Place      Containment/Moisture Prevention    Purwick/Condom Cath - Currently in Place    Anticipated discharge plans:  Home Health Care

## 2023-08-05 NOTE — ASSESSMENT & PLAN NOTE
Known history.  Imaging has been unremarkable.  - Continue home medications.  - Continue to monitor.

## 2023-08-05 NOTE — ASSESSMENT & PLAN NOTE
Patient was being seen in the neurology clinic, had an episode of severe hypotension and unresponsiveness.  Received a small amount of fluids with improvement, and was sent to the ED for further evaluation.  -Continue to encourage p.o. intake.  -Can utilize small fluid boluses with 500 cc of normal saline at a time.  -Can discontinue maintenance IV fluids.  -Continue to hold antihypertensive medications at this time.

## 2023-08-05 NOTE — DISCHARGE PLANNING
"Per voalte from Charge KRISTIN Childress-pt can go back to [facility]. Rev'd previous cm Chu Seymour RN CM note:  \"called [facility], spoke with Maia, states we sent him to you three times in the last few weeks, how do I know he is at his baseline. Explained MD has medically cleared him to return and he has no concern. He is at his baseline. States she will have their nurse call me. No call received. Called back at 1515 to speak to Maia, left voice message.\"    Rev'd with [facility] that we cannot send back to [facility] without thier approval, which will not be today. CM will call to see when/if their nurse has to come and eval pt, as per note they sent pt back to us 3 times and they need to know pt is ok to return.    "

## 2023-08-05 NOTE — PROGRESS NOTES
EDUCATIONAL PURPOSES ONLY     Progress Note               Author: Marzena Mera, Student Date & Time created: 8/5/2023  12:14 PM     Chief Complaint:  Mr. Hernandez is an 87-year-old male with past medical history of atrial fibrillation, CVA with right-sided deficits, hypertension who was initially seen on 8/2 after being sent to the ED by his neurologist after having an episode of hypotension and syncope while in the neurology office.  Patient did improve with fluid resuscitation, imaging findings have been unremarkable.  Of note, patient started to have loose, mucousy bowel movements with a known recent history of C. difficile from 4/2023.     Interval Problem Update:  Pt is alert and oriented to person and place. He is stable and communicating effectively. Patient expressed he does not like the food he is been served which is why he is not very hungry. Continued to have multiple BM throughout the night which are still watery in consistency. C.diff results came back negative 8/4. Blood cultures also are negative. Echo revealed no abnormalities (LVEF:65%), except for RSVP 50-55 mmHg consistent with moderate pulmonary HTN. Patient should receive another CXR in one week to evaluate pleural effusion.   Pending discharge to [facility] since C.diff results were negative and patient is otherwise medically cleared to go back to long-term care facility.     Review of Systems:  Constitutional: Negative for chills and fever. Negative fatigue/malaise   Eyes:  Negative for blurred vision and double vision.   Respiratory:  Negative for cough, dyspnea, SOB.    Cardiovascular:  Negative for chest pain, palpitations and orthopnea.   Gastrointestinal:  Positive for diarrhea. Negative for nausea and vomiting.   Genitourinary:  Negative for dysuria and frequency.   Musculoskeletal:  Negative for myalgias.   Neurological:  Positive for focal weakness. Negative for dizziness and headaches.   Psychiatric/Behavioral:  Negative for  depression and suicidal ideas. The patient is not nervous/anxious.        Hemodynamics:  Temp (24hrs), Av.2 °C (97.1 °F), Min:36 °C (96.8 °F), Max:36.7 °C (98 °F)  Temperature: 36.7 °C (98 °F)  Pulse  Av.9  Min: 63  Max: 115   Blood Pressure : 93/53    Respiration: 16, Pulse Oximetry: 90 %    Physical Exam:  Constitutional:       General: He is not in acute distress.     Appearance: He is normal weight. He is not diaphoretic.   Cardiovascular:      Rate and Rhythm: Normal rate. Rhythm irregular.      Pulses: Normal pulses.      Heart sounds: Normal heart sounds. No murmur heard.  Pulmonary:      Effort: Pulmonary effort is normal. No respiratory distress.      Breath sounds: Normal breath sounds. Crackles appreciated on left lung base upon auscultation.   Abdominal:      General: Abdomen is flat. Bowel sounds are normal. There is no distension.      Palpations: Abdomen is soft.      Tenderness: There is no abdominal tenderness.   Musculoskeletal:         General: No tenderness. Normal range of motion.       Right lower leg: No edema.      Left lower leg: No edema.      Comments: Right-sided muscle strength of 3/5, 5/5 on the left side.   Skin:     General: Skin is warm and dry.      Capillary Refill: Capillary refill takes less than 2 seconds.      Coloration: Skin is not jaundiced.      Findings: No rash.   Neurological:      Mental Status: He is alert and oriented to person, place. Mental status is at baseline.   Psychiatric:         Mood and Affect: Mood normal.         Behavior: Behavior normal.     Labs:    Recent Labs     23  0013 23  0815 234 23  0032   SODIUM  --  141 141 143   POTASSIUM  --  3.6 3.8 3.7   CHLORIDE  --  109 112 111   CO2  --  23 18* 22   BUN  --  15 12 8   CREATININE  --  0.65 0.58 0.72   MAGNESIUM 1.6  --  1.8 1.7   PHOSPHORUS  --   --   --  3.0   CALCIUM  --  8.0* 8.3* 7.9*     Recent Labs     23  0815 234 23  0032   ALTSGPT 14  12 15   ASTSGOT 12 11* 13   ALKPHOSPHAT 59 58 61   TBILIRUBIN 0.7 0.5 0.4   GLUCOSE 88 78 90     Recent Labs     08/03/23  0013 08/04/23 0314 08/05/23  0032   RBC 4.12* 4.03* 4.13*   HEMOGLOBIN 10.9* 10.6* 10.8*   HEMATOCRIT 35.3* 34.8* 34.8*   PLATELETCT 192 164 229     Recent Labs     08/03/23  0013 08/03/23  0815 08/04/23 0314 08/05/23  0032   WBC 12.4*  --  7.6 7.0   NEUTSPOLYS 71.40  --   --  57.80   LYMPHOCYTES 17.20*  --   --  20.50*   MONOCYTES 8.00  --   --  7.80   EOSINOPHILS 2.70  --   --  13.10*   BASOPHILS 0.20  --   --  0.40   ASTSGOT  --  12 11* 13   ALTSGPT  --  14 12 15   ALKPHOSPHAT  --  59 58 61   TBILIRUBIN  --  0.7 0.5 0.4       Fluids:    Intake/Output Summary (Last 24 hours) at 8/5/2023 1214  Last data filed at 8/5/2023 1000  Gross per 24 hour   Intake 612 ml   Output --   Net 612 ml        GI/Nutrition:  Orders Placed This Encounter   Procedures    Diet Order Diet: Level 6 - Soft and Bite Sized; Liquid level: Level 2 - Mildly Thick; Tray Modifications (optional): SLP - 1:1 Supervision by Nursing     Standing Status:   Standing     Number of Occurrences:   1     Order Specific Question:   Diet:     Answer:   Level 6 - Soft and Bite Sized [23]     Order Specific Question:   Liquid level     Answer:   Level 2 - Mildly Thick     Order Specific Question:   Tray Modifications (optional)     Answer:   SLP - 1:1 Supervision by Nursing         Plan:  Assessment/Plan   * Hypotension- (present on admission)  Assessment & Plan  Patient was being seen in the neurology clinic, had an episode of severe hypotension and unresponsiveness.  Received a small amount of fluids with improvement, and was sent to the ED for further evaluation.  -Continue to encourage p.o. intake.  -Can utilize small fluid boluses with 500 cc of normal saline at a time.  -Can discontinue maintenance IV fluids.  -Continue to hold antihypertensive medications at this time.    Frequent loose stools  Assessment & Plan  Per nursing,  started yesterday.  Patient does have a history of C. difficile in April 2023.  Low suspicion as patient does not have any recent antibiotic use however given recent history of C. difficile, cannot absolutely rule out infection given presentation of watery, mucousy bowel movements.  - C. difficile assay is negative   - Polyethylene glycol discontinued     Chronic atrial fibrillation (HCC)  Assessment & Plan  Known history.  Imaging has been unremarkable.  - Continue home medications.  - Continue to monitor.    Sacral ulcer (HCC)  Assessment & Plan  The patient presented in physical examination a pressure ulcer in the sacral area.   - Continue to monitor.  - Pressure ulcer protocol with repositioning.    Pleural effusion- (present on admission)  Assessment & Plan  Small pleural effusion noted, patient did have an elevated CBC on admission.  Possibly reactive leukocytosis.  Of note, chart review indicates that patient did have a previous pleural effusion which seems to have decreased in size.  - We will continue to monitor.  - Will likely require repeat chest x-ray in 1 to 4 weeks to monitor for changes.  - If worsening shortness of breath, increasing pleural effusion, signs of systemic infection, can consider repeat chest x-ray and possible thoracentesis.    Syncope and collapse- (present on admission)  Assessment & Plan  Likely in the setting of dehydration.  - Echocardiogram no abnormalities LVEF: 65%. RVSP 50-55 mmHg consistent with moderate pulmonary HTN.  - EEG, head CT unremarkable.  - Continue to monitor.           VTE prophylaxis: Eliquis tablet 5 mg BID

## 2023-08-06 NOTE — CARE PLAN
The patient is Stable - Low risk of patient condition declining or worsening    Shift Goals  Clinical Goals: neuro checks, VSS  Patient Goals: Go Home  Family Goals: MARGIE    Progress made toward(s) clinical / shift goals:    Problem: Skin Integrity  Goal: Skin integrity is maintained or improved  Outcome: Progressing   Patient mepilex dressing changed and tolerated well  Problem: Fall Risk  Goal: Patient will remain free from falls  Outcome: Progressing   Patient's bed is in the lowest position and call light is within reach.

## 2023-08-06 NOTE — CARE PLAN
The patient is Stable - Low risk of patient condition declining or worsening    Shift Goals  Clinical Goals: neuro checks, Rest  Patient Goals: Rest, go home  Family Goals: MARGIE    Progress made toward(s) clinical / shift goals:    Problem: Fall Risk  Goal: Patient will remain free from falls  Outcome: Progressing     Patient bed is in the lowest position with call light within reach. Education provided for patient to call nurse with any needs. Patient is agreeable and stable at this time.

## 2023-08-06 NOTE — CARE PLAN
The patient is Stable - Low risk of patient condition declining or worsening    Shift Goals  Clinical Goals: neuro checks, Rest  Patient Goals: Rest, go home  Family Goals: MARGIE    Progress made toward(s) clinical / shift goals:    Problem: Knowledge Deficit - Standard  Goal: Patient and family/care givers will demonstrate understanding of plan of care, disease process/condition, diagnostic tests and medications  Outcome: Progressing  Note: Patient educated on plan of care and rationale for Q2 turns, pt refusing some turns but verbalized understanding of importance.     Problem: Skin Integrity  Goal: Skin integrity is maintained or improved  Outcome: Progressing  Note: Barrier cream applied to sacrum and Q2 turns encouraged. Patient changed when incontinent.

## 2023-08-06 NOTE — PROGRESS NOTES
Patient chocked from his rice at lunch time. Was able to keep coughing and coughed up some of the rice. RN notified provider and orders for ST to eval have been placed at this time.

## 2023-08-06 NOTE — PROGRESS NOTES
Daily Progress Note:     Date of Service: 8/6/2023  Primary Team: [facility] IM Purple Team   Attending: Nabil Navarro M.D.   Senior Resident: Freddy Elkins D.O.  Intern: Jonny BENNETT  Contact:  [phone]    Patient Identifier:   Mr. Hernandez is an 87-year-old male with past medical history of atrial fibrillation, CVA with right-sided deficits, hypertension who was initially seen on 8/2 after being sent to the ED by his neurologist after having an episode of hypotension and syncope while in the neurology office.  Patient did improve with fluid resuscitation, imaging findings have been unremarkable.  Of note, patient started to have loose, mucousy bowel movements with a known recent history of C. difficile from 4/2023.    Subjective:  No night events   Patient was evaluated today, he is stable with BP between 120/80 mmHg   oriented and answering question. It was inform by nurse that the patient started having cough while eating but while evaluating the patient he mentioned he was just coughing and did not aspirated food, reason why a new consult for speech therapy was made. It was explained to the patient that he was medical clear and we are only waiting for placement in a SNF, so then he can have better care.       Interval Update:  -No significant events overnight.  - Patient did start to have more frequent watery and mucousy bowel movements.  Per nursing, concern for C. difficile, chart review does indicate that the patient does have a recent history of active C. difficile from 4/2023.    -C. Difficile test was negative. 8/5/23  -Echocardiogram with EF of 65%  -EEC was normal  -CT of the head without intracaneal hemorrhage   -EKG: had one at ED which resulted on Atrial fibrillation, otherwise normal.  -PT is following up  - Expected to DC back to [place] ([facility])     Consultants/Specialty:  N/A    Review of Systems:  Review of Systems   Constitutional:  Negative for chills, fever and malaise/fatigue.   HENT:  Negative for  hearing loss.    Eyes:  Negative for blurred vision and double vision.   Respiratory:  Negative for cough.    Cardiovascular:  Negative for chest pain, palpitations and orthopnea.   Gastrointestinal:  Negative for diarrhea, nausea and vomiting.   Genitourinary:  Negative for dysuria and frequency.   Musculoskeletal:  Negative for myalgias.   Neurological:  Positive for focal weakness. Negative for dizziness and headaches.   Psychiatric/Behavioral:  Negative for depression and suicidal ideas. The patient is not nervous/anxious.      Objective:   Vitals:   Temp:  [36 °C (96.8 °F)-36.6 °C (97.8 °F)] 36.2 °C (97.1 °F)  Pulse:  [] 78  Resp:  [18-20] 20  BP: (106-133)/(52-81) 127/81  SpO2:  [89 %-91 %] 91 %    Physical Exam  Constitutional:       General: He is not in acute distress.     Appearance: He is normal weight. He is not diaphoretic.   HENT:      Head: Normocephalic and atraumatic.      Right Ear: External ear normal.      Left Ear: External ear normal.      Nose: Nose normal.      Mouth/Throat:      Mouth: Mucous membranes are moist.      Pharynx: Oropharynx is clear.   Eyes:      General: No scleral icterus.     Extraocular Movements: Extraocular movements intact.      Pupils: Pupils are equal, round, and reactive to light.   Cardiovascular:      Rate and Rhythm: Normal rate. Rhythm irregular.      Pulses: Normal pulses.      Heart sounds: Normal heart sounds. No murmur heard.  Pulmonary:      Effort: Pulmonary effort is normal. No respiratory distress.      Breath sounds: Normal breath sounds. No wheezing, rhonchi or rales.   Abdominal:      General: Abdomen is flat. Bowel sounds are normal. There is no distension.      Palpations: Abdomen is soft.      Tenderness: There is no abdominal tenderness. There is no right CVA tenderness, left CVA tenderness or rebound.   Musculoskeletal:         General: No tenderness. Normal range of motion.      Cervical back: Normal range of motion and neck supple. No  rigidity.      Right lower leg: No edema.      Left lower leg: No edema.      Comments: Right-sided muscle strength of 3-4/5, 5/5 on the left side.   Skin:     General: Skin is warm and dry.      Capillary Refill: Capillary refill takes less than 2 seconds.      Coloration: Skin is not jaundiced.      Findings: No rash.   Neurological:      Mental Status: He is alert and oriented to person, place, and time. Mental status is at baseline.      Cranial Nerves: No cranial nerve deficit.      Deep Tendon Reflexes: Reflexes normal.   Psychiatric:         Mood and Affect: Mood normal.         Behavior: Behavior normal.     Labs:   Lab Results   Component Value Date/Time    WBC 7.0 08/05/2023 12:32 AM    RBC 4.13 (L) 08/05/2023 12:32 AM    HEMOGLOBIN 10.8 (L) 08/05/2023 12:32 AM    HEMATOCRIT 34.8 (L) 08/05/2023 12:32 AM    MCV 84.3 08/05/2023 12:32 AM    MCH 26.2 (L) 08/05/2023 12:32 AM    MCHC 31.0 (L) 08/05/2023 12:32 AM    MPV 10.6 08/05/2023 12:32 AM    NEUTSPOLYS 57.80 08/05/2023 12:32 AM    LYMPHOCYTES 20.50 (L) 08/05/2023 12:32 AM    MONOCYTES 7.80 08/05/2023 12:32 AM    EOSINOPHILS 13.10 (H) 08/05/2023 12:32 AM    BASOPHILS 0.40 08/05/2023 12:32 AM        Lab Results   Component Value Date/Time    SODIUM 143 08/05/2023 12:32 AM    POTASSIUM 3.7 08/05/2023 12:32 AM    CHLORIDE 111 08/05/2023 12:32 AM    CO2 22 08/05/2023 12:32 AM    GLUCOSE 90 08/05/2023 12:32 AM    BUN 8 08/05/2023 12:32 AM    CREATININE 0.72 08/05/2023 12:32 AM        Lab Results   Component Value Date/Time    PROTHROMBTM 15.3 (H) 07/11/2023 10:57 AM    INR 1.22 (H) 07/11/2023 10:57 AM      Imaging:   Echocardiogram performed on 8/4, pending official read.    Assessment and Plan:    * Hypotension- (present on admission)  Assessment & Plan  The patient had an hypotensive event while he was in neurology clinic, the patient had small amount of fluids improving the patient condition.   -start with bolus 500cc NS #1  -continue 75cc/hr after the bolus.    -hold antihypertensive medications   -Telemetry shows BP are between 120/80 mmHg 8/6/23      Pleural effusion- (present on admission)  Assessment & Plan  The patient had a chest xray in the ER and showed left pleural effusion, the patient mentioned he haven't had any aspiration of food. CBC showed elevated WBC: >16, with left shift, the patient does not present any fever, chills or altered mental status.   -NPO at midnight until speech therapist evaluates   -speech therapist evaluation  -procalcitonin stat and then every 4 hours  -lactid acid every 4 hours / within normal range  -Blood culture X2 one during admission / were negative   -finger stick blood sugar every 4 hours  -CBC with differential AM          Syncope and collapse- (present on admission)  Assessment & Plan  The patient mentioned he did not remember losing consciousness and mentioned he had a similar episode a year ago. Will evaluate for cardiogenic syncope or neurologic syncope.   -EKG: had one at ED which resulted on Atrial fibrillation, otherwise normal.  -continue telemetry   -evaluate for orthostatic changes, is >20/10 in change  -Echocardiogram with EF of 65%  -EEC was normal  -CT of the head without intracaneal hemorrhage       Frequent loose stools  Assessment & Plan  The patient started presenting frequent loose stools.   -C. difficile (test was negative)   -resolved      Chronic atrial fibrillation (HCC)  Assessment & Plan  -continue telemetry  -hold apixaban until ruling out intracranial hemorrhage in CT head  -hold metoprolol due to hypotension       Sacral ulcer (HCC)  Assessment & Plan  The patient presented in physical examination a pressure ulcer in the sacral area.   -pressure ulcer protocol     Code Status: DNR/DNI  DVT prophylaxis: Eliquis 5 mg twice daily  Diet: Regular diet  GI: Bowel protocol placed  Disposition: Pending C. difficile rule out, likely discharge back to [facility] ([place]).    Luis Fuller MD  PGY-1

## 2023-08-06 NOTE — DISCHARGE PLANNING
SW left VM for [facility] to see if we can send pt back. Awaiting return call.     Update: RAJWINDER unable to reach coordinator at [facility] for return. Per chart review, MD documenting discharge to SNF. SW reached out to MD to clarify if plan is for [facility] vs SNF. Awaiting response.

## 2023-08-07 NOTE — DISCHARGE PLANNING
Case Management Discharge Planning    Admission Date: 8/2/2023  GMLOS:    ALOS: 0    6-Clicks ADL Score: 12  6-Clicks Mobility Score: 6  PT and/or OT Eval ordered: Yes  Post-acute Referrals Ordered: No  Post-acute Choice Obtained: NA  Has referral(s) been sent to post-acute provider:  [facility]      Anticipated Discharge Dispo: Discharge Disposition: Discharged to home/self care (01)    DME Needed: No    Action(s) Taken: Two calls to [facility] today, VM left, no return calls.    Escalations Completed: None    Medically Clear: No    Next Steps: Continue calls to [facility] for placement.    Barriers to Discharge: Medical clearance and Pending Placement    Is the patient up for discharge tomorrow: No

## 2023-08-07 NOTE — PROGRESS NOTES
Telemetry Report:    SR [id number]  BECKY WRAY, AFIFTIKHAR  -/.08/.39      Per [facility] from Monitor Room

## 2023-08-07 NOTE — CARE PLAN
The patient is Stable - Low risk of patient condition declining or worsening    Shift Goals  Clinical Goals: Neuro checks, Rest  Patient Goals: Rest  Family Goals: MARGIE    Progress made toward(s) clinical / shift goals:    Problem: Skin Integrity  Goal: Skin integrity is maintained or improved  Outcome: Progressing  Note: Patient turned every 2 hours and as needed. Patient's wound cleansed and covered with barrier paste and monitored for worsening condition.     Problem: Hemodynamics  Goal: Patient's hemodynamics, fluid balance and neurologic status will be stable or improve  Outcome: Progressing  Note: Pt remained hemodynamically stable evidenced by stable vital sings, proper urine output and adequate fluid intake.  Patient no longer hypotensive.

## 2023-08-07 NOTE — PROGRESS NOTES
Daily Progress Note:     Date of Service: 8/7/2023  Primary Team: [facility] IM Purple Team   Attending: Nabil Navarro M.D.   Senior Resident: Freddy Elkins D.O.  Intern: Jonny BENNETT  Contact:  [phone]    Patient Identifier:   Mr. Hernandez is an 87-year-old male with past medical history of atrial fibrillation, CVA with right-sided deficits, hypertension who was initially seen on 8/2 after being sent to the ED by his neurologist after having an episode of hypotension and syncope while in the neurology office.  Patient did improve with fluid resuscitation, imaging findings have been unremarkable.  Of note, patient started to have loose, mucousy bowel movements with a known recent history of C. difficile from 4/2023.    Subjective:  No night events   Patient was evaluated today, he is stable oriented and answering question. The patient had improve his cough compare with yesterday, blood work was negative to any new infection, results were explained to the patient.   It was explained to the patient that he was medical clear and we are only waiting for placement in a SNF, so then he can have better care, the patient agreed to this decision.       Interval Update:  -No significant events overnight.  - Patient did start to have more frequent watery and mucousy bowel movements.  Per nursing, concern for C. difficile, chart review does indicate that the patient does have a recent history of active C. difficile from 4/2023.    -C. Difficile test was negative. 8/5/23  -Echocardiogram with EF of 65%  -EEC was normal  -CT of the head without intracaneal hemorrhage   -EKG: had one at ED which resulted on Atrial fibrillation, otherwise normal.  -PT is following up  -CBC negative to new infection 8/7/23  - medical clear, expected to DC to SNF    Consultants/Specialty:  N/A    Review of Systems:  Review of Systems   Constitutional:  Negative for chills, fever and malaise/fatigue.   HENT:  Negative for hearing loss.    Eyes:  Negative  for blurred vision and double vision.   Respiratory:  Negative for cough.    Cardiovascular:  Negative for chest pain, palpitations and orthopnea.   Gastrointestinal:  Negative for diarrhea, nausea and vomiting.   Genitourinary:  Negative for dysuria and frequency.   Musculoskeletal:  Negative for myalgias.   Neurological:  Positive for focal weakness. Negative for dizziness and headaches.   Psychiatric/Behavioral:  Negative for depression and suicidal ideas. The patient is not nervous/anxious.      Objective:   Vitals:   Temp:  [36 °C (96.8 °F)-36.4 °C (97.6 °F)] 36.2 °C (97.1 °F)  Pulse:  [] 79  Resp:  [16-18] 16  BP: (126-170)/(69-95) 126/74  SpO2:  [92 %-97 %] 96 %    Physical Exam  Constitutional:       General: He is not in acute distress.     Appearance: He is normal weight. He is not diaphoretic.   HENT:      Head: Normocephalic and atraumatic.      Right Ear: External ear normal.      Left Ear: External ear normal.      Nose: Nose normal.      Mouth/Throat:      Mouth: Mucous membranes are moist.      Pharynx: Oropharynx is clear.   Eyes:      General: No scleral icterus.     Extraocular Movements: Extraocular movements intact.      Pupils: Pupils are equal, round, and reactive to light.   Cardiovascular:      Rate and Rhythm: Normal rate. Rhythm irregular.      Pulses: Normal pulses.      Heart sounds: Normal heart sounds. No murmur heard.  Pulmonary:      Effort: Pulmonary effort is normal. No respiratory distress.      Breath sounds: Normal breath sounds. No wheezing, rhonchi or rales.   Abdominal:      General: Abdomen is flat. Bowel sounds are normal. There is no distension.      Palpations: Abdomen is soft.      Tenderness: There is no abdominal tenderness. There is no right CVA tenderness, left CVA tenderness or rebound.   Musculoskeletal:         General: No tenderness. Normal range of motion.      Cervical back: Normal range of motion and neck supple. No rigidity.      Right lower leg: No  edema.      Left lower leg: No edema.      Comments: Right-sided muscle strength of 3-4/5, 5/5 on the left side.   Skin:     General: Skin is warm and dry.      Capillary Refill: Capillary refill takes less than 2 seconds.      Coloration: Skin is not jaundiced.      Findings: No rash.   Neurological:      Mental Status: He is alert and oriented to person, place, and time. Mental status is at baseline.      Cranial Nerves: No cranial nerve deficit.      Deep Tendon Reflexes: Reflexes normal.   Psychiatric:         Mood and Affect: Mood normal.         Behavior: Behavior normal.     Labs:   Lab Results   Component Value Date/Time    WBC 5.9 08/07/2023 03:21 AM    RBC 4.59 (L) 08/07/2023 03:21 AM    HEMOGLOBIN 12.0 (L) 08/07/2023 03:21 AM    HEMATOCRIT 39.9 (L) 08/07/2023 03:21 AM    MCV 86.9 08/07/2023 03:21 AM    MCH 26.1 (L) 08/07/2023 03:21 AM    MCHC 30.1 (L) 08/07/2023 03:21 AM    MPV 10.5 08/07/2023 03:21 AM    NEUTSPOLYS 53.80 08/07/2023 03:21 AM    LYMPHOCYTES 25.20 08/07/2023 03:21 AM    MONOCYTES 9.50 08/07/2023 03:21 AM    EOSINOPHILS 10.30 (H) 08/07/2023 03:21 AM    BASOPHILS 0.50 08/07/2023 03:21 AM        Lab Results   Component Value Date/Time    SODIUM 140 08/07/2023 03:21 AM    POTASSIUM 3.8 08/07/2023 03:21 AM    CHLORIDE 111 08/07/2023 03:21 AM    CO2 16 (L) 08/07/2023 03:21 AM    GLUCOSE 68 08/07/2023 03:21 AM    BUN 8 08/07/2023 03:21 AM    CREATININE 0.63 08/07/2023 03:21 AM        Lab Results   Component Value Date/Time    PROTHROMBTM 15.3 (H) 07/11/2023 10:57 AM    INR 1.22 (H) 07/11/2023 10:57 AM      Imaging:   Echocardiogram performed on 8/4, pending official read.    Assessment and Plan:    * Hypotension- (present on admission)  Assessment & Plan  The patient had an hypotensive event while he was in neurology clinic, the patient had small amount of fluids improving the patient condition.   -start with bolus 500cc NS #1 done  -continue 75cc/hr after the bolus. (discontinued on 8/7/23,  patient on diet)   -hold antihypertensive medications   -Telemetry shows BP between 120/80mmHg 8/7/23      Pleural effusion- (present on admission)  Assessment & Plan  The patient had a chest xray in the ER and showed left pleural effusion, the patient mentioned he haven't had any aspiration of food. CBC showed elevated WBC: >16, with left shift, the patient does not present any fever, chills or altered mental status.   -NPO at midnight until speech therapist evaluates   -speech therapist evaluation  -procalcitonin stat and then every 4 hours  -lactid acid every 4 hours / within normal range  -Blood culture X2 one during admission / were negative   -finger stick blood sugar every 4 hours  -CBC with differential negative to infection           Syncope and collapse- (present on admission)  Assessment & Plan  The patient mentioned he did not remember losing consciousness and mentioned he had a similar episode a year ago. Will evaluate for cardiogenic syncope or neurologic syncope.   -EKG: had one at ED which resulted on Atrial fibrillation, otherwise normal.  -continue telemetry   -evaluate for orthostatic changes, is >20/10 in change  -Echocardiogram with EF of 65%  -EEC was normal  -CT of the head without intracaneal hemorrhage       Frequent loose stools  Assessment & Plan  The patient started presenting frequent loose stools.   -C. difficile (test was negative)   -resolved      Chronic atrial fibrillation (HCC)  Assessment & Plan  -continue telemetry  -hold metoprolol due to hypotension     -apixaban 5 mg BID    Sacral ulcer (HCC)  Assessment & Plan  The patient presented in physical examination a pressure ulcer in the sacral area.   -pressure ulcer protocol     Code Status: DNR/DNI  DVT prophylaxis: Eliquis 5 mg twice daily  Diet: Regular diet  GI: Bowel protocol placed  Disposition: Pending C. difficile rule out, likely discharge back to long-term ([place]).    Luis Fuller MD  PGY-1

## 2023-08-08 PROBLEM — L29.9 ITCHING: Status: ACTIVE | Noted: 2023-01-01

## 2023-08-08 NOTE — DISCHARGE PLANNING
Case Management Discharge Planning    Admission Date: 8/2/2023  GMLOS:    ALOS: 0    6-Clicks ADL Score: 12  6-Clicks Mobility Score: 6  PT and/or OT Eval ordered: Yes  Post-acute Referrals Ordered: Yes  Post-acute Choice Obtained: NA  Has referral(s) been sent to post-acute provider:  [facility]      Anticipated Discharge Dispo: Discharge Disposition: Discharged to home/self care (01)    DME Needed: No    Action(s) Taken: [facility] with  [facility], [phone] (cell) she will come today to review patient for return and set up transport for tomorrow. Requested she have staff contact me when she arrives.    Escalations Completed: None    Medically Clear: Yes    Next Steps: await [facility] nurse.    Barriers to Discharge: Transportation    Is the patient up for discharge tomorrow: Yes    Is transport arranged for discharge disposition: No      8/8/23   1500    Patients RN nor myself have seen [facility] visit patient, called her, left VM to return my call.

## 2023-08-08 NOTE — PROGRESS NOTES
Daily Progress Note:     Date of Service: 8/8/2023  Primary Team: [facility] Purple Team   Attending: Nabil Navarro M.D.   Senior Resident: Freddy Elkins D.O.  Intern: Jonny BENNETT  Contact:  [phone]    Patient Identifier:   Mr. Hernandez is an 87-year-old male with past medical history of atrial fibrillation, CVA with right-sided deficits, hypertension who was initially seen on 8/2 after being sent to the ED by his neurologist after having an episode of hypotension and syncope while in the neurology office.  Patient did improve with fluid resuscitation, imaging findings have been unremarkable.  Of note, patient started to have loose, mucousy bowel movements with a known recent history of C. difficile from 4/2023.    Subjective:  No night events   Patient was evaluated today, he is stable oriented and answering question. The patient was presenting itching in the front head reason why loratadine was given. The patient is tolerating room air without O2. Received a message from nurse from [facility] saying she will come to hospital and will review the patient and set up transport for tomorrow.   His  was visiting him at it was explained that the patient is medical clear and just waiting for placement.   OT followed up with the patient today.       Interval Update:  -No significant events overnight.  - Patient did start to have more frequent watery and mucousy bowel movements.  Per nursing, concern for C. difficile, chart review does indicate that the patient does have a recent history of active C. difficile from 4/2023.    -C. Difficile test was negative. 8/5/23  -Echocardiogram with EF of 65%  -EEC was normal  -CT of the head without intracaneal hemorrhage   -EKG: had one at ED which resulted on Atrial fibrillation, otherwise normal.  -PT is following up  -CBC negative to new infection 8/7/23  - medical clear, expected to DC to [facility] or [facility].   -Will have a visit today (8/8/23) from [facility]  fac[facility] to evaluate him and depending on that they will schedule transportation for him tomorrow (8/9/23)    Consultants/Specialty:  N/A    Review of Systems:  Review of Systems   Constitutional:  Negative for chills, fever and malaise/fatigue.   HENT:  Negative for hearing loss.    Eyes:  Negative for blurred vision and double vision.   Respiratory:  Negative for cough.    Cardiovascular:  Negative for chest pain, palpitations and orthopnea.   Gastrointestinal:  Negative for diarrhea, nausea and vomiting.   Genitourinary:  Negative for dysuria and frequency.   Musculoskeletal:  Negative for myalgias.   Skin:  Positive for itching and rash.   Neurological:  Positive for focal weakness. Negative for dizziness and headaches.   Psychiatric/Behavioral:  Negative for depression and suicidal ideas. The patient is not nervous/anxious.      Objective:   Vitals:   Temp:  [35.9 °C (96.7 °F)-36.4 °C (97.6 °F)] 36.2 °C (97.2 °F)  Pulse:  [] 87  Resp:  [18-20] 18  BP: (120-159)/(70-96) 128/75  SpO2:  [91 %-97 %] 91 %    Physical Exam  Constitutional:       General: He is not in acute distress.     Appearance: He is normal weight. He is not diaphoretic.   HENT:      Head: Normocephalic and atraumatic.        Comments: The patient presenting itching and is scratching      Right Ear: External ear normal.      Left Ear: External ear normal.      Nose: Nose normal.      Mouth/Throat:      Mouth: Mucous membranes are moist.      Pharynx: Oropharynx is clear.   Eyes:      General: No scleral icterus.     Extraocular Movements: Extraocular movements intact.      Pupils: Pupils are equal, round, and reactive to light.   Cardiovascular:      Rate and Rhythm: Normal rate. Rhythm irregular.      Pulses: Normal pulses.      Heart sounds: Normal heart sounds. No murmur heard.  Pulmonary:      Effort: Pulmonary effort is normal. No respiratory distress.      Breath sounds: Normal breath sounds. No wheezing, rhonchi or rales.    Abdominal:      General: Abdomen is flat. Bowel sounds are normal. There is no distension.      Palpations: Abdomen is soft.      Tenderness: There is no abdominal tenderness. There is no right CVA tenderness, left CVA tenderness or rebound.   Musculoskeletal:         General: No tenderness. Normal range of motion.      Cervical back: Normal range of motion and neck supple. No rigidity.      Right lower leg: No edema.      Left lower leg: No edema.      Comments: Right-sided muscle strength of 3-4/5, 5/5 on the left side.   Skin:     General: Skin is warm and dry.      Capillary Refill: Capillary refill takes less than 2 seconds.      Coloration: Skin is not jaundiced.      Findings: No rash.   Neurological:      Mental Status: He is alert and oriented to person, place, and time. Mental status is at baseline.      Cranial Nerves: No cranial nerve deficit.      Deep Tendon Reflexes: Reflexes normal.   Psychiatric:         Mood and Affect: Mood normal.         Behavior: Behavior normal.     Labs:   Lab Results   Component Value Date/Time    WBC 5.9 08/07/2023 03:21 AM    RBC 4.59 (L) 08/07/2023 03:21 AM    HEMOGLOBIN 12.0 (L) 08/07/2023 03:21 AM    HEMATOCRIT 39.9 (L) 08/07/2023 03:21 AM    MCV 86.9 08/07/2023 03:21 AM    MCH 26.1 (L) 08/07/2023 03:21 AM    MCHC 30.1 (L) 08/07/2023 03:21 AM    MPV 10.5 08/07/2023 03:21 AM    NEUTSPOLYS 53.80 08/07/2023 03:21 AM    LYMPHOCYTES 25.20 08/07/2023 03:21 AM    MONOCYTES 9.50 08/07/2023 03:21 AM    EOSINOPHILS 10.30 (H) 08/07/2023 03:21 AM    BASOPHILS 0.50 08/07/2023 03:21 AM        Lab Results   Component Value Date/Time    SODIUM 140 08/07/2023 03:21 AM    POTASSIUM 3.8 08/07/2023 03:21 AM    CHLORIDE 111 08/07/2023 03:21 AM    CO2 16 (L) 08/07/2023 03:21 AM    GLUCOSE 68 08/07/2023 03:21 AM    BUN 8 08/07/2023 03:21 AM    CREATININE 0.63 08/07/2023 03:21 AM        Lab Results   Component Value Date/Time    PROTHROMBTM 15.3 (H) 07/11/2023 10:57 AM    INR 1.22 (H)  07/11/2023 10:57 AM      Imaging:   Echocardiogram performed on 8/4, pending official read.    Assessment and Plan:    * Hypotension- (present on admission)  Assessment & Plan  The patient had an hypotensive event while he was in neurology clinic, the patient had small amount of fluids improving the patient condition.   -start with bolus 500cc NS #1 done  -continue 75cc/hr after the bolus. (discontinued on 8/7/23, patient on diet)   -hold antihypertensive medications   -Telemetry shows BP between 120/80mmHg 8/7/23  -resolved      Pleural effusion- (present on admission)  Assessment & Plan  The patient had a chest xray in the ER and showed left pleural effusion, the patient mentioned he haven't had any aspiration of food. CBC showed elevated WBC: >16, with left shift, the patient does not present any fever, chills or altered mental status.   -NPO at midnight until speech therapist evaluates   -speech therapist evaluation  -procalcitonin stat and then every 4 hours  -lactid acid every 4 hours / within normal range  -Blood culture X2 one during admission / were negative   -finger stick blood sugar every 4 hours  -CBC with differential negative to infection           Syncope and collapse- (present on admission)  Assessment & Plan  The patient mentioned he did not remember losing consciousness and mentioned he had a similar episode a year ago. Will evaluate for cardiogenic syncope or neurologic syncope.   -EKG: had one at ED which resulted on Atrial fibrillation, otherwise normal.  -continue telemetry   -evaluate for orthostatic changes, is >20/10 in change  -Echocardiogram with EF of 65%  -EEC was normal  -CT of the head without intracaneal hemorrhage       Itching  Assessment & Plan  The patient started feeling itch in frontal head  -Loratadine 10 mg 1 per day    Frequent loose stools  Assessment & Plan  The patient started presenting frequent loose stools.   -C. difficile (test was negative)   -resolved      Chronic  atrial fibrillation (HCC)  Assessment & Plan  -continue telemetry  -hold metoprolol due to hypotension     -apixaban 5 mg BID    Sacral ulcer (HCC)  Assessment & Plan  The patient presented in physical examination a pressure ulcer in the sacral area.   -pressure ulcer protocol     Code Status: DNR/DNI  DVT prophylaxis: Eliquis 5 mg twice daily  Diet: Regular diet  GI: Bowel protocol placed  Disposition: Pending C. difficile rule out, likely discharge back to [facility]    Luis Fuller MD  PGY-1

## 2023-08-08 NOTE — CARE PLAN
Problem: Knowledge Deficit - Standard  Goal: Patient and family/care givers will demonstrate understanding of plan of care, disease process/condition, diagnostic tests and medications  8/8/2023 0146 by Aaliyah Lucas R.N.  Outcome: Progressing  8/8/2023 0146 by Aaliyah Lucas R.N.  Outcome: Progressing  8/8/2023 0129 by Aaliyah Lucas R.N.  Outcome: Progressing     Problem: Skin Integrity  Goal: Skin integrity is maintained or improved  8/8/2023 0146 by Aaliyah Lucas R.N.  Outcome: Progressing  8/8/2023 0146 by Aaliyah Lucas R.N.  Outcome: Progressing  8/8/2023 0129 by Aaliyah Lucas R.N.  Outcome: Progressing     Problem: Fall Risk  Goal: Patient will remain free from falls  8/8/2023 0146 by Aaliyah Lucas R.N.  Outcome: Progressing  8/8/2023 0146 by Aaliyah Lucas R.N.  Outcome: Progressing  8/8/2023 0129 by Aaliyah Lucas R.N.  Outcome: Progressing     Problem: Psychosocial  Goal: Patient's level of anxiety will decrease  8/8/2023 0146 by Aaliyah Lucas R.N.  Outcome: Progressing  8/8/2023 0146 by Aaliyah Lucas R.N.  Outcome: Progressing  8/8/2023 0129 by Aaliyah Lucas R.N.  Outcome: Progressing  Goal: Patient's ability to verbalize feelings about condition will improve  8/8/2023 0146 by Aaliyah Lucas R.N.  Outcome: Progressing  8/8/2023 0146 by Aaliyah Lucas R.N.  Outcome: Progressing  8/8/2023 0129 by Aaliyah Lucas R.N.  Outcome: Progressing  Goal: Patient's ability to re-evaluate and adapt role responsibilities will improve  8/8/2023 0146 by Aaliyah Lucas R.N.  Outcome: Progressing  8/8/2023 0146 by Aaliyah Lucas R.N.  Outcome: Progressing  8/8/2023 0129 by Aaliyah Lucas R.N.  Outcome: Progressing  Goal: Patient and family will demonstrate ability to cope with life altering diagnosis and/or procedure  8/8/2023 0146 by Aaliyah Lucas R.N.  Outcome: Progressing  8/8/2023 0146 by Aaliyah Lucas R.N.  Outcome: Progressing  8/8/2023 0129 by Aaliyah Lucas  RJuan LuisNJuan Luis  Outcome: Progressing  Goal: Spiritual and cultural needs incorporated into hospitalization  8/8/2023 0146 by Aaliyah Lucas R.N.  Outcome: Progressing  8/8/2023 0146 by Aaliyah Lucas R.N.  Outcome: Progressing  8/8/2023 0129 by Aaliyah Lucas R.N.  Outcome: Progressing     Problem: Hemodynamics  Goal: Patient's hemodynamics, fluid balance and neurologic status will be stable or improve  8/8/2023 0146 by Aaliyah Lucas R.N.  Outcome: Progressing  8/8/2023 0146 by Aaliyah Lucas R.N.  Outcome: Progressing  8/8/2023 0129 by Aaliyah Lucas R.N.  Outcome: Progressing   The patient is Stable - Low risk of patient condition declining or worsening    Shift Goals  Clinical Goals: Hemodinamic stability  Patient Goals: Rest  Family Goals: MARGIE    Progress made toward(s) clinical / shift goals:        Patient is not progressing towards the following goals:

## 2023-08-08 NOTE — THERAPY
"Occupational Therapy  Daily Treatment     Patient Name: Alireza Hernandez  Age:  87 y.o., Sex:  male  Medical Record #: [id number]  Today's Date: 8/8/2023     Precautions  Precautions: (P) Fall Risk, Swallow Precautions  Comments: (P) h/o CVA, expressive aphasia    Assessment    Pt seen for OT tx session. Pt motivated to participate in EOB activities. Pt required Max A for supine->sit, once sitting EOB pt able to self feed w/ SBA primarily for balance. Pt stood 3x w/ min A (x2 people for safety). Pt demo'd impaired cognition, balance, functional mobility, and activity tolerance. Will continue to follow while in house.    Plan    Treatment Plan Status: (P) Continue Current Treatment Plan  Type of Treatment: Self Care / Activities of Daily Living, Adaptive Equipment, Neuro Re-Education / Balance, Therapeutic Exercises, Therapeutic Activity  Treatment Frequency: 3 Times per Week  Treatment Duration: Until Therapy Goals Met    DC Equipment Recommendations: (P) Unable to determine at this time  Discharge Recommendations: (P) Recommend Home health    Subjective    \"I can eat as long as I am set up well\"     Objective       08/08/23 0917   Treatment Charges   OT Self Care / ADL (Units) 2   Total Time Spent   OT Self Care / ADL (Minutes) 26   Precautions   Precautions Fall Risk;Swallow Precautions   Comments h/o CVA, expressive aphasia   Vitals   O2 (LPM) 0   O2 Delivery Device None - Room Air   Pain 0 - 10 Group   Therapist Pain Assessment Post Activity Pain Same as Prior to Activity;Nurse Notified  (no c/o pain during session)   Cognition    Speech/ Communication Expressive Aphasia   Level of Consciousness Alert   Comments Pt requires increased time to communicate, occasionally has difficulty with word finding   Balance   Sitting Balance (Static) Fair -   Sitting Balance (Dynamic) Poor +   Standing Balance (Static) Poor   Standing Balance (Dynamic) Poor -   Weight Shift Sitting Fair   Weight Shift Standing Poor   Skilled " Intervention Verbal Cuing;Tactile Cuing;Sequencing;Facilitation   Comments w/ trunk and UE support   Bed Mobility    Supine to Sit Maximal Assist   Sit to Supine Maximal Assist   Scooting Maximal Assist   Skilled Intervention Verbal Cuing;Tactile Cuing;Sequencing   Activities of Daily Living   Eating   (SBA- sitting EOB self feed w/ spoon)   Grooming Minimal Assist   Upper Body Dressing Moderate Assist   Lower Body Dressing Maximal Assist   Skilled Intervention Verbal Cuing;Tactile Cuing;Facilitation   How much help from another person does the patient currently need...   Putting on and taking off regular lower body clothing? 2   Bathing (including washing, rinsing, and drying)? 2   Toileting, which includes using a toilet, bedpan, or urinal? 2   Putting on and taking off regular upper body clothing? 2   Taking care of personal grooming such as brushing teeth? 3   Eating meals? 3   6 Clicks Daily Activity Score 14   Functional Mobility   Sit to Stand Moderate Assist  (min A (x2 people for safety))   Mobility stood 3x at EOB   Skilled Intervention Tactile Cuing;Verbal Cuing;Facilitation   Activity Tolerance   Sitting Edge of Bed 15 min   Standing 2-3 min total   Patient / Family Goals   Patient / Family Goal #1 to be able to complete ADL transfers   Goal #1 Outcome Progressing as expected   Short Term Goals   Short Term Goal # 1 pt will complete g/h seated EOB with set up with Chandan for sitting balance   Goal Outcome # 1 Goal met, new goal added   Short Term Goal # 1 B  Pt will demo seated grooming w/ SBA   Short Term Goal # 2 Pt will complete ADL transfers safely with ModA   Goal Outcome # 2 Progressing as expected   Short Term Goal # 3 Pt will complete UB dressing with ModA   Goal Outcome # 3 Progressing as expected   Occupational Therapy Treatment Plan    O.T. Treatment Plan Continue Current Treatment Plan   Anticipated Discharge Equipment and Recommendations   DC Equipment Recommendations Unable to determine at  this time   Discharge Recommendations Recommend post-acute placement for additional occupational therapy services prior to discharge home   Interdisciplinary Plan of Care Collaboration   IDT Collaboration with  Nursing   Patient Position at End of Therapy In Bed;Call Light within Reach;Tray Table within Reach;Phone within Reach;Bed Alarm On   Collaboration Comments report given   Session Information   Date / Session Number  8/8, 2 (2/3, 8/9)

## 2023-08-08 NOTE — THERAPY
"Speech Language Pathology   Daily Treatment     Patient Name: Alireza Hernandez  AGE:  87 y.o., SEX:  male  Medical Record #: [id number]  Date of Service: 8/8/2023      Precautions:  Precautions: Fall Risk, Swallow Precautions         Subjective  Patient cleared by RN for evaluation. Pt fully cooperated with SLP tasks.      Assessment  Patient seen on this date for dysphagia management. Pt and RN endorsed tolerance of current diet. PO of mildly thick liquids, soft cheese and trials of thin liquids assessed. Wet vocal quality appreciated with thin liquids, cleared with cued cough and cleansing swallow. No overt s/sx of aspiration appreciated. Pt would benefit from repeat instrumental study (MBSS) to objectively assess swallow function and further POC.       Clinical Impressions  Patient is appropriate to repeat instrumental swallow study. MBSS tentatively scheduled for Wednesday morning, MD updated.      Recommendations  Treatment Completed: Dysphagia Treatment       Dysphagia Treatment  Diet Consistency: Soft/bite sized solids and mildly thick liquids  Instrumentation: VFSS (MBSS)  Medication: Crush with applesauce, as appropriate, Crush with pudding/puree, as appropriate, As tolerated  Supervision: 1:1 feeding with constant supervision, Assist with meal tray set up, Encourage self-feeding  Positioning: Fully upright and midline during oral intake  Risk Management : Small bites/sips, Slow rate of intake  Oral Care: BID                     SLP Treatment Plan  Treatment Plan: Dysphagia Treatment, Patient/Family/Caregiver Training  SLP Frequency: 3x Per Week  Estimated Duration: Until Therapy Goals Met      Anticipated Discharge Needs  Discharge Recommendations: Recommend post-acute placement for additional speech therapy services prior to discharge home  Therapy Recommendations Upon DC: Dysphagia Training, Patient / Family / Caregiver Education      Patient / Family Goals  Patient / Family Goal #1: \"I need water\"  Goal " #1 Outcome: Progressing as expected  Short Term Goals  Short Term Goal # 1: Patient will consume a diet of soft/bite sized solids and mildly thick liquids with no overt s/sx of aspiration given 1:1 feeding A.  Goal Outcome # 1: Progressing as expected      Ashlyn Finch MS,CCC-SLP

## 2023-08-08 NOTE — CARE PLAN
The patient is Stable - Low risk of patient condition declining or worsening    Shift Goals  Clinical Goals: Hemodinamic stability  Patient Goals: Rest  Family Goals: MARGIE    Progress made toward(s) clinical / shift goals:    Problem: Knowledge Deficit - Standard  Goal: Patient and family/care givers will demonstrate understanding of plan of care, disease process/condition, diagnostic tests and medications  Outcome: Progressing     Problem: Skin Integrity  Goal: Skin integrity is maintained or improved  Outcome: Progressing     Problem: Fall Risk  Goal: Patient will remain free from falls  Outcome: Progressing     Problem: Psychosocial  Goal: Patient's level of anxiety will decrease  Outcome: Progressing  Goal: Patient's ability to verbalize feelings about condition will improve  Outcome: Progressing  Goal: Patient's ability to re-evaluate and adapt role responsibilities will improve  Outcome: Progressing  Goal: Patient and family will demonstrate ability to cope with life altering diagnosis and/or procedure  Outcome: Progressing  Goal: Spiritual and cultural needs incorporated into hospitalization  Outcome: Progressing     Problem: Hemodynamics  Goal: Patient's hemodynamics, fluid balance and neurologic status will be stable or improve  Outcome: Progressing       Patient is not progressing towards the following goals:

## 2023-08-08 NOTE — CARE PLAN
The patient is Stable - Low risk of patient condition declining or worsening    Shift Goals  Clinical Goals: neuro checks, rest  Patient Goals: rest  Family Goals: MARGIE    Progress made toward(s) clinical / shift goals:    Problem: Skin Integrity  Goal: Skin integrity is maintained or improved  Outcome: Progressing  Note: Pt sacral skin remains red, with blanching, barrier paste in use, soap and water use for cleaning     Problem: Fall Risk  Goal: Patient will remain free from falls  Outcome: Progressing  Note: Pt continues to remain in bed, frequent repositioning with staff assistance         Patient is not progressing towards the following goals:

## 2023-08-09 PROBLEM — R00.1 BRADYCARDIA: Status: ACTIVE | Noted: 2023-01-01

## 2023-08-09 PROBLEM — I48.91 A-FIB (HCC): Status: ACTIVE | Noted: 2023-01-01

## 2023-08-09 NOTE — PROGRESS NOTES
Telemetry Report:     AFIB   PVC, PAC  -/.08/.36                    Per Telestrip from Monitor Room

## 2023-08-09 NOTE — PROGRESS NOTES
HR:   Rhythm: A-fib, (ectopy sustained above 173 intermittently)  MS: N/A  QRS: 0.08  QT: 0.36

## 2023-08-09 NOTE — CARE PLAN
The patient is Watcher - Medium risk of patient condition declining or worsening    Shift Goals  Clinical Goals: Vitals, speak swallow evaluation, 1:1 feed,  Patient Goals: Rest  Family Goals: MARGIE    Progress made toward(s) clinical / shift goals:        Problem: Skin Integrity  Goal: Skin integrity is maintained or improved  Outcome: Progressing  Note: Patient skin integrity maintained. Patient is a Q2 turn. Waffle overlay in place. Dressing replaced to heels and coccyx area with day RN      Problem: Fall Risk  Goal: Patient will remain free from falls  Outcome: Progressing  Note: Patient is a high fall risk. Remained free from falls. Bed alarm in place and active, Personal belonging within reach. Patient educated on call light and demonstrated use multiple times throughout the day        Patient is not progressing towards the following goals:  Plan for discharge to [place]. Waiting on facility to come and evaluate patient. Hold up on discharge due to facility not present for evaluation

## 2023-08-09 NOTE — ASSESSMENT & PLAN NOTE
The patient has a previous history of A-fib, last episode was on 8/9/23 without symptoms reason why will restart BB   -Apixaban 5 mg PO BID  -Metoprolol SR tablet 50 mg PO Q day  -Today was given metoprolol 25 mg extra #1  -continue telemetry    -from 8/13/23 the patient will receive Metoprolol  75 mg PO Q day

## 2023-08-09 NOTE — DISCHARGE PLANNING
Case Management Discharge Planning    Admission Date: 8/2/2023  GMLOS:    ALOS: 0    6-Clicks ADL Score: 14  6-Clicks Mobility Score: 6  PT and/or OT Eval ordered: Yes  Post-acute Referrals Ordered: Yes  Post-acute Choice Obtained: No  Has referral(s) been sent to post-acute provider:  No      Anticipated Discharge Dispo: Discharge Disposition: Discharged to home/self care (01)    DME Needed: No    Action(s) Taken: PAULW called Clarisa RN from [facility] about pt returning. No answer, LSW left  for call back .     1515: LSW spoke to Clarisa RN from [facility], who stated she came into the hospital yesterday to see the pt. Clarisa stated the pt seemed confused. Clarisa stated that she believed the pt would do better if he were able to go to a SNF for additional rehab before returning to [facility]. Clarisa stated that the pt currently has HH that comes to work with the pt 1 to 2 times a week and believes she would benefit from a facility that could provide more therapy. PAULW discussed the barriers to SNF with Clarisa being that the pt in in observation status and does not qualify for SNF at this time.     RIGOBERTO will follow up with the pt medical team about DC barriers.     Escalations Completed: None    Medically Clear: Yes    Next Steps:  f/u with pt and medical team to discuss dc needs and barriers.     Barriers to Discharge: Pending Placement

## 2023-08-09 NOTE — PROGRESS NOTES
Notified by telemetry monitoring at 02:05 that patient's rhythm has been labile. Patient's rhythm is going in and out of SR to A-fib. Heart rate is 80-90's sinus 50-60's. Went to check on patient and performed assessment. Upon entering the room patient was sleeping, eyes closed, respirations even and unlabored and in no apparent sign of distress. Vitals were taken WNL and documented. Notified Nancy Ceron attending NP. Plan of care ongoing.        Telemetry monitoring room notified  me at 03;18 that patient had a HR of 48bpm non-sustained. Pt was asleep. Upon assessment patient is completely asymptomatic. Denies pain and has no further needs reported episode to attending NP.

## 2023-08-09 NOTE — THERAPY
Speech Language Pathology   Videofluoroscopic Swallow Study Evaluation     Patient Name: Alireza Hernandez  AGE:  87 y.o., SEX:  Male  Medical Record #: [id number]  Date of Service: 8/9/2023      History of Present Illness  This is an 86 y/o male with a PMHx of CVA and ICH. In March 2023 MRI revealed left thalamic and left corona radiata hemorrhage as well as remote infarcts in the right cerebellum, left kylee, and right thalamus. MRI in July 2023 revealed acute infarct in the left insular region and left parietal lobe as well as scattered foci in the left parietal lobe. It also identified bilateral remote infarcts in the thalami. Pt hospitalized 8/2/23 with hypotension after losing consciousness at his neurology visit. EEG was negative for seizures. MBSS ordered to determine any changes to pt's swallow function and determine his least restrictive diet.     Most recent CXR 8/3/23:   FINDINGS:  There are stable large and the cardiomediastinal silhouette. Stable small left pleural effusion. Unchanged bilateral basilar atelectasis and/or consolidation. Imaged osseous structures are unchanged.     IMPRESSION:  Stable appearance of the chest.      Pertinent Information  Current Method of Nutrition: Oral diet (Soft and bite-sized solids, mildly-thick liquids)  Patient Behaviors: Confused (Cooperative)  Dentition: Fair, Natural dentition  Secretion Management: Adequate  Feeding Tube: No  Factor(s) Affecting Performance: Impaired mental status, Impaired command following      Discussed the risks, benefits, and alternatives of the VFSS procedure. Patient/family acknowledged and agreed to proceed.      Assessment  Videofluoroscopic Swallow Study was conducted in the Lateral projection(s) to evaluate oropharyngeal swallow function. A radiology tech was present to assist with the procedure.      Positioning: Seated upright in fluoroscopy chair  Anatomic View: WFL  Bolus Administration: SLP, Patient  PO Barium Contrast Trials:  Varibar thin, Varibar nectar (mildly thick), Varibar pudding, Soft & Bite sized coated with Varibar powder, Regular solid coated with Varibar pudding      Consistency PAS Score Timing Comments   Thin Liquid 8 During swallow PAS 1 on first sip via cup  PAS 1 on second sip via cup  PAS 1 on third sip via straw  PAS 8 on fourth sip via straw as liquid wash  PAS 1 on fifth sip via straw  PAS 1 on sixth sip via straw   Mildly Thick Liquid 1 N/A Via tsp and single cup sip    Pudding 1 N/A Via tsp   Soft & Bite Sized 2 During swallow PAS 2   Solid 1 N/A        Penetration-Aspiration Scale (PAS)  1     No contrast enters airway  2     Contrast enters the airway, remains above the vocal folds, and is ejected from the airway (not seen in the airway at the end of the swallow).  3     Contrast enters the airway, remains above the vocal folds, and is not ejected from the airway (is seen in the airway after the swallow).  4     Contrast enters the airway, contacts the vocal folds, and is ejected from the airway.  5     Contrast enters the airway, contacts the vocal folds, and is not ejected from the airway  6     Contrast enters the airway, crosses the plane of the vocal folds, and is ejected from the airway.  7     Contrast enters the airway, crosses the plane of the vocal folds, and is not ejected from the airway despite effort.  8     Contrast enters the airway, crosses the plane of the vocal folds, is not ejected from the airway and there is no response to aspiration.        Oral phase:  Lip closure was characterized by escape beyond mid-chin to the left cleared by SLP.   Tongue control during bolus hold was cohesive bolus between tongue to palatal seal  Bolus preparation/mastication was slow prolonged chewing/mashing with complete re-collection.  Bolus transport/lingual motion was slowed.   Oral clearance was incomplete with residue collection on oral structures.  Initiation of pharyngeal swallow was severely delayed with  bolus head in pyriform sinuses at first hyoid excursion. It should be noted that pt's swallow initiation varied from swallow to swallow and had no apparent change with texture.       Pharyngeal phase:  Soft palate elevation was complete with no bolus between soft palate (SP)/pharyngeal wall (PW).  Laryngeal elevation was minimal for superior movement of thyroid cartilage with minimal approximation of arytenoids to epiglottic petiole.   Anterior hyoid excursion was absent with no anterior movement.  Epiglottic inversion was partial  Laryngeal vestibule closure was incomplete with narrow column air/contrast in laryngeal vestibule.  Pharyngeal stripping wave was present but diminished.    Pharyngoesophageal Segment Opening was partial for distension/partial duration with partial obstruction of flow.  Tongue base retraction was severely decreased with wide column of contrast or air between TB and PW  Pharyngeal residue was moderate with majority of contrast within or on base of tongue, in the valleculae, in the pyriform sinuses, and the aryepiglottic folds. Double swallow partially effective in clearing material but pt variable in his ability to follow this command.       DALTON Level:  Level 4: Mild-moderate dysphagia- intermittent supervision/cuing, one or two consistencies restricted    Dysphagia Outcome and Severity Scale:   DALTON Level:  Characteristics:   Level 7: Normal in all situations No strategies or extra time needed   Level 6: Within functional limits/modified independence -Patient may have mild oral or pharyngeal delay, retention or trace epiglottal undercoating but independently and spontaneously  compensates/clears  -May need extra time for meal  -Have no aspiration or penetration across consistencies  -Full P.O: Modified diet and/or independence   Level 5: Mild dysphagia: Distant supervision, may need one diet consistency restricted May exhibit one or more of the following:  -Aspiration of thin liquids  only but with strong reflexive cough to clear completely  -Airway penetration midway to cords with one or more consistency or to cords with one consistency but clears spontaneously  -Retention in pharynx that is cleared spontaneously  -Mild oral dysphagia with reduced mastication and/or oral retention that is cleared spontaneously   Level 4: Mild-moderate dysphagia: Intermittent supervision/cueing, one or two consistencies restricted May exhibit one or more of the following:  -Retention in pharynx cleared with cue  -Retention in the oral cavity that is cleared with cue  -Aspiration with one consistency, with weak or no reflexive cough  -Or airway penetration to the level of the vocal cords with cough with two consistencies  -Or airway penetration to the level of the vocal cords without cought with one consistency   Level 3: Moderate dysphagia: Total assist, supervision, or strategies, two or more diet consistencies restricted May exhibit one or more of the following:  -Moderate retention in pharynx, cleared with cue  -Moderate retention in oral cavity, cleared with cue  -Airway penetration to the level of the vocal cords without cough with two or more consistencies  -Or aspiration with two consistencies, with weak or no reflexive cough  -Or aspiration with one consistency, no cough and airway penetration to cords with one, no cough  -Nonoral nutrition necessary   Level 2: Moderately severe dysphagia: Maximum assistance or use of strategies with partial P.O. only (tolerates at least one consistency safely  with total use of strategies) May exhibit one or more of the following:  -Severe retention in pharynx, unable to clear or needs multiple cues  -Severe oral stage bolus loss or retention, unable to clear or needs multiple cues  -Aspiration with two or more consistencies, no reflexive cough, weak volitional cough  -Or aspiration with one or more consistency, no cough and airway penetration to cords with one or more  consistency, no cough   Level 1: Severe dysphagia: NPO: Unable to tolerate any P.O. safely May exhibit one or more of the following:  -Severe retention in pharynx, unable to clear  -Severe oral stage bolus loss or retention, unable to clear  -Silent aspiration with two or more consistencies, nonfunctional volitional cough  -Or unable to achieve swallow     Compensatory Strategies:  Double swallow partially effective in clearing pharyngeal residue.   Chin tuck effective for airway protection and ineffective for clearing pharyngeal clearance.   Liquid wash ineffective and contributed to aspiration of pharyngeal residue.     Clinical Impressions  The pt presents with a mild-moderate oropharyngeal dysphagia. His oral phase was characterized by decreased labial seal with loss past mid-chin, slowed mastication, incomplete oral clearance, slowed lingual motion, and variable initiation of swallow. His pharyngeal phase was characterized by minimal laryngeal elevation, absent hyoid excursion, and minimal to no epiglottic inversion resulting in variable penetration of solids and liquids. Pt only demonstrated aspiration on 1/6 thin liquid sips which is an improvement from his previous studies. He demonstrated severely reduced tongue base retraction, diminished pharyngeal stripping wave, and partial PES/UES opening resulting in mild-moderate pharyngeal residue partially cleared with double swallow.      Recommendations  Regular solids and thin liquids.   2.  Swallowing Instructions & Precautions:   Supervision: 1:1 feeding with constant supervision  Positioning: Fully upright and midline during oral intake, Remain upright for 30 minutes after oral intake  Medication: Crush with applesauce or puree, as appropriate, As tolerated  Strategies: Small bites/sips, Alternate bites and sips, Slow rate of intake, Multiple swallows (x 3) per bite/sip , Effortful swallow, Encourage periodic cough/throat clear, Reduce environmental  "distractions, Chin tuck (trial use with SLP)  Oral Care: Q2h       SLP Treatment Plan  Treatment Plan: Dysphagia Treatment, Patient/Family/Caregiver Training  SLP Frequency: 3x Per Week  Estimated Duration: Until Therapy Goals Met      Anticipated Discharge Needs  Discharge Recommendations: Recommend post-acute placement for additional speech therapy services prior to discharge home   Therapy Recommendations Upon DC: Dysphagia Training, Patient / Family / Caregiver Education        Patient / Family Goals  Patient / Family Goal #1: \"I need water\"  Goal #1 Outcome: Progressing as expected  Short Term Goal # 1: Patient will consume a diet of soft/bite sized solids and mildly thick liquids with no overt s/sx of aspiration given 1:1 feeding A.  Goal Outcome # 1: Progressing as expected  Short Term Goal # 2: Patient will follow strategies of double swallow, effortful swallow, and to alternate bites/sips with MOD A 80% of the time.      Ania Garcia MS, CCC-SLP   "

## 2023-08-09 NOTE — CARE PLAN
The patient is Stable - Low risk of patient condition declining or worsening    Shift Goals  Clinical Goals: Vital s/s, comfort,skin integrety  Patient Goals: Rest  Family Goals: MARGIE    Progress made toward(s) clinical / shift goals:    Problem: Knowledge Deficit - Standard  Goal: Patient and family/care givers will demonstrate understanding of plan of care, disease process/condition, diagnostic tests and medications  Outcome: Progressing     Problem: Skin Integrity  Goal: Skin integrity is maintained or improved  Outcome: Progressing     Problem: Fall Risk  Goal: Patient will remain free from falls  Outcome: Progressing     Problem: Psychosocial  Goal: Patient's level of anxiety will decrease  Outcome: Progressing  Goal: Patient's ability to verbalize feelings about condition will improve  Outcome: Progressing  Goal: Patient's ability to re-evaluate and adapt role responsibilities will improve  Outcome: Progressing  Goal: Patient and family will demonstrate ability to cope with life altering diagnosis and/or procedure  Outcome: Progressing  Goal: Spiritual and cultural needs incorporated into hospitalization  Outcome: Progressing     Problem: Hemodynamics  Goal: Patient's hemodynamics, fluid balance and neurologic status will be stable or improve  Outcome: Progressing       Patient is not progressing towards the following goals:

## 2023-08-09 NOTE — ASSESSMENT & PLAN NOTE
The patient presented at 3:08 am on 8/9/23 and episode of bradycardia with a pulse of 48xminute, it was asymptomatic.   -continue telemetry of patient.

## 2023-08-09 NOTE — PROGRESS NOTES
Daily Progress Note:     Date of Service: 8/9/2023  Primary Team: [facility] Purple Team   Attending: Nabil Navarro M.D.   Senior Resident: Freddy Elkins D.O.  Intern: Jonny BENNETT  Contact:  [phone]    Patient Identifier:   Mr. Hernandez is an 87-year-old male with past medical history of atrial fibrillation, CVA with right-sided deficits, hypertension who was initially seen on 8/2 after being sent to the ED by his neurologist after having an episode of hypotension and syncope while in the neurology office.  Patient did improve with fluid resuscitation, imaging findings have been unremarkable.  Of note, patient started to have loose, mucousy bowel movements with a known recent history of C. difficile from 4/2023.    Subjective:  Patient was evaluated today, he is stable oriented and answering question. The patient had a Afib and bradycardia without symptoms during night, reason why it was started on BB once again. The nurse from [facility] did not showed up yesterday, so the patient team was unable to discharge the patient.  is tried reaching [facility] today but there was no answer. Otherwise the patient is stable without symptoms in room air.     The patient is medically clear.     Interval Update:  - Patient did start to have more frequent watery and mucousy bowel movements.  Per nursing, concern for C. difficile, chart review does indicate that the patient does have a recent history of active C. difficile from 4/2023.    -C. Difficile test was negative. 8/5/23  -Echocardiogram with EF of 65%  -EEC was normal  -CT of the head without intracaneal hemorrhage   -EKG: had one at ED which resulted on Atrial fibrillation, otherwise normal.  -PT is following up  -CBC negative to new infection 8/7/23  - medical clear, expected to DC to SNF or [facility].   -Will have a visit today (8/8/23) from [facility] to evaluate him and depending on that they will schedule transportation for him tomorrow  (8/9/23)  -Patient have Afib without symptoms at night, BB prescribed.     Consultants/Specialty:  N/A    Review of Systems:  Review of Systems   Constitutional:  Negative for chills, fever and malaise/fatigue.   HENT:  Negative for hearing loss.    Eyes:  Negative for blurred vision and double vision.   Respiratory:  Negative for cough.    Cardiovascular:  Negative for chest pain, palpitations and orthopnea.   Gastrointestinal:  Negative for diarrhea, nausea and vomiting.   Genitourinary:  Negative for dysuria and frequency.   Musculoskeletal:  Negative for myalgias.   Skin:  Positive for itching and rash.   Neurological:  Positive for focal weakness. Negative for dizziness and headaches.   Psychiatric/Behavioral:  Negative for depression and suicidal ideas. The patient is not nervous/anxious.      Objective:   Vitals:   Temp:  [36.1 °C (96.9 °F)-36.9 °C (98.4 °F)] 36.5 °C (97.7 °F)  Pulse:  [75-97] 77  Resp:  [16-22] 18  BP: (131-151)/(62-90) 145/83  SpO2:  [86 %-98 %] 96 %    Physical Exam  Constitutional:       General: He is not in acute distress.     Appearance: He is normal weight. He is not diaphoretic.   HENT:      Head: Normocephalic and atraumatic.        Comments: The patient presenting itching and is scratching      Right Ear: External ear normal.      Left Ear: External ear normal.      Nose: Nose normal.      Mouth/Throat:      Mouth: Mucous membranes are moist.      Pharynx: Oropharynx is clear.   Eyes:      General: No scleral icterus.     Extraocular Movements: Extraocular movements intact.      Pupils: Pupils are equal, round, and reactive to light.   Cardiovascular:      Rate and Rhythm: Normal rate. Rhythm irregular.      Pulses: Normal pulses.      Heart sounds: Normal heart sounds. No murmur heard.  Pulmonary:      Effort: Pulmonary effort is normal. No respiratory distress.      Breath sounds: Normal breath sounds. No wheezing, rhonchi or rales.   Abdominal:      General: Abdomen is flat.  Bowel sounds are normal. There is no distension.      Palpations: Abdomen is soft.      Tenderness: There is no abdominal tenderness. There is no right CVA tenderness, left CVA tenderness or rebound.   Musculoskeletal:         General: No tenderness. Normal range of motion.      Cervical back: Normal range of motion and neck supple. No rigidity.      Right lower leg: No edema.      Left lower leg: No edema.      Comments: Right-sided muscle strength of 3-4/5, 5/5 on the left side.   Skin:     General: Skin is warm and dry.      Capillary Refill: Capillary refill takes less than 2 seconds.      Coloration: Skin is not jaundiced.      Findings: No rash.   Neurological:      Mental Status: He is alert and oriented to person, place, and time. Mental status is at baseline.      Cranial Nerves: No cranial nerve deficit.      Deep Tendon Reflexes: Reflexes normal.   Psychiatric:         Mood and Affect: Mood normal.         Behavior: Behavior normal.     Labs:   Lab Results   Component Value Date/Time    WBC 5.9 08/07/2023 03:21 AM    RBC 4.59 (L) 08/07/2023 03:21 AM    HEMOGLOBIN 12.0 (L) 08/07/2023 03:21 AM    HEMATOCRIT 39.9 (L) 08/07/2023 03:21 AM    MCV 86.9 08/07/2023 03:21 AM    MCH 26.1 (L) 08/07/2023 03:21 AM    MCHC 30.1 (L) 08/07/2023 03:21 AM    MPV 10.5 08/07/2023 03:21 AM    NEUTSPOLYS 53.80 08/07/2023 03:21 AM    LYMPHOCYTES 25.20 08/07/2023 03:21 AM    MONOCYTES 9.50 08/07/2023 03:21 AM    EOSINOPHILS 10.30 (H) 08/07/2023 03:21 AM    BASOPHILS 0.50 08/07/2023 03:21 AM        Lab Results   Component Value Date/Time    SODIUM 140 08/07/2023 03:21 AM    POTASSIUM 3.8 08/07/2023 03:21 AM    CHLORIDE 111 08/07/2023 03:21 AM    CO2 16 (L) 08/07/2023 03:21 AM    GLUCOSE 68 08/07/2023 03:21 AM    BUN 8 08/07/2023 03:21 AM    CREATININE 0.63 08/07/2023 03:21 AM        Lab Results   Component Value Date/Time    PROTHROMBTM 15.3 (H) 07/11/2023 10:57 AM    INR 1.22 (H) 07/11/2023 10:57 AM      Imaging:   Echocardiogram  performed on 8/4, pending official read.    Assessment and Plan:    * Hypotension- (present on admission)  Assessment & Plan  The patient has been having normal BP and recently started having high blood pressures (patient has a previous Hx of hypertension) the patient is asymptomatic at the moment, no longer in fluids.   -resolved      A-fib (HCC)  Assessment & Plan  The patient has a previous history of A-fib, last episode was on 8/9/23 without symptoms reason why will restart BB   -Apixaban 5 mg PO BID  -Metoprolol SR tablet 50 mg PO Q day   -continue telemetry      Hypertension  Assessment & Plan  The patient has a previous history of hypertension, the patient is having high blood pressures.  -metoprolol SR tablet 50 mg Q day       Bradycardia   Assessment & Plan  The patient presented at 3:08 am on 8/9/23 and episode of bradycardia with a pulse of 48xminute, it was asymptomatic.   -continue telemetry of patient.     Pleural effusion- (present on admission)  Assessment & Plan  The patient had a chest xray in the ER and showed left pleural effusion, labs done including CBC are witching normal limits at the moment.   The patient is asymptomatic at the moment tolerating room air, no chest pain.             Syncope and collapse- (present on admission)  Assessment & Plan  The patient had a syncope, evaluation for neurologic and cardiogenic was done and test and images where negative.   -telemetry  -resolved       Itching  Assessment & Plan  The patient started feeling itch in frontal head  -Loratadine 10 mg 1 per day    Frequent loose stools  Assessment & Plan  The patient started presenting frequent loose stools.   -C. difficile (test was negative)   -resolved      Chronic atrial fibrillation (HCC)  Assessment & Plan  -continue telemetry  -hold metoprolol due to hypotension     -apixaban 5 mg BID    Sacral ulcer (HCC)  Assessment & Plan  The patient presented in physical examination a pressure ulcer in the sacral  area.   -pressure ulcer protocol     Code Status: DNR/DNI  DVT prophylaxis: Eliquis 5 mg twice daily  Diet: Regular diet  GI: Bowel protocol placed  Disposition: Pending C. difficile rule out, likely discharge back to nursing home ([facility]).    Luis Fuller MD  PGY-1

## 2023-08-09 NOTE — CARE PLAN
Sacral wound remains the same as yesterday, no worsening skin integrity, soap and water to clean, barrier cream in use. Fall prevention and safety reviewed with patient, PT working with patient to regain mobility.      The patient is Stable - Low risk of patient condition declining or worsening    Shift Goals  Clinical Goals: comfort, maintain skin integrity, monitor BP  Patient Goals: Rest, comfort  Family Goals: MARGIE    Progress made toward(s) clinical / shift goals:    Problem: Skin Integrity  Goal: Skin integrity is maintained or improved  Outcome: Progressing  Note: Sacral wound not worsening, barrier cream in use, soap and water to clean     Problem: Fall Risk  Goal: Patient will remain free from falls  Outcome: Progressing  Note: Reviewed fall prevention/safety with patient, PT working with patient to gain strength and movement ability       Patient is not progressing towards the following goals:

## 2023-08-09 NOTE — ASSESSMENT & PLAN NOTE
The patient has a previous history of hypertension, the patient is having high blood pressures.  -metoprolol SR tablet 50 mg Q day

## 2023-08-10 NOTE — THERAPY
Speech Language Pathology   Daily Treatment     Patient Name: Alireza Hernandez  AGE:  87 y.o., SEX:  male  Medical Record #: [id number]  Date of Service: 8/10/2023      Precautions:  Precautions: Fall Risk, Swallow Precautions         Subjective  Patient seen this date for dysphagia management. Patient alert, agreeable to session, and positioned to upright in bed. Patient reports recall of MBSS and named 3/4 strategies. Reports preference for 1:1 feeding assistance d/t incoordination.       Assessment  PO presentations of TN0 (cup) and RG7. Adequate oral bolus acceptance/containment. Complete AP transfer without notable oral bolus residue upon oral inspection. Adequate bite with functional mastication of solids. No cough/throat clear appreciated with PO. Vocal quality remained stable throughout PO intake. Double swallow completed per bolus, in alignment with recommendations. No signs of esophageal dysfunction. Patient independently stating/doing appropriate rate/volume of intake with alternating bites/sip.s       Clinical Impressions  Patient demonstrates independent use of recommended swallowing strategies and appears to be tolerating a regular/baseline diet well. Service will sign off. Please re-consult should change in swallow function occur.       Recommendations  Diet Consistency: Regular solids with thin liquids  Instrumentation: None indicated at this time  Medication: Crush with applesauce, as appropriate  Supervision: 1:1 feeding with constant supervision (per pt request)  Positioning: Fully upright and midline during oral intake  Risk Management : Small bites/sips, Alternate bites and sips, Slow rate of intake (Swallow 2x per bite/sip)  Oral Care: BID                     SLP Treatment Plan  Treatment Plan: None Indicated (d/c secondary to goals met)            Anticipated Discharge Needs  Discharge Recommendations: Recommend home health for continued speech therapy services  Therapy Recommendations Upon DC:  "Dysphagia Training      Patient / Family Goals  Patient / Family Goal #1: \"I need water\"  Goal #1 Outcome: Goal met  Short Term Goals  Short Term Goal # 1: Patient will consume a diet of soft/bite sized solids and mildly thick liquids with no overt s/sx of aspiration given 1:1 feeding A.  Goal Outcome # 1: Goal met  Short Term Goal # 2: Patient will follow strategies of double swallow, effortful swallow, and to alternate bites/sips with MOD A 80% of the time.  Goal Outcome # 2 : Goal met      DARSHAN Mclaughlin  "

## 2023-08-10 NOTE — CARE PLAN
"The patient is Watcher - Medium risk of patient condition declining or worsening    Shift Goals  Clinical Goals: Q2 turns, placement  Patient Goals: Rest  Family Goals: MARGIE    Progress made toward(s) clinical / shift goals:      Patient is not progressing towards the following goals:      Problem: Fall Risk  Goal: Patient will remain free from falls  Outcome: Progressing  Note: Patients bed alarm is in place, 3 side rails are up, the patients call bell is within reach. The patient has a fall risk wristband on his arm as well as a fall risk tab on his door. The nurse educated the patient that if he needed anything he could press the call light and the RN or CNA would come, the patient verbalized understanding.      Problem: Pain - Standard  Goal: Alleviation of pain or a reduction in pain to the patient’s comfort goal  Outcome: Progressing  Note: Patient was repositioned with wedges. The patient was turned on his right side. The patient wanted the wedges to be adjusted 2 times until he stated, \"that's it, that's perfect you found the spot.\" The patient stated that the placement of his wedges decreased his pain level in his back.      "

## 2023-08-10 NOTE — CARE PLAN
The patient is Stable - Low risk of patient condition declining or worsening    Shift Goals  Clinical Goals: Monitor vitals, aspiration precautions, Q2 turns  Patient Goals: Rest, comfort  Family Goals: MARGIE    Progress made toward(s) clinical / shift goals:    Problem: Skin Integrity  Goal: Skin integrity is maintained or improved  Outcome: Progressing  Note: Skin remains intact this shift. There are no new wounds or skin issues noted overnight.  Waffle overlay in place, wedges in place to relieve pressure, and Q2 turns completed within shift.Wound care completed within shift.      Problem: Fall Risk  Goal: Patient will remain free from falls  Outcome: Progressing  Note: Patient has remained free of falls this shift. Instructed patient to use call light for help. Call light always placed within reach when leaving room. Patient's room has been cleared of clutter such as cords and extra chairs.        Patient is not progressing towards the following goals:

## 2023-08-10 NOTE — DISCHARGE PLANNING
Case Management Discharge Planning    Admission Date: 8/2/2023  GMLOS:    ALOS: 0    6-Clicks ADL Score: 14  6-Clicks Mobility Score: 6  PT and/or OT Eval ordered: Yes  Post-acute Referrals Ordered: Yes  Post-acute Choice Obtained: No  Has referral(s) been sent to post-acute provider:  No      Anticipated Discharge Dispo: Discharge Disposition: Discharged to home/self care (01)    DME Needed: No    Action(s) Taken: LSW called Clarisa RN from [facility] about returning to [facility]. No answer, LSW left  for call back.     1430: LSW  attempted to call Clarisa at [facility] with no answer.     Escalations Completed: None    Medically Clear: Yes    Next Steps: LSW will continue to assist with discharge as needed.      Barriers to Discharge: Pending Placement and Transportation

## 2023-08-11 NOTE — CARE PLAN
The patient is Watcher - Medium risk of patient condition declining or worsening    Shift Goals  Clinical Goals: Q2 turns, placement, comfort, skin integrity  Patient Goals: Rest  Family Goals: MARGIE    Progress made toward(s) clinical / shift goals:    Problem: Skin Integrity  Goal: Skin integrity is maintained or improved  Outcome: Progressing  Note: Skin remains intact this shift. There are no new wounds or skin issues noted overnight.  Q2 turns, TAPS, mepilex heel/sacrum, barrier cream     Problem: Fall Risk  Goal: Patient will remain free from falls  Outcome: Progressing  Note: Patient has remained free of falls this shift. Instructed patient to use call light for help. Call light always placed within reach when leaving room. Patient's room has been cleared of clutter such as cords and extra chairs.        Patient is not progressing towards the following goals:

## 2023-08-11 NOTE — PROGRESS NOTES
Daily Progress Note:     Date of Service: 8/11/2023  Primary Team: [facility] IM Purple Team   Attending: Nabil Navarro M.D.   Senior Resident: Freddy Elkins D.O.  Intern: Jonny BENNETT  Contact:  [phone]    Patient Identifier:   Mr. Hernandez is an 87-year-old male with past medical history of atrial fibrillation, CVA with right-sided deficits, hypertension who was initially seen on 8/2 after being sent to the ED by his neurologist after having an episode of hypotension and syncope while in the neurology office.  Patient did improve with fluid resuscitation, imaging findings have been unremarkable.  Of note, patient started to have loose, mucousy bowel movements with a known recent history of C. difficile from 4/2023.    Subjective:  The patient was stable today, on 2 Lt of O2, blood pressures have been within normal range. The patient is still pending placement, [facility] have not responded any call yet and no staff member from the facility has visit the patient for evaluation.    will continue to follow-up his case.        The patient is medically clear.     Interval Update:  - Patient did start to have more frequent watery and mucousy bowel movements.  Per nursing, concern for C. difficile, chart review does indicate that the patient does have a recent history of active C. difficile from 4/2023.    -C. Difficile test was negative. 8/5/23  -Echocardiogram with EF of 65%  -EEC was normal  -CT of the head without intracaneal hemorrhage   -EKG: had one at ED which resulted on Atrial fibrillation, otherwise normal.  -PT is following up  -CBC negative to new infection 8/7/23  - medical clear, expected to DC to SNF or [facility].   -Will have a visit today (8/8/23) from [facility] to evaluate him and depending on that they will schedule transportation for him tomorrow (8/9/23)  -Patient have Afib without symptoms at night, BB prescribed 8/9/23    Consultants/Specialty:  N/A    Review of Systems:  Review of  Systems   Constitutional:  Negative for chills, fever and malaise/fatigue.   HENT:  Negative for hearing loss.    Eyes:  Negative for blurred vision and double vision.   Respiratory:  Negative for cough.    Cardiovascular:  Negative for chest pain, palpitations and orthopnea.   Gastrointestinal:  Negative for diarrhea, nausea and vomiting.   Genitourinary:  Negative for dysuria and frequency.   Musculoskeletal:  Negative for myalgias.   Skin:  Negative for itching and rash.   Neurological:  Positive for focal weakness. Negative for dizziness and headaches.   Psychiatric/Behavioral:  Negative for depression and suicidal ideas. The patient is not nervous/anxious.      Objective:   Vitals:   Temp:  [36 °C (96.8 °F)-36.7 °C (98 °F)] 36.5 °C (97.7 °F)  Pulse:  [66-84] 76  Resp:  [15-18] 15  BP: (106-150)/(55-79) 150/79  SpO2:  [92 %-99 %] 98 %    Physical Exam  Constitutional:       General: He is not in acute distress.     Appearance: He is normal weight. He is not diaphoretic.   HENT:      Head: Normocephalic and atraumatic.        Comments: The patient presenting itching and is scratching      Right Ear: External ear normal.      Left Ear: External ear normal.      Nose: Nose normal.      Mouth/Throat:      Mouth: Mucous membranes are moist.      Pharynx: Oropharynx is clear.   Eyes:      General: No scleral icterus.     Extraocular Movements: Extraocular movements intact.      Pupils: Pupils are equal, round, and reactive to light.   Cardiovascular:      Rate and Rhythm: Normal rate. Rhythm irregular.      Pulses: Normal pulses.      Heart sounds: Normal heart sounds. No murmur heard.  Pulmonary:      Effort: Pulmonary effort is normal. No respiratory distress.      Breath sounds: Normal breath sounds. No wheezing, rhonchi or rales.   Abdominal:      General: Abdomen is flat. Bowel sounds are normal. There is no distension.      Palpations: Abdomen is soft.      Tenderness: There is no abdominal tenderness. There is  no right CVA tenderness, left CVA tenderness or rebound.   Musculoskeletal:         General: No tenderness. Normal range of motion.      Cervical back: Normal range of motion and neck supple. No rigidity.      Right lower leg: No edema.      Left lower leg: No edema.      Comments: Right-sided muscle strength of 3-4/5, 5/5 on the left side.   Skin:     General: Skin is warm and dry.      Capillary Refill: Capillary refill takes less than 2 seconds.      Coloration: Skin is not jaundiced.      Findings: No rash.   Neurological:      Mental Status: He is alert and oriented to person, place, and time. Mental status is at baseline.      Cranial Nerves: No cranial nerve deficit.      Deep Tendon Reflexes: Reflexes normal.   Psychiatric:         Mood and Affect: Mood normal.         Behavior: Behavior normal.     Labs:   Lab Results   Component Value Date/Time    WBC 5.9 08/07/2023 03:21 AM    RBC 4.59 (L) 08/07/2023 03:21 AM    HEMOGLOBIN 12.0 (L) 08/07/2023 03:21 AM    HEMATOCRIT 39.9 (L) 08/07/2023 03:21 AM    MCV 86.9 08/07/2023 03:21 AM    MCH 26.1 (L) 08/07/2023 03:21 AM    MCHC 30.1 (L) 08/07/2023 03:21 AM    MPV 10.5 08/07/2023 03:21 AM    NEUTSPOLYS 53.80 08/07/2023 03:21 AM    LYMPHOCYTES 25.20 08/07/2023 03:21 AM    MONOCYTES 9.50 08/07/2023 03:21 AM    EOSINOPHILS 10.30 (H) 08/07/2023 03:21 AM    BASOPHILS 0.50 08/07/2023 03:21 AM        Lab Results   Component Value Date/Time    SODIUM 140 08/07/2023 03:21 AM    POTASSIUM 3.8 08/07/2023 03:21 AM    CHLORIDE 111 08/07/2023 03:21 AM    CO2 16 (L) 08/07/2023 03:21 AM    GLUCOSE 68 08/07/2023 03:21 AM    BUN 8 08/07/2023 03:21 AM    CREATININE 0.63 08/07/2023 03:21 AM        Lab Results   Component Value Date/Time    PROTHROMBTM 15.3 (H) 07/11/2023 10:57 AM    INR 1.22 (H) 07/11/2023 10:57 AM      Imaging:   Echocardiogram performed on 8/4, pending official read.    Assessment and Plan:    * Hypotension- (present on admission)  Assessment & Plan  The patient has  been having normal BP and recently started having high blood pressures (patient has a previous Hx of hypertension) the patient is asymptomatic at the moment, no longer in fluids.   -resolved      A-fib (HCC)  Assessment & Plan  The patient has a previous history of A-fib, last episode was on 8/9/23 without symptoms reason why will restart BB   -Apixaban 5 mg PO BID  -Metoprolol SR tablet 50 mg PO Q day   -continue telemetry      Hypertension  Assessment & Plan  The patient has a previous history of hypertension, the patient is having high blood pressures.  -metoprolol SR tablet 50 mg Q day       Bradycardia   Assessment & Plan  The patient presented at 3:08 am on 8/9/23 and episode of bradycardia with a pulse of 48xminute, it was asymptomatic.   -continue telemetry of patient.     Pleural effusion- (present on admission)  Assessment & Plan  The patient had a chest xray in the ER and showed left pleural effusion, labs done including CBC are witching normal limits at the moment.   The patient is asymptomatic at the moment tolerating room air, no chest pain.             Syncope and collapse- (present on admission)  Assessment & Plan  The patient had a syncope, evaluation for neurologic and cardiogenic was done and test and images where negative.   -telemetry  -resolved       Itching  Assessment & Plan  The patient started feeling itch in frontal head  -Loratadine 10 mg 1 per day    Frequent loose stools  Assessment & Plan  The patient started presenting frequent loose stools.   -C. difficile (test was negative)   -resolved      Chronic atrial fibrillation (HCC)  Assessment & Plan  -continue telemetry  -hold metoprolol due to hypotension     -apixaban 5 mg BID    Sacral ulcer (HCC)  Assessment & Plan  The patient presented in physical examination a pressure ulcer in the sacral area.   -pressure ulcer protocol     Code Status: DNR/DNI  DVT prophylaxis: Eliquis 5 mg twice daily  Diet: Regular diet  GI: Bowel protocol  placed  Disposition: Pending C. difficile rule out, likely discharge back to long term ([facility]).    Luis Fuller MD  PGY-1

## 2023-08-11 NOTE — PROGRESS NOTES
Monitor summary:     Rhythm : SB-SR  Rate : 56-83  Ectopy : PAC'S, PVC'S    MD=.16  QRS=.10  QT=.38        Per telemetry strip summary from monitor room.

## 2023-08-11 NOTE — PROGRESS NOTES
Daily Progress Note:     Date of Service: 8/10/2023  Primary Team: [facility] IM Purple Team   Attending: Nabil Navarro M.D.   Senior Resident: Freddy Elkins D.O.  Intern: Jonny BENNETT  Contact:  [phone]    Patient Identifier:   Mr. Hernandez is an 87-year-old male with past medical history of atrial fibrillation, CVA with right-sided deficits, hypertension who was initially seen on 8/2 after being sent to the ED by his neurologist after having an episode of hypotension and syncope while in the neurology office.  Patient did improve with fluid resuscitation, imaging findings have been unremarkable.  Of note, patient started to have loose, mucousy bowel movements with a known recent history of C. difficile from 4/2023.    Subjective:  Patient was evaluated today, he is stable oriented and answering questions, on 2 Lts O2, the patient mentioned he was on O2 at [facility]. The patient on medications for Afib, stable with BP in normal range.    called [facility] today to ask about patient returning to this place but there was no answer.  Patient medically clear.        is tried reaching [facility] today but there was no answer. Otherwise the patient is stable without symptoms in room air.     The patient is medically clear.     Interval Update:  - Patient did start to have more frequent watery and mucousy bowel movements.  Per nursing, concern for C. difficile, chart review does indicate that the patient does have a recent history of active C. difficile from 4/2023.    -C. Difficile test was negative. 8/5/23  -Echocardiogram with EF of 65%  -EEC was normal  -CT of the head without intracaneal hemorrhage   -EKG: had one at ED which resulted on Atrial fibrillation, otherwise normal.  -PT is following up  -CBC negative to new infection 8/7/23  - medical clear, expected to DC to SNF or [facility].   -Will have a visit today (8/8/23) from [facility] to evaluate him and depending on that they will  schedule transportation for him tomorrow (8/9/23)  -Patient have Afib without symptoms at night, BB prescribed 8/9/23    Consultants/Specialty:  N/A    Review of Systems:  Review of Systems   Constitutional:  Negative for chills, fever and malaise/fatigue.   HENT:  Negative for hearing loss.    Eyes:  Negative for blurred vision and double vision.   Respiratory:  Negative for cough.    Cardiovascular:  Negative for chest pain, palpitations and orthopnea.   Gastrointestinal:  Negative for diarrhea, nausea and vomiting.   Genitourinary:  Negative for dysuria and frequency.   Musculoskeletal:  Negative for myalgias.   Skin:  Negative for itching and rash.   Neurological:  Positive for focal weakness. Negative for dizziness and headaches.   Psychiatric/Behavioral:  Negative for depression and suicidal ideas. The patient is not nervous/anxious.      Objective:   Vitals:   Temp:  [36.2 °C (97.2 °F)-36.6 °C (97.8 °F)] 36.2 °C (97.2 °F)  Pulse:  [69-80] 73  Resp:  [18-28] 18  BP: (120-156)/(61-77) 138/61  SpO2:  [94 %-98 %] 94 %    Physical Exam  Constitutional:       General: He is not in acute distress.     Appearance: He is normal weight. He is not diaphoretic.   HENT:      Head: Normocephalic and atraumatic.        Comments: The patient presenting itching and is scratching      Right Ear: External ear normal.      Left Ear: External ear normal.      Nose: Nose normal.      Mouth/Throat:      Mouth: Mucous membranes are moist.      Pharynx: Oropharynx is clear.   Eyes:      General: No scleral icterus.     Extraocular Movements: Extraocular movements intact.      Pupils: Pupils are equal, round, and reactive to light.   Cardiovascular:      Rate and Rhythm: Normal rate. Rhythm irregular.      Pulses: Normal pulses.      Heart sounds: Normal heart sounds. No murmur heard.  Pulmonary:      Effort: Pulmonary effort is normal. No respiratory distress.      Breath sounds: Normal breath sounds. No wheezing, rhonchi or rales.    Abdominal:      General: Abdomen is flat. Bowel sounds are normal. There is no distension.      Palpations: Abdomen is soft.      Tenderness: There is no abdominal tenderness. There is no right CVA tenderness, left CVA tenderness or rebound.   Musculoskeletal:         General: No tenderness. Normal range of motion.      Cervical back: Normal range of motion and neck supple. No rigidity.      Right lower leg: No edema.      Left lower leg: No edema.      Comments: Right-sided muscle strength of 3-4/5, 5/5 on the left side.   Skin:     General: Skin is warm and dry.      Capillary Refill: Capillary refill takes less than 2 seconds.      Coloration: Skin is not jaundiced.      Findings: No rash.   Neurological:      Mental Status: He is alert and oriented to person, place, and time. Mental status is at baseline.      Cranial Nerves: No cranial nerve deficit.      Deep Tendon Reflexes: Reflexes normal.   Psychiatric:         Mood and Affect: Mood normal.         Behavior: Behavior normal.     Labs:   Lab Results   Component Value Date/Time    WBC 5.9 08/07/2023 03:21 AM    RBC 4.59 (L) 08/07/2023 03:21 AM    HEMOGLOBIN 12.0 (L) 08/07/2023 03:21 AM    HEMATOCRIT 39.9 (L) 08/07/2023 03:21 AM    MCV 86.9 08/07/2023 03:21 AM    MCH 26.1 (L) 08/07/2023 03:21 AM    MCHC 30.1 (L) 08/07/2023 03:21 AM    MPV 10.5 08/07/2023 03:21 AM    NEUTSPOLYS 53.80 08/07/2023 03:21 AM    LYMPHOCYTES 25.20 08/07/2023 03:21 AM    MONOCYTES 9.50 08/07/2023 03:21 AM    EOSINOPHILS 10.30 (H) 08/07/2023 03:21 AM    BASOPHILS 0.50 08/07/2023 03:21 AM        Lab Results   Component Value Date/Time    SODIUM 140 08/07/2023 03:21 AM    POTASSIUM 3.8 08/07/2023 03:21 AM    CHLORIDE 111 08/07/2023 03:21 AM    CO2 16 (L) 08/07/2023 03:21 AM    GLUCOSE 68 08/07/2023 03:21 AM    BUN 8 08/07/2023 03:21 AM    CREATININE 0.63 08/07/2023 03:21 AM        Lab Results   Component Value Date/Time    PROTHROMBTM 15.3 (H) 07/11/2023 10:57 AM    INR 1.22 (H)  07/11/2023 10:57 AM      Imaging:   Echocardiogram performed on 8/4, pending official read.    Assessment and Plan:    * Hypotension- (present on admission)  Assessment & Plan  The patient has been having normal BP and recently started having high blood pressures (patient has a previous Hx of hypertension) the patient is asymptomatic at the moment, no longer in fluids.   -resolved      A-fib (HCC)  Assessment & Plan  The patient has a previous history of A-fib, last episode was on 8/9/23 without symptoms reason why will restart BB   -Apixaban 5 mg PO BID  -Metoprolol SR tablet 50 mg PO Q day   -continue telemetry      Hypertension  Assessment & Plan  The patient has a previous history of hypertension, the patient is having high blood pressures.  -metoprolol SR tablet 50 mg Q day       Bradycardia   Assessment & Plan  The patient presented at 3:08 am on 8/9/23 and episode of bradycardia with a pulse of 48xminute, it was asymptomatic.   -continue telemetry of patient.     Pleural effusion- (present on admission)  Assessment & Plan  The patient had a chest xray in the ER and showed left pleural effusion, labs done including CBC are witching normal limits at the moment.   The patient is asymptomatic at the moment tolerating room air, no chest pain.             Syncope and collapse- (present on admission)  Assessment & Plan  The patient had a syncope, evaluation for neurologic and cardiogenic was done and test and images where negative.   -telemetry  -resolved       Itching  Assessment & Plan  The patient started feeling itch in frontal head  -Loratadine 10 mg 1 per day    Frequent loose stools  Assessment & Plan  The patient started presenting frequent loose stools.   -C. difficile (test was negative)   -resolved      Chronic atrial fibrillation (HCC)  Assessment & Plan  -continue telemetry  -hold metoprolol due to hypotension     -apixaban 5 mg BID    Sacral ulcer (HCC)  Assessment & Plan  The patient presented in  physical examination a pressure ulcer in the sacral area.   -pressure ulcer protocol     Code Status: DNR/DNI  DVT prophylaxis: Eliquis 5 mg twice daily  Diet: Regular diet  GI: Bowel protocol placed  Disposition: Pending C. difficile rule out, likely discharge back to nursing home ([place]).    Luis Fuller MD  PGY-1

## 2023-08-12 NOTE — CARE PLAN
The patient is Stable - Low risk of patient condition declining or worsening    Shift Goals  Clinical Goals: Pain management, rest, safety  Patient Goals: rest, pain management  Family Goals: same as pt    Progress made toward(s) clinical / shift goals:  Pt was able to remain free of new pressure ulcers and abrasions.         Problem: Knowledge Deficit - Standard  Goal: Patient and family/care givers will demonstrate understanding of plan of care, disease process/condition, diagnostic tests and medications  Outcome: Not Met  Note: Pt is A/OX2-3. Pt has to be oriented to time/place. Communication about disease process and treatment plan has not been met due to disorientation.

## 2023-08-12 NOTE — ASSESSMENT & PLAN NOTE
The patient has a previous history of hypertension, the patient is having high blood pressures.  - Metoprolol SR tablet 50 mg daily.

## 2023-08-12 NOTE — DISCHARGE PLANNING
8/12/23 1300    Called [facility], left VM for Clarisa to return my call.    Spoken in rounds today that Clarisa will be coming back out to review patient for return to [facility].

## 2023-08-12 NOTE — PROGRESS NOTES
Monitor summary:     Rhythm : A-flutter, converted to SR  Rate : 57-78  Ectopy : PVCs, PACs    IL=.-  QRS=.0.06  QT=.-        Per telemetry strip summary from monitor room.

## 2023-08-12 NOTE — PROGRESS NOTES
Pt is Aox2-3, disoriented to time and forgetful on occasion. VSS, on 2L NC, no complaints of pain throughout shift. No acute events, pt awaiting discharge/placement to SNF, medically cleared.

## 2023-08-12 NOTE — PROGRESS NOTES
Report received from night shift RN. Pt is sleeping in bed at this time. All other needs are met. Call light within reach, bed locked/lowered.

## 2023-08-12 NOTE — CARE PLAN
The patient is Stable - Low risk of patient condition declining or worsening    Shift Goals  Clinical Goals: Monitor skin, VSS, placement  Patient Goals: Feel better and go home  Family Goals: NA    Progress made toward(s) clinical / shift goals:      Problem: Knowledge Deficit - Standard  Goal: Patient and family/care givers will demonstrate understanding of plan of care, disease process/condition, diagnostic tests and medications  Description: Target End Date:  1-3 days or as soon as patient condition allows    Document in Patient Education    1.  Patient and family/caregiver oriented to unit, equipment, visitation policy and means for communicating concern  2.  Complete/review Learning Assessment  3.  Assess knowledge level of disease process/condition, treatment plan, diagnostic tests and medications  4.  Explain disease process/condition, treatment plan, diagnostic tests and medications  Outcome: Progressing     Problem: Skin Integrity  Goal: Skin integrity is maintained or improved  Description: Target End Date:  Prior to discharge or change in level of care    Document interventions on Skin Risk/Issa flowsheet groups and corresponding LDA    1.  Assess and monitor skin integrity, appearance and/or temperature  2.  Assess risk factors for impaired skin integrity and/or pressures ulcers  3.  Implement precautions to protect skin integrity in collaboration with interdisciplinary team  4.  Implement pressure ulcer prevention protocol if at risk for skin breakdown  5.  Confirm wound care consult if at risk for skin breakdown  6.  Ensure patient use of pressure relieving devices  (Low air loss bed, waffle overlay, heel protectors, ROHO cushion, etc)  Outcome: Progressing     Problem: Fall Risk  Goal: Patient will remain free from falls  Description: Target End Date:  Prior to discharge or change in level of care    Document interventions on the Татьяна Camargo Fall Risk Assessment    1.  Assess for fall risk  factors  2.  Implement fall precautions  Outcome: Progressing     Problem: Psychosocial  Goal: Patient's level of anxiety will decrease  Description: Target End Date:  1-3 days or as soon as patient condition allows    1.  Collaborate with patient and family/caregiver to identify triggers and develop strategies to cope with anxiety  2.  Implement stimuli reduction, calming techniques  3.  Pharmacologic management per provider order  4.  Encourage patient/family/care giver participation  5.  Collaborate with interdisciplinary team including Psychologist or Behavioral Health Team as needed  Outcome: Progressing  Goal: Patient's ability to verbalize feelings about condition will improve  Description: Target End Date:  Prior to discharge or change in level of care    1.  Discuss coping with medical condition and its effects  2.  Encourage patient participation in care  3.  Encourage acknowledgement of body changes and accompanying emotions  4.  Perform depression screening  Outcome: Progressing  Goal: Patient's ability to re-evaluate and adapt role responsibilities will improve  Description: Target End Date:  Prior to discharge or change in level of care    1.  Assess family support  2.  Encourage support system participation in care  3.  Encouraged verbalization of feelings regarding caregiver responsibilities  4.  Discuss changes in role and responsibilities caused by patient's condition  Outcome: Progressing  Goal: Patient and family will demonstrate ability to cope with life altering diagnosis and/or procedure  Description: Target End Date:  1-3 days or as soon as patient condition allows    1. Expect initial shock and disbelief following diagnosis of cancer and traumatizing procedures (disfiguring surgery, colostomy, amputation).  2.  Assess patient and family/caregiver for stage of grief currently being experienced. Explain process as appropriate.  3.  Provide open, nonjudgmental environment. Use therapeutic  communication skills of Active-Listening, acknowledgment, and so on.  4.  Encourage verbalization of thoughts or concerns and accept expressions of sadness, anger, rejection. Acknowledge normality of these feelings  5.  Be aware of mood swings, hostility, and other acting-out behavior. Set limits on inappropriate behavior, redirect negative thinking  6.  Be aware of debilitating depression. Ask patient direct questions about state of mind.  7.  Visit frequently and provide physical contact as appropriate, or provide frequent phone support as appropriate for setting. Arrange for care provider and support person to stay with patient as needed  8.  Reinforce teaching regarding disease process and treatments and provide information as appropriate about dying. Be honest; do not give false hope while providing emotional support  9.  Review past life experiences, role changes, and coping skills. Talk about things that interest the patient  Outcome: Progressing  Goal: Spiritual and cultural needs incorporated into hospitalization  Description: Target End Date:  End of day 1    1.  Encourage verbalization of feelings, concerns, expectations and needs  2.  Collaborate with Case Management/  3.  Collaborate with Pastoral Care to meet spiritual needs  Outcome: Progressing     Problem: Hemodynamics  Goal: Patient's hemodynamics, fluid balance and neurologic status will be stable or improve  Description: Target End Date:  Prior to discharge or change in level of care    Document on Assessment and I/O flowsheet templates    1.  Monitor vital signs, pulse oximetry and cardiac monitor per provider order and/or policy  2.  Maintain blood pressure per provider order  3.  Hemodynamic monitoring per provider order  4.  Manage IV fluids and IV infusions  5.  Monitor intake and output  6.  Daily weights per unit policy or provider order  7.  Assess peripheral pulses and capillary refill  8.  Assess color and body  temperature  9.  Position patient for maximum circulation/cardiac output  10. Monitor for signs/symptoms of excessive bleeding  11. Assess mental status, restlessness and changes in level of consciousness  12. Monitor temperature and report fever or hypothermia to provider immediately. Consideration of targeted temperature management.  Outcome: Progressing     Problem: Pain - Standard  Goal: Alleviation of pain or a reduction in pain to the patient’s comfort goal  Description: Target End Date:  Prior to discharge or change in level of care    Document on Vitals flowsheet    1.  Document pain using the appropriate pain scale per order or unit policy  2.  Educate and implement non-pharmacologic comfort measures (i.e. relaxation, distraction, massage, cold/heat therapy, etc.)  3.  Pain management medications as ordered  4.  Reassess pain after pain med administration per policy  5.  If opiods administered assess patient's response to pain medication is appropriate per POSS sedation scale  6.  Follow pain management plan developed in collaboration with patient and interdisciplinary team (including palliative care or pain specialists if applicable)  Outcome: Progressing       Patient is not progressing towards the following goals: NA

## 2023-08-12 NOTE — PROGRESS NOTES
Daily Progress Note:     Date of Service: 8/12/2023  Primary Team: [facility] Team   Attending: Nabil Navarro M.D.   Senior Resident: Freddy Elkins D.O.  Contact:  [phone]    Patient Identifier:   Mr. Hernandez is an 87-year-old male with past medical history of atrial fibrillation, CVA with right-sided deficits, hypertension who was initially seen on 8/2 after being sent to the ED by his neurologist after having an episode of hypotension and syncope while in the neurology office.  Patient did improve with fluid resuscitation, imaging findings have been unremarkable.  Evaluation for C. difficile was negative, patient appears to be back to baseline in terms of functionality and oxygen requirements.    Subjective:  No significant concerns today.  Patient notes that he is doing well and is wondering when he can leave the hospital.  Has been able to urinate and stool without issues.  No nursing concerns at this time.    Interval Update:  - No significant events overnight.  - Patient has been cleared by PT/OT/SLP for home with home health.  Can likely be transferred back to [facility] pending evaluation by the nursing staff.    Consultants/Specialty:  N/A    Review of Systems:  Review of Systems   Constitutional:  Positive for malaise/fatigue. Negative for chills, diaphoresis and fever.   HENT:  Negative for congestion, hearing loss and sore throat.    Eyes:  Negative for blurred vision and double vision.   Respiratory:  Negative for cough, hemoptysis, shortness of breath and wheezing.    Cardiovascular:  Negative for chest pain, palpitations, orthopnea and claudication.   Gastrointestinal:  Negative for abdominal pain, blood in stool, constipation, diarrhea, melena, nausea and vomiting.   Genitourinary:  Negative for dysuria, frequency and hematuria.   Musculoskeletal:  Negative for joint pain and myalgias.   Skin:  Negative for itching and rash.   Neurological:  Positive for focal weakness. Negative for  dizziness, tremors, weakness and headaches.   Endo/Heme/Allergies:  Does not bruise/bleed easily.   Psychiatric/Behavioral:  Negative for depression and suicidal ideas. The patient is not nervous/anxious.      Objective:   Vitals:   Temp:  [36.2 °C (97.1 °F)-36.6 °C (97.9 °F)] 36.3 °C (97.4 °F)  Pulse:  [74-81] 81  Resp:  [15-18] 16  BP: (126-161)/(70-84) 126/84  SpO2:  [94 %-98 %] 94 %    Physical Exam  Constitutional:       General: He is not in acute distress.     Appearance: He is normal weight. He is not diaphoretic.      Comments: Laying comfortably in bed.   HENT:      Head: Normocephalic and atraumatic.      Right Ear: External ear normal.      Left Ear: External ear normal.      Nose: Nose normal.      Mouth/Throat:      Mouth: Mucous membranes are moist.      Pharynx: Oropharynx is clear.   Eyes:      General: No scleral icterus.     Extraocular Movements: Extraocular movements intact.      Pupils: Pupils are equal, round, and reactive to light.   Cardiovascular:      Rate and Rhythm: Normal rate. Rhythm irregular.      Pulses: Normal pulses.      Heart sounds: Normal heart sounds. No murmur heard.  Pulmonary:      Effort: Pulmonary effort is normal. No respiratory distress.      Breath sounds: Normal breath sounds. No wheezing, rhonchi or rales.   Abdominal:      General: Abdomen is flat. Bowel sounds are normal. There is no distension.      Palpations: Abdomen is soft.      Tenderness: There is no abdominal tenderness. There is no right CVA tenderness, left CVA tenderness or rebound.   Musculoskeletal:         General: No tenderness. Normal range of motion.      Cervical back: Normal range of motion and neck supple. No rigidity.      Right lower leg: No edema.      Left lower leg: No edema.      Comments: Right-sided muscle strength of 3-4/5, 5/5 on the left side.   Skin:     General: Skin is warm and dry.      Capillary Refill: Capillary refill takes less than 2 seconds.      Coloration: Skin is not  jaundiced.      Findings: No rash.   Neurological:      Mental Status: He is alert and oriented to person, place, and time. Mental status is at baseline.      Cranial Nerves: No cranial nerve deficit.      Deep Tendon Reflexes: Reflexes normal.   Psychiatric:         Mood and Affect: Mood normal.         Behavior: Behavior normal.     Labs:   Lab Results   Component Value Date/Time    WBC 5.9 08/07/2023 03:21 AM    RBC 4.59 (L) 08/07/2023 03:21 AM    HEMOGLOBIN 12.0 (L) 08/07/2023 03:21 AM    HEMATOCRIT 39.9 (L) 08/07/2023 03:21 AM    MCV 86.9 08/07/2023 03:21 AM    MCH 26.1 (L) 08/07/2023 03:21 AM    MCHC 30.1 (L) 08/07/2023 03:21 AM    MPV 10.5 08/07/2023 03:21 AM    NEUTSPOLYS 53.80 08/07/2023 03:21 AM    LYMPHOCYTES 25.20 08/07/2023 03:21 AM    MONOCYTES 9.50 08/07/2023 03:21 AM    EOSINOPHILS 10.30 (H) 08/07/2023 03:21 AM    BASOPHILS 0.50 08/07/2023 03:21 AM        Lab Results   Component Value Date/Time    SODIUM 140 08/07/2023 03:21 AM    POTASSIUM 3.8 08/07/2023 03:21 AM    CHLORIDE 111 08/07/2023 03:21 AM    CO2 16 (L) 08/07/2023 03:21 AM    GLUCOSE 68 08/07/2023 03:21 AM    BUN 8 08/07/2023 03:21 AM    CREATININE 0.63 08/07/2023 03:21 AM        Lab Results   Component Value Date/Time    PROTHROMBTM 15.3 (H) 07/11/2023 10:57 AM    INR 1.22 (H) 07/11/2023 10:57 AM      Imaging:   Echocardiogram performed on 8/4, pending official read.    Assessment and Plan:    * Hypotension- (present on admission)  Assessment & Plan  Patient was being seen in the neurology clinic, had an episode of severe hypotension and unresponsiveness.  Received a small amount of fluids with improvement, and was sent to the ED for further evaluation.  -Continue to encourage p.o. intake.  -Can utilize small fluid boluses with 500 cc of normal saline at a time.  -Can discontinue maintenance IV fluids.  -Continue to hold antihypertensive medications at this time.    Frequent loose stools  Assessment & Plan  Resolved.  Likely related to  diet.  - C. difficile assay is negative.    Chronic atrial fibrillation (HCC)  Assessment & Plan  Known history.  Imaging has been unremarkable.  - Continue home medications.  - Continue to monitor.    Sacral ulcer (HCC)  Assessment & Plan  The patient presented in physical examination a pressure ulcer in the sacral area.   - Continue to monitor.  - Pressure ulcer protocol with repositioning.    Pleural effusion- (present on admission)  Assessment & Plan  Small pleural effusion noted, patient did have an elevated CBC on admission.  Possibly reactive leukocytosis.  Of note, chart review indicates that patient did have a previous pleural effusion which seems to have decreased in size.  - We will continue to monitor.  - Will likely require repeat chest x-ray in 1 to 4 weeks to monitor for changes.  - If worsening shortness of breath, increasing pleural effusion, signs of systemic infection, can consider repeat chest x-ray and possible thoracentesis.    Syncope and collapse- (present on admission)  Assessment & Plan  Likely in the setting of dehydration.  - Echocardiogram pending.  - EEG, head CT unremarkable.  - Continue to monitor.    A-fib ([facility])  Assessment & Plan  The patient has a previous history of A-fib, last episode was on 8/9/23 without symptoms reason why will restart BB   - Apixaban 5 mg PO BID  - Metoprolol SR tablet 50 mg PO Q day   - Continue telemetry monitoring.    Hypertension  Assessment & Plan  The patient has a previous history of hypertension, the patient is having high blood pressures.  - Metoprolol SR tablet 50 mg daily.    Code Status: DNR/DNI  DVT prophylaxis: Eliquis 5 mg twice daily  Diet: Regular diet  GI: Bowel protocol placed  Disposition: Patient is medically clear, can be discharged back to [facility] pending their nursing staff evaluation.    Freddy Elknis DO, MPH  PGY-3 Internal Medicine

## 2023-08-12 NOTE — CARE PLAN
The patient is Stable - Low risk of patient condition declining or worsening    Shift Goals  Clinical Goals: Q2 turns, skin integrity, VSS, rest  Patient Goals: Adequate rest. go home  Family Goals: MARGIE    Progress made toward(s) clinical / shift goals:    Problem: Skin Integrity  Goal: Skin integrity is maintained or improved  Outcome: Progressing  Note: Skin remains intact this shift. There are no new wounds or skin issues noted overnight.  Q2 turns in place, TAPS in use, and barrier cream used when changing pt.      Problem: Fall Risk  Goal: Patient will remain free from falls  Outcome: Progressing  Note: Patient has remained free of falls this shift. Instructed patient to use call light for help. Call light always placed within reach when leaving room. Patient's room has been cleared of clutter such as cords and extra chairs.      Problem: Pain - Standard  Goal: Alleviation of pain or a reduction in pain to the patient’s comfort goal  Outcome: Progressing  Note: Patient pain alleviated with repositioning       Patient is not progressing towards the following goals:

## 2023-08-13 NOTE — PROGRESS NOTES
Daily Progress Note:     Date of Service: 8/13/2023  Primary Team: [facility] Purple Team   Attending: Nabil Navarro M.D.   Senior Resident: Freddy Elkins D.O.  Intern: Jonny BENNETT  Contact:  [phone]    Patient Identifier:   Mr. Hernandez is an 87-year-old male with past medical history of atrial fibrillation, CVA with right-sided deficits, hypertension who was initially seen on 8/2 after being sent to the ED by his neurologist after having an episode of hypotension and syncope while in the neurology office.  Patient did improve with fluid resuscitation, imaging findings have been unremarkable.  Evaluation for C. difficile was negative, patient appears to be back to baseline in terms of functionality and oxygen requirements.    Subjective:  The patient was evaluated today, the patient was stable and oriented to time, place and person.  called today to facility and was told they can received the patient tomorrow at [facility] before 4 pm, transportation has already been request.   I called his niece and sister to update about situation but there was no answer.     Interval Update:  - No significant events overnight.  - Patient has been cleared by PT/OT/SLP for home with home health.  Can likely be transferred back to [facility] pending evaluation by the nursing staff.    Consultants/Specialty:  N/A    Review of Systems:  Review of Systems   Constitutional:  Positive for malaise/fatigue. Negative for chills, diaphoresis and fever.   HENT:  Negative for congestion, hearing loss and sore throat.    Eyes:  Negative for blurred vision and double vision.   Respiratory:  Negative for cough, hemoptysis, shortness of breath and wheezing.    Cardiovascular:  Negative for chest pain, palpitations, orthopnea and claudication.   Gastrointestinal:  Negative for abdominal pain, blood in stool, constipation, diarrhea, melena, nausea and vomiting.   Genitourinary:  Negative for dysuria, frequency and hematuria.    Musculoskeletal:  Negative for joint pain and myalgias.   Skin:  Negative for itching and rash.   Neurological:  Positive for focal weakness. Negative for dizziness, tremors, weakness and headaches.   Endo/Heme/Allergies:  Does not bruise/bleed easily.   Psychiatric/Behavioral:  Negative for depression and suicidal ideas. The patient is not nervous/anxious.      Objective:   Vitals:   Temp:  [35.9 °C (96.6 °F)-36.6 °C (97.8 °F)] 36.6 °C (97.8 °F)  Pulse:  [71-82] 77  Resp:  [16-18] 16  BP: (115-153)/(73-99) 135/73  SpO2:  [88 %-98 %] 88 %    Physical Exam  Constitutional:       General: He is not in acute distress.     Appearance: He is normal weight. He is not diaphoretic.      Comments: Laying comfortably in bed.   HENT:      Head: Normocephalic and atraumatic.      Right Ear: External ear normal.      Left Ear: External ear normal.      Nose: Nose normal.      Mouth/Throat:      Mouth: Mucous membranes are moist.      Pharynx: Oropharynx is clear.   Eyes:      General: No scleral icterus.     Extraocular Movements: Extraocular movements intact.      Pupils: Pupils are equal, round, and reactive to light.   Cardiovascular:      Rate and Rhythm: Normal rate. Rhythm irregular.      Pulses: Normal pulses.      Heart sounds: Normal heart sounds. No murmur heard.  Pulmonary:      Effort: Pulmonary effort is normal. No respiratory distress.      Breath sounds: Normal breath sounds. No wheezing, rhonchi or rales.   Abdominal:      General: Abdomen is flat. Bowel sounds are normal. There is no distension.      Palpations: Abdomen is soft.      Tenderness: There is no abdominal tenderness. There is no right CVA tenderness, left CVA tenderness or rebound.   Musculoskeletal:         General: No tenderness. Normal range of motion.      Cervical back: Normal range of motion and neck supple. No rigidity.      Right lower leg: No edema.      Left lower leg: No edema.      Comments: Right-sided muscle strength of 3-4/5, 5/5 on  the left side.   Skin:     General: Skin is warm and dry.      Capillary Refill: Capillary refill takes less than 2 seconds.      Coloration: Skin is not jaundiced.      Findings: No rash.   Neurological:      Mental Status: He is alert and oriented to person, place, and time. Mental status is at baseline.      Cranial Nerves: No cranial nerve deficit.      Deep Tendon Reflexes: Reflexes normal.   Psychiatric:         Mood and Affect: Mood normal.         Behavior: Behavior normal.     Labs:   Lab Results   Component Value Date/Time    WBC 5.9 08/07/2023 03:21 AM    RBC 4.59 (L) 08/07/2023 03:21 AM    HEMOGLOBIN 12.0 (L) 08/07/2023 03:21 AM    HEMATOCRIT 39.9 (L) 08/07/2023 03:21 AM    MCV 86.9 08/07/2023 03:21 AM    MCH 26.1 (L) 08/07/2023 03:21 AM    MCHC 30.1 (L) 08/07/2023 03:21 AM    MPV 10.5 08/07/2023 03:21 AM    NEUTSPOLYS 53.80 08/07/2023 03:21 AM    LYMPHOCYTES 25.20 08/07/2023 03:21 AM    MONOCYTES 9.50 08/07/2023 03:21 AM    EOSINOPHILS 10.30 (H) 08/07/2023 03:21 AM    BASOPHILS 0.50 08/07/2023 03:21 AM        Lab Results   Component Value Date/Time    SODIUM 140 08/07/2023 03:21 AM    POTASSIUM 3.8 08/07/2023 03:21 AM    CHLORIDE 111 08/07/2023 03:21 AM    CO2 16 (L) 08/07/2023 03:21 AM    GLUCOSE 68 08/07/2023 03:21 AM    BUN 8 08/07/2023 03:21 AM    CREATININE 0.63 08/07/2023 03:21 AM        Lab Results   Component Value Date/Time    PROTHROMBTM 15.3 (H) 07/11/2023 10:57 AM    INR 1.22 (H) 07/11/2023 10:57 AM      Imaging:   Echocardiogram performed on 8/4, pending official read.    Assessment and Plan:    * Hypotension- (present on admission)  Assessment & Plan  The patient has been having normal BP and recently started having high blood pressures (patient has a previous Hx of hypertension) the patient is asymptomatic at the moment, no longer in fluids.   -resolved      A-fib (HCC)  Assessment & Plan  The patient has a previous history of A-fib, last episode was on 8/9/23 without symptoms reason why  will restart BB   -Apixaban 5 mg PO BID  -Metoprolol SR tablet 50 mg PO Q day   -Today was given metoprolol 25 mg extra #1  -continue telemetry      Hypertension  Assessment & Plan  The patient has a previous history of hypertension, the patient is having high blood pressures.  -metoprolol SR tablet 50 mg Q day       Bradycardia   Assessment & Plan  The patient presented at 3:08 am on 8/9/23 and episode of bradycardia with a pulse of 48xminute, it was asymptomatic.   -continue telemetry of patient.     Pleural effusion- (present on admission)  Assessment & Plan  The patient had a chest xray in the ER and showed left pleural effusion, labs done including CBC are witching normal limits at the moment.   The patient is asymptomatic at the moment tolerating room air, no chest pain.             Syncope and collapse- (present on admission)  Assessment & Plan  The patient had a syncope, evaluation for neurologic and cardiogenic was done and test and images where negative.   -telemetry  -resolved       Itching  Assessment & Plan  The patient started feeling itch in frontal head  -Loratadine 10 mg 1 per day    Frequent loose stools  Assessment & Plan  The patient started presenting frequent loose stools.   -C. difficile (test was negative)   -resolved      Chronic atrial fibrillation (HCC)  Assessment & Plan  -continue telemetry  -hold metoprolol due to hypotension     -apixaban 5 mg BID    Sacral ulcer (HCC)  Assessment & Plan  The patient presented in physical examination a pressure ulcer in the sacral area.   -pressure ulcer protocol     Code Status: DNR/DNI  DVT prophylaxis: Eliquis 5 mg twice daily  Diet: Regular diet  GI: Bowel protocol placed  Disposition: Patient is medically clear, can be discharged back to [facility] pending their nursing staff evaluation.    Luis Fuller MD  PGY-1 Internal Medicine

## 2023-08-13 NOTE — PROGRESS NOTES
Telemetry Report:    Leandro Edwards   -/.09/.31      Per Telestrip from Monitor Room

## 2023-08-13 NOTE — CARE PLAN
The patient is Stable - Low risk of patient condition declining or worsening      Problem: Skin Integrity  Goal: Skin integrity is maintained or improved  Outcome: Progressing  Note: Patient is turn q2.  Skin assessed for breakdown.  Patient has sacral and heel mepilex's in place.  Wedges in use for turns.      Problem: Fall Risk  Goal: Patient will remain free from falls  Outcome: Met  Note: Patient remains safe in bed with call light and personal belongings within reach.  Fall precautions in place.  Patient uses call light appropriately for assistance.      Shift Goals  Clinical Goals: rest, safety  Patient Goals: rest  Family Goals: same as pt    Progress made toward(s) clinical / shift goals:  Patient appeared to be able to rest with no signs or symptoms of acute distress.

## 2023-08-13 NOTE — DISCHARGE PLANNING
Agency/Facility Name: [facility]  Spoke To: Rogelio  Outcome: Patient to transfer to [facility] on 08/14/23 at 1300 via [facility].  RIGOBERTO Hazel advised of transport time.

## 2023-08-13 NOTE — CARE PLAN
The patient is Stable - Low risk of patient condition declining or worsening    Shift Goals  Clinical Goals: patient will remain free from falls by end of shift  Patient Goals: rest  Family Goals: not present, MARGIE    Progress made toward(s) clinical / shift goals:      Problem: Knowledge Deficit - Standard  Goal: Patient and family/care givers will demonstrate understanding of plan of care, disease process/condition, diagnostic tests and medications  Outcome: Progressing  Note: Patient was educated on possible discharge tomorrow, all questions answered, uses call light appropriately      Problem: Pain - Standard  Goal: Alleviation of pain or a reduction in pain to the patient’s comfort goal  Outcome: Progressing  Flowsheets (Taken 8/13/2023 1030)  Pain Rating Scale (NPRS): 0

## 2023-08-13 NOTE — PROGRESS NOTES
Monitor summary:     Rhythm : SB,SR,AFLUTTER  Rate : 57-82  Ectopy : RARE PVC'S, PAPO DOWN TO 47    ID=.-  QRS=.09  QT=.31        Per telemetry strip summary from monitor room.

## 2023-08-13 NOTE — DISCHARGE PLANNING
LSW made phone call to [facility]ia Wright, [phone], to inquire about pt's return to [facility]. Safia reported last she saw the pt she recommended he go to SNF/IRF. LSW explained barriers to SNF and IRF and that inpatient therapy is not an option for the pt. Safia inquired if pt is able to eat independently. Voalte sent to RN.    Addendum @3104  Per RN Destini pt needs supervision for eating, however not physical assistance. LSW made phone call to Safia to provide update. Safia stated pt is fine to return to their facility with supervision and emphasized they are not able to physically assist him. Safia stated they cannot accept pt's back over the weekend so transportation will need to be arranged for tomorrow before 1600. RN made aware of the above. Transportation forms faxed to [facility] to request 1300 transportation on 8/14.

## 2023-08-14 NOTE — DISCHARGE SUMMARY
[facility] Discharge Summary    Attending: Nabil Navarro M.d.  Senior Resident: Dr. TOBY Lewis  Intern:  Dr. ILEANA Pickett  Contact Number: [phone]    CHIEF COMPLAINT ON ADMISSION  Chief Complaint   Patient presents with    Syncope     Pt was at doctors office for neuro checkup when MD noticed pt was not responsive. BP for fire was 58/40. Pt was placed in gurney, placed in trendelenburg, BP went to 90/50.       Reason for Admission  Hypotension and syncope    Admission Date  8/2/2023    CODE STATUS  DNAR/DNI    HPI & HOSPITAL COURSE  Mr. Alireza Hernandez is an 87-year-old male with past medical history of atrial fibrillation, CVA with right-sided deficits, hypertension who was initially seen on 8/2 after being sent to the ED by his neurologist after having an episode of hypotension and syncope while in the neurology office. Patient was admitted for fluid resuscitation and workup to look for syncope cause. Patient did improve with fluid resuscitation, imaging findings have been unremarkable with Echocardiogram showing EF 65% and  no mayor changes in the CXR. Evaluation for C. difficile was negative. Overall workup largely unremarkable with uncomplicated hospital stay. Syncope probably due to poor oral intake/dehydration. Patient appears to be back to baseline in terms of functionality and oxygen requirements.  On day of discharge patient is clinically and hemodynamically stable. Recommend to follow-up with primary care provider within 2 weeks. Patient in agreement with plan. Will transfer him to [facility].      Therefore, he is discharged in fair and stable condition to Assisted living facility.     The patient met 2-midnight criteria for an inpatient stay at the time of discharge.    Discharge Date  8/14/2023    Physical Exam on Day of Discharge  Physical Exam  Constitutional:       General: He is not in acute distress.     Appearance: Normal appearance. He is not ill-appearing.      Comments:  Laying comfortably in bed.      HENT:      Head: Normocephalic and atraumatic.      Nose: Nose normal.      Mouth/Throat:      Mouth: Mucous membranes are moist.   Eyes:      Extraocular Movements: Extraocular movements intact.      Conjunctiva/sclera: Conjunctivae normal.      Pupils: Pupils are equal, round, and reactive to light.   Cardiovascular:      Rate and Rhythm: Normal rate. Rhythm irregular.      Pulses: Normal pulses.      Heart sounds: No murmur heard.     No friction rub.   Pulmonary:      Effort: Pulmonary effort is normal. No respiratory distress.      Breath sounds: Normal breath sounds.   Chest:      Chest wall: No tenderness.   Abdominal:      General: Bowel sounds are normal. There is no distension.      Palpations: Abdomen is soft.      Tenderness: There is no abdominal tenderness. There is no right CVA tenderness or left CVA tenderness.   Musculoskeletal:         General: No swelling or tenderness. Normal range of motion.      Cervical back: Normal range of motion and neck supple.      Right lower leg: No edema.      Left lower leg: No edema.      Comments: Right-sided muscle strength of 3-4/5, 5/5 on the left side.   Skin:     General: Skin is warm.      Capillary Refill: Capillary refill takes less than 2 seconds.      Coloration: Skin is not jaundiced or pale.   Neurological:      General: No focal deficit present.      Mental Status: He is alert and oriented to person, place, and time.      Cranial Nerves: No cranial nerve deficit.      Sensory: No sensory deficit.      Motor: No weakness.   Psychiatric:         Mood and Affect: Mood normal.         FOLLOW UP ITEMS POST DISCHARGE  -Follow up with PCP for BP management and Afib control      DISCHARGE DIAGNOSES  Principal Problem:    Hypotension (POA: Yes)  Active Problems:    Hypertension (POA: Unknown)    A-fib (HCC) (POA: Unknown)    Syncope and collapse (POA: Yes)    Pleural effusion (POA: Yes)      Overview: The patient had a chest xray  in the ER and showed left pleural effusion,       the patient mentioned he haven't had any aspiration of food. CBC showed       elevated WBC: >16, with left shift, the patient does not present any       fever, chills or altered mental status.     Sacral ulcer (HCC) (POA: Unknown)    Chronic atrial fibrillation (HCC) (POA: Unknown)    Frequent loose stools (POA: Unknown)    Loose stools (POA: Unknown)    Itching (POA: Unknown)    Bradycardia  (POA: Unknown)  Resolved Problems:    * No resolved hospital problems. *      FOLLOW UP  Follow-up with PCP in 1 week for BP management and Afib control    MEDICATIONS ON DISCHARGE     Medication List        ASK your doctor about these medications        Instructions   acetaminophen 325 MG Tabs  Commonly known as: Tylenol   Take 650 mg by mouth every four hours as needed. Indications: Pain  Dose: 650 mg     bismuth subsalicylate 262 MG/15ML Susp  Commonly known as: Pepto-Bismol   Take 30 mL by mouth every 6 hours as needed (diarrhea).  Dose: 30 mL     Calcium Antacid Ultra Strength 1000 MG Chew  Generic drug: Calcium Carbonate Antacid   Chew 1,000 mg 2 times a day.  Dose: 1,000 mg     Eliquis 5mg Tabs  Generic drug: apixaban   Take 1 Tablet by mouth 2 times a day. Indications: Thromboembolism secondary to Atrial Fibrillation  Dose: 5 mg     finasteride 5 MG Tabs  Commonly known as: Proscar   Take 5 mg by mouth every day.  Dose: 5 mg     loperamide 2 MG Caps  Commonly known as: Imodium   Take 2 mg by mouth 4 times a day as needed for Diarrhea.  Dose: 2 mg     losartan 50 MG Tabs  Commonly known as: Cozaar   Take 75 mg by mouth every day. 75 mg = 1.5 tablets daily  Dose: 75 mg     magnesium hydroxide 400 MG/5ML Susp  Commonly known as: Milk Of Magnesia   Take 30 mL by mouth 1 time a day as needed.  Dose: 30 mL     metoprolol tartrate 50 MG Tabs  Commonly known as: Lopressor   Take 1 Tablet by mouth 2 times a day.  Dose: 50 mg     omeprazole 20 MG delayed-release capsule  Commonly  known as: PriLOSEC   Take 1 Capsule by mouth every day.  Dose: 20 mg     simvastatin 20 MG Tabs  Commonly known as: Zocor   Take 1 Tablet by mouth every evening.  Dose: 20 mg     tamsulosin 0.4 MG capsule  Commonly known as: Flomax   Take 1 Capsule by mouth 1/2 hour after breakfast.  Dose: 0.4 mg              Allergies  No Known Allergies    DIET  Orders Placed This Encounter   Procedures    Diet Order Diet: Regular; Tray Modifications (optional): SLP - 1:1 Supervision by Nursing     Standing Status:   Standing     Number of Occurrences:   1     Order Specific Question:   Diet:     Answer:   Regular [1]     Order Specific Question:   Tray Modifications (optional)     Answer:   SLP - 1:1 Supervision by Nursing       ACTIVITY  As tolerated.  Weight bearing as tolerated    CONSULTATIONS  None    PROCEDURES  None    LABORATORY  Lab Results   Component Value Date    SODIUM 140 08/07/2023    POTASSIUM 3.8 08/07/2023    CHLORIDE 111 08/07/2023    CO2 16 (L) 08/07/2023    GLUCOSE 68 08/07/2023    BUN 8 08/07/2023    CREATININE 0.63 08/07/2023        Lab Results   Component Value Date    WBC 5.9 08/07/2023    HEMOGLOBIN 12.0 (L) 08/07/2023    HEMATOCRIT 39.9 (L) 08/07/2023    PLATELETCT 236 08/07/2023        Total time of the discharge process exceeds 45 minutes.

## 2023-08-14 NOTE — DISCHARGE INSTRUCTIONS
You came to the hospital with low blood pressure and a syncopal episode. You received IV fluids and electrolyte repletion.  We monitor your BP and your atrial fibrillation while your stay. Imaging findings have been unremarkable. You improved with fluid resuscitation and now you are stable to be discharge to [place].     - Follow-up with PCP within 1 week for BP management and Atrial fibrillation control.

## 2023-08-14 NOTE — CARE PLAN
Problem: Knowledge Deficit - Standard  Goal: Patient and family/care givers will demonstrate understanding of plan of care, disease process/condition, diagnostic tests and medications  Outcome: Progressing  Note: Patient will have better understanding about his disease process,treatment and medications.     Problem: Skin Integrity  Goal: Skin integrity is maintained or improved  Outcome: Progressing  Note: Maintain good skin hygiene, Monitor skin appearance and color, use relieving devices.     Problem: Pain - Standard  Goal: Alleviation of pain or a reduction in pain to the patient’s comfort goal  Outcome: Progressing  Flowsheets (Taken 8/13/2023 2125)  Pain Rating Scale (NPRS): 0  Note: Patient's pain scale remains 0/10, no s/s of discomfort.

## 2023-08-15 NOTE — FACE TO FACE
"Face to Face Note  -  Durable Medical Equipment    Enrique Lewis D.O. - NPI: [id number]  I certify that this patient is under my care and that they had a durable medical equipment(DME)face to face encounter by myself that meets the physician DME face-to-face encounter requirements with this patient on:    Date of encounter:   Patient:                    MRN:                       YOB: 2022  Alireza Hernandez  [id number]  1935     The encounter with the patient was in whole, or in part, for the following medical condition, which is the primary reason for durable medical equipment:  Other - Chronic hypoxic respiratory failure    I certify that, based on my findings, the following durable medical equipment is medically necessary:    Oxygen   HOME O2 Saturation Measurements:(Values must be present for Home Oxygen orders)         ,     ,       If patient feels more short of breath, they can go up to 6 liters per minute and contact healthcare provider.    Supporting Symptoms: The patient requires supplemental oxygen, as the following interventions have been tried with limited or no improvement: \"Ambulation with oximetry.    My Clinical findings support the need for the above equipment due to:  Hypoxia  "

## 2023-08-20 PROBLEM — K21.9 GERD (GASTROESOPHAGEAL REFLUX DISEASE): Status: ACTIVE | Noted: 2023-01-01

## 2023-08-20 PROBLEM — R53.1 RIGHT SIDED WEAKNESS: Status: ACTIVE | Noted: 2023-01-01

## 2023-08-22 PROBLEM — N30.00 ACUTE CYSTITIS: Status: ACTIVE | Noted: 2023-01-01

## 2023-08-22 PROBLEM — R19.7 DIARRHEA: Status: ACTIVE | Noted: 2023-01-01

## 2023-08-22 PROBLEM — G93.40 ACUTE ENCEPHALOPATHY: Status: ACTIVE | Noted: 2023-01-01

## 2023-08-22 NOTE — ED PROVIDER NOTES
ER Provider Note    Scribed for Basilio Cole D.O. by Montserrat Carmona. 8/22/2023  4:31 PM    Primary Care Provider: GEOVANI Layton    CHIEF COMPLAINT  Chief Complaint   Patient presents with    Diarrhea     Hx C. Diff. Onset of diarrhea x 2 days. 5 BM today, odor consistent with C. Diff. Hx stroke, R sided deficits baseline. AAO x 2     HPI/ROS  LIMITATION TO HISTORY   Confusion    OUTSIDE HISTORIAN(S)  RN note     Alireza Hernandez is a 88 y.o. male who presents to the Emergency Department via EMS for diarrhea onset two days ago. The patient is currently A&O x 2 and states that he was not aware that he has diarrhea. Per nursing note, the patient had five bowel movements today and the odor is consistent with C. diff. He does not experience any abdominal pain, fever, shortness of breath, or vomiting. He lives in an assisted living facility.     ROS as per HPI.    PAST MEDICAL HISTORY  Past Medical History:   Diagnosis Date    A-fib (HCC)     Acute pulmonary edema (HCC) 03/15/2023    BPH (benign prostatic hyperplasia)     Cancer (HCC)     Hypertension     Psychiatric problem 2022    Anxiety, depression. Did MRI to r/o biological cause. No tx provided.    Stroke (HCC) 2018    minor stroke, patient doesn't remember details     SURGICAL HISTORY  Past Surgical History:   Procedure Laterality Date    HIP REPLACEMENT, TOTAL Right 2004     FAMILY HISTORY  Family History   Problem Relation Age of Onset    No Known Problems Mother     No Known Problems Father      SOCIAL HISTORY   reports that he has never smoked. He has never used smokeless tobacco. He reports that he does not drink alcohol and does not use drugs.    CURRENT MEDICATIONS  Previous Medications    APIXABAN (ELIQUIS) 5MG TAB    Take 1 Tablet by mouth 2 times a day. Indications: Thromboembolism secondary to Atrial Fibrillation    BISMUTH SUBSALICYLATE (PEPTO-BISMOL) 262 MG/15ML SUSPENSION    Take 30 mL by mouth every 6 hours as needed (diarrhea).     "CALCIUM CARBONATE ANTACID (CALCIUM ANTACID ULTRA STRENGTH) 1000 MG CHEW TAB    Chew 1,000 mg 2 times a day.    FAMOTIDINE (PEPCID) 20 MG TAB    Take 1 Tablet by mouth every day.    FINASTERIDE (PROSCAR) 5 MG TAB    Take 5 mg by mouth every day.    LOPERAMIDE (IMODIUM) 2 MG CAP    Take 2 mg by mouth 4 times a day as needed for Diarrhea.    LORATADINE (CLARITIN) 10 MG TAB    Take 1 Tablet by mouth every day.    LOSARTAN (COZAAR) 25 MG TAB    Take 0.5 Tablets by mouth every day.    METOPROLOL SR (TOPROL XL) 25 MG TABLET SR 24 HR    Take 3 Tablets by mouth every day.    SIMVASTATIN (ZOCOR) 20 MG TAB    Take 1 Tablet by mouth every evening.    TAMSULOSIN (FLOMAX) 0.4 MG CAPSULE    Take 1 Capsule by mouth 1/2 hour after breakfast.     ALLERGIES  Patient has no known allergies.    PHYSICAL EXAM  /85   Pulse (!) 118 Comment: a fib  Temp 36.6 °C (97.8 °F) (Temporal)   Resp 18   Ht 1.753 m (5' 9\")   Wt 67.6 kg (149 lb)   SpO2 94%   BMI 22.00 kg/m²     General: No acute distress. Actively having diarrhea now.   HENT: Normocephalic, Mucus membranes are dry.   Chest: Lungs have even and unlabored respirations, Clear to auscultation.   Cardiovascular: Regular rate and regular rhythm, No peripheral cyanosis.  Abdomen: Non distended.  Skin: Buttocks with stage 2 decubitus ulcer .   Neuro: Awake, Conversive, Able to relay recent events.  Psychiatric: Calm and cooperative.     EXTERNAL RECORDS REVIEWED  Review of patient's past medical records show he was admitted on 8/2/2023 for syncope and hypotension.     INITIAL ASSESSMENT  Patient is confused and appears with mild dehydration. He has foul smelling stool that is concerning for C. diff and other infection. We will send for culture. There is skin breakdown that is possibly related to new diarrhea. Will give IV fluids.    ED Observation Status? Yes; I am placing the patient in to an observation status due to a diagnostic uncertainty as well as therapeutic intensity. " Patient placed in observation status at 4:35 PM, 8/22/2023.     Observation plan is as follows: IV fluids pending evaluation results.     Upon Reevaluation, the patient's condition has: not improved; and will be escalated to hospitalization.    Patient discharged from ED Observation status at 1900 (Time) 8/22/2023 (Date).     DIAGNOSTIC STUDIES    Labs:   Results for orders placed or performed during the hospital encounter of 08/22/23   LACTIC ACID   Result Value Ref Range    Lactic Acid 1.3 0.5 - 2.0 mmol/L   CBC WITH DIFFERENTIAL   Result Value Ref Range    WBC 5.8 4.8 - 10.8 K/uL    RBC 4.66 (L) 4.70 - 6.10 M/uL    Hemoglobin 12.6 (L) 14.0 - 18.0 g/dL    Hematocrit 39.6 (L) 42.0 - 52.0 %    MCV 85.0 81.4 - 97.8 fL    MCH 27.0 27.0 - 33.0 pg    MCHC 31.8 (L) 32.3 - 36.5 g/dL    RDW 54.4 (H) 35.9 - 50.0 fL    Platelet Count 379 164 - 446 K/uL    MPV 9.8 9.0 - 12.9 fL    Neutrophils-Polys 49.90 44.00 - 72.00 %    Lymphocytes 28.30 22.00 - 41.00 %    Monocytes 9.20 0.00 - 13.40 %    Eosinophils 11.70 (H) 0.00 - 6.90 %    Basophils 0.70 0.00 - 1.80 %    Immature Granulocytes 0.20 0.00 - 0.90 %    Nucleated RBC 0.00 0.00 - 0.20 /100 WBC    Neutrophils (Absolute) 2.89 1.82 - 7.42 K/uL    Lymphs (Absolute) 1.64 1.00 - 4.80 K/uL    Monos (Absolute) 0.53 0.00 - 0.85 K/uL    Eos (Absolute) 0.68 (H) 0.00 - 0.51 K/uL    Baso (Absolute) 0.04 0.00 - 0.12 K/uL    Immature Granulocytes (abs) 0.01 0.00 - 0.11 K/uL    NRBC (Absolute) 0.00 K/uL   COMP METABOLIC PANEL   Result Value Ref Range    Sodium 139 135 - 145 mmol/L    Potassium 4.3 3.6 - 5.5 mmol/L    Chloride 103 96 - 112 mmol/L    Co2 25 20 - 33 mmol/L    Anion Gap 11.0 7.0 - 16.0    Glucose 99 65 - 99 mg/dL    Bun 16 8 - 22 mg/dL    Creatinine 0.82 0.50 - 1.40 mg/dL    Calcium 9.2 8.5 - 10.5 mg/dL    Correct Calcium 9.5 8.5 - 10.5 mg/dL    AST(SGOT) 11 (L) 12 - 45 U/L    ALT(SGPT) 11 2 - 50 U/L    Alkaline Phosphatase 66 30 - 99 U/L    Total Bilirubin 0.4 0.1 - 1.5 mg/dL     Albumin 3.6 3.2 - 4.9 g/dL    Total Protein 6.7 6.0 - 8.2 g/dL    Globulin 3.1 1.9 - 3.5 g/dL    A-G Ratio 1.2 g/dL   URINALYSIS    Specimen: Urine   Result Value Ref Range    Color Yellow     Character Cloudy (A)     Specific Gravity 1.014 <1.035    Ph 6.5 5.0 - 8.0    Glucose Negative Negative mg/dL    Ketones Negative Negative mg/dL    Protein Negative Negative mg/dL    Bilirubin Negative Negative    Urobilinogen, Urine 0.2 Negative    Nitrite Positive (A) Negative    Leukocyte Esterase Large (A) Negative    Occult Blood Trace (A) Negative    Micro Urine Req Microscopic    URINE MICROSCOPIC (W/UA)   Result Value Ref Range    WBC  (A) /hpf    RBC 0-2 (A) /hpf    Bacteria Many (A) None /hpf    Epithelial Cells Negative /hpf    Hyaline Cast 0-2 /lpf   ESTIMATED GFR   Result Value Ref Range    GFR (CKD-EPI) 85 >60 mL/min/1.73 m 2      All labs reviewed by me.      COURSE & MEDICAL DECISION MAKING     COURSE AND PLAN  4:31 PM - Patient seen and examined at bedside. Discussed plan of care, including labs. Patient agrees to the plan of care. He will be resuscitated with 1L NS IV. Ordered for C diff by PCR, Blood Culture x2, Urine Culture, UA, CMP, CBC w/ Diff, LA, and Culture Stool to evaluate his symptoms.     5:30 PM - Ordered Estimated GFR and Urine Microscopic to evaluate.     ED Summary: Patient presented with some confusion, he is oriented to name and place but he and is confused by the year.  He is having active diarrhea here.  There is concerns for C. difficile and other pathology.  Cultures and C. difficile were sent from the stool samples obtained here.  Previous records show his last admission he was oriented x3, currently is confused and does have a urinary tract infection.  IV antibiotics were initiated here and the patient will be admitted for further evaluation and treatment.      HYDRATION: Based on the patient's presentation of Acute Diarrhea and Dehydration the patient was given IV fluids. IV  Hydration was used because oral hydration was not adequate alone. Upon recheck following hydration, the patient was improving.      DISPOSITION AND DISCUSSIONS  I have discussed management of the patient with the following physicians and SILKE's: Discussed with hospitalist    D  Admitted in guarded condition    FINAL DIAGNOSIS  1. Diarrhea of presumed infectious origin    2. Pressure injury of contiguous region involving back and buttock, stage 2, unspecified laterality (HCC)    3. Urinary tract infection without hematuria, site unspecified    4. New onset of confusion        Montserrat BERNAL (Huyen), am scribing for, and in the presence of, Basilio Cole D.O..    Electronically signed by: Montserrat Carmona ([email or web address]), 8/22/2023    IBasilio D.O. personally performed the services described in this documentation, as scribed by Montserrat Carmona in my presence, and it is both accurate and complete.     The note accurately reflects work and decisions made by me.  Basilio Cole D.O.  8/22/2023  7:22 PM

## 2023-08-22 NOTE — ED TRIAGE NOTES
Alireza Hernandez  88 y.o. male  Chief Complaint   Patient presents with    Diarrhea     Hx C. Diff. Onset of diarrhea x 2 days. 5 BM today, odor consistent with C. Diff. Hx stroke, R sided deficits baseline. AAO x 2     Pt BIB EMS for above complaint.    Pt is GCS 15, speaking in full sentences, follows commands and responds appropriately to questions. Resp are even and unlabored. Patient denies pain.       Vitals:    08/22/23 1619   BP: 121/85   Pulse: (!) 118   Resp: 18   Temp: 36.6 °C (97.8 °F)   SpO2: 94%

## 2023-08-23 PROBLEM — R82.90 ABNORMAL URINALYSIS: Status: ACTIVE | Noted: 2023-01-01

## 2023-08-23 NOTE — ED NOTES
Called lab to add on Sed rate,  CRP; Procalcitonin,  Vit B12, TSH with Reflex to FT4;  Mg,  Ph and Salmonella typhi.

## 2023-08-23 NOTE — ED NOTES
"Med Rec complete per staff from [facility]   Allergies reviewed  No oral antibiotics in the last 30 days    Medications \"UNK\" are \"pending confirmation\" from facility  "

## 2023-08-23 NOTE — HOSPICE
[facility] referral/consult response    Is this patient accepted to [facility]?:  Dr. Mohan to review.  What hospice level of care?: Routine  Approved by provider: Dr. Mohan    Anticipated DC date: ?  DC Barriers: hospice qualification  Additional Information:  Call to POA, she is interested in perusing hospice if he qualifies.    Dr. Mohan will do a chart review tonight to determine if pt qualifies for hospice.

## 2023-08-23 NOTE — ASSESSMENT & PLAN NOTE
UA + for bacteria, LE and nitrite but patient has no symptoms or signs of UTI. Started on ceftriaxone, received one dose then discontinued given asymptomatic and history of c diff  -Monitor for symptoms for need to resume abx (cx was collected prior to abx initiation)

## 2023-08-23 NOTE — ED NOTES
Bedside report given to KRISTIN Elam. Patient care transferred. Bed alarm in use. Fall risk sign at door. Call light within reach.

## 2023-08-23 NOTE — ASSESSMENT & PLAN NOTE
At admission, more than 5 times a day. C. Diff PCR negative, but toxin positive on repeat 8/31.  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  -Supportive care, encourage hydration  -Supportive hydration as needed for hypotension  -Cleared for discharge if diarrhea is controlled; <3 BM's in 24 hours. Patient will continue oral vanc on outpatient basis under current taper. May continue taking vancomycin oral every other day indefinitely

## 2023-08-23 NOTE — DISCHARGE PLANNING
Case Management Discharge Planning    Admission Date: 8/22/2023  GMLOS: 2.6  ALOS: 1    6-Clicks ADL Score: 6  6-Clicks Mobility Score: 7  PT and/or OT Eval ordered: Yes  Post-acute Referrals Ordered: No  Post-acute Choice Obtained: No  Has referral(s) been sent to post-acute provider:  No      Anticipated Discharge Dispo: Discharge Disposition: D/T to SNF with Medicare cert in anticipation of skilled care (03)    DME Needed: No    Action(s) Taken: DC Assessment Complete (See below)    Escalations Completed: None    Medically Clear: No    Next Steps: LSW to follow up with patient and medical team regarding discharge needs and barriers.     Barriers to Discharge: Medical clearance and Pending PT Evaluation    **0805  Assessment completed via chart review as patient was discharged a week ago and is familiar to this floor.  Patient lives at [facility]. At discharge, there seemed to be some push back from [facility] on accepting patient back citing he needed rehab or SNF. SNF or rehab was not explored as PT/OT evaluations recommended home health.   Patient's DPOA for healthcare is his sister, Cindy Morales [phone].  LSW to follow PT/OT evaluations for further discharge planning.    **1115  Patient discussed during IDT rounds. Per team, they will place a hospice referral as patient is open to hospice discussion    **1234  LSW met with patient at bedside. LSW discussed hospice referral with patient. Patient states he would like more information on what hospice entails. Choice obtained for [facility].    Care Transition Team Assessment    Information Source  Orientation Level: Oriented to person, Oriented to place, Disoriented to time, Disoriented to situation  Information Given By: Other (Comments) (Chart Review, Readmission)    Readmission Evaluation  Is this a readmission?: Yes - unplanned readmission    Elopement Risk  Legal Hold: No  Ambulatory or Self Mobile in Wheelchair: No-Not an  Elopement Risk  Elopement Risk: Not at Risk for Elopement    Interdisciplinary Discharge Planning  Lives with - Patient's Self Care Capacity: Unable To Determine At This Time  Patient or legal guardian wants to designate a caregiver: No  Support Systems: Unable to Obtain at this Time  Housing / Facility: Unable To Determine At This Time  Name of Care Facility: [place]?  Durable Medical Equipment: Not Applicable    Discharge Preparedness  What is your plan after discharge?: Skilled nursing facility  What are your discharge supports?: Sibling  Prior Functional Level: Needs Assist with Activities of Daily Living, Needs Assist with Medication Management    Functional Assesment  Prior Functional Level: Needs Assist with Activities of Daily Living, Needs Assist with Medication Management    Finances  Financial Barriers to Discharge: No  Prescription Coverage: Yes    Advance Directive  Advance Directive?: DPOA for Health Care  Durable Power of  Name and Contact : Daisy Morales [phone]    Domestic Abuse  Have you ever been the victim of abuse or violence?:  (cognitive impairment)  Physical Abuse or Sexual Abuse: Unable to Assess due to Patient Condition (cognitive impairment)  Verbal Abuse or Emotional Abuse: Unable to Assess due to Patient Condition (cognitive impairment)  Possible Abuse/Neglect Reported to:: Not Applicable    Anticipated Discharge Information  Discharge Disposition: D/T to SNF with Medicare cert in anticipation of skilled care (03)   No

## 2023-08-23 NOTE — ED NOTES
Bedside report received from KRISTIN Willson Assumed patient care. Verified patient identification.   Checked on bed, connected to monitor,  with unlabored respirations. Bed alarm in use with fall risk sign at door noted.   Vital signs is monitored.   Denied any new complaints.   Gurney in low position, side rail up for pt safety. Call light within reach.   No needs identified at the moment. Will continue to monitor.

## 2023-08-23 NOTE — H&P
Hospital Medicine History & Physical Note    Date of Service  8/22/2023    Primary Care Physician  GEOVANI Layton    Consultants      Specialist Names:     Code Status  DNAR/DNI    Chief Complaint  Chief Complaint   Patient presents with    Diarrhea     Hx C. Diff. Onset of diarrhea x 2 days. 5 BM today, odor consistent with C. Diff. Hx stroke, R sided deficits baseline. AAO x 2       History of Presenting Illness  Alireza Hernandez is a 88 y.o. male who presented 8/22/2023 with past medical history of CVA with right-sided deficits, atrial fibrillation on Xarelto, hypertension, BPH, history of C. difficile who comes into the hospital for diarrhea for the past 2 days and confusion.  Patient is having more than 5 bowel movements.  Patient said he was diagnosed with Salmonella at the nursing home?  He states that he was not able to walk since his stroke.  He denies any nausea, vomiting, fevers, back pain, shortness of breath, cough.  He denies any urinary symptoms.    Chest x-ray interpreted and reveal no acute pulmonary process  EKG found atrial fibrillation with RVR at a rate of 120    I discussed the plan of care with patient.    Review of Systems  Review of Systems   Constitutional:  Negative for chills, diaphoresis, fever and malaise/fatigue.   HENT:  Negative for congestion, ear discharge, ear pain, hearing loss, nosebleeds, sinus pain, sore throat and tinnitus.    Eyes:  Negative for blurred vision, double vision, photophobia and pain.   Respiratory:  Negative for cough, hemoptysis, sputum production, shortness of breath, wheezing and stridor.    Cardiovascular:  Negative for chest pain, palpitations, orthopnea, claudication, leg swelling and PND.   Gastrointestinal:  Positive for diarrhea. Negative for abdominal pain, blood in stool, constipation, heartburn, melena, nausea and vomiting.   Genitourinary:  Negative for dysuria, flank pain, frequency, hematuria and urgency.   Musculoskeletal:  Negative for  back pain, falls, joint pain, myalgias and neck pain.   Skin:  Negative for itching and rash.   Neurological:  Positive for weakness. Negative for dizziness, tingling, tremors and headaches.   Endo/Heme/Allergies:  Negative for environmental allergies and polydipsia. Does not bruise/bleed easily.   Psychiatric/Behavioral:  Negative for depression, hallucinations, substance abuse and suicidal ideas.        Past Medical History   has a past medical history of A-fib ([facility]), Acute pulmonary edema (HCC) (03/15/2023), BPH (benign prostatic hyperplasia), Cancer ([facility]), Hypertension, Psychiatric problem (2022), and Stroke ([facility]) (2018).    Surgical History   has a past surgical history that includes hip replacement, total (Right, 2004).     Family History  family history includes No Known Problems in his father and mother.   Family history reviewed with patient. There is no family history that is pertinent to the chief complaint.     Social History   reports that he has never smoked. He has never used smokeless tobacco. He reports that he does not drink alcohol and does not use drugs.    Allergies  No Known Allergies    Medications  Prior to Admission Medications   Prescriptions Last Dose Informant Patient Reported? Taking?   Calcium Carbonate Antacid (CALCIUM ANTACID ULTRA STRENGTH) 1000 MG Chew Tab  MAR from Other Facility Yes No   Sig: Chew 1,000 mg 2 times a day.   apixaban (ELIQUIS) 5mg Tab  MAR from Other Facility No No   Sig: Take 1 Tablet by mouth 2 times a day. Indications: Thromboembolism secondary to Atrial Fibrillation   bismuth subsalicylate (PEPTO-BISMOL) 262 MG/15ML Suspension  MAR from Other Facility Yes No   Sig: Take 30 mL by mouth every 6 hours as needed (diarrhea).   famotidine (PEPCID) 20 MG Tab   No No   Sig: Take 1 Tablet by mouth every day.   finasteride (PROSCAR) 5 MG Tab  MAR from Other Facility Yes No   Sig: Take 5 mg by mouth every day.   loperamide (IMODIUM) 2 MG Cap  MAR from Other Facility Yes No    Sig: Take 2 mg by mouth 4 times a day as needed for Diarrhea.   loratadine (CLARITIN) 10 MG Tab   No No   Sig: Take 1 Tablet by mouth every day.   losartan (COZAAR) 25 MG Tab   No No   Sig: Take 0.5 Tablets by mouth every day.   metoprolol SR (TOPROL XL) 25 MG TABLET SR 24 HR   No No   Sig: Take 3 Tablets by mouth every day.   simvastatin (ZOCOR) 20 MG Tab  MAR from Other Facility No No   Sig: Take 1 Tablet by mouth every evening.   tamsulosin (FLOMAX) 0.4 MG capsule  MAR from Other Facility No No   Sig: Take 1 Capsule by mouth 1/2 hour after breakfast.      Facility-Administered Medications: None       Physical Exam  Temp:  [36.6 °C (97.8 °F)] 36.6 °C (97.8 °F)  Pulse:  [] 98  Resp:  [18] 18  BP: (121-145)/(77-93) 145/93  SpO2:  [93 %-94 %] 94 %  Blood Pressure : (!) 145/93   Temperature: 36.6 °C (97.8 °F)   Pulse: 98   Respiration: 18   Pulse Oximetry: 94 %       Physical Exam  Vitals and nursing note reviewed.   Constitutional:       General: He is not in acute distress.     Appearance: Normal appearance. He is not ill-appearing, toxic-appearing or diaphoretic.   HENT:      Head: Normocephalic and atraumatic.      Nose: No congestion or rhinorrhea.      Mouth/Throat:      Pharynx: No posterior oropharyngeal erythema.   Eyes:      General: No scleral icterus.        Right eye: No discharge.   Cardiovascular:      Rate and Rhythm: Normal rate and regular rhythm.      Pulses: Normal pulses.      Heart sounds: Normal heart sounds. No murmur heard.     No friction rub. No gallop.   Pulmonary:      Effort: Pulmonary effort is normal. No respiratory distress.      Breath sounds: Normal breath sounds. No stridor. No wheezing, rhonchi or rales.   Abdominal:      General: There is no distension.      Tenderness: There is no abdominal tenderness.   Musculoskeletal:         General: No swelling, tenderness, deformity or signs of injury.      Cervical back: Normal range of motion.      Right lower leg: No edema.       "Left lower leg: No edema.   Skin:     Capillary Refill: Capillary refill takes more than 3 seconds.      Coloration: Skin is not jaundiced or pale.      Findings: No bruising, erythema, lesion or rash.   Neurological:      General: No focal deficit present.      Mental Status: He is alert and oriented to person, place, and time.      Comments: Right-sided weakness         Laboratory:  Recent Labs     08/22/23  1730   WBC 5.8   RBC 4.66*   HEMOGLOBIN 12.6*   HEMATOCRIT 39.6*   MCV 85.0   MCH 27.0   MCHC 31.8*   RDW 54.4*   PLATELETCT 379   MPV 9.8     Recent Labs     08/22/23  1730   SODIUM 139   POTASSIUM 4.3   CHLORIDE 103   CO2 25   GLUCOSE 99   BUN 16   CREATININE 0.82   CALCIUM 9.2     Recent Labs     08/22/23  1730   ALTSGPT 11   ASTSGOT 11*   ALKPHOSPHAT 66   TBILIRUBIN 0.4   GLUCOSE 99         No results for input(s): \"NTPROBNP\" in the last 72 hours.      No results for input(s): \"TROPONINT\" in the last 72 hours.    Imaging:  DX-CHEST-LIMITED (1 VIEW)    (Results Pending)       X-Ray:  I have personally reviewed the images and compared with prior images.  EKG:  I have personally reviewed the images and compared with prior images.    Assessment/Plan:  Justification for Admission Status  I anticipate this patient will require at least two midnights for appropriate medical management, necessitating inpatient admission because of debility, diarrhea    Patient will need a Telemetry bed on MEDICAL service .  The need is secondary to debility, diarrhea.    * Diarrhea- (present on admission)  Assessment & Plan  More than 5 times a day  Check first stool cultures, C. difficile  Check for Salmonella antibodies    Acute encephalopathy  Assessment & Plan  Due to infectious etiology    Acute cystitis  Assessment & Plan  UA + for bacteria, LE and nitrite  F/u urine cultures  Started pt on ceftriaxone  Previous cultures found and sensitive E. 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

## 2023-08-23 NOTE — DISCHARGE PLANNING
Received Choice form @: [id number]  Agency/Facility Name: [facility]   Referral sent per Choice form @: [phone]

## 2023-08-23 NOTE — ED NOTES
IV access placed. Blood drawn and sent to lab. Urine sample collected using straight cath and sent to lab. Medicated patient per MAR. Patient denies further needs. Call light within reach.

## 2023-08-23 NOTE — THERAPY
Occupational Therapy   Initial Evaluation     Patient Name: Alireza Hernandez  Age:  88 y.o., Sex:  male  Medical Record #: [id number]  Today's Date: 8/23/2023     Precautions: (P) Fall Risk, Swallow Precautions  Comments: (P) hx of CVA with R deficits    Assessment  Patient is 88 y.o. male admitted with onset of diarrhea with hx of CVA in march with significant R deficits, A fib, HTN, BPH, C-diff seen for OT evaluation. Pt has been receiving total assist care at [facility] including assist with all ADLs and mobility, and routine check ins. Pt presented today close to at anticipated new baseline however reports increased weakness compared to last admit. Pt is concerned about functional level/care being provided at [facility]. Pt is considering hospice discussion.     Anticipate limited therapeutic benefit in the acute care hospital setting. Recommend back to [facility] if able to provide adequate care (including assist with all mobility and self cares) with hospice and home health OT (if available, not a barrier to DC), vs placement with hospice. Will follow at low frequency as pt is very motivated to participate in therapy to address increased weakness compared to baseline.    Plan    Occupational Therapy Initial Treatment Plan   Treatment Interventions: (P) Self Care / Activities of Daily Living, Adaptive Equipment, Neuro Re-Education / Balance, Therapeutic Exercises, Therapeutic Activity, Cognitive Skill Development  Treatment Frequency: (P) 2 Times per Week  Duration: (P) Until Therapy Goals Met    DC Equipment Recommendations: (P) Unable to determine at this time  Discharge Recommendations: (P)  (back to [facility] if able to provide assist with all mobility and self care tasks, with hospice vs placement with hospice)        08/23/23 1035   Prior Living Situation   Prior Services Continuous (24 Hour) Care Giving Per Service   Housing / Facility Assisted Living Residence   Comments Pt resides at [facility], he reports they  help with all ADLs and mobility, including transfer to , showering, etc, however he is not pleased with current level of care being provided and wants more therapy   Prior Level of ADL Function   Self Feeding Requires Assist   Grooming / Hygiene Requires Assist   Bathing Dependent   Dressing Dependent   Toileting Dependent   Prior Level of IADL Function   Medication Management Dependent   Laundry Dependent   Kitchen Mobility Dependent   Finances Dependent   Home Management Dependent   Shopping Dependent   Precautions   Precautions Fall Risk;Swallow Precautions   Comments hx of CVA with R deficits   Cognition    Cognition / Consciousness X   Speech/ Communication Expressive Aphasia   Comments pt very pleasant, some expressive aphasia, difficulty describing DC wishes   Passive ROM Upper Body   Passive ROM Upper Body X   Comments flexor tone in RUE, able to release with input, baseline from CVA   Active ROM Upper Body   Active ROM Upper Body  X   Comments RUE limited by  flexor tone, able to range elbow and shoulder slightly   Strength Upper Body   Upper Body Strength  X   Comments RUE weakness   Upper Body Muscle Tone   Rt Upper Extremity Muscle Tone Hypertonic;Non Functional   Coordination Upper Body   Coordination X   Gross Motor Coordination impaired RUE   Balance Assessment   Sitting Balance (Static) Fair -   Sitting Balance (Dynamic) Poor +   Standing Balance (Static) Poor -   Standing Balance (Dynamic) Trace   Weight Shift Sitting Poor   Weight Shift Standing Poor   Bed Mobility    Supine to Sit Maximal Assist   Sit to Supine Maximal Assist   Scooting Total Assist   ADL Assessment   Grooming Moderate Assist;Seated   Upper Body Dressing Moderate Assist   Lower Body Dressing Maximal Assist   Toileting   (wears depends)   How much help from another person does the patient currently need...   6 Clicks Daily Activity Score 9   mRS Prior to admission   Prior to admission mRS 4   Modified [place] (mRS)   Modified  [place] Score 5   Functional Mobility   Sit to Stand Maximal Assist   Bed, Chair, Wheelchair Transfer Maximal Assist   Patient / Family Goals   Patient / Family Goal #1 to get more therapy   Short Term Goals   Short Term Goal # 1 Pt will demo fair seated balance in prep for seated ADLs   Short Term Goal # 1 B  Pt will demo ADL txfs mod A   Short Term Goal # 3 Pt will complete UB dressing with ModA   Education Group   Role of Occupational Therapist Patient Response Patient;Acceptance;Explanation;Verbal Demonstration   Occupational Therapy Initial Treatment Plan    Treatment Interventions Self Care / Activities of Daily Living;Adaptive Equipment;Neuro Re-Education / Balance;Therapeutic Exercises;Therapeutic Activity;Cognitive Skill Development   Treatment Frequency 2 Times per Week   Duration Until Therapy Goals Met   Problem List   Problem List Decreased Active Daily Living Skills;Decreased Homemaking Skills;Decreased Functional Mobility;Decreased Activity Tolerance;Decreased Upper Extremity AROM Right;Decreased Upper Extremity PROM Right;Impaired Postural Control / Balance   Anticipated Discharge Equipment and Recommendations   DC Equipment Recommendations Unable to determine at this time   Discharge Recommendations   (back to nursing home if able to provide assist with all mobility and self care tasks, with hospice vs placement with hospice)

## 2023-08-23 NOTE — ED NOTES
Incontinence care provided. Brief changed. Stool sample walked to lab. Call light within reach. Patient denies further needs.

## 2023-08-23 NOTE — ED NOTES
RN from S149-00 @ bedside. Informed sacral redness and both feet with redness.    Pt transferred via gurney with all Pts belongings taken.

## 2023-08-23 NOTE — CARE PLAN
"The patient is Stable - Low risk of patient condition declining or worsening    Shift Goals  Clinical Goals: monitor stools, monitor labs, monitor HR  Patient Goals: \"get better\"  Family Goals: MARGIE    Progress made toward(s) clinical / shift goals:    Problem: Pain - Standard  Goal: Alleviation of pain or a reduction in pain to the patient’s comfort goal  Outcome: Progressing     Problem: Knowledge Deficit - Standard  Goal: Patient and family/care givers will demonstrate understanding of plan of care, disease process/condition, diagnostic tests and medications  Outcome: Progressing     Problem: Skin Integrity  Goal: Skin integrity is maintained or improved  Outcome: Progressing  Note: Q2h turning utilized using pillow support. Heels elevated with pillow support. Waffle overlay on bed. Barrier cream applied to scrotum and sacrum. Wound care consult placed.        Patient is not progressing towards the following goals:      "

## 2023-08-23 NOTE — PROGRESS NOTES
R Internal Medicine Daily Progress Note    Date of Service  8/23/2023    [facility] Team: [facility] Team   Attending: Brenton Shaw M.d.  Senior Resident: Dr. Nicol Singer  Contact Number: [phone]    Chief Complaint  Chief Complaint   Patient presents with    Diarrhea       Hx C. Diff. Onset of diarrhea x 2 days. 5 BM today, odor consistent with C. Diff. Hx stroke, R sided deficits baseline. AAO x 2       Hospital Course  Ailreza Hernandez is a 88 y.o. male who presented 8/22/2023 with past medical history of CVA with right-sided deficits, atrial fibrillation on Xarelto, hypertension, BPH, history of C. difficile who comes into the hospital for non-bloody diarrhea (5+ bowel movements) for two days and confusion. He lives in an care home. In the ED he was no longer confused and was afebrile without leukocytosis, and he was admitted for evaluation and management of diarrhea. C diff gene and toxin were negative. Though UA was suggestive of UTI he was asymptomatic.    Interval Problem Update  Admitted overnight. C diff negative (both gene and toxin). Patient feeling somewhat better today, though still having frequent very loose, nonbloody, brown bowel movements per nursing. Patient reports he has been drinking plenty, nursing confirming good intake. Despite dirty UA patient denies any symptoms suggestive of UTI. Per patient he has concerns about his quality of life. He is very limited with his mobility due to his stroke and wants to make improvements in his mobility, including PT/OT. He is also amenable to discussion with hospice    I have discussed this patient's plan of care and discharge plan at IDT rounds today with Case Management, Nursing, Nursing leadership, and other members of the IDT team.    Consultants/Specialty  None    Code Status  DNAR/DNI    Disposition  The patient is not medically cleared for discharge to home or a post-acute facility.      I have placed the appropriate orders for post-discharge  needs.    Review of Systems  Review of Systems   Constitutional:  Negative for chills and fever.   Respiratory:  Negative for cough and shortness of breath.    Cardiovascular:  Negative for chest pain.   Gastrointestinal:  Positive for diarrhea. Negative for abdominal pain, blood in stool, nausea and vomiting.   Genitourinary:  Negative for dysuria, frequency, hematuria and urgency.        Physical Exam  Temp:  [36.2 °C (97.1 °F)-36.7 °C (98 °F)] 36.6 °C (97.8 °F)  Pulse:  [] 77  Resp:  [18-21] 18  BP: (102-147)/(61-93) 102/61  SpO2:  [91 %-96 %] 96 %    Physical Exam  Vitals and nursing note reviewed.   Constitutional:       General: He is not in acute distress.     Appearance: Normal appearance. He is not ill-appearing, toxic-appearing or diaphoretic.   HENT:      Head: Normocephalic and atraumatic.      Mouth/Throat:      Mouth: Mucous membranes are moist.      Pharynx: Oropharynx is clear.   Eyes:      Extraocular Movements: Extraocular movements intact.   Cardiovascular:      Rate and Rhythm: Normal rate and regular rhythm.   Pulmonary:      Effort: Pulmonary effort is normal. No respiratory distress.      Breath sounds: Normal breath sounds. No wheezing or rales.   Abdominal:      General: Abdomen is flat. There is no distension.      Palpations: Abdomen is soft.      Tenderness: There is no abdominal tenderness. There is no guarding or rebound.   Musculoskeletal:      Right lower leg: No edema.      Left lower leg: No edema.   Skin:     General: Skin is warm and dry.   Neurological:      General: No focal deficit present.      Mental Status: He is alert and oriented to person, place, and time.   Psychiatric:         Mood and Affect: Mood normal.         Behavior: Behavior normal.         Fluids    Intake/Output Summary (Last 24 hours) at 8/23/2023 1155  Last data filed at 8/23/2023 1000  Gross per 24 hour   Intake 1120 ml   Output --   Net 1120 ml       Laboratory  Recent Labs     08/22/23  1737  08/23/23  0333   WBC 5.8 5.8   RBC 4.66* 4.42*   HEMOGLOBIN 12.6* 11.7*   HEMATOCRIT 39.6* 37.5*   MCV 85.0 84.8   MCH 27.0 26.5*   MCHC 31.8* 31.2*   RDW 54.4* 53.8*   PLATELETCT 379 343   MPV 9.8 10.1     Recent Labs     08/22/23  1730 08/23/23  0333   SODIUM 139 140   POTASSIUM 4.3 3.8   CHLORIDE 103 106   CO2 25 24   GLUCOSE 99 84   BUN 16 12   CREATININE 0.82 0.78   CALCIUM 9.2 8.0*                   Imaging  DX-CHEST-LIMITED (1 VIEW)   Final Result         Moderate left pleural effusion with left basilar atelectasis.            Assessment/Plan  Problem Representation: 87 YO M admitted 8/22/23 for intractable diarrhea concerning for infectious etiology.     * Diarrhea- (present on admission)  Assessment & Plan  More than 5 times a day. C. Diff PCR and toxin negative  -Check stool cultures  -Check for Salmonella antibodies  -Stool O&P  -Fecal lactoferrin   -Ensure adequate hydration and monitor electrolytes for need for repletion  -If salmonella positive will consider fluoroquinolone treatment given sacral ulcer  -Will also consider loperamide depending on the stool culture    Acute encephalopathy  Assessment & Plan  RESOLVED  Due to infectious etiology    Abnormal urinalysis  Assessment & Plan  UA + for bacteria, LE and nitrite but patient has no symptoms or signs of UTI. 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 In review of yesterday's note (8/22/2023), there are no changes except as documented above.

## 2023-08-23 NOTE — PROGRESS NOTES
Patient brought up to T room 149 via stretcher from ED wearing hospital clothing and attached to continuous ECG per protocol with this RN and charge RN bedside.   One bag of personal belongings brought up with patient, as well as one large tan and white garcía blanket from home.     Incontinence care conducted upon placement in bed. Waffle overlay inflated. And two RN skin assessment completed per protocol with charge RN: [place].    All wound photos taken and uploaded into Epic with wound consult placed per protocol.       Telemetry placed on patient. AFIB per monitor technician.     Patient oriented to room, unit, call bell, and plan of care. Reoriented to plan of care by this RN while attempting to complete admission history.

## 2023-08-23 NOTE — HOSPITAL COURSE
Alireza Hernandez is a 88 y.o. 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 difficile who presented 8/22/23 for non-bloody diarrhea (5+ bowel movements) for two days and confusion. He lives in [facility]. In the ED he was no longer confused and was afebrile without leukocytosis, and he was admitted for evaluation and management of diarrhea. C diff gene positive but toxin was negative, stool culture was negative, and fecal lactoferrin was elevated. Bowel movements initially reduced spontaneously, but recurred 7-10 BM's per day. Repeat C diff toxin testing was positive. Bowel movements reduced after treatment with pulsed oral vancomycin. Patient initially indecisive but is accepting of placement back at [facility] for Hospice. Hospice referral was placed

## 2023-08-24 NOTE — CONSULTS
Physical Medicine and Rehabilitation Consultation              Date of initial consultation: 8/24/2023  Consulting provider: Nicol Singer M.D.  Reason for consultation: assess for acute inpatient rehab appropriateness  LOS: 2 Day(s)    Chief complaint: AMS    HPI: The patient is a 88 y.o. right hand dominant male with a past medical history of CVA with right-sided deficits, atrial fibrillation on Xarelto, hypertension, BPH, history of C. difficile;  who presented on 8/22/2023  4:08 PM from his snf with diarrhea and confusion.  Patient reoriented in the ED, was found to be afebrile, no leukocytosis, C. difficile was found to be negative. Fecal lactoferrin was elevated suggesting an infection, however stool culture was negative.  Patient likely experienced a viral enteritis.  While hospitalized patient received PT and OT services and was found to have a 6 clicks score of the 9, representing severe debility.  PMR has been consulted to determine if patient's functional status is amenable to rehab, or if patient should pursue hospice.    Of note, patient has been seen on 2 prior admissions by the rehab service.  Once in March 2023 for left thalamic hemorrhagic stroke, then again in July 2023 for left MCA stroke.  Patient was not a candidate for IPR in March due to lack of discharge support and living alone.  In July, patient was recommended for rehab, but his choice was to return to his [facility] with home health.     Patient was most recently admitted 8/23 for passing out and his neurologist office secondary to hypotension, which was treated with IV fluids.  At that time he was able to stand 3 times with mod assist, and demonstrated poor sitting and standing balance.  He required max assist for lower body dressing.     The patient currently is somewhat confused. He is not oriented to place, time or situation. He states he is in a condo, it is January/February, and he cannot tell me the year. We discussed rehab vs  "hospice and he says he is not ready for hospice and wants to try rehab. His goals are to walk. Based on his spasticity I do not think that is reasonable, so we discussed being more independent with stand pivot transfers.     ROS  Pertinent positives are mentioned in the HPI, all others reviewed and are negative.    Social Hx:  Assisted living facility.  [facility]    THERAPY:  Restrictions: Fall risk   PT: Functional mobility   8/23: Max assist sit to stand, no gait    OT: ADLs  8/23: Max assist lower body dressing    SLP:   None    IMAGING:  CXR 8/22/2023  Moderate left pleural effusion with left basilar atelectasis.    PROCEDURES:  None    PMH:  Past Medical History:   Diagnosis Date    A-fib (HCC)     Acute pulmonary edema (HCC) 03/15/2023    BPH (benign prostatic hyperplasia)     Cancer (HCC)     Hypertension     Psychiatric problem 2022    Anxiety, depression. Did MRI to r/o biological cause. No tx provided.    Stroke (HCC) 2018    minor stroke, patient doesn't remember details       PSH:  Past Surgical History:   Procedure Laterality Date    HIP REPLACEMENT, TOTAL Right 2004       FHX:  Family History   Problem Relation Age of Onset    No Known Problems Mother     No Known Problems Father        Medications:  Current Facility-Administered Medications   Medication Dose    miconazole (Micotin) 2 % cream      apixaban (Eliquis) tablet 5 mg  5 mg    famotidine (Pepcid) tablet 20 mg  20 mg    losartan (Cozaar) tablet 12.5 mg  12.5 mg    metoprolol SR (Toprol XL) tablet 75 mg  75 mg    tamsulosin (Flomax) capsule 0.4 mg  0.4 mg    acetaminophen (Tylenol) tablet 650 mg  650 mg    ondansetron (Zofran) syringe/vial injection 4 mg  4 mg    ondansetron (Zofran ODT) dispertab 4 mg  4 mg       Allergies:  No Known Allergies      Physical Exam:  Vitals: BP (!) 144/79   Pulse 80   Temp 36.3 °C (97.4 °F) (Temporal)   Resp 18   Ht 1.753 m (5' 9\")   Wt 62.5 kg (137 lb 12.6 oz)   SpO2 94%   Gen: NAD  Head: " NC/AT  Eyes/ Nose/ Mouth: PERRLA, moist mucous membranes  Cardio: RRR, good distal perfusion, warm extremities  Pulm: normal respiratory effort, no cyanosis   Abd: Soft NTND, negative borborygmi   Ext: No peripheral edema. No calf tenderness. No clubbing.    Mental status: follows commands. Not oriented to place, time, situation.   Speech: fluent, no aphasia or dysarthria    Motor:  RUE IS SPASTIC AND HAS LIMITED ROM      Upper Extremity  Myotome R L   Shoulder flexion C5 1/5 5   Elbow flexion C5 1/5 5   Wrist extension C6 1/5 5   Elbow extension C7 1/5 5   Finger flexion C8 1/5 5   Finger abduction T1 1/5 5     Lower Extremity Myotome R L   Hip flexion L2 3/5 4/5   Knee extension L3 4/5 5   Ankle dorsiflexion L4 1/5 5   Toe extension L5 1/5 5   Ankle plantarflexion S1 1/5 5       Sensory:   intact to light touch through out    Tone: RUE is spastic and contracted, MAS 3. RLE has adductor tone and there is scissoring       Labs: Reviewed and significant for   Recent Labs     08/22/23 1730 08/23/23  0333   RBC 4.66* 4.42*   HEMOGLOBIN 12.6* 11.7*   HEMATOCRIT 39.6* 37.5*   PLATELETCT 379 343     Recent Labs     08/22/23 1730 08/23/23  0333   SODIUM 139 140   POTASSIUM 4.3 3.8   CHLORIDE 103 106   CO2 25 24   GLUCOSE 99 84   BUN 16 12   CREATININE 0.82 0.78   CALCIUM 9.2 8.0*     Recent Results (from the past 24 hour(s))   FECAL LACTOFERRIN QUALITATIVE    Collection Time: 08/23/23  3:50 PM   Result Value Ref Range    Lactoferrin, Fecal by ONEIL Positive (A) Negative   CRYPTO/GIARDIA RAPID ASSAY    Collection Time: 08/23/23  3:50 PM    Specimen: Stool   Result Value Ref Range    Significant Indicator NEG     Source STL     Site -     [facility] -          ASSESSMENT:  Patient is a 88 y.o. male admitted with AMS and non- c.diff diarrhea     C Code / Diagnosis to Support: 0001.2 - Stroke: Right Body Involvement (Left Brain)    Rehabilitation: Impaired ADLs and mobility  Patient is a candidate for  inpatient rehab based on needs for PT, OT, and speech therapy.  Patient has a good discharge situation which will be home to his [facility], [facility].     Barriers to transfer include: Insurance authorization, TCCs to verify disposition, medical clearance and bed availability     All cases are subject to administrative review and recommendations may change    Disposition recommendations:  - Patient is a candidate for IPR, and I think he can experience a reasonable functional improvement with medication management and therapy. His spasticity is a major contributor to his debility and there is room for improvement there.   - TCC to verify that [facility] is willing to accept him back   -PMR to follow in the periphery for rehab appropriateness, please reach out with questions or request for medical management      Medical Complexity:     Left MCA stroke   - stroke was on 7/1/23, patient has not been to IPR for this event  - Patient is declining hospice at this time   - Continue PT/ OT while in house   - Patient needs SLP for cog   - PMR will look into DC support at [facility], which will need to be confirmed prior to transfer    Spasticity   - Starting Baclofen 5mg TID. Patient will need much higher doses but side effects of medication initiation include lethargy/ altered mental status and he is already altered. Starting low to develop tolerance. Plan to increase incrementally to titrate to effect. Patient will likely need 10-20mg TID.    Encephalopathy   - SLP for cog   - Minimize sedating medication   - Avoid haldol, benzodiazepines    Diarrhea   - C. Diff negative   - Etiology is likely viral enteritis      A fib   - Metoprolol   - Eliquis     DVT PPX: Eliquis      Thank you for allowing us to participate in the care of this patient.     Patient was seen for >80 minutes on unit/floor of which > 50% of time was spent on counseling and coordination of care regarding the above, including prognosis, risk reduction, benefits  of treatment, and options for next stage of care.    Isma German, DO   Physical Medicine and Rehabilitation     Please note that this dictation was created using voice recognition software. I have made every reasonable attempt to correct obvious errors, but there may be errors of grammar and possibly content that I did not discover before finalizing the note.

## 2023-08-24 NOTE — DISCHARGE PLANNING
[facility] Transitional Care Coordination    Referral from: Dr. Camarena    Insurance Provider on Facesheet: [facility]    Potential Rehab Diagnosis: NTBI    Chart review indicates patient may have on going medical management and may have therapy needs to possibly meet inpatient rehab facility criteria with the goal of returning to community.    D/C support will need to be verified: Relative    Physiatry consultation forwarded per protocol.      Thank you for the referral.

## 2023-08-24 NOTE — FACE TO FACE
Face to Face Supporting Documentation - Home Health    The encounter with this patient was in whole or in part the primary reason for home health admission.    Date of encounter:   Patient:                    MRN:                       YOB: 2022  Alireza Hernandez  [id number]  1935     Home health to see patient for:  Physical Therapy evaluation and treatment and Occupational therapy evaluation and treatment, skilled nursing    Skilled need for:  Comment: PT/OT, wound care    Skilled nursing interventions to include:  Wound Care    Homebound status evidenced by:  Need the aid of supportive devices such as crutches, canes, wheelchairs or walkers or Needs the assistance of another person in order to leave the home. Leaving home requires a considerable and taxing effort. There is a normal inability to leave the home.    Community Physician to provide follow up care: GEOVANI Layton     Optional Interventions? No      I certify the face to face encounter for this home health care referral meets the CMS requirements and the encounter/clinical assessment with the patient was, in whole, or in part, for the medical condition(s) listed above, which is the primary reason for home health care. Based on my clinical findings: the service(s) are medically necessary, support the need for home health care, and the homebound criteria are met.  I certify that this patient has had a face to face encounter by myself.  Nicol Singer M.D. - NPI: [id number]

## 2023-08-24 NOTE — THERAPY
Physical Therapy   Initial Evaluation     Patient Name: Alireza Hernandez  Age:  88 y.o., Sex:  male  Medical Record #: [id number]  Today's Date: 8/23/2023     Precautions  Precautions: Fall Risk;Swallow Precautions  Comments: hx CVA with R side deficits    Assessment  Patient is an 88 y.o. 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 PT eval complete, pt currently presents near his functional baseline, however he is limited by residual R hemiparesis as well as reported new weakness since his stroke. Pt reports living at [facility] where they have been assisting with all mobility and ADL's prior to admission. Pt is motivated to work with therapy and wants to improve his functional baseline. Pt would benefit most from return to [facility] with potential HHPT if they can provide the level of care that he needs, however pt may need post acute placement to increase functional independence and decrease caregiver burden prior to transition to long term care facility. Will follow while admitted for skilled PT intervention.     Plan    Physical Therapy Initial Treatment Plan   Treatment Plan : Bed Mobility, Gait Training, Neuro Re-Education / Balance, Self Care / Home Evaluation, Stair Training, Therapeutic Activities, Therapeutic Exercise  Treatment Frequency: 3 Times per Week  Duration: Until Therapy Goals Met    DC Equipment Recommendations: Unable to determine at this time  Discharge Recommendations: Other - (pt would benefit from return to [facility] with potential HHPT services if they can provide appropriate care for pt at his [facility]. If not, pt may benefit from post acute placement to decrease caregiver burden and facilitate transition to long term care facility)         Vitals   O2 (LPM) 0   O2 Delivery Device None - Room Air   Pain 0 - 10 Group   Therapist Pain Assessment Post Activity Pain Same as Prior to Activity;0   Prior  Living Situation   Prior Services Continuous (24 Hour) Care Giving Per Service   Housing / Facility Assisted Living Residence   Lives with - Patient's Self Care Capacity Attendant / Paid Care Giver   Comments Pt resides at [facility], he reports they help with all ADLs and mobility, including transfer to , showering, etc, however he is not pleased with current level of care being provided and wants more therapy   Prior Level of Functional Mobility   Bed Mobility Required Assist   Transfer Status Required Assist   Ambulation Dependent   Wheelchair Required Assist   Comments pt reports he needs help for all mobility, but can assist some   Cognition    Cognition / Consciousness X   Speech/ Communication Expressive Aphasia   Level of Consciousness Alert   Comments pleasant and participatory, however has difficulty communicating due to aphasia   Active ROM Lower Body    Active ROM Lower Body  X   Comments decreased ROM RLE   Strength Lower Body   Lower Body Strength  X   Comments LLE grossly 4+/5, RLE grossly 3/5. pt having difficulty with sustained contractions   Sensation Lower Body   Lower Extremity Sensation   X   Comments reports decreased sensation to light touch in RLE   Coordination Lower Body    Coordination Lower Body  X   Comments RLE coordination grossly impaired, NT formally   Balance Assessment   Sitting Balance (Static) Fair -   Sitting Balance (Dynamic) Poor +   Standing Balance (Static) Poor -   Standing Balance (Dynamic) Trace   Weight Shift Sitting Poor   Weight Shift Standing Poor   Bed Mobility    Supine to Sit Maximal Assist   Sit to Supine Maximal Assist   Scooting Total Assist   Gait Analysis   Gait Level Of Assist Unable to Participate   Functional Mobility   Sit to Stand Maximal Assist   Bed, Chair, Wheelchair Transfer Maximal Assist   Transfer Method Squat Pivot   Mobility pt not able to help significantly for squat pivot transfer   How much difficulty does the patient currently have...    Turning over in bed (including adjusting bedclothes, sheets and blankets)? 2   Sitting down on and standing up from a chair with arms (e.g., wheelchair, bedside commode, etc.) 1   Moving from lying on back to sitting on the side of the bed? 1   How much help from another person does the patient currently need...   Moving to and from a bed to a chair (including a wheelchair)? 2   Need to walk in a hospital room? 1   Climbing 3-5 steps with a railing? 1   6 clicks Mobility Score 8   Activity Tolerance   Sitting in Chair 2 min   Sitting Edge of Bed 10 min   Comments limited by R hemiparesis   Short Term Goals    Short Term Goal # 1 pt will move supine<>eob with min a in 6 tx for bed mobility.   Short Term Goal # 2 pt will complete spt with fww and mod a in 6 tx for functional mobility.   Short Term Goal # 3 pt will propel self 150 ft in wc with min a in 6 tx for household distances.   Education Group   Education Provided Role of Physical Therapist   Role of Physical Therapist Patient Response Patient;Acceptance;Explanation;Verbal Demonstration   Physical Therapy Initial Treatment Plan    Treatment Plan  Bed Mobility;Gait Training;Neuro Re-Education / Balance;Self Care / Home Evaluation;Stair Training;Therapeutic Activities;Therapeutic Exercise   Treatment Frequency 3 Times per Week   Duration Until Therapy Goals Met   Problem List    Problems Impaired Bed Mobility;Impaired Transfers;Impaired Ambulation;Functional ROM Deficit;Functional Strength Deficit;Impaired Balance;Impaired Coordination;Decreased Activity Tolerance   Anticipated Discharge Equipment and Recommendations   DC Equipment Recommendations Unable to determine at this time   Discharge Recommendations Other -  (pt would benefit from return to [facility] with potential HHPT services if they can provide appropriate care for pt at his [facility]. If not, pt may benefit from post acute placement to decrease caregiver burden and facilitate transition to long term care  facility)   Interdisciplinary Plan of Care Collaboration   IDT Collaboration with  Nursing;Occupational Therapist;Physician   Patient Position at End of Therapy In Bed;Bed Alarm On;Call Light within Reach;Tray Table within Reach;Phone within Reach   Collaboration Comments RN updated   Session Information   Date / Session Number  8/23- 1 (1/3, 8/29)

## 2023-08-24 NOTE — PROGRESS NOTES
[facility] Daily Progress Note    Date of Service  8/24/2023    [facility] Team: [facility] Team   Attending: Brenton Shaw M.d.  Senior Resident: Dr. Nicol Singer  Contact Number: [phone]    Chief Complaint  Chief Complaint   Patient presents with    Diarrhea       Hx C. Diff. Onset of diarrhea x 2 days. 5 BM today, odor consistent with C. Diff. Hx stroke, R sided deficits baseline. AAO x 2       Hospital Course  Alireza Hernandez is a 88 y.o. male who presented 8/22/2023 with past medical history of CVA with right-sided deficits, atrial fibrillation on Xarelto, hypertension, BPH, history of C. difficile who comes into the hospital for non-bloody diarrhea (5+ bowel movements) for two days and confusion. [facility]. In the ED he was no longer confused and was afebrile without leukocytosis, and he was admitted for evaluation and management of diarrhea. C diff gene and toxin were negative, stool culture was negative, and fecal lactoferrin was elevated. Though UA was suggestive of UTI he was asymptomatic. Likely he experienced a viral enteritis, self limited. Bowel movements reduced spontaneously.    Interval Problem Update  Stool culture negative. Bowel movements reducing in frequency spontaneously. 1/2 blood cultures positive for CONS, likely contaminant. VSS, no suspicion of bacteremia. PT recommending home health or post-acute care and upon further discussion he would likely see some improvement with acute rehab. Will place PMR referral for evaluation for acute rehab.     I have discussed this patient's plan of care and discharge plan at IDT rounds today with Case Management, Nursing, Nursing leadership, and other members of the IDT team.    Consultants/Specialty  None    Code Status  DNAR/DNI    Disposition  The patient is medically cleared for discharge to home or a post-acute facility.      I have placed the appropriate orders for post-discharge needs.    Review of Systems  Review of  Systems   Constitutional:  Negative for chills and fever.   Respiratory:  Negative for cough and shortness of breath.    Cardiovascular:  Negative for chest pain.   Gastrointestinal:  Positive for diarrhea (improving). Negative for abdominal pain, blood in stool, nausea and vomiting.   Genitourinary:  Negative for dysuria, frequency, hematuria and urgency.        Physical Exam  Temp:  [36.1 °C (97 °F)-36.7 °C (98 °F)] 36.3 °C (97.4 °F)  Pulse:  [62-80] 80  Resp:  [17-19] 18  BP: (120-144)/(60-81) 144/79  SpO2:  [94 %-98 %] 94 %    Physical Exam  Vitals and nursing note reviewed.   Constitutional:       General: He is not in acute distress.     Appearance: Normal appearance. He is not ill-appearing, toxic-appearing or diaphoretic.   HENT:      Head: Normocephalic and atraumatic.      Mouth/Throat:      Mouth: Mucous membranes are moist.      Pharynx: Oropharynx is clear.   Eyes:      Extraocular Movements: Extraocular movements intact.   Cardiovascular:      Rate and Rhythm: Normal rate and regular rhythm.   Pulmonary:      Effort: Pulmonary effort is normal. No respiratory distress.      Breath sounds: Normal breath sounds. No wheezing or rales.   Abdominal:      General: Abdomen is flat. There is no distension.      Palpations: Abdomen is soft.      Tenderness: There is no abdominal tenderness. There is no guarding or rebound.   Musculoskeletal:      Right lower leg: No edema.      Left lower leg: No edema.   Skin:     General: Skin is warm and dry.   Neurological:      General: No focal deficit present.      Mental Status: He is alert and oriented to person, place, and time.      Motor: Weakness (chronic R sided deficits) present.      Comments: Dysarthric speech (chronic)   Psychiatric:         Mood and Affect: Mood normal.         Behavior: Behavior normal.         Fluids    Intake/Output Summary (Last 24 hours) at 8/24/2023 1147  Last data filed at 8/24/2023 0904  Gross per 24 hour   Intake 120 ml   Output 950  ml   Net -830 ml       Laboratory  Recent Labs     08/22/23  1730 08/23/23  0333   WBC 5.8 5.8   RBC 4.66* 4.42*   HEMOGLOBIN 12.6* 11.7*   HEMATOCRIT 39.6* 37.5*   MCV 85.0 84.8   MCH 27.0 26.5*   MCHC 31.8* 31.2*   RDW 54.4* 53.8*   PLATELETCT 379 343   MPV 9.8 10.1     Recent Labs     08/22/23  1730 08/23/23  0333   SODIUM 139 140   POTASSIUM 4.3 3.8   CHLORIDE 103 106   CO2 25 24   GLUCOSE 99 84   BUN 16 12   CREATININE 0.82 0.78   CALCIUM 9.2 8.0*                   Imaging  DX-CHEST-LIMITED (1 VIEW)   Final Result         Moderate left pleural effusion with left basilar atelectasis.            Assessment/Plan  Problem Representation: 87 YO M admitted 8/22/23 for intractable diarrhea concerning for infectious etiology.     * Diarrhea of presumed infectious origin- (present on admission)  Assessment & Plan  More than 5 times a day. C. Diff PCR and toxin negative, stool cx negative. Fecal lactoferrin positive. Likely viral enteritis, self limiting   -Supportive care, encourage hydration  -Stool O&P, pending final Salmonella antibody and stool culture final result  -Ensure adequate hydration and monitor electrolytes for need for repletion    Acute encephalopathy  Assessment & Plan  RESOLVED  Due to infectious etiology    Abnormal urinalysis  Assessment & Plan  UA + for bacteria, LE and nitrite but patient has no symptoms or signs of UTI. 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 In review of yesterday's note (8/23/2023), there are no changes except as documented above.

## 2023-08-24 NOTE — PROGRESS NOTES
Atrial fibrillation with controlled ventricular response.   HR 80, irregular   P waves undistinguishable   QRS 0.09  QT - MARGIE

## 2023-08-24 NOTE — PROGRESS NOTES
Blood culture was postive for gram positive cocci, not Staph aureus or MRSA. RN notfied resident on-call Lisa Rossi

## 2023-08-24 NOTE — HOSPICE
Pt is approved for hospice if he would like to try hospice by Dr. Mohan.    Since therapy has not been ruled out, we will wait to see what the final decision is about therapy.

## 2023-08-24 NOTE — PROGRESS NOTES
Monitor Summary     Rhythm Afib  HR Range 56-82  Ectopy R-PVC  VA/QRS/QT --/0.10/--

## 2023-08-24 NOTE — CARE PLAN
The patient is Stable - Low risk of patient condition declining or worsening    Shift Goals  Clinical Goals: fall safety  Patient Goals: sleep  Family Goals: none present    Progress made toward(s) clinical / shift goals:  Patient remained fall free. Patient was Aox2 person and time with forgetful moments. Patient attempted to get out of bed and set off the bed alarms a few times but easily redirected. Patient did not have any diarrhea episode during the night     Patient is not progressing towards the following goals:

## 2023-08-25 NOTE — DISCHARGE SUMMARY
[facility] Discharge Summary    Attending: Palmira Hodgson M.d.  Senior Resident: Dr. Mateo Will  Contact Number: [phone]    CHIEF COMPLAINT ON ADMISSION  Chief Complaint   Patient presents with    Diarrhea     Hx C. Diff. Onset of diarrhea x 2 days. 5 BM today, odor consistent with C. Diff. Hx stroke, R sided deficits baseline. AAO x 2       Reason for Admission  EMS     Admission Date  8/22/2023    Discharge Date  9/4/23    CODE STATUS  DNAR/DNI    HPI & HOSPITAL COURSE  Alireza Hernandez is a 88 y.o. 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 difficile who presented 8/22/23 for non-bloody diarrhea (5+ bowel movements) for two days and confusion. He lives in [facility]. In the ED he was no longer confused and was afebrile without leukocytosis, and he was admitted for evaluation and management of diarrhea. C diff gene positive but toxin was negative, stool culture was negative, and fecal lactoferrin was elevated. Bowel movements initially reduced spontaneously, but recurred 7-10 BM's per day. Repeat C diff toxin testing was positive. Bowel movements reduced after treatment with pulsed oral vancomycin. Patient had some fluid-responsive hypotension, antihypertensives were held, achieved normotension with fluid resuscitation. Patient initially indecisive but is accepting of placement back at [facility]. Hospice referral was placed. Patient no longer on precautions and will require continued long taper of vancomycin to prevent recurrence of C diff. GERD therapy de-escalated to PRN Tums to reduce risk of further C diff recurrence.    Therefore, he is discharged in fair and stable condition to hospice.    The patient met 2-midnight criteria for an inpatient stay at the time of discharge.        Physical Exam on Day of Discharge  Vitals  and nursing note reviewed.   Constitutional:       Appearance: He is ill-appearing.      Comments: Frail, cachectic   HENT:      Head: Normocephalic and atraumatic.      Mouth/Throat:      Mouth: Mucous membranes are moist.      Pharynx: Oropharynx is clear.   Eyes:      General: No scleral icterus.  Cardiovascular:      Rate and Rhythm: Normal rate and regular rhythm.      Heart sounds: Murmur heard.   Pulmonary:      Effort: Pulmonary effort is normal. No respiratory distress.      Breath sounds: No wheezing.   Abdominal:      General: Bowel sounds are normal. There is no distension.      Palpations: Abdomen is soft.      Tenderness: There is no abdominal tenderness.   Musculoskeletal:         General: No swelling.   Skin:     Capillary Refill: Capillary refill takes 2 to 3 seconds.      Coloration: Skin is pale.   Neurological:      Mental Status: He is alert. He is disoriented.      Motor: Weakness present.   Psychiatric:         Mood and Affect: Mood normal.         Behavior: Behavior normal.         Thought Content: Thought content normal.         Judgment: Judgment normal.     FOLLOW UP ITEMS POST DISCHARGE  Patient is to follow up with primary care to discuss long-term prevention of C diff recurrence with indefinite vancomycin oral therapy q48h.    Patient may follow up with PCP for further blood pressure management.    DISCHARGE DIAGNOSES  Principal Problem (Resolved):    C. 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  DARRYL Layton.  [address] [facility] [address]  [phone]          Rigo Mohan D.O.  [address] [address] [id number] [phone]            MEDICATIONS ON DISCHARGE      Medication List        START taking these medications        Instructions   * vancomycin 50 mg/mL 50 mg/mL Soln   Take 2.5 mL by mouth every 6 hours for 6 days.  Dose: 125 mg     * vancomycin 50 mg/mL 50 mg/mL Soln  Start taking on: September 10, 2023   Take 2.5 mL by mouth every 12 hours for 7 days.  Dose: 125 mg     * vancomycin 50 mg/mL 50 mg/mL Soln  Start taking on: September 17, 2023   Take 2.5 mL by mouth every 24 hours for 6 days.  Dose: 125 mg     * vancomycin 50 mg/mL 50 mg/mL Soln  Start taking on: September 24, 2023   Take 2.5 mL by mouth every 48 hours for 6 days.  Dose: 125 mg     * vancomycin 50 mg/mL 50 mg/mL Soln  Start taking on: October 2, 2023   Take 2.5 mL by mouth every 72 hours for 8 days.  Dose: 125 mg           * This list has 5 medication(s) that are the same as other medications prescribed for you. Read the directions carefully, and ask your doctor or other care provider to review them with you.                CONTINUE taking these medications        Instructions   Calcium Antacid Ultra Strength 1000 MG Chew  Generic drug: Calcium Carbonate Antacid   Chew 1,000 mg 2 times a day.  Dose: 1,000 mg     Eliquis 5mg Tabs  Generic drug: apixaban   Take 1 Tablet by mouth 2 times a day. Indications: Thromboembolism secondary to Atrial Fibrillation  Dose: 5 mg     finasteride 5 MG Tabs  Commonly known as: Proscar   Take 5 mg by mouth every day.  Dose: 5 mg     loperamide 2 MG Caps  Commonly known as: Imodium   Take 2 mg by mouth 4 times a day as needed for Diarrhea.  Dose: 2 mg     loratadine 10 MG Tabs  Commonly known as: Claritin   Take 1 Tablet by mouth every day.  Dose: 10 mg     metoprolol SR 25 MG Tb24  Commonly known as: Toprol XL   Take 3 Tablets by mouth every day.  Dose:  75 mg     simvastatin 20 MG Tabs  Commonly known as: Zocor   Take 1 Tablet by mouth every evening.  Dose: 20 mg     tamsulosin 0.4 MG capsule  Commonly known as: Flomax   Take 1 Capsule by mouth 1/2 hour after breakfast.  Dose: 0.4 mg            STOP taking these medications      famotidine 20 MG Tabs  Commonly known as: Pepcid     losartan 25 MG Tabs  Commonly known as: Cozaar              Allergies  No Known Allergies    DIET  Orders Placed This Encounter   Procedures    Diet Order Diet: Level 6 - Soft and Bite Sized; Liquid level: Level 0 - Thin     Standing Status:   Standing     Number of Occurrences:   1     Order Specific Question:   Diet:     Answer:   Level 6 - Soft and Bite Sized [23]     Order Specific Question:   Liquid level     Answer:   Level 0 - Thin       ACTIVITY  As tolerated and directed by skilled nursing.  Weight bearing as tolerated    LINES, DRAINS, AND WOUNDS  This is an automated list. Peripheral IVs will be removed prior to discharge.  Peripheral IV 08/22/23 18 G Anterior;Proximal;Right Forearm (Active)   Site Assessment Clean;Dry;Intact 08/24/23 1500   Dressing Type Transparent 08/24/23 1500   Line Status Saline locked 08/24/23 1500   Dressing Status Clean;Dry;Intact 08/24/23 1500   Dressing Intervention N/A 08/24/23 1500   Infiltration Grading ([facility], [facility]) 0 08/23/23 0800   Phlebitis Scale ([facility] Only) 0 08/23/23 0800     External Urinary Catheter (Condom) (Active)   Collection Container Standard drainage bag 08/23/23 0800   Output (mL) 600 mL 08/24/23 1239      Wound 07/11/23 Pressure Injury Sacrum Midline POA DTI w/excoriation (Active)   Wound Image   08/22/23 2010   Site Assessment Clean;Dry;Red;Purple 08/23/23 0800   Periwound Assessment Clean;Dry;Pink 08/23/23 0800   Drainage Amount None 08/23/23 0800   Drainage Description Serosanguineous 08/04/23 1700   Treatments Offloading;Site care 08/23/23 0800   Offloading/DME Other (comment) 08/23/23 0800   Wound Cleansing Foam  Cleanser/Washcloth 08/23/23 0800   Periwound Protectant Barrier Paste 08/23/23 0800   Dressing Status Open to Air 08/23/23 0800   Dressing Changed Changed 08/14/23 1100   Dressing Options Mepilex 08/14/23 1100   Dressing Change/Treatment Frequency Every Shift, and As Needed 08/14/23 1100   NEXT Dressing Change/Treatment Date 08/14/23 08/14/23 1100   NEXT Weekly Photo (Inpatient Only) 08/16/23 08/14/23 1100   WOUND NURSE ONLY - Pressure Injury Stage 2 08/04/23 1700   WOUND NURSE ONLY - Time Spent with Patient (mins) 60 08/04/23 1700       Wound 08/22/23 Chest Upper Bilateral (Active)   Wound Image   08/22/23 2010   Site Assessment Red 08/23/23 2002   Periwound Assessment Clean;Dry;Intact;Fragile 08/22/23 2010   Drainage Amount None 08/23/23 2002   Wound Cleansing Soap and Water 08/23/23 2002   Dressing Status Open to Air 08/23/23 2002       Wound 08/22/23 Back Lateral;Upper Right (Active)   Wound Image   08/22/23 2010   Site Assessment Pink 08/23/23 2002   Periwound Assessment Clean;Dry;Intact 08/22/23 2010   Wound Cleansing Soap and Water 08/23/23 2002   Dressing Status Open to Air 08/23/23 2002       Wound 08/22/23 Ear Right (Active)   Wound Image   08/22/23 2010   Site Assessment Red;Excoriated 08/23/23 2002   Periwound Assessment Pink 08/23/23 2002   Drainage Amount None 08/23/23 2002   Treatments Cleansed 08/23/23 2002   Wound Cleansing Soap and Water 08/23/23 2002   Periwound Protectant Antifungal Therapy 08/23/23 2002   Dressing Status Open to Air 08/22/23 2010       Wound 08/22/23 Scrotum (Active)   Wound Image   08/22/23 2010   Site Assessment Red;Intact 08/23/23 2002   Periwound Assessment Clean;Dry;Intact;Blanchable erythema 08/22/23 2010   Drainage Amount None 08/23/23 2002   Treatments Cleansed;Site care 08/22/23 2010   Wound Cleansing Soap and Water 08/23/23 2002   Periwound Protectant Barrier Paste 08/23/23 2002   Dressing Status Open to Air 08/23/23 2002       Wound 08/22/23 Heel Left (Active)   Wound  Image   08/22/23 2010   Site Assessment MARGIE 08/23/23 2002   Periwound Assessment Clean;Dry;Intact 08/22/23 2010   Treatments Offloading 08/22/23 2010   Dressing Status Clean;Dry;Intact 08/23/23 2002   Dressing Options Offloading Dressing - Heel 08/22/23 2010       Wound 08/22/23 Toe, 3rd;Toe, 4th Right (Active)   Wound Image   08/22/23 2010   Site Assessment Clean;Dry;Red;Scabbed 08/22/23 2010   Periwound Assessment Clean;Dry;Intact 08/22/23 2010   Dressing Status Open to Air 08/22/23 2010       Peripheral IV 08/22/23 18 G Anterior;Proximal;Right Forearm (Active)   Site Assessment Clean;Dry;Intact 08/24/23 1500   Dressing Type Transparent 08/24/23 1500   Line Status Saline locked 08/24/23 1500   Dressing Status Clean;Dry;Intact 08/24/23 1500   Dressing Intervention N/A 08/24/23 1500   Infiltration Grading ([facility]) 0 08/23/23 0800   Phlebitis Scale ([facility] Only) 0 08/23/23 0800               MENTAL STATUS ON TRANSFER  Stable, at baseline, with known irreversible dementia but retained capacity for decision making.    CONSULTATIONS  Physiatry- Dr. German  Hospice- Dr. Mohan    PROCEDURES  None    LABORATORY  Lab Results   Component Value Date    SODIUM 140 08/23/2023    POTASSIUM 3.8 08/23/2023    CHLORIDE 106 08/23/2023    CO2 24 08/23/2023    GLUCOSE 84 08/23/2023    BUN 12 08/23/2023    CREATININE 0.78 08/23/2023        Lab Results   Component Value Date    WBC 5.8 08/23/2023    HEMOGLOBIN 11.7 (L) 08/23/2023    HEMATOCRIT 37.5 (L) 08/23/2023    PLATELETCT 343 08/23/2023        Total time of the discharge process: 60 minutes.   no hallucinations/no paranoia/no weakness/no homicidal/no disorientation

## 2023-08-25 NOTE — DISCHARGE PLANNING
Spoke with Maria Del Carmen Harris regarding [facility] resources/support.  She is agreeable with an admission.  Alireza will return to [facility].  He is signed up for 2 staff assist. Kimberly owns a business and is not able to attend family training.  Alireza's sister is retired and will attend family training.  Submitted to insurance.

## 2023-08-25 NOTE — CARE PLAN
The patient is Stable - Low risk of patient condition declining or worsening    Shift Goals  Clinical Goals: reoriente pt  Patient Goals: rest  Family Goals: MARGIE    Progress made toward(s) clinical / shift goals:    Problem: Skin Integrity  Goal: Skin integrity is maintained or improved  Outcome: Progressing  Note: Skin remains intact this shift, no new abrasions or wounds noted.        Problem: Fall Risk  Goal: Patient will remain free from falls  Outcome: Progressing  Note: Patient remains free of falls this shift, patient belongings in reach, assistive devices out of reach, educated patient on call light, bed alarm on.          Patient is not progressing towards the following goals:

## 2023-08-25 NOTE — CARE PLAN
The patient is Stable - Low risk of patient condition declining or worsening    Shift Goals  Clinical Goals: increase mobility, DC plannng, comfort  Patient Goals: rest, comfort  Family Goals: MARGIE    Progress made toward(s) clinical / shift goals:    Problem: Skin Integrity  Goal: Skin integrity is maintained or improved  Outcome: Progressing  Note: Appropriate interventions in place to protect skin. Pt on q2 turns, dry flows in place, with condom catheter for urinary elimination. Bed changes PRN. Heels floated, extra pillows for positioning. Wound on board.       Problem: Fall Risk  Goal: Patient will remain free from falls  Outcome: Progressing  Note: Safety and fall education given. All fall precautions are in place with belongings at bedside table. Needs are tended to. Educated to use call light for assistance. Pt verbalized understanding.         Patient is not progressing towards the following goals:

## 2023-08-25 NOTE — PROGRESS NOTES
Telemetry Report    Rhythm: A flutter  Heart Rate: 77  Ectopy: PVC    PA:   QRS: 0.07  QT:              Per telemetry room monitor

## 2023-08-25 NOTE — DOCUMENTATION QUERY
[facility]                                                                       Query Response Note      PATIENT:               RODERICK MILLS  ACCT #:                  [id number]  MRN:                     [id number]  :                      1935  ADMIT DATE:       2023 4:08 PM  DISCH DATE:          RESPONDING  PROVIDER #:        [id number]           QUERY TEXT:    Encephalopathy due to infectious etiology is documented in the Medical Record.  Patient admitted with diarrhea, likely viral enteritis, abnormal UA  (asymptomatic) and mild dehydration.    Pt treated with IV fluids, 1 dose of ceftriaxone, labs and cultures. Bowel movements spontaneously reduced and patient Encephalopathy resolved.    Can the type of encephalopathy be further specified?      The patient's Clinical Indicators include:  87 yo M admitted with diarrhea and debility    Clinical Indicators: ED Note: A/O x 2; confused; A/Ox3 on previous admission   H&P: Encephalopathy due to infectious etiology   HM PN: Encephalopathy - resolved    Risk Factors:  Diarrhea - likely viral enteritis, Abnormal UA, dehydration, A-fib RVR, Age,     Treatments: IV abx (1 dose), labs, stool cultures, urine cultures, blood cultures,  IV fluids, PMR consult  Options provided:   -- Metabolic encephalopathy   -- Toxic Metabolic Encephalopathy   -- Other explanation, (please specify other explanation)      Query created by: Sara Ibrahim on 2023 11:04 AM    RESPONSE TEXT:    Metabolic encephalopathy          Electronically signed by:  MARGY HERNANDEZ MD 2023 12:34 PM

## 2023-08-25 NOTE — PROGRESS NOTES
[facility] Daily Progress Note    Date of Service  8/25/2023    [facility] Team: [facility] Team   Attending: Brenton Shaw M.d.  Senior Resident: Dr. Nicol Singer  Contact Number: [phone]    Chief Complaint  Chief Complaint   Patient presents with    Diarrhea       Hx C. Diff. Onset of diarrhea x 2 days. 5 BM today, odor consistent with C. Diff. Hx stroke, R sided deficits baseline. AAO x 2       Hospital Course  Alireza Hernandez is a 88 y.o. 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 difficile who presented 8/22/23 for non-bloody diarrhea (5+ bowel movements) for two days and confusion. He lives in [facility]. In the ED he was no longer confused and was afebrile without leukocytosis, and he was admitted for evaluation and management of diarrhea. C diff gene and toxin were negative, stool culture was negative, and fecal lactoferrin was elevated. Though UA was suggestive of UTI he was asymptomatic. Likely he experienced a viral enteritis, self limited. Bowel movements reduced spontaneously. 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 Hospice referral was placed    Interval Problem Update  Has been determined to be a good candidate for acute rehab. Pending insurance authorization. Stool cultures remain negative. Patient denies more bowel movements, though four (semi-formed) documented in last 24 hrs.     I have discussed this patient's plan of care and discharge plan at IDT rounds today with Case Management, Nursing, Nursing leadership, and other members of the IDT team.    Consultants/Specialty  PMR- Dr. German     Code Status  DNAR/DNI    Disposition  The patient is medically cleared for discharge to home or a  post-acute facility.      I have placed the appropriate orders for post-discharge needs.    Review of Systems  Review of Systems   Constitutional:  Negative for chills and fever.   Respiratory:  Negative for cough and shortness of breath.    Cardiovascular:  Negative for chest pain.   Gastrointestinal:  Negative for abdominal pain, blood in stool, diarrhea, nausea and vomiting.   Genitourinary:  Negative for dysuria, frequency, hematuria and urgency.        Physical Exam  Temp:  [36.2 °C (97.1 °F)-36.8 °C (98.2 °F)] 36.8 °C (98.2 °F)  Pulse:  [76-80] 76  Resp:  [16-19] 18  BP: (109-144)/(57-79) 112/75  SpO2:  [91 %-96 %] 94 %    Physical Exam  Vitals and nursing note reviewed.   Constitutional:       General: He is not in acute distress.     Appearance: Normal appearance. He is not ill-appearing, toxic-appearing or diaphoretic.   HENT:      Head: Normocephalic and atraumatic.      Mouth/Throat:      Mouth: Mucous membranes are moist.      Pharynx: Oropharynx is clear.   Eyes:      Extraocular Movements: Extraocular movements intact.   Cardiovascular:      Rate and Rhythm: Normal rate and regular rhythm.   Pulmonary:      Effort: Pulmonary effort is normal. No respiratory distress.      Breath sounds: Normal breath sounds. No wheezing or rales.   Abdominal:      General: Abdomen is flat. There is no distension.      Palpations: Abdomen is soft.      Tenderness: There is no abdominal tenderness. There is no guarding or rebound.   Musculoskeletal:      Right lower leg: No edema.      Left lower leg: No edema.   Skin:     General: Skin is warm and dry.   Neurological:      General: No focal deficit present.      Mental Status: He is alert and oriented to person, place, and time.      Motor: Weakness (chronic R sided deficits) present.      Comments: Dysarthric speech (chronic)   Psychiatric:         Mood and Affect: Mood normal.         Behavior: Behavior normal.         Fluids    Intake/Output Summary (Last 24 hours)  at 8/25/2023 0957  Last data filed at 8/25/2023 0900  Gross per 24 hour   Intake 1360 ml   Output 1125 ml   Net 235 ml       Laboratory  Recent Labs     08/22/23  1730 08/23/23  0333   WBC 5.8 5.8   RBC 4.66* 4.42*   HEMOGLOBIN 12.6* 11.7*   HEMATOCRIT 39.6* 37.5*   MCV 85.0 84.8   MCH 27.0 26.5*   MCHC 31.8* 31.2*   RDW 54.4* 53.8*   PLATELETCT 379 343   MPV 9.8 10.1     Recent Labs     08/22/23  1730 08/23/23  0333   SODIUM 139 140   POTASSIUM 4.3 3.8   CHLORIDE 103 106   CO2 25 24   GLUCOSE 99 84   BUN 16 12   CREATININE 0.82 0.78   CALCIUM 9.2 8.0*                   Imaging  DX-CHEST-LIMITED (1 VIEW)   Final Result         Moderate left pleural effusion with left basilar atelectasis.            Assessment/Plan  Problem Representation: 87 YO M admitted 8/22/23 for intractable diarrhea concerning for infectious etiology.     * Diarrhea of presumed infectious origin- (present on admission)  Assessment & Plan  More than 5 times a day. C. Diff PCR and toxin negative, stool cx negative. Fecal lactoferrin positive. 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 Started on ceftriaxone, received one dose then discontinued given asymptomatic and history of c diff  -Monitor for symptoms for need to resume abx (cx was collected prior to abx initiation)      Pressure injury of sacral region, stage 1- (present on admission)  Assessment & Plan  Wound consult  Frequent turns    A-fib (HCC)- (present on admission)  Assessment &  Plan  Rate controlled  Continue Eliquis  Metoprolol 75 mg daily  IV as needed medications    Hypertension- (present on admission)  Assessment & Plan  Continue home medications  Controlled         VTE prophylaxis: home apixaban     I have performed a physical exam and reviewed and updated ROS and Plan today (8/25/2023). In review of yesterday's note (8/24/2023), there are no changes except as documented above.

## 2023-08-26 NOTE — CARE PLAN
The patient is Stable - Low risk of patient condition declining or worsening    Shift Goals  Clinical Goals: increase mobility, dc planning, comfort  Patient Goals: rest, comfort  Family Goals: jay    Progress made toward(s) clinical / shift goals:    Problem: Pain - Standard  Goal: Alleviation of pain or a reduction in pain to the patient’s comfort goal  Outcome: Progressing  Note: Patient met pain goals with PRN medications ordered. Educated patient on non-pharmacological interventions.        Problem: Skin Integrity  Goal: Skin integrity is maintained or improved  Outcome: Progressing  Note: Skin remains intact this shift, no new abrasions or wounds noted.          Patient is not progressing towards the following goals:

## 2023-08-26 NOTE — PROGRESS NOTES
Assumed care of patient at bedside report from NOC RN. Updated on POC. Patient currently A & O x 2; on room air; up max assist; without complaints of acute pain. Assessment completed. Call light within reach. Whiteboard updated. Fall precautions in place. Bed locked and in lowest position. All questions answered. No other needs indicated at this time.

## 2023-08-26 NOTE — CARE PLAN
The patient is Stable - Low risk of patient condition declining or worsening    Shift Goals  Clinical Goals: dc planning, safe mobilization  Patient Goals: rest, comfort  Family Goals: jay    Progress made toward(s) clinical / shift goals:    Problem: Pain - Standard  Goal: Alleviation of pain or a reduction in pain to the patient’s comfort goal  Outcome: Progressing     Problem: Knowledge Deficit - Standard  Goal: Patient and family/care givers will demonstrate understanding of plan of care, disease process/condition, diagnostic tests and medications  Outcome: Progressing     Problem: Skin Integrity  Goal: Skin integrity is maintained or improved  Outcome: Progressing     Discussed daily POC. Monitored for discomfort, discussed pain management. Skin breakdown prevention measures in place. Encouraged verbalization of needs. Reoriented as necessary    Patient is not progressing towards the following goals:

## 2023-08-26 NOTE — WOUND TEAM
[facility]  Inpatient Services  Initial Wound and Skin Care Evaluation    Admission Date: 8/22/2023     Last order of IP CONSULT TO WOUND CARE was found on 8/23/2023 from Hospital Encounter on 8/22/2023     HPI, PMH, SH: Reviewed    Past Surgical History:   Procedure Laterality Date    HIP REPLACEMENT, TOTAL Right 2004     Social History     Tobacco Use    Smoking status: Never    Smokeless tobacco: Never   Substance Use Topics    Alcohol use: Never     Chief Complaint   Patient presents with    Diarrhea     Hx C. Diff. Onset of diarrhea x 2 days. 5 BM today, odor consistent with C. Diff. Hx stroke, R sided deficits baseline. AAO x 2     Diagnosis: Diarrhea [R19.7]    Unit where seen by Wound Team: S149/00     WOUND CONSULT RELATED TO:  L heel, R chest, R arm, R ear, sacrum, scrotum, R     WOUND TEAM PLAN OF CARE - Frequency of Follow-up:   Nursing to follow dressing orders written for wound care. Contact wound team if area fails to progress, deteriorates or with any questions/concerns if something comes up before next scheduled follow up (See below as to whether wound is following and frequency of wound follow up)   Bi-Monthly -      WOUND HISTORY:   Pt is an 89 yo female who presented with GI issues.  Hx of stroke with R motor problems       WOUND ASSESSMENT/LDA  Wound 07/11/23 Pressure Injury Sacrum Midline POA DTI w/excoriation (Active)   Date First Assessed/Time First Assessed: 07/11/23 1445   Present on Original Admission: Yes  Hand Hygiene Completed: Yes  Primary Wound Type: Pressure Injury  Location: Sacrum  Wound Orientation: Midline  Wound Description (Comments): POA DTI w/excori...      Assessments 8/25/2023  5:00 PM   Wound Image     Site Assessment Dry   Periwound Assessment Purple   Drainage Amount None   Drainage Description Serosanguineous   Periwound Protectant Barrier Paste   Dressing Changed Changed   Dressing Change/Treatment Frequency Every Shift, and As Needed   NEXT Dressing  Change/Treatment Date 08/25/23   NEXT Weekly Photo (Inpatient Only) 09/01/23   Wound Length (cm) 8 cm   Wound Width (cm) 10 cm   Wound Surface Area (cm^2) 80 cm^2   Wound Odor None       Wound 08/22/23 Heel Left DTI POA (Active)   Date First Assessed/Time First Assessed: 08/22/23 2010   Present on Original Admission: Yes  Hand Hygiene Completed: Yes  Location: Heel  Laterality: Left  Wound Description (Comments): DTI POA      Assessments 8/25/2023  5:00 PM   Wound Image     Site Assessment Purple   Periwound Assessment Pink   Drainage Amount None   Periwound Protectant Barrier Paste   Dressing Changed Changed   Dressing Change/Treatment Frequency Every Shift, and As Needed   NEXT Dressing Change/Treatment Date 08/25/23   Wound Team Following Other (comment)   Wound Length (cm) 3 cm   Wound Width (cm) 2 cm   Wound Surface Area (cm^2) 6 cm^2   Wound Odor None   WOUND NURSE ONLY - Time Spent with Patient (mins) 60        Vascular:    EDGAR:   No results found.    Lab Values:    Lab Results   Component Value Date/Time    WBC 5.8 08/23/2023 03:33 AM    RBC 4.42 (L) 08/23/2023 03:33 AM    HEMOGLOBIN 11.7 (L) 08/23/2023 03:33 AM    HEMATOCRIT 37.5 (L) 08/23/2023 03:33 AM    CREACTPROT 5.70 (H) 08/22/2023 05:30 PM    SEDRATEWES 33 (H) 08/22/2023 05:30 PM    HBA1C 6.3 (H) 07/11/2023 05:57 PM         Culture Results show:  No results found for this or any previous visit (from the past 720 hour(s)).    Pain Level/Medicated:  None, Tolerated without pain medication       INTERVENTIONS BY WOUND TEAM:  Chart and images reviewed. Discussed with bedside RN. All areas of concern (based on picture review, LDA review and discussion with bedside RN) have been thoroughly assessed. Documentation of areas based on significant findings. This RN in to assess patient. Performed standard wound care which includes appropriate positioning, dressing removal and non-selective debridement. Pictures and measurements obtained weekly if/when  required.    Wound:    Sacrum - barrier paste  L heel - mepilex    Advanced Wound Care Discharge Planning  Number of Clinicians necessary to complete wound care: 1  Is patient requiring IV pain medications for dressing changes:  No   Length of time for dressing change 30 min. (This does not include chart review, pre-medication time, set up, clean up or time spent charting.)    Interdisciplinary consultation: Patient, Bedside RN (), .  Pressure injury and staging reviewed with .    EVALUATION / RATIONALE FOR TREATMENT:     Date:  08/25/23  Wound Status:  Wound improving    8/25/23 Pt's sacrum is a resolving stage 2 with drying blisters.           Goals: Steady decrease in wound area and depth weekly.    NURSING PLAN OF CARE ORDERS:  Dressing changes: See Dressing Care orders    NUTRITION RECOMMENDATIONS   Wound Team Recommendations:      DIET ORDERS (From admission to next 24h)       Start     Ordered    08/22/23 1905  Diet Order Diet: Regular  ALL MEALS        Question:  Diet:  Answer:  Regular    08/22/23 1904                    PREVENTATIVE INTERVENTIONS:    Q shift Issa - performed per nursing policy  Q shift pressure point assessments - performed per nursing policy    Surface/Positioning  Reposition q 2 hours - Currently in Place  Waffle overlay  - Currently in Place    Offloading/Redistribution  Heel offloading dressing (Silicone dressing) - Currently in Place    Anticipated discharge plans:  [facility]        Vac Discharge Needs:  Vac Discharge plan is purely a recommendation from wound team and not a requirement for discharge unless otherwise stated by physician.  Not Applicable Pt not on a wound vac

## 2023-08-26 NOTE — PROGRESS NOTES
[facility] Internal Medicine Daily Progress Note    Date of Service  8/26/2023    [facility] Team: [facility] Team   Attending: Brenton Shaw M.d.  Senior Resident: Dr. Nicol Singer  Contact Number: [phone]    Chief Complaint  Chief Complaint   Patient presents with    Diarrhea       Hx C. Diff. Onset of diarrhea x 2 days. 5 BM today, odor consistent with C. Diff. Hx stroke, R sided deficits baseline. AAO x 2       Hospital Course  Alireza Hernandez is a 88 y.o. 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 difficile who presented 8/22/23 for non-bloody diarrhea (5+ bowel movements) for two days and confusion. He lives in [facility]. In the ED he was no longer confused and was afebrile without leukocytosis, and he was admitted for evaluation and management of diarrhea. C diff gene and toxin were negative, stool culture was negative, and fecal lactoferrin was elevated. Though UA was suggestive of UTI he was asymptomatic. Likely he experienced a viral enteritis, self limited. Bowel movements reduced spontaneously. 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 Hospice referral was placed    Interval Problem Update  No acute events overnight. Bowel movements continue to normalize (only 2 in the last 24 hrs). Patient requesting a shower today. Pending insurance authorization for acute rehab. Will stay in touch with rehab for any updates.     I have discussed this patient's plan of care and discharge plan at IDT rounds today with Case Management, Nursing, Nursing leadership, and other members of the IDT team.    Consultants/Specialty  PMR- Dr. German     Code Status  DNAR/DNI    Disposition  The patient is medically cleared for discharge to home  or a post-acute facility.  Anticipate discharge to: an inpatient rehabilitation hospital    I have placed the appropriate orders for post-discharge needs.    Review of Systems  Review of Systems   Constitutional:  Negative for chills and fever.   Respiratory:  Negative for cough and shortness of breath.    Cardiovascular:  Negative for chest pain.   Gastrointestinal:  Negative for abdominal pain, blood in stool, diarrhea, nausea and vomiting.   Genitourinary:  Negative for dysuria, frequency, hematuria and urgency.        Physical Exam  Temp:  [36 °C (96.8 °F)-36.8 °C (98.3 °F)] 36.3 °C (97.4 °F)  Pulse:  [58-78] 76  Resp:  [16-20] 18  BP: (105-136)/(63-75) 136/65  SpO2:  [95 %-97 %] 96 %    Physical Exam  Vitals and nursing note reviewed.   Constitutional:       General: He is not in acute distress.     Appearance: Normal appearance. He is not ill-appearing, toxic-appearing or diaphoretic.   HENT:      Head: Normocephalic and atraumatic.      Mouth/Throat:      Mouth: Mucous membranes are moist.      Pharynx: Oropharynx is clear.   Eyes:      Extraocular Movements: Extraocular movements intact.   Cardiovascular:      Rate and Rhythm: Normal rate and regular rhythm.   Pulmonary:      Effort: Pulmonary effort is normal. No respiratory distress.      Breath sounds: Normal breath sounds. No wheezing or rales.   Abdominal:      General: Abdomen is flat. There is no distension.      Palpations: Abdomen is soft.      Tenderness: There is no abdominal tenderness. There is no guarding or rebound.   Musculoskeletal:      Right lower leg: No edema.      Left lower leg: No edema.   Skin:     General: Skin is warm and dry.   Neurological:      General: No focal deficit present.      Mental Status: He is alert and oriented to person, place, and time.      Motor: Weakness (chronic R sided deficits) present.      Comments: Dysarthric speech (chronic)   Psychiatric:         Mood and Affect: Mood normal.         Behavior: Behavior  normal.         Fluids    Intake/Output Summary (Last 24 hours) at 8/26/2023 1134  Last data filed at 8/26/2023 0919  Gross per 24 hour   Intake 540 ml   Output 740 ml   Net -200 ml       Laboratory                            Imaging  DX-CHEST-LIMITED (1 VIEW)   Final Result         Moderate left pleural effusion with left basilar atelectasis.            Assessment/Plan  Problem Representation: 87 YO M admitted 8/22/23 for intractable diarrhea concerning for infectious etiology.     * Diarrhea of presumed infectious origin- (present on admission)  Assessment & Plan  RESOLVING  More than 5 times a day. C. Diff PCR and toxin negative, stool cx negative. Fecal lactoferrin positive. 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 Started on ceftriaxone, received one dose then discontinued given asymptomatic and history of c diff  -Monitor for symptoms for need to resume abx (cx was collected prior to abx initiation)      Pressure injury of sacral region, stage 1- (present on admission)  Assessment & Plan  Wound consult  Frequent turns    A-fib (HCC)- (present on admission)  Assessment & Plan  Rate controlled  Continue Eliquis  Metoprolol 75 mg daily  IV as needed medications    Hypertension- (present on admission)  Assessment & Plan  Continue home medications  Controlled         VTE prophylaxis: home apixaban     I have performed a physical exam and reviewed and updated ROS and Plan today (8/26/2023). In review of  yesterday's note (8/25/2023), there are no changes except as documented above.

## 2023-08-26 NOTE — DISCHARGE PLANNING
Insurance auth pending from [facility].  Would appreciate updated therapy notes when available.  TCC continues to follow

## 2023-08-27 NOTE — THERAPY
Occupational Therapy  Daily Treatment     Patient Name: Alireza Hernandez  Age:  88 y.o., Sex:  male  Medical Record #: [id number]  Today's Date: 8/27/2023     Precautions  Precautions: Fall Risk  Comments: hx CVA with R side deficits    Assessment    Pt continues to be motivated and eager to participate with therapy. Pt initially discouraged at start of session stating he woke up wanting to get OOB. Pt states feeling better once seated EOB with therapy, however then wanting to return to supine at the end of the session. Pt continues to have increased tone in R UE. Educated Pt in use of washcloth in palm of hand to prevent skin breakdown. Pt needs assistance with self-feeding, informed RN.     Plan    Treatment Plan Status: Continue Current Treatment Plan  Type of Treatment: Self Care / Activities of Daily Living, Therapeutic Activity, Therapeutic Exercises, Cognitive Skill Development  Treatment Frequency: 2 Times per Week  Treatment Duration: Until Therapy Goals Met    DC Equipment Recommendations: Unable to determine at this time  Discharge Recommendations:  (back to [facility] if able to provide assist with all mobility and self care tasks, with hospice vs placement with hospice)    Subjective    Awake, cooperative     Objective     08/27/23 1016   Treatment Charges   Charges Yes   OT Therapy Activity (Units) 2   Total Time Spent   OT Time Spent Yes   OT Therapy Activity (Minutes) 25   OT Total Time Spent (Calculated) 25   Precautions   Precautions Fall Risk   Pain   Intervention Rest;Repositioned   Cognition    Cognition / Consciousness WDL   Speech/ Communication Expressive Aphasia   Comments cooperative, participatory   Balance   Sitting Balance (Static) Fair -   Sitting Balance (Dynamic) Poor   Bed Mobility    Supine to Sit Maximal Assist   Sit to Supine Maximal Assist   Scooting Total Assist   Activities of Daily Living   Eating Minimal Assist   Upper Body Dressing Total Assist   Lower Body Dressing Total Assist    Toileting Total Assist   How much help from another person does the patient currently need...   Putting on and taking off regular lower body clothing? 1   Bathing (including washing, rinsing, and drying)? 1   Toileting, which includes using a toilet, bedpan, or urinal? 1   Putting on and taking off regular upper body clothing? 2   Taking care of personal grooming such as brushing teeth? 2   Eating meals? 2   6 Clicks Daily Activity Score 9   Modified [place] (mRS)   Modified [place] Score 5   Short Term Goals   Short Term Goal # 1 B  Pt will demo ADL txfs mod A   Short Term Goal # 2 Pt will complete ADL transfers safely with ModA   Goal Outcome # 2 Progressing slower than expected   Short Term Goal # 3 Pt will complete UB dressing with ModA   Goal Outcome # 3 Progressing slower than expected   Education Group   Education Provided Role of Occupational Therapist   Role of Occupational Therapist Patient Response Patient;Acceptance;Explanation;Demonstration;Verbal Demonstration   Occupational Therapy Treatment Plan    O.T. Treatment Plan Continue Current Treatment Plan   Treatment Interventions Self Care / Activities of Daily Living;Therapeutic Activity;Therapeutic Exercises;Cognitive Skill Development   Treatment Frequency 2 Times per Week   Duration Until Therapy Goals Met   Anticipated Discharge Equipment and Recommendations   DC Equipment Recommendations Unable to determine at this time   Interdisciplinary Plan of Care Collaboration   IDT Collaboration with  Nursing;Physical Therapist   Patient Position at End of Therapy In Bed;Bed Alarm On;Call Light within Reach;Tray Table within Reach   Collaboration Comments Pt status, need for assistance with self-feeding   Session Information   Date / Session Number  8/27, #2 (2/2, 8/29)

## 2023-08-27 NOTE — CARE PLAN
The patient is Stable - Low risk of patient condition declining or worsening    Shift Goals  Clinical Goals: Discharge planning, goals of care  Patient Goals: Rest, comfort  Family Goals: MARGIE- no family present    Progress made toward(s) clinical / shift goals:    Problem: Pain - Standard  Goal: Alleviation of pain or a reduction in pain to the patient’s comfort goal  Outcome: Progressing     Problem: Knowledge Deficit - Standard  Goal: Patient and family/care givers will demonstrate understanding of plan of care, disease process/condition, diagnostic tests and medications  Outcome: Progressing     Problem: Skin Integrity  Goal: Skin integrity is maintained or improved  Outcome: Progressing     Problem: Fall Risk  Goal: Patient will remain free from falls  Outcome: Progressing       Patient is not progressing towards the following goals:

## 2023-08-27 NOTE — CARE PLAN
The patient is Stable - Low risk of patient condition declining or worsening    Shift Goals  Clinical Goals: DC planning  Patient Goals: rest, comfort  Family Goals: jay    Progress made toward(s) clinical / shift goals:    Problem: Pain - Standard  Goal: Alleviation of pain or a reduction in pain to the patient’s comfort goal  Outcome: Progressing  Note: Patient met pain goals with PRN medications ordered. Educated patient on non-pharmacological interventions.        Problem: Knowledge Deficit - Standard  Goal: Patient and family/care givers will demonstrate understanding of plan of care, disease process/condition, diagnostic tests and medications  Outcome: Progressing  Note: Patient verbalizes understanding of plan of care.       Problem: Fall Risk  Goal: Patient will remain free from falls  Outcome: Progressing  Note: Patient remains free of falls this shift, patient belongings in reach, assistive devices out of reach, educated patient on call light, bed alarm on.          Patient is not progressing towards the following goals:

## 2023-08-27 NOTE — THERAPY
Physical Therapy   Daily Treatment     Patient Name: Alireza Hernandez  Age:  88 y.o., Sex:  male  Medical Record #: [id number]  Today's Date: 8/27/2023     Precautions  Precautions: Fall Risk  Comments: hx CVA with R side deficits    Assessment    Follow up session in the acute setting focusing on seated balance.  Patient participates and follows cues, he is motivated to participate.  Requires cues for quality of exercises with fair intra session education carryover.  PT can follow and progress towards functional goals while acute.    Plan    Treatment Plan Status:    Type of Treatment: Bed Mobility, Gait Training, Neuro Re-Education / Balance, Self Care / Home Evaluation, Stair Training, Therapeutic Activities, Therapeutic Exercise  Treatment Frequency: 3 Times per Week  Treatment Duration: Until Therapy Goals Met    DC Equipment Recommendations: Unable to determine at this time  Discharge Recommendations: Other - Post acute placement for rehab therapies vs return to [facility] with caregiver support and HH      Subjective    Pt is agreeable to participate.     Objective     08/27/23 0952    Services   Is patient using  services for this encounter? No   Precautions   Precautions Fall Risk   Comments hx CVA with R side deficits   Pain 0 - 10 Group   Therapist Pain Assessment Post Activity Pain Same as Prior to Activity;0   Cognition    Comments alert, pleasant, follows cues - limited by existing aphasia   Sitting Lower Body Exercises   Comments Edge of bed saq's x 10 bilateral, ankle pump AROM on left, AAROM on right - cues for quality and control of movement   Neuro-Muscular Treatments   Comments Seated weight shifts and reaching   Balance   Sitting Balance (Static) Fair   Sitting Balance (Dynamic) Fair -   Skilled Intervention Verbal Cuing;Tactile Cuing   Bed Mobility    Supine to Sit Maximal Assist   Sit to Supine Maximal Assist   Scooting Maximal Assist   Skilled Intervention Verbal Cuing;Tactile  Cuing;Sequencing   Gait Analysis   Gait Level Of Assist Unable to Participate   Functional Mobility   Mobility Bed mobility>sitting/reaching/balance>return to bed   Skilled Intervention Verbal Cuing;Tactile Cuing;Sequencing;Facilitation   How much difficulty does the patient currently have...   Turning over in bed (including adjusting bedclothes, sheets and blankets)? 2   Sitting down on and standing up from a chair with arms (e.g., wheelchair, bedside commode, etc.) 1   Moving from lying on back to sitting on the side of the bed? 1   How much help from another person does the patient currently need...   Moving to and from a bed to a chair (including a wheelchair)? 2   Need to walk in a hospital room? 1   Climbing 3-5 steps with a railing? 1   6 clicks Mobility Score 8   Activity Tolerance   Sitting Edge of Bed 16 min   Short Term Goals    Short Term Goal # 1 pt will move supine<>eob with min a in 6 tx for bed mobility.   Goal Outcome # 1 Progressing as expected   Short Term Goal # 2 pt will complete spt with fww and mod a in 6 tx for functional mobility.   Goal Outcome # 2 Progressing as expected   Short Term Goal # 3 pt will propel self 150 ft in wc with min a in 6 tx for household distances.   Goal Outcome # 3 Goal not met   Session Information   Date / Session Number  8/27 - 2(2/3, 8/29)

## 2023-08-27 NOTE — PROGRESS NOTES
"[facility] Internal Medicine Daily Progress Note    Date of Service  8/27/2023    [employer] Team: [facility] IM Orange Team   Attending: Brenton Shaw M.d.  Senior Resident: Dr. Nicol Singer  Contact Number: [phone]    Chief Complaint  Chief Complaint   Patient presents with    Diarrhea       Hx C. Diff. Onset of diarrhea x 2 days. 5 BM today, odor consistent with C. Diff. Hx stroke, R sided deficits baseline. AAO x 2       Hospital Course  Alireza Hernandez is a 88 y.o. 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 difficile who presented 8/22/23 for non-bloody diarrhea (5+ bowel movements) for two days and confusion. He lives in [facility]. In the ED he was no longer confused and was afebrile without leukocytosis, and he was admitted for evaluation and management of diarrhea. C diff gene and toxin were negative, stool culture was negative, and fecal lactoferrin was elevated. Though UA was suggestive of UTI he was asymptomatic. Likely he experienced a viral enteritis, self limited. Bowel movements reduced spontaneously. 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 Hospice referral was placed    Interval Problem Update    GABRIELLE ON  He is having a \"terrible\" morning because he needs help. When asked to clarify, he needs help eating breakfast. The tray was moved closer to him, his meal uncovered, utensils and plate in arm's reach, and juice opened. He was able to drink from the juice cup independently without spilling. He insisted he needed help, for which he was advised that he has independent function of his LUE and staff may not be available for 1:1 feeds. He became angry, for which I validated the frustration of his limited capabilities after the " stroke, yet reminded him that he cannot yell at staff and has been feeding himself with his LUE at baseline. Discussed with RN, who will determine if staff available to help feed or standby assist when meal served.     1 BM overnight. He denies pain. Stool culture final result was negative.    We are awaiting authorization for Acute Rehabilitation.    I have discussed this patient's plan of care and discharge plan at IDT rounds today with Case Management, Nursing, Nursing leadership, and other members of the IDT team.    Consultants/Specialty  PMR- Dr. German     Code Status  DNAR/DNI    Disposition  The patient is medically cleared for discharge to home or a post-acute facility.  Anticipate discharge to: an inpatient rehabilitation hospital    I have placed the appropriate orders for post-discharge needs.    Review of Systems  Review of Systems   HENT:  Negative for sinus pain and sore throat.    Eyes:  Negative for pain.   Cardiovascular:  Negative for chest pain.   Gastrointestinal:  Negative for abdominal pain and diarrhea.   Genitourinary:  Negative for flank pain.   Musculoskeletal:  Negative for back pain, joint pain, myalgias and neck pain.   Neurological:  Negative for headaches.   Psychiatric/Behavioral:  Positive for depression.         Physical Exam  Temp:  [36.1 °C (97 °F)-36.8 °C (98.2 °F)] 36.6 °C (97.8 °F)  Pulse:  [76-79] 79  Resp:  [16-18] 16  BP: (109-132)/(65-78) 122/78  SpO2:  [90 %-96 %] 90 %    Physical Exam  Vitals and nursing note reviewed.   Constitutional:       General: He is in acute distress.      Appearance: Normal appearance. He is not ill-appearing.   HENT:      Mouth/Throat:      Mouth: Mucous membranes are moist.   Eyes:      General: No scleral icterus.     Conjunctiva/sclera: Conjunctivae normal.   Pulmonary:      Effort: Pulmonary effort is normal. No respiratory distress.   Genitourinary:     Comments: No tabares  Musculoskeletal:      Cervical back: Neck supple.   Skin:      General: Skin is dry.   Neurological:      Mental Status: He is alert.      Cranial Nerves: Dysarthria and facial asymmetry (Right facial droop) present.      Comments: Moves LUE   Psychiatric:         Attention and Perception: Attention and perception normal.         Mood and Affect: Mood is anxious. Affect is angry.         Speech: Speech is slurred.         Behavior: Behavior is agitated. Behavior is cooperative.         Judgment: Judgment is inappropriate.         Fluids    Intake/Output Summary (Last 24 hours) at 8/27/2023 1524  Last data filed at 8/27/2023 0800  Gross per 24 hour   Intake 360 ml   Output 700 ml   Net -340 ml         Laboratory                            Imaging  DX-CHEST-LIMITED (1 VIEW)   Final Result         Moderate left pleural effusion with left basilar atelectasis.            Assessment/Plan  Problem Representation: 89 YO M admitted 8/22/23 for intractable diarrhea concerning for infectious etiology.     * Diarrhea of presumed infectious origin- (present on admission)  Assessment & Plan  Resolved  More than 5 times a day. C. Diff PCR and toxin negative, stool cx negative. Fecal lactoferrin positive.   Likely viral enteritis, self limiting   -Supportive care, encourage hydration  -Pending final Salmonella antibody    Acute encephalopathy- (present on admission)  Assessment & Plan  RESOLVED  Due to infectious etiology    Abnormal urinalysis- (present on admission)  Assessment & Plan  UA + for bacteria, LE and nitrite but patient has no symptoms or signs of UTI. 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 In review of yesterday's note (8/26/2023), there are no changes except as documented above.

## 2023-08-28 NOTE — THERAPY
"Speech Language Pathology   Communication Evaluation     Patient Name: Alireza Hernandez  AGE:  88 y.o., SEX:  male  Medical Record #: [id number]  Date of Service: 8/28/2023      History of Present Illness  88 y.o. male admitted 8/22/23 with onset of diarrhea, AMS, and acute cystitis. Hx of CVA in 03/23 and 07/23 with residual aphasia and right-sided weakness. Pt resides in total assist care at [place].       PMHx: Afib, HTN, BPH, and C diff      General Information  Vitals  O2 Delivery Device: None - Room Air  Level of Consciousness: Alert, Awake  Orientation: Oriented x 4  Follows Directives: Yes      Prior Living Situation & Level of Function  Prior Services: Continuous (24 Hour) Care Giving Per Service  Housing / Facility: Assisted Living Residence  Lives with - Patient's Self Care Capacity: Attendant / Paid Care Giver  Communication: Baseline aphasia        Subjective  Patient received awake, alert, upright in bed. Endorsed frustration with word-finding deficits, \"I have quite a bit of concern, I can say most words but some I can't.\" Pt agreeable to SLP tasks.       Communication Domain(s)  Expressive Language: Moderate  Receptive Language: Mild  Cognitive-Linguistic: WFL       Assessment  The patient was seen this date for a communication evaluation. The Western Aphasia Battery-Revised (WAB-R) Beside Version was administered. The battery is used to assess a patient’s language function following stroke, dementia, or other acquired neurologic disorder. The results of the battery provide diagnostic information as to the presence, severity, and type of aphasia. The following results were obtained:    Bedside Western Aphasia Battery (WAB)  Bedside Aphasia Score: 87.5  Bedside Aphasia Classification: Anomic    Receptive Language: Pt followed 1-step and 2-step commands with 100% accuracy (6/6). Accuracy decreased to 33% (1/3) with 3-step commands. Answered yes/no questions with 80% accuracy (8/10). Repetition: pt " "able to repeat at the word, phrase, and sentence level with 95% accuracy.     Expressive Language: Verbal output characterized by some word-finding difficulties and semantic paraphasias, however mostly fluent at level of conversation. Confrontation namin% accuracy (20/20) with 1 instance of semantic paraphasia with immediate self-correct.        Clinical Impressions  Patient presents with anomic aphasia as evidenced by word-finding deficits and paraphasias at baseline resulting from previous CVA. Pt expressed significant frustration with word-finding. Skilled ST is indicated at this time to improve receptive/expressive language abilities in order to improve the pt's QOL, ability to participate in iADLs, and maximize linguistic outcomes.       NOTE: It is not within the scope of practice of Speech-Language Pathologists to determine patient capacity. Please defer to the physician or psych to complete this assessment.       Recommendations  Supervision Needs Upons Discharge: Intermittent supervision throughout the day and direct assistance with IADLs (see below)  IADLs: Medication management, Financial management, Appointment management       SLP Treatment Plan  Treatment Plan: Speech-Language Treatment  SLP Frequency: 2x Per Week  Estimated Duration: Until Therapy Goals Met      Anticipated Discharge Needs  Discharge Recommendations: Recommend home health for continued speech therapy services  Therapy Recommendations Upon DC: Expression Training, Comprehension Training, Patient / Family / Caregiver Education      Patient / Family Goals  Patient / Family Goal #1: \"I'm very frustrated with my language\"  Short Term Goal # 1: Patient will utilize word finding strategies in 80% of opportunities with min DARSHAN Plascencia  "

## 2023-08-28 NOTE — CARE PLAN
The patient is Stable - Low risk of patient condition declining or worsening    Shift Goals  Clinical Goals: d/c planning  Patient Goals: rest, comfort  Family Goals: not present    Progress made toward(s) clinical / shift goals:    Problem: Knowledge Deficit - Standard  Goal: Patient and family/care givers will demonstrate understanding of plan of care, disease process/condition, diagnostic tests and medications  Outcome: Progressing  Note: Updated POC re: d/c to half-way, q2H turns and skin care.     Problem: Skin Integrity  Goal: Skin integrity is maintained or improved  Outcome: Progressing  Note: Pt on q2H turns with barrier cream on sacral area, heels floated, waffle overlay     Problem: Fall Risk  Goal: Patient will remain free from falls  Outcome: Progressing  Note: Fall precautions and hourly rounding in place       Patient is not progressing towards the following goals:

## 2023-08-28 NOTE — DISCHARGE PLANNING
Case Management Discharge Planning    Admission Date: 8/22/2023  GMLOS: 3.8  ALOS: 6    6-Clicks ADL Score: 9  6-Clicks Mobility Score: 8  PT and/or OT Eval ordered: Yes  Post-acute Referrals Ordered: Yes  Post-acute Choice Obtained: Yes  Has referral(s) been sent to post-acute provider:  Yes      Anticipated Discharge Dispo: Discharge Disposition: D/T to SNF with Medicare cert in anticipation of skilled care (03)    DME Needed: No    Action(s) Taken: Updated Provider/Nurse on Discharge Plan    Escalations Completed: None    Medically Clear: Yes    **1115  Patient discussed during IDT rounds. Patient remains medically clear. Pending insurance authorization for [facility]. Patient was interested in hospice after rehab, but now reports he is no longer interested.

## 2023-08-28 NOTE — PROGRESS NOTES
[facility] Internal Medicine Daily Progress Note    Date of Service  8/28/2023    [employer] Team: [employer] IM Orange Team   Attending: Brenton Shaw M.d.  Senior Resident: Dr. Nicol Singer  Contact Number: [phone]    Chief Complaint  Chief Complaint   Patient presents with    Diarrhea       Hx C. Diff. Onset of diarrhea x 2 days. 5 BM today, odor consistent with C. Diff. Hx stroke, R sided deficits baseline. AAO x 2       Hospital Course  No notes on file    Interval Problem Update    GABRIELLE ON  Patient seen independently feeding himself at bedside. Patient describes no acute complaints, had 2 formed bowel movements overnight. No acute complaints, looking forward to working with rehab.     We are awaiting authorization for Acute Rehabilitation.    I have discussed this patient's plan of care and discharge plan at IDT rounds today with Case Management, Nursing, Nursing leadership, and other members of the IDT team.    Consultants/Specialty  PMR- Dr. German     Code Status  DNAR/DNI    Disposition  Medically Cleared  I have placed the appropriate orders for post-discharge needs.    Review of Systems  Review of Systems   HENT:  Negative for sinus pain and sore throat.    Eyes:  Negative for pain.   Cardiovascular:  Negative for chest pain.   Gastrointestinal:  Negative for abdominal pain and diarrhea.   Genitourinary:  Negative for flank pain.   Musculoskeletal:  Negative for back pain, joint pain, myalgias and neck pain.   Neurological:  Negative for headaches.   Psychiatric/Behavioral:  Positive for depression.         Physical Exam  Temp:  [36.3 °C (97.4 °F)-36.8 °C (98.2 °F)] 36.4 °C (97.5 °F)  Pulse:  [61-95] 81  Resp:  [16-18] 16  BP: (109-126)/(50-65) 126/57  SpO2:  [90 %-97 %] 97 %    Physical Exam  Vitals and nursing note reviewed.   Constitutional:       General: He is not in acute distress.     Appearance: Normal appearance. He is not ill-appearing.   HENT:      Mouth/Throat:      Mouth: Mucous membranes are moist.    Eyes:      General: No scleral icterus.     Conjunctiva/sclera: Conjunctivae normal.   Pulmonary:      Effort: Pulmonary effort is normal. No respiratory distress.   Genitourinary:     Comments: No tabares  Musculoskeletal:      Cervical back: Neck supple.   Skin:     General: Skin is dry.   Neurological:      Mental Status: He is alert.      Cranial Nerves: Dysarthria and facial asymmetry (Right facial droop) present.      Comments: Moves LUE   Psychiatric:         Attention and Perception: Attention and perception normal.         Mood and Affect: Mood normal. Affect is not angry.         Speech: Speech is slurred.         Behavior: Behavior is not agitated. Behavior is cooperative.         Judgment: Judgment is inappropriate.         Fluids    Intake/Output Summary (Last 24 hours) at 8/28/2023 1157  Last data filed at 8/28/2023 0424  Gross per 24 hour   Intake 200 ml   Output 900 ml   Net -700 ml         Laboratory                            Imaging  DX-CHEST-LIMITED (1 VIEW)   Final Result         Moderate left pleural effusion with left basilar atelectasis.            Assessment/Plan  Problem Representation: 87 YO M admitted 8/22/23 for infectious diarrhea, likely viral, which has resolved.      * Diarrhea of presumed infectious origin- (present on admission)  Assessment & Plan  Resolved  At admission, more than 5 times a day. C. Diff PCR and toxin negative, stool cx negative. Fecal lactoferrin positive. Salmonella antibody positive   Likely viral vs salmonella enteritis, self limiting   -Supportive care, encourage hydration    Acute encephalopathy- (present on admission)  Assessment & Plan  RESOLVED  Due to infectious etiology    Abnormal urinalysis- (present on admission)  Assessment & Plan  UA + for bacteria, LE and nitrite but patient has no symptoms or signs of UTI. 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 In review of yesterday's note (8/27/2023), there are no changes except as documented above.

## 2023-08-29 NOTE — CARE PLAN
The patient is Stable - Low risk of patient condition declining or worsening    Shift Goals  Clinical Goals: pending SNF placement/insurance auth, q2hr turns  Patient Goals: eat soft foods  Family Goals: MARGIE    Progress made toward(s) clinical / shift goals:      Problem: Knowledge Deficit - Standard  Goal: Patient and family/care givers will demonstrate understanding of plan of care, disease process/condition, diagnostic tests and medications  Outcome: Progressing  Note: Cont'd education on plan for rehab, no beds available yet. Encouraged self-feeding with supervision with soft/bite sized diet.     Problem: Skin Integrity  Goal: Skin integrity is maintained or improved  Outcome: Progressing  Note: Cont'd q2hr turns and encouraging good PO intake.       Patient is not progressing towards the following goals:

## 2023-08-29 NOTE — CARE PLAN
The patient is Stable - Low risk of patient condition declining or worsening    Shift Goals  Clinical Goals: DC planning  Patient Goals: comfort  Family Goals: MARGIE    Progress made toward(s) clinical / shift goals:    Problem: Skin Integrity  Goal: Skin integrity is maintained or improved  Outcome: Progressing  Note: No new skin integrity issues, waffle overlay in place, incontinent care and turns provided.      Problem: Fall Risk  Goal: Patient will remain free from falls  Outcome: Progressing  Note: Fall and safety measures in place, no falls this shift.        Patient is not progressing towards the following goals:

## 2023-08-29 NOTE — CARE PLAN
The patient is Stable - Low risk of patient condition declining or worsening    Shift Goals  Clinical Goals: waitinf for SNF placement, q2H turns, safety  Patient Goals: rest  Family Goals: not present    Progress made toward(s) clinical / shift goals:    Problem: Knowledge Deficit - Standard  Goal: Patient and family/care givers will demonstrate understanding of plan of care, disease process/condition, diagnostic tests and medications  Outcome: Progressing  Note: Updated POC re: discharge planning, q2H turns and safety.      Problem: Skin Integrity  Goal: Skin integrity is maintained or improved  Outcome: Progressing  Note: Pt on q2H turns, barrier cream on sacral area applied, waffle bed.     Problem: Fall Risk  Goal: Patient will remain free from falls  Note: Fall precautions and hourly rounding in place       Patient is not progressing towards the following goals:

## 2023-08-29 NOTE — PROGRESS NOTES
[facility] Internal Medicine Daily Progress Note    Date of Service  8/29/2023    [facility] Team: [facility] Team   Attending: Palmira Hodgson M.d.  Senior Resident: Dr. Nicol Singer  Contact Number: [phone]    Chief Complaint  Chief Complaint   Patient presents with    Diarrhea       Hx C. Diff. Onset of diarrhea x 2 days. 5 BM today, odor consistent with C. Diff. Hx stroke, R sided deficits baseline. AAO x 2       Hospital Course  Alireza Hernandez is a 88 y.o. 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 difficile who presented 8/22/23 for non-bloody diarrhea (5+ bowel movements) for two days and confusion. He lives in [facility]. In the ED he was no longer confused and was afebrile without leukocytosis, and he was admitted for evaluation and management of diarrhea. C diff gene and toxin were negative, stool culture was negative, and fecal lactoferrin was elevated. Though UA was suggestive of UTI he was asymptomatic. Likely he experienced a viral enteritis, self limited. Bowel movements reduced spontaneously. 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 Hospice referral was placed    Interval Problem Update    GABRIELLE ON  Patient seen independently feeding himself at bedside. Patient describes no acute complaints, did not remember why he was admitted to the hospital but was aware of the location, date, and current events. No acute complaints, looking forward to working with rehab.     We are awaiting authorization for Acute Rehabilitation.    I have discussed this patient's plan of care and discharge plan at IDT rounds today with Case Management, Nursing, Nursing leadership, and other members of the IDT team.    Consultants/Specialty  PMR-   [facility]     Code Status  DNAR/DNI    Disposition  Medically Cleared  I have placed the appropriate orders for post-discharge needs.    Review of Systems  Review of Systems   HENT:  Negative for sinus pain and sore throat.    Eyes:  Negative for pain.   Cardiovascular:  Negative for chest pain.   Gastrointestinal:  Negative for abdominal pain and diarrhea.   Genitourinary:  Negative for flank pain.   Musculoskeletal:  Negative for back pain, joint pain, myalgias and neck pain.   Neurological:  Negative for headaches.   Psychiatric/Behavioral:  Positive for depression.         Physical Exam  Temp:  [36.1 °C (97 °F)-36.6 °C (97.8 °F)] 36.6 °C (97.8 °F)  Pulse:  [73-82] 73  Resp:  [16] 16  BP: (126-131)/(56-61) 131/57  SpO2:  [93 %-96 %] 96 %    Physical Exam  Vitals and nursing note reviewed.   Constitutional:       General: He is not in acute distress.     Appearance: Normal appearance. He is not ill-appearing.   HENT:      Mouth/Throat:      Mouth: Mucous membranes are moist.   Eyes:      General: No scleral icterus.     Conjunctiva/sclera: Conjunctivae normal.   Pulmonary:      Effort: Pulmonary effort is normal. No respiratory distress.   Genitourinary:     Comments: No tabares  Musculoskeletal:      Cervical back: Neck supple.   Skin:     General: Skin is dry.   Neurological:      Mental Status: He is alert.      Cranial Nerves: Dysarthria and facial asymmetry (Right facial droop) present.      Comments: Moves LUE   Psychiatric:         Attention and Perception: Attention and perception normal.         Mood and Affect: Mood normal. Affect is not angry.         Speech: Speech is slurred.         Behavior: Behavior is not agitated. Behavior is cooperative.         Judgment: Judgment is inappropriate.         Fluids    Intake/Output Summary (Last 24 hours) at 8/29/2023 1305  Last data filed at 8/28/2023 2002  Gross per 24 hour   Intake 200 ml   Output 0 ml   Net 200 ml         Laboratory                             Imaging  DX-CHEST-LIMITED (1 VIEW)   Final Result         Moderate left pleural effusion with left basilar atelectasis.            Assessment/Plan  Problem Representation: 87 YO M admitted 8/22/23 for infectious diarrhea, likely viral, which has resolved.      * Diarrhea of presumed infectious origin- (present on admission)  Assessment & Plan  Resolved  At admission, more than 5 times a day. C. Diff PCR and toxin negative, stool cx negative. Fecal lactoferrin positive. Salmonella antibody positive   Likely viral vs salmonella enteritis, self limiting   -Supportive care, encourage hydration    Acute encephalopathy- (present on admission)  Assessment & Plan  RESOLVED  Due to infectious etiology    Abnormal urinalysis- (present on admission)  Assessment & Plan  UA + for bacteria, LE and nitrite but patient has no symptoms or signs of UTI. 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 In review of yesterday's note (8/28/2023), there are no changes except as documented above.

## 2023-08-29 NOTE — DISCHARGE PLANNING
Insurance remains pending.  Alireza remains medically cleared per Dr. Will.  Msg placed to [facility] yesterday to verify level of assist available.      0907-Msg placed to Cee @ [facility].     7124-Spoke with [facility] to inquire about request for auth.  Case has not been reviewed @ this time by her colleague.  I will f/u again in the am if no response today.

## 2023-08-30 NOTE — HOSPICE
[facility] referral/consult response    Is this patient accepted to [facility]?: Yes  What hospice level of care?: Routine   Approved by provider: Dr. Mohan    Anticipated DC date: ?  DC Barriers: C-diff, not medically cleared  Additional Information:

## 2023-08-30 NOTE — CARE PLAN
Problem: Pain - Standard  Goal: Alleviation of pain or a reduction in pain to the patient’s comfort goal  Outcome: Progressing     Problem: Knowledge Deficit - Standard  Goal: Patient and family/care givers will demonstrate understanding of plan of care, disease process/condition, diagnostic tests and medications  Outcome: Progressing     Problem: Skin Integrity  Goal: Skin integrity is maintained or improved  Outcome: Progressing     Problem: Fall Risk  Goal: Patient will remain free from falls  Outcome: Progressing   The patient is Stable - Low risk of patient condition declining or worsening    Shift Goals  Clinical Goals: maintian skin integrity  Patient Goals: rest  Family Goals: jay    Progress made toward(s) clinical / shift goals:  q2 turns and skin precautions in place    Patient is not progressing towards the following goals:

## 2023-08-30 NOTE — THERAPY
"Occupational Therapy  Daily Treatment     Patient Name: Alireza Hernandez  Age:  88 y.o., Sex:  male  Medical Record #: [id number]  Today's Date: 8/30/2023    Precautions: Fall Risk  Comments: hypotensive; possible syncope or orthostatic during session    Assessment  Pt was seen for OT tx in collaboration w/PT unfortunately pt had very poor tolerance for OOB activity. Pt was incontinent of bowel, was able to assist w/rolling to right side but otherwise was total assist for sara care. Pt was agreeable to sitting EOB, OT focused on reaching w/LUE towards grooming items, but required physical assist to maintain sitting balance d/t significant posterior lean, pt did verbalize \"not feeling well\" but could not specify what was bothering him. While sitting EOB pt became less responses increased assist for sitting balance and appeared palor, questionable syncope event. RN notified, safely returned to supine BP and HR assessed, in supine /57 w/labile HR 's. Pt did verbally respond to voice but appeared more lethargic. RN aware of vitals. At this time pt had generally poor activity tolerance and limited active participation and did require 2 skilled therapist to facilitate functional mobility and ADL participation.     Plan  Treatment Plan Status: Continue Current Treatment Plan  Type of Treatment: Self Care / Activities of Daily Living, Therapeutic Activity, Therapeutic Exercises, Cognitive Skill Development  Treatment Frequency: 2 Times per Week  Treatment Duration: Until Therapy Goals Met    DC Equipment Recommendations: Unable to determine at this time  Discharge Recommendations:  (post acute vs back to long term  (w/ HH vs hospice))    Subjective  \"I don't feel good\"      Objective   08/30/23 1036   Treatment Charges   Charges Yes   OT Self Care / ADL (Units) 1   Total Time Spent   OT Time Spent Yes   OT Self Care / ADL (Minutes) 36   OT Total Time Spent (Calculated) 36    Services   Is patient using "  services for this encounter? No   Precautions   Precautions Fall Risk   Comments hypotensive; possible syncope or orthostatic during session   Pain 0 - 10 Group   Location Generalized   Therapist Pain Assessment During Activity;Nurse Notified   Cognition    Speech/ Communication Delayed Responses;Expressive Aphasia   Level of Consciousness Responds to voice   Comments more responsive at start of session, very soft spoken agreeble to OOB activity, appeared lethargic w/more delayed responses towards end of sessio n   Passive ROM Upper Body   Passive ROM Upper Body X   Comments RUE flexor tone worsen distally w/hand able to passively range but not full extensin   Active ROM Upper Body   Active ROM Upper Body  X   Dominant Hand Right   Comments RUE non functional trace shoulder shrug otherwise limited by hypertonicity   Strength Upper Body   Upper Body Strength  X   Comments RUE impaired LUE w/generalized weakness 3+/5   Upper Body Muscle Tone   Upper Body Muscle Tone  X   Rt Upper Extremity Muscle Tone Hypertonic;Non Functional   Other Treatments   Other Treatments Provided Total assist for sara care d/t bowel incontince, pt able to assist w/rolling towards R. Then w/sitting balance faciliation focused on reaching for grooming items outside base of support w/LUE   Balance   Sitting Balance (Static) Poor +   Sitting Balance (Dynamic) Poor   Weight Shift Sitting Poor   Skilled Intervention Compensatory Strategies;Facilitation;Postural Facilitation;Sequencing;Tactile Cuing;Verbal Cuing   Comments significant posterior and lateral lean sitting EOB   Bed Mobility    Supine to Sit Maximal Assist   Sit to Supine Total Assist   Rolling Moderate Assist to Rt.;Maximum Assist to Lt.   Skilled Intervention Verbal Cuing;Tactile Cuing;Sequencing;Postural Facilitation;Facilitation;Compensatory Strategies   Activities of Daily Living   Grooming Maximal Assist;Seated   Upper Body Dressing Maximal Assist   Lower Body  "Dressing Total Assist   Toileting Total Assist   Skilled Intervention Compensatory Strategies;Facilitation;Postural Facilitation;Sequencing;Tactile Cuing;Verbal Cuing   How much help from another person does the patient currently need...   6 Clicks Daily Activity Score 9   Modified Simran (mRS)   Modified Harper Score 5   Functional Mobility   Sit to Stand Unable to Participate   Bed, Chair, Wheelchair Transfer Unable to Participate   Mobility EOB only noted increasing lethargy concern for low BP returned to supine   Skilled Intervention Compensatory Strategies;Facilitation;Tactile Cuing;Verbal Cuing   Activity Tolerance   Comments poor c/o \"not feeling well' and appeared fatigued through out activity   Patient / Family Goals   Patient / Family Goal #1 to get more therapy   Goal #1 Outcome Goal not met   Short Term Goals   Short Term Goal # 1 Pt will demo fair seated balance in prep for seated ADLs   Goal Outcome # 1 Progressing slower than expected   Short Term Goal # 2 Pt will complete ADL transfers safely with ModA   Goal Outcome # 2 Progressing slower than expected   Short Term Goal # 3 Pt will complete UB dressing with ModA   Goal Outcome # 3 Progressing slower than expected   Education Group   Role of Occupational Therapist Patient Response Patient;Acceptance;Explanation;Demonstration;Verbal Demonstration   Occupational Therapy Treatment Plan    O.T. Treatment Plan Continue Current Treatment Plan   Anticipated Discharge Equipment and Recommendations   DC Equipment Recommendations Unable to determine at this time   Discharge Recommendations   (post acute vs back to jail  (w/ HH vs hospice))   Interdisciplinary Plan of Care Collaboration   IDT Collaboration with  Nursing;Physical Therapist   Patient Position at End of Therapy In Bed;Call Light within Reach;Tray Table within Reach;Phone within Reach   Collaboration Comments RN aware of OT tx and concern for change in alertness and vitals   Session Information "   Date / Session Number  8/30 #3 (1/2, 9/5)

## 2023-08-30 NOTE — PROGRESS NOTES
Received report and assumed care of patient. Patient alert and oriented x 2, on RA. Assessment completed. Patient updated regarding plan of care, all questions answered. Bed in lock and lowest position with bed alarm on. Call light and belongings are within reach. Educated on room and call light, patient verbalized understanding. Patient expressed no further needs at this time.

## 2023-08-30 NOTE — CARE PLAN
The patient is Stable - Low risk of patient condition declining or worsening    Shift Goals  Clinical Goals: maintain skin integrity, stable VS, monitor labs  Patient Goals: Rest  Family Goals: MARGIE    Progress made toward(s) clinical / shift goals:      Patient is not progressing towards the following goals:      Problem: Skin Integrity  Goal: Skin integrity is maintained or improved  Outcome: Progressing     Waffle mattress in place. Pt educated about the importance of frequent repositioning to prevent skin breakdown. Encouraged turning in bed and notifying staff for help. Pt verbalized understanding. Extra pillows in place for comfort, between knees, and to float heels. Barrier cream applied as appropriate. Pt cleaned and linens changed frequently as appropriate. Heel foam protectors in use.    Problem: Fall Risk  Goal: Patient will remain free from falls  Outcome: Progressing    Patient remained free from falls. All fall precautions in place. Patient educated the need to call when needed assistance, patient verbalized understanding. Call light and personal belongings within reach.

## 2023-08-30 NOTE — THERAPY
Physical Therapy   Daily Treatment     Patient Name: Alireza Hernandez  Age:  88 y.o., Sex:  male  Medical Record #: [id number]  Today's Date: 8/30/2023     Precautions  Precautions: Fall Risk  Comments: hx CVA with R side deficits, labile BP    Assessment    Assisted pt with bed mob for pericare, EOB sitting balance work. Pt with possible syncopal episode after sitting EOB for approximately 8 minutes. Pt unresponsive for approximately 10 seconds- notified and RN took vitals. Pt responsive, lethargic. Pt will continue to benefit from acute physical therapy to assist towards established goals. Anticipate pt will benefit from post acute placement upon DC from hospital.           Plan    Treatment Plan Status: Continue Current Treatment Plan  Type of Treatment: Bed Mobility, Manual Therapy, Gait Training, Therapeutic Activities, Therapeutic Exercise, Neuro Re-Education / Balance, Self Care / Home Evaluation  Treatment Frequency: 3 Times per Week  Treatment Duration: Until Therapy Goals Met    DC Equipment Recommendations: Unable to determine at this time  Discharge Recommendations: Recommend post-acute placement for additional physical therapy services prior to discharge home      Subjective    Pt stated he was not feeling very well, no pain. Pt incontinent of BM.      Objective       08/30/23 1006   Precautions   Precautions Fall Risk   Comments hx CVA with R side deficits, labile BP   Vitals   Pulse 75  (Pulse rate fluctuated on vital machine from , pt with Afib, on palpation did not observe rapid Afib- notified RN of pulse rate on machine)   Blood Pressure  102/57  (after BTB after sitting at EOB for 8 minutes, pt sympotomatic)   Vitals Comments while sitting at EOB and about to assist pt back to bed, observe pt with increased posterior lean and decreased responsiveness, assisted back to bed and pt unresponsive but breathing, blank stare for approximately 10 seconds and decreased color in face. Once supine,  color came back and pt responsive. Notified RN and took vitals.   Pain 0 - 10 Group   Therapist Pain Assessment 0  (no c/o pain)   Cognition    Cognition / Consciousness X   Speech/ Communication Expressive Aphasia  (soft spoken)   Orientation Level Not Oriented to Year;Not Oriented to Place  (pt oriented at end of visit, RN aware)   Level of Consciousness Alert  (pt with episode of possibly being unrepsonsive due to possible synocpal episode after sitting at EOB for 8 minutes)   Sequencing Impaired  (required VCs and facilitation)   Initiation Impaired  (VCs and faciitation)   Neuro-Muscular Treatments   Neuro-Muscular Treatments Anterior weight shift;Compensatory Strategies;Facilitation;Postural Facilitation;Sequencing;Tactile Cuing;Verbal Cuing;Weight Shift Right;Weight Shift Left   Comments EOB sitting for 8 minutes working on upright balance, pt only able to maintain without support for approximatly 1 minute, tends to have posterior lean, worked on lateral leaning to L elbow and pushing up with L UE with mod A, faciliation for abduction of hips- pt with spasticity R adductor   Other Treatments   Other Treatments Provided mulitple rolling in bed for pericare due to pt incontinent of BM   Neurological Concerns   Sitting Posture During ADL's Lateral Lean Right;Posterior Lean   Balance   Sitting Balance (Static) Fair -   Sitting Balance (Dynamic) Poor   Skilled Intervention Compensatory Strategies;Tactile Cuing;Verbal Cuing;Postural Facilitation   Comments standing not assessed   Bed Mobility    Supine to Sit Maximal Assist   Sit to Supine Total Assist   Scooting Total Assist  (worked on lateral lean and shifting)   Rolling Moderate Assist to Rt.;Moderate Assist to Lt.  (cues for use of L UE and L LE to assist, pt with slow initiation)   Skilled Intervention Facilitation;Compensatory Strategies;Sequencing;Verbal Cuing;Tactile Cuing   Gait Analysis   Gait Level Of Assist Unable to Participate   Functional Mobility    Sit to Stand Unable to Participate   How much difficulty does the patient currently have...   Turning over in bed (including adjusting bedclothes, sheets and blankets)? 1   Sitting down on and standing up from a chair with arms (e.g., wheelchair, bedside commode, etc.) 1   Moving from lying on back to sitting on the side of the bed? 1   How much help from another person does the patient currently need...   Moving to and from a bed to a chair (including a wheelchair)? 2   Need to walk in a hospital room? 1   Climbing 3-5 steps with a railing? 1   6 clicks Mobility Score 7   Activity Tolerance   Sitting Edge of Bed 8 minutes   Comments limited activity tolerance due to possibly hypotensive/syncopal episode while at EOB   Short Term Goals    Short Term Goal # 1 pt will move supine<>eob with min a in 6 tx for bed mobility.   Goal Outcome # 1 Progressing slower than expected   Short Term Goal # 2 pt will complete spt with fww and mod a in 6 tx for functional mobility.   Goal Outcome # 2 Progressing slower than expected   Short Term Goal # 3 pt will propel self 150 ft in wc with min a in 6 tx for household distances.   Goal Outcome # 3 Goal not met   Education Group   Education Provided Role of Physical Therapist   Role of Physical Therapist Patient Response Patient;Acceptance;Explanation;Verbal Demonstration   Physical Therapy Treatment Plan   Physical Therapy Treatment Plan Continue Current Treatment Plan   Treatment Plan  Bed Mobility;Manual Therapy;Gait Training;Therapeutic Activities;Therapeutic Exercise;Neuro Re-Education / Balance;Self Care / Home Evaluation   Treatment Frequency 3 Times per Week   Duration Until Therapy Goals Met   Anticipated Discharge Equipment and Recommendations   DC Equipment Recommendations Unable to determine at this time   Discharge Recommendations Recommend post-acute placement for additional physical therapy services prior to discharge home   Interdisciplinary Plan of Care  Collaboration   IDT Collaboration with  Nursing;Occupational Therapist   Patient Position at End of Therapy In Bed;Bed Alarm On;Call Light within Reach;Tray Table within Reach   Collaboration Comments RN stated she would notify MD about possible syncopal episode

## 2023-08-30 NOTE — PROGRESS NOTES
[facility] Internal Medicine Daily Progress Note    Date of Service  8/30/2023    [employer] Team: R IM Orange Team   Attending: Palmira Hodgson M.d.  Senior Resident: Dr. Nicol Singer  Contact Number: [phone]    Chief Complaint  Chief Complaint   Patient presents with    Diarrhea       Hx C. Diff. Onset of diarrhea x 2 days. 5 BM today, odor consistent with C. Diff. Hx stroke, R sided deficits baseline. AAO x 2       Hospital Course  Alireza Hernandez is a 88 y.o. 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 difficile who presented 8/22/23 for non-bloody diarrhea (5+ bowel movements) for two days and confusion. He lives in [facility]. In the ED he was no longer confused and was afebrile without leukocytosis, and he was admitted for evaluation and management of diarrhea. C diff gene and toxin were negative, stool culture was negative, and fecal lactoferrin was elevated. Though UA was suggestive of UTI he was asymptomatic. Likely he experienced a viral enteritis, self limited. Bowel movements reduced spontaneously. 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 Hospice referral was placed    Interval Problem Update  Patient with loose bowel movements overnight. Stool Cultures and ETA for C diff toxin negative. Per Dr. Guido, no need to treat for positive salmonella antibody.  Patient seen independently feeding himself at bedside. Patient describes no acute complaints, did not remember why he was admitted to the hospital but was aware of the location, date, and current events. No acute complaints. Patient with retained capacity despite waxing/waning orientation. Per patient's sister, she would prefer placement with hospice.     I have discussed  this patient's plan of care and discharge plan at IDT rounds today with Case Management, Nursing, Nursing leadership, and other members of the IDT team.    Consultants/Specialty  PMR- Dr. German     Code Status  DNAR/DNI    Disposition  The patient is not medically cleared for discharge to home or a post-acute facility.  Anticipate discharge to: hospice    I have placed the appropriate orders for post-discharge needs.    Review of Systems  Review of Systems   HENT:  Negative for sinus pain and sore throat.    Eyes:  Negative for pain.   Cardiovascular:  Negative for chest pain.   Gastrointestinal:  Negative for abdominal pain and diarrhea.   Genitourinary:  Negative for flank pain.   Musculoskeletal:  Negative for back pain, joint pain, myalgias and neck pain.   Neurological:  Negative for headaches.   Psychiatric/Behavioral:  Positive for depression.         Physical Exam  Temp:  [36.1 °C (97 °F)-36.8 °C (98.2 °F)] 36.1 °C (97 °F)  Pulse:  [61-75] 75  Resp:  [18] 18  BP: ()/(38-67) 96/38  SpO2:  [94 %-95 %] 95 %    Physical Exam  Vitals and nursing note reviewed.   Constitutional:       General: He is not in acute distress.     Appearance: Normal appearance. He is not ill-appearing.   HENT:      Mouth/Throat:      Mouth: Mucous membranes are moist.   Eyes:      General: No scleral icterus.     Conjunctiva/sclera: Conjunctivae normal.   Pulmonary:      Effort: Pulmonary effort is normal. No respiratory distress.   Genitourinary:     Comments: No tabares  Musculoskeletal:      Cervical back: Neck supple.   Skin:     General: Skin is dry.   Neurological:      Mental Status: He is alert.      Cranial Nerves: Dysarthria and facial asymmetry (Right facial droop) present.      Comments: Moves LUE   Psychiatric:         Attention and Perception: Attention and perception normal.         Mood and Affect: Mood normal. Affect is not angry.         Speech: Speech is slurred.         Behavior: Behavior is not agitated.  Behavior is cooperative.         Judgment: Judgment is inappropriate.         Fluids    Intake/Output Summary (Last 24 hours) at 8/30/2023 1622  Last data filed at 8/29/2023 1950  Gross per 24 hour   Intake 120 ml   Output 600 ml   Net -480 ml         Laboratory                            Imaging  DX-CHEST-LIMITED (1 VIEW)   Final Result         Moderate left pleural effusion with left basilar atelectasis.            Assessment/Plan  Problem Representation: 89 YO M admitted 8/22/23 for infectious diarrhea, likely viral, which has resolved.      * Diarrhea of presumed infectious origin- (present on admission)  Assessment & Plan  At admission, more than 5 times a day. C. Diff PCR and toxin negative, stool cx negative. Fecal lactoferrin positive. Salmonella antibody positive  With recurrence 8/29/23. Unclear if patient has had progressive worsening of stool incontinence causing the impression of frequent bowel movements. Frequently smearing bed. Documented majority [place] Type 5 movements.  -Supportive care, encourage hydration  -Supportive hydration as needed for hypotension    Acute encephalopathy- (present on admission)  Assessment & Plan  RESOLVED  Due to infectious etiology    Abnormal urinalysis- (present on admission)  Assessment & Plan  UA + for bacteria, LE and nitrite but patient has no symptoms or signs of UTI. 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 In review of yesterday's note (8/29/2023), there are no changes except as documented above.

## 2023-08-30 NOTE — DISCHARGE PLANNING
Would appreciate updated [place] evals once appropriate.   [facility].  Sp[facility] with [facility]enni [facility] and she will f/u with Mady.      1004-Spoke with Safia, Admissions Coordinator, with [facility].  Alireza will need to feed himself and do stand pivot transfers.  All other ADL's are provided by staff.     1353-Updated documents sent [facility] as requested.    1419-Insurance has denied.  [facility] will no longer follow.  Please reach out to myself with any interval changes/questions.

## 2023-08-30 NOTE — DISCHARGE PLANNING
Case Management Discharge Planning    Admission Date: 8/22/2023  GMLOS: 3.8  ALOS: 8    6-Clicks ADL Score: 9  6-Clicks Mobility Score: 7  PT and/or OT Eval ordered: Yes  Post-acute Referrals Ordered: Yes  Post-acute Choice Obtained: Yes  Has referral(s) been sent to post-acute provider:  Yes      Anticipated Discharge Dispo: Discharge Disposition: Disch to  rehab facility or distinct part unit (62)    DME Needed: No    Action(s) Taken: Updated Provider/Nurse on Discharge Plan    Escalations Completed: None    Medically Clear: No    Next Steps: LSW to follow up with patient and medical team regarding discharge needs and barriers.     Barriers to Discharge: Capacity Evaluation, Rehab versus Hospice.    **1045  Patient discussed during IDT rounds. Per team, they will follow up with family regarding goals of care.     **1235  Per Dr. Will, they are to assess patient's capacity. Patient's sister, also POA, is agreeable to home with hospice. Pending capacity evaluation.    Dr. Hodgson reports patient has capacity and has elected hospice.    **1240  [phone]. Transferred to [facility]. LSW left voicemail requesting call back. Patient is not medically cleared at this time, per Dr. Hodgson. Still treating c.diff.     **1325  [facility] updated with plan of care.    **7624  [facility] Manager: Rajani [phone]. Rajani to stay in contact with this [facility] as patient may be eligible [facility] with hosp[facility] or [facility].

## 2023-08-31 NOTE — CARE PLAN
The patient is Stable - Low risk of patient condition declining or worsening    Shift Goals  Clinical Goals: C-dif r/o, q2H turns, d/c planning  Patient Goals: rest  Family Goals: not present    Progress made toward(s) clinical / shift goals:    Problem: Skin Integrity  Goal: Skin integrity is maintained or improved  Outcome: Progressing  Note: Wound care on coccyx every shift and prn, q2H turns, waffle bed.Wound care following.     Problem: Fall Risk  Goal: Patient will remain free from falls  Outcome: Progressing  Note: Fall precautions and hourly rounding in place     Problem: Knowledge Deficit - Standard  Goal: Patient and family/care givers will demonstrate understanding of plan of care, disease process/condition, diagnostic tests and medications  Note: Updated POC re: d/c planning, q2H turns, on special contact isolation for C-dif r/o. Pt states he has BM everyday. Educated pt re: collecting stool specimen for C-dif test. Pt demonstrated understanding       Patient is not progressing towards the following goals:

## 2023-08-31 NOTE — PROGRESS NOTES
Report received from night shift nurse, Patient is stable and has been place in isolation for C- Diff as of last night. Patient declines pain or discomfort this am.

## 2023-08-31 NOTE — DISCHARGE PLANNING
Case Management Discharge Planning    Admission Date: 8/22/2023  GMLOS: 5.2  ALOS: 9    6-Clicks ADL Score: 9  6-Clicks Mobility Score: 7  PT and/or OT Eval ordered: Yes  Post-acute Referrals Ordered: Yes  Post-acute Choice Obtained: Yes  Has referral(s) been sent to post-acute provider:  Yes      Anticipated Discharge Dispo: Discharge Disposition: D/T to hospice home (50)    DME Needed: No    Action(s) Taken: Updated Provider/Nurse on Discharge Plan    Escalations Completed: None    Medically Clear: No    Barriers to Discharge: Medical clearance    **4242  Call to [facility]. Spoke to Safia. Safia reports they are able to take patient back on hospice but they will need to assess patient in person before he's discharged. Safia requests she be reached at her person cell number when patient is cleared so she can come to [facility] for an assessment.    Safia Dove RN: [phone].    **1226  [facility] W informed Jessica of current placement options: [facility] with hospice, or home with sister with hospice. Jessica reports she can be reached for assistance if any needs arise.    **1227  Call to patient's sister, Cindy. Per Cindy, she has not stated she can take patient into her home. Placement at her home is not an option.     **1229  Call to KRISTIN Baig.  Informed her patient is anticipated to be cleared in three- four days and should be assessed. Safia reports they will stop by tomorrow, Friday, for evaluation.

## 2023-08-31 NOTE — PROGRESS NOTES
[facility] Internal Medicine Daily Progress Note    Date of Service  8/31/2023    [facility] Team: [employer] IM Orange Team   Attending: Palmira Hodgson M.d.  Senior Resident: Dr. Nicol Singer  Contact Number: [phone]    Chief Complaint  Chief Complaint   Patient presents with    Diarrhea       Hx C. Diff. Onset of diarrhea x 2 days. 5 BM today, odor consistent with C. Diff. Hx stroke, R sided deficits baseline. AAO x 2       Hospital Course  Alireza Hernandez is a 88 y.o. 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 difficile who presented 8/22/23 for non-bloody diarrhea (5+ bowel movements) for two days and confusion. He lives in [facility]. In the ED he was no longer confused and was afebrile without leukocytosis, and he was admitted for evaluation and management of diarrhea. C diff gene and toxin were negative, stool culture was negative, and fecal lactoferrin was elevated. Though UA was suggestive of UTI he was asymptomatic. Likely he experienced a viral enteritis, self limited. Bowel movements reduced spontaneously. 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 Hospice referral was placed    Interval Problem Update  Patient with loose bowel movements overnight. Repeat toxin testing for C diff positive.  Patient seen independently feeding himself at bedside. Patient describes no acute complaints, did not remember why he was admitted to the hospital but was aware of the location, date, and current events. No acute complaints. Patient with retained capacity despite waxing/waning orientation.      I have discussed this patient's plan of care and discharge plan at IDT rounds today with Case Management, Nursing, Nursing leadership, and other members of  the IDT team.    Consultants/Specialty  PMR- Dr. German     Code Status  DNAR/DNI    Disposition  The patient is not medically cleared for discharge to home or a post-acute facility.  Anticipate discharge to: hospice    I have placed the appropriate orders for post-discharge needs.    Review of Systems  Review of Systems   HENT:  Negative for sinus pain and sore throat.    Eyes:  Negative for pain.   Cardiovascular:  Negative for chest pain.   Gastrointestinal:  Negative for abdominal pain and diarrhea.   Genitourinary:  Negative for flank pain.   Musculoskeletal:  Negative for back pain, joint pain, myalgias and neck pain.   Neurological:  Negative for headaches.   Psychiatric/Behavioral:  Positive for depression.         Physical Exam  Temp:  [36.1 °C (97 °F)-36.3 °C (97.3 °F)] 36.3 °C (97.3 °F)  Pulse:  [] 62  Resp:  [16-18] 18  BP: ()/(38-57) 128/57  SpO2:  [92 %-98 %] 98 %    Physical Exam  Vitals and nursing note reviewed.   Constitutional:       General: He is not in acute distress.     Appearance: Normal appearance. He is not ill-appearing.   HENT:      Mouth/Throat:      Mouth: Mucous membranes are moist.   Eyes:      General: No scleral icterus.     Conjunctiva/sclera: Conjunctivae normal.   Pulmonary:      Effort: Pulmonary effort is normal. No respiratory distress.   Genitourinary:     Comments: No tabares  Musculoskeletal:      Cervical back: Neck supple.   Skin:     General: Skin is dry.   Neurological:      Mental Status: He is alert.      Cranial Nerves: Dysarthria and facial asymmetry (Right facial droop) present.      Comments: Moves LUE   Psychiatric:         Attention and Perception: Attention and perception normal.         Mood and Affect: Mood normal. Affect is not angry.         Speech: Speech is slurred.         Behavior: Behavior is not agitated. Behavior is cooperative.         Judgment: Judgment is inappropriate.         Fluids    Intake/Output Summary (Last 24 hours) at  8/31/2023 1034  Last data filed at 8/31/2023 0502  Gross per 24 hour   Intake 520 ml   Output 450 ml   Net 70 ml         Laboratory                            Imaging  DX-CHEST-LIMITED (1 VIEW)   Final Result         Moderate left pleural effusion with left basilar atelectasis.            Assessment/Plan  Problem Representation: 87 YO M admitted 8/22/23 for infectious diarrhea, likely viral, which has resolved.      * Diarrhea of presumed infectious origin- (present on admission)  Assessment & Plan  At admission, more than 5 times a day. C. Diff PCR negative, but toxin positive on repeat 8/31.  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  -Supportive care, encourage hydration  -Supportive hydration as needed for hypotension    Acute encephalopathy- (present on admission)  Assessment & Plan  RESOLVED  Due to infectious etiology    Abnormal urinalysis- (present on admission)  Assessment & Plan  UA + for bacteria, LE and nitrite but patient has no symptoms or signs of UTI. 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 In review of yesterday's note (8/30/2023), there are no changes except as documented above.

## 2023-08-31 NOTE — CARE PLAN
The patient is Stable - Low risk of patient condition declining or worsening    Shift Goals  Clinical Goals: Isolation for C-diff, Q 2hrs tuns, Cardiac Cath pending  Patient Goals: Rest  Family Goals: MARGIE    Progress made toward(s) clinical / shift goals:    Problem: Skin Integrity  Goal: Skin integrity is maintained or improved  Outcome: Progressing  Note: Patient's skin remains intact and reddened sara area. Barrier cream applied and patient tolerates well.     Problem: Fall Risk  Goal: Patient will remain free from falls  Outcome: Progressing  Note: Patient understands use of call light, call light placed within reach and patient's bed is in the lowest position.

## 2023-09-01 PROBLEM — A49.8 CLOSTRIDIOIDES DIFFICILE INFECTION: Status: ACTIVE | Noted: 2023-01-01

## 2023-09-01 NOTE — CARE PLAN
The patient is Stable - Low risk of patient condition declining or worsening    Shift Goals: maintain skin integrity, isolation precautions, comfort  Clinical Goals: Q2 turns, skin care, C. Diff precautions  Patient Goals: rest  Family Goals: MARGIE    Progress made toward(s) clinical / shift goals:  Pt is progressing in goals regarding pain level by remaining pain free during shift.     Patient is not progressing towards the following goals:Pt is not progressing in areas of standard knowledge of care.       Problem: Knowledge Deficit - Standard  Goal: Patient and family/care givers will demonstrate understanding of plan of care, disease process/condition, diagnostic tests and medications  Outcome: Not Met  Note: Pt has not met goal due to being confused and forgetful. Pt is oriented to the equipment and how to communicate to interdisciplinary team members. Pt's level of knowledge is limited with disease process, treatment plan, meds, and diagnostics. Pt is not able to state why they are admitted or understanding of medications. Educated has been given regarding these areas of concern. Pt is A/OX2-3 and remains forgetful. Will continue to assess and educate pm these topics.        Problem: Pain - Standard  Goal: Alleviation of pain or a reduction in pain to the patient’s comfort goal  Outcome: Progressing  Flowsheets (Taken 9/1/2023 1000)  Pain Rating Scale (NPRS): 0  Note: Pt is progressing towards goal by remaining free from pain. Pt states a pain level of 0 on the 0-10 pain scale. No interventions are needed at this time. Will continue to assess.

## 2023-09-01 NOTE — PROGRESS NOTES
Report received from night shift nurse, patient had no new events last night. Patient's bed is in the lowest position with bed alarm on. Patient has call light within reach and RN will continue to monitor BM this shift and administer IV antibiotics.

## 2023-09-01 NOTE — ASSESSMENT & PLAN NOTE
Severe, WBC 15k   Close monitoring  High mortality. No surgical intervention need at this time   Continue vanco  Vitals are improving though

## 2023-09-01 NOTE — DISCHARGE PLANNING
Case Management Discharge Planning    Admission Date: 8/22/2023  GMLOS: 5.2  ALOS: 10    6-Clicks ADL Score: 9  6-Clicks Mobility Score: 7  PT and/or OT Eval ordered: Yes  Post-acute Referrals Ordered: Yes  Post-acute Choice Obtained: Yes  Has referral(s) been sent to post-acute provider:  Yes      Anticipated Discharge Dispo: Discharge Disposition: D/T to hospice home (50)    DME Needed: No    Action(s) Taken: Updated Provider/Nurse on Discharge Plan    Escalations Completed: Pending Discharge Destination    Medically Clear: No    Next Steps: CM will continue to follow for discharge planning needs.     Barriers to Discharge: Medical clearance and Pending Placement, [facility] sending employee to evaluate patient for appropriateness to return to facility, Spoke with [facility] and they do not accept transfers over the weekend.    Is the patient up for discharge tomorrow: No

## 2023-09-01 NOTE — PROGRESS NOTES
Hospital Medicine Daily Progress Note    Date of Service  9/1/2023    Chief Complaint  Alireza Hernandez is a 88 y.o. male admitted 8/22/2023 with diarrhea, confusion     Hospital Course  Alireza Hernandez is a 88 y.o. 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 difficile who presented 8/22/23 for non-bloody diarrhea (5+ bowel movements) for two days and confusion. He lives in [facility]. In the ED he was no longer confused and was afebrile without leukocytosis, and he was admitted for evaluation and management of diarrhea. C diff gene and toxin were negative, stool culture was negative, and fecal lactoferrin was elevated. Though UA was suggestive of UTI he was asymptomatic. Likely he experienced a viral enteritis, self limited. Bowel movements reduced spontaneously. 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 Hospice referral was placed    Interval Problem Update  Pt had almost 8-10 bowel movement yesterday. Only one today in the morning   Heart rate 70, blood pressure 127/68, 96% on room air.  Blood pressure improving from 96/38 2 days prior  Is able to tolerate oral vancomycin  Continue to hold losartan  K3.3, mag 1.6, replaced  WBC increasing 15.4 however patient has positive bowel sounds.  Close monitoring  I asked again about the hospice, and patient stated that he is emotional but he is in peace and he wants hospice.  He understands that he will not be able to come in the morning the hospital if he gets sick.  Wants to focus on quality.  I called his Sister Maria Isabel and I updated about discussion C-diff.  She verbalized gratitude and she also agreed with her brother about hospice  Patient was of sound mind and voluntarily  participated in the conversation.  patient were present for the conversation.  I discussed advance care planning face to face with the patient and family for at least 18 minutes, including diagnosis, prognosis, plan of care, risks and benefits of any therapies that could be offered, as well as alternatives including palliation and hospice, as appropriate.  Forms were completed and placed in the chart.  A hospice consult was placed.      I have discussed this patient's plan of care and discharge plan at IDT rounds today with Case Management, Nursing, Nursing leadership, and other members of the IDT team.    Consultants/Specialty  infectious disease and PMR    Code Status  DNAR/DNI    Disposition  The patient is not medically cleared for discharge to home or a post-acute facility.  Anticipate discharge to: hospice    I have placed the appropriate orders for post-discharge needs.    Review of Systems  Review of Systems   Constitutional:  Positive for malaise/fatigue. Negative for chills and fever.   HENT:  Negative for sore throat.    Respiratory:  Negative for shortness of breath.    Cardiovascular:  Negative for chest pain and palpitations.   Gastrointestinal:  Positive for diarrhea. Negative for abdominal pain, nausea and vomiting.   Genitourinary:  Negative for dysuria and urgency.   Musculoskeletal:  Negative for back pain.   Neurological:  Negative for dizziness and headaches.   Psychiatric/Behavioral:  The patient is not nervous/anxious.         Physical Exam  Temp:  [36.1 °C (97 °F)-36.5 °C (97.7 °F)] 36.1 °C (97 °F)  Pulse:  [66-99] 70  Resp:  [16-20] 18  BP: (113-132)/(47-68) 127/68  SpO2:  [90 %-96 %] 96 %    Physical Exam  Vitals and nursing note reviewed.   Constitutional:       Appearance: He is ill-appearing.      Comments: Frail, cachectic   HENT:      Head: Normocephalic and atraumatic.   Eyes:      General: No scleral icterus.  Cardiovascular:      Rate and Rhythm: Normal rate.      Heart sounds:  Murmur heard.   Pulmonary:      Effort: Pulmonary effort is normal. No respiratory distress.      Breath sounds: No wheezing.   Abdominal:      General: Bowel sounds are normal. There is no distension.      Palpations: Abdomen is soft.      Tenderness: There is no abdominal tenderness.   Musculoskeletal:         General: No swelling.   Skin:     Capillary Refill: Capillary refill takes 2 to 3 seconds.      Coloration: Skin is pale.   Neurological:      Motor: Weakness present.   Psychiatric:         Mood and Affect: Mood normal.         Behavior: Behavior normal.         Thought Content: Thought content normal.         Judgment: Judgment normal.         Fluids    Intake/Output Summary (Last 24 hours) at 9/1/2023 1314  Last data filed at 9/1/2023 0802  Gross per 24 hour   Intake 360 ml   Output 550 ml   Net -190 ml       Laboratory  Recent Labs     09/01/23  0105   WBC 15.4*   RBC 4.52*   HEMOGLOBIN 11.9*   HEMATOCRIT 37.7*   MCV 83.4   MCH 26.3*   MCHC 31.6*   RDW 51.8*   PLATELETCT 305   MPV 9.4     Recent Labs     08/31/23  1035 09/01/23  0105   SODIUM 139 140   POTASSIUM 3.5* 3.3*   CHLORIDE 106 105   CO2 22 25   GLUCOSE 108* 97   BUN 15 17   CREATININE 0.76 0.78   CALCIUM 8.4* 8.3*                   Imaging  DX-CHEST-LIMITED (1 VIEW)   Final Result         Moderate left pleural effusion with left basilar atelectasis.           Assessment/Plan  * Diarrhea of presumed infectious origin- (present on admission)  Assessment & Plan  At admission, more than 5 times a day. C. Diff PCR negative, but toxin positive on repeat 8/31.  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  -Supportive care, encourage hydration  -Supportive hydration as needed for hypotension    Clostridioides difficile infection- (present on admission)  Assessment & Plan  Severe, WBC 15k   Close monitoring  High mortality. No surgical intervention need at this time   Continue vanco  Vitals are improving though     Acute encephalopathy- (present on  admission)  Assessment & Plan  RESOLVED  Due to infectious etiology    Abnormal urinalysis- (present on admission)  Assessment & Plan  UA + for bacteria, LE and nitrite but patient has no symptoms or signs of UTI. Started on ceftriaxone, received one dose then discontinued given asymptomatic and history of c diff  -Monitor for symptoms for need to resume abx (cx was collected prior to abx initiation)      Pressure injury of sacral region, stage 1- (present on admission)  Assessment & Plan  Wound consult  Frequent turns    Leukocytosis- (present on admission)  Assessment & Plan  Due to c-diff. Close monitoring. 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)- (present on admission)  Assessment & Plan  Chronic, unable to ambulate  -Turn every 2 hours   -PT/OT    Hypokalemia- (present on admission)  Assessment & Plan  Due to diarrhea   Mg replaced too     Paroxysmal atrial fibrillation (HCC)- (present on admission)  Assessment & Plan  Rate controlled  Continue Eliquis  Metoprolol 75 mg daily  IV as needed medications    Primary hypertension- (present on admission)  Assessment & Plan  Holding home medications while hypotensive/normotensive in setting of volume loss from diarrhea         VTE prophylaxis:    therapeutic anticoagulation with eliquis 5 mg BID      I have performed a physical exam and reviewed and updated ROS and Plan today (9/1/2023). In review of yesterday's note (8/31/2023), there are no changes except as documented above.

## 2023-09-01 NOTE — THERAPY
"Speech Language Pathology   Daily Treatment     Patient Name: Alireza Hernandez  AGE:  88 y.o., SEX:  male  Medical Record #: [id number]  Date of Service: 9/1/2023      Precautions:  Precautions: Fall Risk       Subjective  Patient received awake, alert, watching television. Reported \"not feeling great.\" Continued frustration with expressive language deficits.       Assessment  Patient seen this date for speech language therapy targeting expressive language deficits. Reviewed and provided education on word-finding strategies (circumlocution, gestures, writing, coming back to the thought) as this is pt's main concern. Pt stated understanding and demo'd implementation of 2/4 strategies (circumlocution and gestures) during unstructured conversation. Provided education on consideration for home health services to further target expressive language, pt stated understanding.       Clinical Impressions  Patient presents with mild-moderate expressive language deficits in setting of previous CVA. Deficits are pt's baseline secondary to residual deficits from previous CVA in 03/23 and 07/23. No further skilled acute speech therapy needs indicated at this time. Please reconsult if any change in status or increased difficulties arise.     Recommendations  Treatment Completed: Speech-Language Treatment  Speech-Language Treatment: Expressive-language         SLP Treatment Plan  Treatment Plan: None Indicated       Anticipated Discharge Needs  Discharge Recommendations: Recommend home health for continued speech therapy services  Therapy Recommendations Upon DC: Expression Training      Patient / Family Goals  Patient / Family Goal #1: \"I'm very frustrated with my language\"  Goal #1 Outcome: Goal met  Short Term Goals  Short Term Goal # 1: Patient will utilize word finding strategies in 80% of opportunities with min cues  Goal Outcome # 1: Goal met      DARSHAN Menjivar  "

## 2023-09-01 NOTE — CARE PLAN
The patient is Stable - Low risk of patient condition declining or worsening    Shift Goals  Clinical Goals: Abx, Skin care, Placement  Patient Goals: Rest, comfort  Family Goals: MARGIE    Progress made toward(s) clinical / shift goals:     Problem: Knowledge Deficit - Standard  Goal: Patient and family/care givers will demonstrate understanding of plan of care, disease process/condition, diagnostic tests and medications    Description  Target End Date: 1-3 days or as soon as patient condition allows     Document in Patient Education   1. Patient and family/caregiver oriented to unit, equipment, visitation policy and means for communicating concern   2. Complete/review Learning Assessment   3. Assess knowledge level of disease process/condition, treatment plan, diagnostic tests and medications   4. Explain disease process/condition, treatment plan, diagnostic tests and medications    Outcome: Progressing  Note: Patient updated on plan of care, and patient care for shift. Patient only able to process simple instructions and explanations. Patient responsive to commands.    Problem: Pain - Standard  Goal: Alleviation of pain or a reduction in pain to the patient’s comfort goal    Description  Target End Date: Prior to discharge or change in level of care     Document on Vitals flowsheet   1. Document pain using the appropriate pain scale per order or unit policy   2. Educate and implement non-pharmacologic comfort measures (i.e. relaxation, distraction, massage, cold/heat therapy, etc.)   3. Pain management medications as ordered   4. Reassess pain after pain med administration per policy   5. If opiods administered assess patient's response to pain medication is appropriate per POSS sedation scale 6. Follow pain management plan developed in collaboration with patient and interdisciplinary team (including palliative care or pain specialists if applicable)    Outcome: Progressing  Note: Pain reports no pain. Continued  pain level assessment and monitoring all throughout shift.    Problem: Skin Integrity  Goal: Skin integrity is maintained or improved    Description  Target End Date: Prior to discharge or change in level of care     Document interventions on Skin Risk/Issa flowsheet groups and corresponding LDA     1. Assess and monitor skin integrity, appearance and/or temperature   2. Assess risk factors for impaired skin integrity and/or pressures ulcers   3. Implement precautions to protect skin integrity in collaboration with interdisciplinary team   4. Implement pressure ulcer prevention protocol if at risk for skin breakdown   5. Confirm wound care consult if at risk for skin breakdown 6. Ensure patient use of pressure relieving devices (Low air loss bed, waffle overlay, heel protectors, ROHO cushion, etc)    Outcome: Progressing  Note: Encouraged turning side to side and notifying staff for help. On turning schedule q2. Extra pillow used for positional support and heel offloading. Barrier cream applied as needed. Patient cleaned with gown and linens changed as appropriate. Heel protectors in place.  Patient on  Moon boots, TAP with wedges, and DAVIN.      Problem: Fall Risk  Goal: Patient will remain free from falls    Description  Target End Date: Prior to discharge or change in level of care     Document interventions on the Татьяна Camargo Fall Risk Assessment   1. Assess for fall risk factors   2. Implement fall precautions    Outcome: Progressing  Note: Side rails up. Call button within reach. Rounded on patient frequently. On two person assists.

## 2023-09-02 PROBLEM — A04.72 C. DIFFICILE COLITIS: Status: ACTIVE | Noted: 2023-01-01

## 2023-09-02 NOTE — CARE PLAN
The patient is Stable - Low risk of patient condition declining or worsening    Shift Goals  Clinical Goals: IV antibiotics, Q2hrs turn, skin care  Patient Goals: comfort  Family Goals: MARGIE    Progress made toward(s) clinical / shift goals:    Problem: Skin Integrity  Goal: Skin integrity is maintained or improved  Outcome: Progressing  Note: Patient is Q 2 hrs turn with nurses assistance  Moisture barrier cream applied to reddened scrotum during brief changes  Patient skin remains intact this shift.     Problem: Fall Risk  Goal: Patient will remain free from falls  Outcome: Progressing  Note: Patient reminded on use of call light and verbalized understanding.  Patient will call nurse to request for assistance with sitting up in bed or transfers.   [place] in to evaluate patient this shift.

## 2023-09-02 NOTE — PROGRESS NOTES
Report received from night shift RN. Pt is sleeping in bed, all needs are met at this time. Call light within reach, bed locked/lowered, alarm activated.

## 2023-09-02 NOTE — THERAPY
Physical Therapy   Daily Treatment     Patient Name: Alireza Hernandez  Age:  88 y.o., Sex:  male  Medical Record #: [id number]  Today's Date: 9/2/2023     Precautions  Precautions: (P) Fall Risk    Assessment    Pt tolerated session fairly. He required requires generalized MAX A for mobility however does initiate and participate. Pt requires MAX A for supine to sit. Pt performed 6 sit to stands with MAX A. He maintained standing balance for 15-30s per bout with continued MAX A due to significant R lateral trunk lean. Pt requested to return to bed. Upright sitting and standing time close to 20 minutes. Sit to supine with MAX A.     Continue to recommend post acute placement pending medical and physical progress.     Plan    Treatment Plan Status: (P) Continue Current Treatment Plan  Type of Treatment: Bed Mobility, Manual Therapy, Gait Training, Therapeutic Activities, Therapeutic Exercise, Neuro Re-Education / Balance, Self Care / Home Evaluation  Treatment Frequency: 3 Times per Week  Treatment Duration: Until Therapy Goals Met    DC Equipment Recommendations: (P) Unable to determine at this time  Discharge Recommendations: (P) Recommend post-acute placement for additional physical therapy services prior to discharge home      Subjective    Pt is awake and alert during session     Objective     09/02/23 1155   Precautions   Precautions Fall Risk   Pain   Intervention Declines   Balance   Sitting Balance (Static) Fair -   Sitting Balance (Dynamic) Poor +   Standing Balance (Static) Poor   Standing Balance (Dynamic) Dependent   Weight Shift Sitting Poor   Weight Shift Standing Poor   Bed Mobility    Supine to Sit Maximal Assist   Gait Analysis   Gait Level Of Assist Unable to Participate   Functional Mobility   Sit to Stand Maximal Assist   How much difficulty does the patient currently have...   Turning over in bed (including adjusting bedclothes, sheets and blankets)? 1   Sitting down on and standing up from a chair  with arms (e.g., wheelchair, bedside commode, etc.) 1   Moving from lying on back to sitting on the side of the bed? 1   How much help from another person does the patient currently need...   Moving to and from a bed to a chair (including a wheelchair)? 1   Need to walk in a hospital room? 1   Climbing 3-5 steps with a railing? 1   6 clicks Mobility Score 6   Short Term Goals    Short Term Goal # 1 pt will move supine<>eob with min a in 6 tx for bed mobility.   Goal Outcome # 1 Progressing slower than expected   Short Term Goal # 2 pt will complete spt with fww and mod a in 6 tx for functional mobility.   Goal Outcome # 2 Progressing slower than expected   Short Term Goal # 3 pt will propel self 150 ft in wc with min a in 6 tx for household distances.   Goal Outcome # 3 Goal not met   Education Group   Role of Physical Therapist Patient Response Patient;Acceptance;Explanation;Verbal Demonstration   Physical Therapy Treatment Plan   Physical Therapy Treatment Plan Continue Current Treatment Plan   Anticipated Discharge Equipment and Recommendations   DC Equipment Recommendations Unable to determine at this time   Discharge Recommendations Recommend post-acute placement for additional physical therapy services prior to discharge home   Interdisciplinary Plan of Care Collaboration   Patient Position at End of Therapy In Bed;Bed Alarm On;Phone within Reach;Tray Table within Reach;Call Light within Reach   Session Information   Date / Session Number  9/2-4 (2/3, 9/5)

## 2023-09-02 NOTE — CARE PLAN
The patient is Stable - Low risk of patient condition declining or worsening    Shift Goals  Clinical Goals: Abx, Q2 turns, Skin care  Patient Goals: Comfort  Family Goals: MARGIE    Progress made toward(s) clinical / shift goals:     Problem: Knowledge Deficit - Standard  Goal: Patient and family/care givers will demonstrate understanding of plan of care, disease process/condition, diagnostic tests and medications     Description  Target End Date: 1-3 days or as soon as patient condition allows      Document in Patient Education   1. Patient and family/caregiver oriented to unit, equipment, visitation policy and means for communicating concern   2. Complete/review Learning Assessment   3. Assess knowledge level of disease process/condition, treatment plan, diagnostic tests and medications   4. Explain disease process/condition, treatment plan, diagnostic tests and medications     Outcome: Progressing  Note: Patient updated on plan of care, and patient care for shift. Patient only able to process simple instructions and explanations. Patient responsive to commands.     Problem: Pain - Standard  Goal: Alleviation of pain or a reduction in pain to the patient’s comfort goal     Description  Target End Date: Prior to discharge or change in level of care      Document on Vitals flowsheet   1. Document pain using the appropriate pain scale per order or unit policy   2. Educate and implement non-pharmacologic comfort measures (i.e. relaxation, distraction, massage, cold/heat therapy, etc.)   3. Pain management medications as ordered   4. Reassess pain after pain med administration per policy   5. If opiods administered assess patient's response to pain medication is appropriate per POSS sedation scale 6. Follow pain management plan developed in collaboration with patient and interdisciplinary team (including palliative care or pain specialists if applicable)     Outcome: Progressing  Note: Pain reports no pain. Continued  pain level assessment and monitoring all throughout shift.     Problem: Skin Integrity  Goal: Skin integrity is maintained or improved     Description  Target End Date: Prior to discharge or change in level of care      Document interventions on Skin Risk/Issa flowsheet groups and corresponding LDA      1. Assess and monitor skin integrity, appearance and/or temperature   2. Assess risk factors for impaired skin integrity and/or pressures ulcers   3. Implement precautions to protect skin integrity in collaboration with interdisciplinary team   4. Implement pressure ulcer prevention protocol if at risk for skin breakdown   5. Confirm wound care consult if at risk for skin breakdown 6. Ensure patient use of pressure relieving devices (Low air loss bed, waffle overlay, heel protectors, ROHO cushion, etc)     Outcome: Progressing  Note: Encouraged turning side to side and notifying staff for help. On turning schedule q2. Waffle mattress in place. Extra pillow used for positional support and heel offloading. Barrier cream applied as needed. Patient cleaned with gown and linens changed as appropriate. Heel protectors in place. Maria boots, TAP and DAVIN bed requested.      Problem: Fall Risk  Goal: Patient will remain free from falls     Description  Target End Date: Prior to discharge or change in level of care      Document interventions on the Vazdeborah Camargo Fall Risk Assessment   1. Assess for fall risk factors   2. Implement fall precautions     Outcome: Progressing  Note: Side rails up. Call button within reach. Rounded on patient frequently. On two person assists.

## 2023-09-02 NOTE — PROGRESS NOTES
Hospital Medicine Daily Progress Note    Date of Service  9/2/2023    Chief Complaint  Alireza Hernandez is a 88 y.o. male admitted 8/22/2023 with diarrhea, confusion     Hospital Course  Alireza Hernandez is a 88 y.o. 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 difficile who presented 8/22/23 for non-bloody diarrhea (5+ bowel movements) for two days and confusion. He lives in [facility]. In the ED he was no longer confused and was afebrile without leukocytosis, and he was admitted for evaluation and management of diarrhea. C diff gene and toxin were negative, stool culture was negative, and fecal lactoferrin was elevated. Though UA was suggestive of UTI he was asymptomatic. Likely he experienced a viral enteritis, self limited. Bowel movements reduced spontaneously. 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 Hospice referral was placed    Interval Problem Update  Pt had ~3 bowel movements yesterday. Only one today in the morning   Patient maintains normotension after fluid resuscitation.  Is able to tolerate oral vancomycin  Continue to hold losartan  WBC count decreasing with treatment    I have discussed this patient's plan of care and discharge plan at IDT rounds today with Case Management, Nursing, Nursing leadership, and other members of the IDT team.    Consultants/Specialty  infectious disease and PMR    Code Status  DNAR/DNI    Disposition  The patient is not medically cleared for discharge to home or a post-acute facility.  Anticipate discharge to: hospice    I have placed the appropriate orders for post-discharge needs.    Review of Systems  Review of Systems   Constitutional:  Positive for malaise/fatigue. Negative for  chills and fever.   HENT:  Negative for sore throat.    Respiratory:  Negative for shortness of breath.    Cardiovascular:  Negative for chest pain and palpitations.   Gastrointestinal:  Negative for abdominal pain, diarrhea, nausea and vomiting.   Genitourinary:  Negative for dysuria and urgency.   Musculoskeletal:  Negative for back pain.   Neurological:  Negative for dizziness and headaches.   Psychiatric/Behavioral:  The patient is not nervous/anxious.         Physical Exam  Temp:  [36.1 °C (97 °F)-36.8 °C (98.3 °F)] 36.4 °C (97.6 °F)  Pulse:  [63-82] 63  Resp:  [17-18] 18  BP: ()/(52-76) 138/76  SpO2:  [93 %-97 %] 95 %    Physical Exam  Vitals and nursing note reviewed.   Constitutional:       Appearance: He is ill-appearing.      Comments: Frail, cachectic   HENT:      Head: Normocephalic and atraumatic.      Mouth/Throat:      Mouth: Mucous membranes are moist.      Pharynx: Oropharynx is clear.   Eyes:      General: No scleral icterus.  Cardiovascular:      Rate and Rhythm: Normal rate and regular rhythm.      Heart sounds: Murmur heard.   Pulmonary:      Effort: Pulmonary effort is normal. No respiratory distress.      Breath sounds: No wheezing.   Abdominal:      General: Bowel sounds are normal. There is no distension.      Palpations: Abdomen is soft.      Tenderness: There is no abdominal tenderness.   Musculoskeletal:         General: No swelling.   Skin:     Capillary Refill: Capillary refill takes 2 to 3 seconds.      Coloration: Skin is pale.   Neurological:      Mental Status: He is alert. He is disoriented.      Motor: Weakness present.   Psychiatric:         Mood and Affect: Mood normal.         Behavior: Behavior normal.         Thought Content: Thought content normal.         Judgment: Judgment normal.         Fluids    Intake/Output Summary (Last 24 hours) at 9/2/2023 1457  Last data filed at 9/2/2023 1200  Gross per 24 hour   Intake 840 ml   Output 950 ml   Net -110 ml          Laboratory  Recent Labs     09/01/23  0105 09/02/23  0100   WBC 15.4* 6.8   RBC 4.52* 4.54*   HEMOGLOBIN 11.9* 11.9*   HEMATOCRIT 37.7* 38.0*   MCV 83.4 83.7   MCH 26.3* 26.2*   MCHC 31.6* 31.3*   RDW 51.8* 51.9*   PLATELETCT 305 289   MPV 9.4 9.6       Recent Labs     08/31/23  1035 09/01/23  0105 09/02/23  0100   SODIUM 139 140 140   POTASSIUM 3.5* 3.3* 3.6   CHLORIDE 106 105 106   CO2 22 25 23   GLUCOSE 108* 97 82   BUN 15 17 15   CREATININE 0.76 0.78 0.78   CALCIUM 8.4* 8.3* 8.1*                     Imaging  DX-CHEST-LIMITED (1 VIEW)   Final Result         Moderate left pleural effusion with left basilar atelectasis.             Assessment/Plan  * C. difficile colitis- (present on admission)  Assessment & Plan  At admission, more than 5 times a day. C. Diff PCR negative, but toxin positive on repeat 8/31.  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  -Supportive care, encourage hydration  -Supportive hydration as needed for hypotension  -Cleared for discharge if diarrhea is controlled; <3 BM's in 24 hours. Patient will continue oral vanc on outpatient basis under current taper. May continue taking vancomycin oral every other day indefinitely    Clostridioides difficile infection- (present on admission)  Assessment & Plan  Severe, WBC 15k   Close monitoring  High mortality. No surgical intervention need at this time   Continue vanco  Vitals are improving though     Acute encephalopathy- (present on admission)  Assessment & Plan  RESOLVED  Due to infectious etiology    Abnormal urinalysis- (present on admission)  Assessment & Plan  UA + for bacteria, LE and nitrite but patient has no symptoms or signs of UTI. Started on ceftriaxone, received one dose then discontinued given asymptomatic and history of c diff  -Monitor for symptoms for need to resume abx (cx was collected prior to abx initiation)      Pressure injury of sacral region, stage 1- (present on admission)  Assessment & Plan  Wound  consult  Frequent turns    Leukocytosis- (present on admission)  Assessment & Plan  Due to c-diff. Close monitoring. 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)- (present on admission)  Assessment & Plan  Chronic, unable to ambulate  -Turn every 2 hours   -PT/OT    Hypokalemia- (present on admission)  Assessment & Plan  Due to diarrhea   Mg replaced too     Paroxysmal atrial fibrillation (HCC)- (present on admission)  Assessment & Plan  Rate controlled  Continue Eliquis  Metoprolol 75 mg daily  IV as needed medications    Primary hypertension- (present on admission)  Assessment & Plan  Holding home medications while hypotensive/normotensive in setting of volume loss from diarrhea         VTE prophylaxis:    therapeutic anticoagulation with eliquis 5 mg BID      I have performed a physical exam and reviewed and updated ROS and Plan today (9/2/2023). In review of yesterday's note (9/1/2023), there are no changes except as documented above.

## 2023-09-03 NOTE — CARE PLAN
The patient is Stable - Low risk of patient condition declining or worsening    Shift Goals  Clinical Goals: Antibiotics, Q 2hrs turns, skin care, Placement  Patient Goals: Rest, comfort  Family Goals: MARGIE    Progress made toward(s) clinical / shift goals:    Problem: Pain - Standard  Goal: Alleviation of pain or a reduction in pain to the patient’s comfort goal  Outcome: Progressing   Patient declines pain this shift.  Education provided for patient to report pain before worsening and patient verbalized understanding.  Problem: Skin Integrity  Goal: Skin integrity is maintained or improved  Outcome: Progressing   Patient skin remain open to air, moisture barrier creams applied. Chest continues with dry scabs and buttocks area is noted with redness and drying scabs. Patient turned Q 2 hrs and tolerates well.

## 2023-09-03 NOTE — CARE PLAN
The patient is Stable - Low risk of patient condition declining or worsening    Shift Goals  Clinical Goals: Abx, Q2 turns, Skin care, Placement  Patient Goals: Rest, Comfort  Family Goals: MARGIE    Progress made toward(s) clinical / shift goals:     Problem: Knowledge Deficit - Standard  Goal: Patient and family/care givers will demonstrate understanding of plan of care, disease process/condition, diagnostic tests and medications     Description  Target End Date: 1-3 days or as soon as patient condition allows      Document in Patient Education   1. Patient and family/caregiver oriented to unit, equipment, visitation policy and means for communicating concern   2. Complete/review Learning Assessment   3. Assess knowledge level of disease process/condition, treatment plan, diagnostic tests and medications   4. Explain disease process/condition, treatment plan, diagnostic tests and medications     Outcome: Progressing  Note: Patient updated on plan of care, and patient care for shift. Patient only able to process simple instructions and explanations. Patient responsive to commands.     Problem: Pain - Standard  Goal: Alleviation of pain or a reduction in pain to the patient’s comfort goal     Description  Target End Date: Prior to discharge or change in level of care      Document on Vitals flowsheet   1. Document pain using the appropriate pain scale per order or unit policy   2. Educate and implement non-pharmacologic comfort measures (i.e. relaxation, distraction, massage, cold/heat therapy, etc.)   3. Pain management medications as ordered   4. Reassess pain after pain med administration per policy   5. If opiods administered assess patient's response to pain medication is appropriate per POSS sedation scale 6. Follow pain management plan developed in collaboration with patient and interdisciplinary team (including palliative care or pain specialists if applicable)     Outcome: Progressing  Note: Pain reports PS  5/10 on mouth. Scheduled Oxycodone 2.5 mg PO given. Patient reports relief form pain with medication. Continued pain level assessment and monitoring all throughout shift.     Problem: Skin Integrity  Goal: Skin integrity is maintained or improved     Description  Target End Date: Prior to discharge or change in level of care      Document interventions on Skin Risk/Issa flowsheet groups and corresponding LDA      1. Assess and monitor skin integrity, appearance and/or temperature   2. Assess risk factors for impaired skin integrity and/or pressures ulcers   3. Implement precautions to protect skin integrity in collaboration with interdisciplinary team   4. Implement pressure ulcer prevention protocol if at risk for skin breakdown   5. Confirm wound care consult if at risk for skin breakdown 6. Ensure patient use of pressure relieving devices (Low air loss bed, waffle overlay, heel protectors, ROHO cushion, etc)     Outcome: Progressing  Note: Encouraged turning side to side and notifying staff for help. On turning schedule q2. Extra pillow used for positional support and heel offloading. Barrier cream applied as needed. Patient cleaned with gown and linens changed as appropriate. Heel protectors in place. Patient on  Moon boots, TAP with wedges, and DAVIN.      Problem: Fall Risk  Goal: Patient will remain free from falls     Description  Target End Date: Prior to discharge or change in level of care      Document interventions on the Vaz Raman Fall Risk Assessment   1. Assess for fall risk factors   2. Implement fall precautions     Outcome: Progressing  Note: Side rails up. Call button within reach. Rounded on patient frequently. On two person assists.

## 2023-09-03 NOTE — PROGRESS NOTES
Hospital Medicine Daily Progress Note    Date of Service  9/3/2023    Chief Complaint  Alireza Hernandez is a 88 y.o. male admitted 8/22/2023 with diarrhea, confusion     Hospital Course  Alireza Hernandez is a 88 y.o. 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 difficile who presented 8/22/23 for non-bloody diarrhea (5+ bowel movements) for two days and confusion. He lives in [facility]. In the ED he was no longer confused and was afebrile without leukocytosis, and he was admitted for evaluation and management of diarrhea. C diff gene positive but toxin was negative, stool culture was negative, and fecal lactoferrin was elevated. Bowel movements initially reduced spontaneously, but recurred 7-10 BM's per day. Repeat C diff toxin testing was positive. Bowel movements reduced after treatment with pulsed oral vancomycin. Patient initially indecisive but is accepting of placement back at [facility] for Hospice. Hospice referral was placed    Interval Problem Update  Pt had ~1 bowel movement in 24 hours  Patient maintains normotension after fluid resuscitation.  Is able to tolerate oral vancomycin  Continue to hold losartan  WBC count decreasing with treatment    I have discussed this patient's plan of care and discharge plan at IDT rounds today with Case Management, Nursing, Nursing leadership, and other members of the IDT team.    Consultants/Specialty  infectious disease and PMR  and Hospice    Code Status  DNAR/DNI    Disposition  The patient is medically cleared for discharge to home or a post-acute facility.  Anticipate discharge to: hospice    I have placed the appropriate orders for post-discharge needs.    Review of Systems  Review of Systems   Constitutional:  Positive for malaise/fatigue. Negative for chills and fever.   HENT:  Negative for sore  throat.    Respiratory:  Negative for shortness of breath.    Cardiovascular:  Negative for chest pain and palpitations.   Gastrointestinal:  Negative for abdominal pain, diarrhea, nausea and vomiting.   Genitourinary:  Negative for dysuria and urgency.   Musculoskeletal:  Negative for back pain.   Neurological:  Negative for dizziness and headaches.   Psychiatric/Behavioral:  The patient is not nervous/anxious.         Physical Exam  Temp:  [36.5 °C (97.7 °F)-36.7 °C (98 °F)] 36.6 °C (97.9 °F)  Pulse:  [50-76] 71  Resp:  [17-19] 18  BP: (114-139)/(53-88) 128/78  SpO2:  [92 %-97 %] 96 %    Physical Exam  Vitals and nursing note reviewed.   Constitutional:       Appearance: He is ill-appearing.      Comments: Frail, cachectic   HENT:      Head: Normocephalic and atraumatic.      Mouth/Throat:      Mouth: Mucous membranes are moist.      Pharynx: Oropharynx is clear.   Eyes:      General: No scleral icterus.  Cardiovascular:      Rate and Rhythm: Normal rate and regular rhythm.      Heart sounds: Murmur heard.   Pulmonary:      Effort: Pulmonary effort is normal. No respiratory distress.      Breath sounds: No wheezing.   Abdominal:      General: Bowel sounds are normal. There is no distension.      Palpations: Abdomen is soft.      Tenderness: There is no abdominal tenderness.   Musculoskeletal:         General: No swelling.   Skin:     Capillary Refill: Capillary refill takes 2 to 3 seconds.      Coloration: Skin is pale.   Neurological:      Mental Status: He is alert. He is disoriented.      Motor: Weakness present.   Psychiatric:         Mood and Affect: Mood normal.         Behavior: Behavior normal.         Thought Content: Thought content normal.         Judgment: Judgment normal.         Fluids    Intake/Output Summary (Last 24 hours) at 9/3/2023 1553  Last data filed at 9/3/2023 1300  Gross per 24 hour   Intake 600 ml   Output 1000 ml   Net -400 ml         Laboratory  Recent Labs     09/01/23  0105  09/02/23 0100 09/03/23  0336   WBC 15.4* 6.8 6.7   RBC 4.52* 4.54* 4.61*   HEMOGLOBIN 11.9* 11.9* 12.3*   HEMATOCRIT 37.7* 38.0* 38.6*   MCV 83.4 83.7 83.7   MCH 26.3* 26.2* 26.7*   MCHC 31.6* 31.3* 31.9*   RDW 51.8* 51.9* 52.1*   PLATELETCT 305 289 270   MPV 9.4 9.6 10.6       Recent Labs     09/01/23 0105 09/02/23 0100 09/03/23 0336   SODIUM 140 140 141   POTASSIUM 3.3* 3.6 3.8   CHLORIDE 105 106 108   CO2 25 23 24   GLUCOSE 97 82 87   BUN 17 15 12   CREATININE 0.78 0.78 0.73   CALCIUM 8.3* 8.1* 8.2*                     Imaging  DX-CHEST-LIMITED (1 VIEW)   Final Result         Moderate left pleural effusion with left basilar atelectasis.             Assessment/Plan  * C. difficile colitis- (present on admission)  Assessment & Plan  At admission, more than 5 times a day. C. Diff PCR negative, but toxin positive on repeat 8/31.  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  -Supportive care, encourage hydration  -Supportive hydration as needed for hypotension  -Cleared for discharge if diarrhea is controlled; <3 BM's in 24 hours. Patient will continue oral vanc on outpatient basis under current taper. May continue taking vancomycin oral every other day indefinitely    Clostridioides difficile infection- (present on admission)  Assessment & Plan  Severe, WBC 15k   Close monitoring  High mortality. No surgical intervention need at this time   Continue vanco  Vitals are improving though     Acute encephalopathy- (present on admission)  Assessment & Plan  RESOLVED  Due to infectious etiology    Abnormal urinalysis- (present on admission)  Assessment & Plan  UA + for bacteria, LE and nitrite but patient has no symptoms or signs of UTI. Started on ceftriaxone, received one dose then discontinued given asymptomatic and history of c diff  -Monitor for symptoms for need to resume abx (cx was collected prior to abx initiation)      Pressure injury of sacral region, stage 1- (present on admission)  Assessment &  Plan  Wound consult  Frequent turns    Leukocytosis- (present on admission)  Assessment & Plan  Due to c-diff. Close monitoring. 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)- (present on admission)  Assessment & Plan  Chronic, unable to ambulate  -Turn every 2 hours   -PT/OT    Hypokalemia- (present on admission)  Assessment & Plan  Due to diarrhea   Mg replaced too     Paroxysmal atrial fibrillation (HCC)- (present on admission)  Assessment & Plan  Rate controlled  Continue Eliquis  Metoprolol 75 mg daily  IV as needed medications    Primary hypertension- (present on admission)  Assessment & Plan  Holding home medications while hypotensive/normotensive in setting of volume loss from diarrhea         VTE prophylaxis:    therapeutic anticoagulation with eliquis 2.5 mg BID      I have performed a physical exam and reviewed and updated ROS and Plan today (9/3/2023). In review of yesterday's note (9/2/2023), there are no changes except as documented above.

## 2023-09-04 PROBLEM — E87.6 HYPOKALEMIA: Status: RESOLVED | Noted: 2023-01-01 | Resolved: 2023-01-01

## 2023-09-04 PROBLEM — R82.90 ABNORMAL URINALYSIS: Status: RESOLVED | Noted: 2023-01-01 | Resolved: 2023-01-01

## 2023-09-04 PROBLEM — A49.8 CLOSTRIDIOIDES DIFFICILE INFECTION: Status: RESOLVED | Noted: 2023-01-01 | Resolved: 2023-01-01

## 2023-09-04 PROBLEM — A04.72 C. DIFFICILE COLITIS: Status: RESOLVED | Noted: 2023-01-01 | Resolved: 2023-01-01

## 2023-09-04 PROBLEM — D72.829 LEUKOCYTOSIS: Status: RESOLVED | Noted: 2023-01-01 | Resolved: 2023-01-01

## 2023-09-04 PROBLEM — G93.40 ACUTE ENCEPHALOPATHY: Status: RESOLVED | Noted: 2023-01-01 | Resolved: 2023-01-01

## 2023-09-04 NOTE — DISCHARGE PLANNING
Agency/Facility Name: [facility]  Spoke To: Adrien  Outcome: Transport is scheduled for today, 9/04/23 at 2423-1801 going to:  [facility]   [address] [place]  [address]      Reservation #[id number]    Quote is for $348.14    @1033  MELISA Chu was notified of the transport time via [facility] via Teams and Voalte.  DPA asked for confirmation of transport address, face sheet and transport form are two different addresses.    Per Ivana Suarez address is correct.      @1038  Agency/Facility Name: [facility]  Spoke To: Lobito  Outcome: DPA called [facility] to correct the transport address.  Pt is transport to:  [facility]  [address]  [address]  [place]  [address]    @1045  MELISA Chu has been notified of address correction via Voalte and Teams.

## 2023-09-04 NOTE — CARE PLAN
The patient is Stable - Low risk of patient condition declining or worsening    Shift Goals  Clinical Goals: PO ABX, Discharge safey back to [facility]  Patient Goals: To go home  Family Goals: NA    Progress made toward(s) clinical / shift goals:      Problem: Pain - Standard  Goal: Alleviation of pain or a reduction in pain to the patient’s comfort goal  Description: Target End Date:  Prior to discharge or change in level of care    Document on Vitals flowsheet    1.  Document pain using the appropriate pain scale per order or unit policy  2.  Educate and implement non-pharmacologic comfort measures (i.e. relaxation, distraction, massage, cold/heat therapy, etc.)  3.  Pain management medications as ordered  4.  Reassess pain after pain med administration per policy  5.  If opiods administered assess patient's response to pain medication is appropriate per POSS sedation scale  6.  Follow pain management plan developed in collaboration with patient and interdisciplinary team (including palliative care or pain specialists if applicable)  Outcome: Met     Problem: Knowledge Deficit - Standard  Goal: Patient and family/care givers will demonstrate understanding of plan of care, disease process/condition, diagnostic tests and medications  Description: Target End Date:  1-3 days or as soon as patient condition allows    Document in Patient Education    1.  Patient and family/caregiver oriented to unit, equipment, visitation policy and means for communicating concern  2.  Complete/review Learning Assessment  3.  Assess knowledge level of disease process/condition, treatment plan, diagnostic tests and medications  4.  Explain disease process/condition, treatment plan, diagnostic tests and medications  Outcome: Met     Problem: Skin Integrity  Goal: Skin integrity is maintained or improved  Description: Target End Date:  Prior to discharge or change in level of care    Document interventions on Skin Risk/Issa flowsheet  groups and corresponding LDA    1.  Assess and monitor skin integrity, appearance and/or temperature  2.  Assess risk factors for impaired skin integrity and/or pressures ulcers  3.  Implement precautions to protect skin integrity in collaboration with interdisciplinary team  4.  Implement pressure ulcer prevention protocol if at risk for skin breakdown  5.  Confirm wound care consult if at risk for skin breakdown  6.  Ensure patient use of pressure relieving devices  (Low air loss bed, waffle overlay, heel protectors, ROHO cushion, etc)  Outcome: Met     Problem: Fall Risk  Goal: Patient will remain free from falls  Description: Target End Date:  Prior to discharge or change in level of care    Document interventions on the [facility] Fall Risk Assessment    1.  Assess for fall risk factors  2.  Implement fall precautions  Outcome: Met       Patient is not progressing towards the following goals:

## 2023-09-04 NOTE — PROGRESS NOTES
Patient discussed with Med Lange RN. Patient admitting to community hospice today with diagnosis of CVA. Patient to continue on oral vancomycin for C-Diff, titrating doses, metoprolol and Eliquis for atrial fibrillation, and will continue on flomax and proscar for comfort r/t BPH. Comfort medications ordered.

## 2023-09-04 NOTE — PROGRESS NOTES
Pt to discharge back to [facility]. Pt is aware of plan and agreeable. Transport set for between [id number]. Report called to Clarisa at [facility]. PIV removed. All personal possessions accounted for.

## 2024-04-19 NOTE — CARE PLAN
The patient is Stable - Low risk of patient condition declining or worsening    Shift Goals  Clinical Goals: infection prevention, wound care, stool sample  Patient Goals: feel better  Family Goals: none present    Progress made toward(s) clinical / shift goals:    Problem: Pain - Standard  Goal: Alleviation of pain or a reduction in pain to the patient’s comfort goal  Outcome: Progressing     Problem: Skin Integrity  Goal: Skin integrity is maintained or improved  Outcome: Progressing       Patient is not progressing towards the following goals:       Warm